# Patient Record
Sex: FEMALE | Race: WHITE | NOT HISPANIC OR LATINO | Employment: OTHER | URBAN - METROPOLITAN AREA
[De-identification: names, ages, dates, MRNs, and addresses within clinical notes are randomized per-mention and may not be internally consistent; named-entity substitution may affect disease eponyms.]

---

## 2017-03-14 DIAGNOSIS — I10 HYPERTENSION GOAL BP (BLOOD PRESSURE) < 140/90: ICD-10-CM

## 2017-03-14 NOTE — LETTER
[facility]                                           [address] [place]  [address]    March 16, 2017    Orquidea Stevens  [address] [address]    Dear Orquidea,       We recently received a refill request for enalapril (VASOTEC) 20 MG tablet.  We have refilled this for a one time 30 day supply only because you are due for a:    Blood pressure check with Dr. Amos and fasting lab appointment      Please schedule this lab appointment 4-5 days prior to the office visit.     Please call at your earliest convenience so that there will not be a delay with your future refills.          Thank you,   Your [facility]/as  [phone]

## 2017-03-15 RX ORDER — ENALAPRIL MALEATE 20 MG/1
20 TABLET ORAL 2 TIMES DAILY
Qty: 60 TABLET | Refills: 0 | Status: SHIPPED | OUTPATIENT
Start: 2017-03-15 | End: 2017-05-09

## 2017-05-03 ENCOUNTER — DOCUMENTATION ONLY (OUTPATIENT)
Dept: LAB | Facility: CLINIC | Age: 77
End: 2017-05-03

## 2017-05-03 DIAGNOSIS — E11.9 TYPE 2 DIABETES MELLITUS WITHOUT COMPLICATION, WITHOUT LONG-TERM CURRENT USE OF INSULIN (H): ICD-10-CM

## 2017-05-03 DIAGNOSIS — I10 HYPERTENSION GOAL BP (BLOOD PRESSURE) < 140/90: ICD-10-CM

## 2017-05-03 DIAGNOSIS — E78.5 HYPERLIPIDEMIA LDL GOAL <100: Primary | ICD-10-CM

## 2017-05-05 DIAGNOSIS — E11.9 TYPE 2 DIABETES MELLITUS WITHOUT COMPLICATION, WITHOUT LONG-TERM CURRENT USE OF INSULIN (H): ICD-10-CM

## 2017-05-05 DIAGNOSIS — E78.5 HYPERLIPIDEMIA LDL GOAL <100: ICD-10-CM

## 2017-05-05 DIAGNOSIS — I10 HYPERTENSION GOAL BP (BLOOD PRESSURE) < 140/90: ICD-10-CM

## 2017-05-05 LAB
ANION GAP SERPL CALCULATED.3IONS-SCNC: 7 MMOL/L (ref 3–14)
BUN SERPL-MCNC: 13 MG/DL (ref 7–30)
CALCIUM SERPL-MCNC: 9.4 MG/DL (ref 8.5–10.1)
CHLORIDE SERPL-SCNC: 104 MMOL/L (ref 94–109)
CHOLEST SERPL-MCNC: 141 MG/DL
CO2 SERPL-SCNC: 30 MMOL/L (ref 20–32)
CREAT SERPL-MCNC: 0.64 MG/DL (ref 0.52–1.04)
CREAT UR-MCNC: 183 MG/DL
GFR SERPL CREATININE-BSD FRML MDRD: ABNORMAL ML/MIN/1.7M2
GLUCOSE SERPL-MCNC: 134 MG/DL (ref 70–99)
HBA1C MFR BLD: 6.7 % (ref 4.3–6)
HDLC SERPL-MCNC: 67 MG/DL
LDLC SERPL CALC-MCNC: 51 MG/DL
MICROALBUMIN UR-MCNC: 16 MG/L
MICROALBUMIN/CREAT UR: 8.69 MG/G CR (ref 0–25)
NONHDLC SERPL-MCNC: 74 MG/DL
POTASSIUM SERPL-SCNC: 3.7 MMOL/L (ref 3.4–5.3)
SODIUM SERPL-SCNC: 141 MMOL/L (ref 133–144)
TRIGL SERPL-MCNC: 116 MG/DL

## 2017-05-05 PROCEDURE — 36415 COLL VENOUS BLD VENIPUNCTURE: CPT | Performed by: FAMILY MEDICINE

## 2017-05-05 PROCEDURE — 82043 UR ALBUMIN QUANTITATIVE: CPT | Performed by: FAMILY MEDICINE

## 2017-05-05 PROCEDURE — 80048 BASIC METABOLIC PNL TOTAL CA: CPT | Performed by: FAMILY MEDICINE

## 2017-05-05 PROCEDURE — 83036 HEMOGLOBIN GLYCOSYLATED A1C: CPT | Performed by: FAMILY MEDICINE

## 2017-05-05 PROCEDURE — 80061 LIPID PANEL: CPT | Performed by: FAMILY MEDICINE

## 2017-05-12 ENCOUNTER — OFFICE VISIT (OUTPATIENT)
Dept: FAMILY MEDICINE | Facility: CLINIC | Age: 77
End: 2017-05-12
Payer: COMMERCIAL

## 2017-05-12 VITALS
BODY MASS INDEX: 24.27 KG/M2 | WEIGHT: 137 LBS | TEMPERATURE: 98.2 F | SYSTOLIC BLOOD PRESSURE: 136 MMHG | DIASTOLIC BLOOD PRESSURE: 60 MMHG | OXYGEN SATURATION: 98 % | HEART RATE: 72 BPM | HEIGHT: 63 IN

## 2017-05-12 DIAGNOSIS — E78.5 HYPERLIPIDEMIA LDL GOAL <100: ICD-10-CM

## 2017-05-12 DIAGNOSIS — G25.81 RESTLESS LEG SYNDROME: ICD-10-CM

## 2017-05-12 DIAGNOSIS — E78.1 HYPERTRIGLYCERIDEMIA: ICD-10-CM

## 2017-05-12 DIAGNOSIS — I10 HYPERTENSION GOAL BP (BLOOD PRESSURE) < 140/90: ICD-10-CM

## 2017-05-12 DIAGNOSIS — E11.9 TYPE 2 DIABETES MELLITUS WITHOUT COMPLICATION, WITHOUT LONG-TERM CURRENT USE OF INSULIN (H): Primary | ICD-10-CM

## 2017-05-12 PROCEDURE — 99214 OFFICE O/P EST MOD 30 MIN: CPT | Performed by: FAMILY MEDICINE

## 2017-05-12 PROCEDURE — 99207 C FOOT EXAM  NO CHARGE: CPT | Mod: 25 | Performed by: FAMILY MEDICINE

## 2017-05-12 RX ORDER — ENALAPRIL MALEATE 20 MG/1
20 TABLET ORAL 2 TIMES DAILY
Qty: 180 TABLET | Refills: 1 | Status: SHIPPED | OUTPATIENT
Start: 2017-05-12 | End: 2017-11-01

## 2017-05-12 RX ORDER — AMLODIPINE BESYLATE 10 MG/1
10 TABLET ORAL DAILY
Qty: 90 TABLET | Refills: 3 | Status: SHIPPED | OUTPATIENT
Start: 2017-05-12 | End: 2018-05-07

## 2017-05-12 RX ORDER — HYDROCHLOROTHIAZIDE 25 MG/1
25 TABLET ORAL DAILY
Qty: 90 TABLET | Refills: 1 | Status: SHIPPED | OUTPATIENT
Start: 2017-05-12 | End: 2017-11-01

## 2017-05-12 RX ORDER — ATORVASTATIN CALCIUM 80 MG/1
80 TABLET, FILM COATED ORAL DAILY
Qty: 90 TABLET | Refills: 3 | Status: SHIPPED | OUTPATIENT
Start: 2017-05-12 | End: 2018-05-07

## 2017-05-12 RX ORDER — GABAPENTIN 300 MG/1
300 CAPSULE ORAL 2 TIMES DAILY
Qty: 180 CAPSULE | Refills: 3 | Status: SHIPPED | OUTPATIENT
Start: 2017-05-12 | End: 2018-04-16

## 2017-05-12 RX ORDER — HYDRALAZINE HYDROCHLORIDE 25 MG/1
25 TABLET, FILM COATED ORAL 2 TIMES DAILY
Qty: 180 TABLET | Refills: 3 | Status: SHIPPED | OUTPATIENT
Start: 2017-05-12 | End: 2018-05-07

## 2017-05-12 NOTE — NURSING NOTE
"Chief Complaint   Patient presents with     Diabetes     Hypertension       Initial /60  Pulse 72  Temp 98.2  F (36.8  C) (Oral)  Ht 5' 2.5\" (1.588 m)  Wt 137 lb (62.1 kg)  SpO2 98%  BMI 24.66 kg/m2 Estimated body mass index is 24.66 kg/(m^2) as calculated from the following:    Height as of this encounter: 5' 2.5\" (1.588 m).    Weight as of this encounter: 137 lb (62.1 kg).  Medication Reconciliation: complete   Lizeth Ibrahim CMA    "

## 2017-05-12 NOTE — MR AVS SNAPSHOT
"              After Visit Summary   5/12/2017    Orquidea Stevens    MRN: [id number]           Patient Information     Date Of Birth          1940        Visit Information        Provider Department      5/12/2017 11:20 AM Lisette Amos MD [facility]        Today's Diagnoses     Type 2 diabetes mellitus without complication, without long-term current use of insulin (H)    -  1    Hypertension goal BP (blood pressure) < 140/90        Hypertriglyceridemia        Restless leg syndrome        Hyperlipidemia LDL goal <100           Follow-ups after your visit        Your next 10 appointments already scheduled     Jun 30, 2017 10:00 AM CDT   New Visit with Lucie Gordillo MD   [facility] ([facility])    [address] [address] [phone]              Who to contact     If you have questions or need follow up information about today's clinic visit or your schedule please contact [facility] directly at [phone].  Normal or non-critical lab and imaging results will be communicated to you by [employer], letter or phone within 4 business days after the clinic has received the results. If you do not hear from us within 7 days, please contact the clinic through [employer] or phone. If you have a critical or abnormal lab result, we will notify you by phone as soon as possible.  Submit refill requests through [employer] or call your pharmacy and they will forward the refill request to us. Please allow 3 business days for your refill to be completed.          Additional Information About Your Visit        [employer] Information     [employer] lets you send messages to your doctor, view your test results, renew your prescriptions, schedule appointments and more. To sign up, go to [email or web address] . Click on \"Log in\" on the left side of the screen, which will take you to the Welcome page. Then click on \"Sign up Now\" on the right side of the page. " "    You will be asked to enter the access code listed below, as well as some personal information. Please follow the directions to create your username and password.     Your access code is: [email or web address]  Expires: 8/10/2017 12:02 PM     Your access code will  in 90 days. If you need help or a new code, please call your [place] clinic or [phone].        Care EveryWhere ID     This is your Care EveryWhere ID. This could be used by other organizations to access your [facility] medical records  [id number]        Your Vitals Were     Pulse Temperature Height Pulse Oximetry BMI (Body Mass Index)       72 98.2  F (36.8  C) (Oral) 5' 2.5\" (1.588 m) 98% 24.66 kg/m2        Blood Pressure from Last 3 Encounters:   17 136/60   10/31/16 118/58   16 138/56    Weight from Last 3 Encounters:   17 137 lb (62.1 kg)   10/31/16 135 lb (61.2 kg)   16 136 lb (61.7 kg)              We Performed the Following     FOOT EXAM          Today's Medication Changes          These changes are accurate as of: 17 12:02 PM.  If you have any questions, ask your nurse or doctor.               These medicines have changed or have updated prescriptions.        Dose/Directions    amLODIPine 10 MG tablet   Commonly known as:  NORVASC   This may have changed:  additional instructions   Used for:  Hypertension goal BP (blood pressure) < 140/90        Dose:  10 mg   Take 1 tablet (10 mg) by mouth daily Will call when needs refills   Quantity:  90 tablet   Refills:  3       atorvastatin 80 MG tablet   Commonly known as:  LIPITOR   This may have changed:  additional instructions   Used for:  Hyperlipidemia LDL goal <100, Hypertriglyceridemia        Dose:  80 mg   Take 1 tablet (80 mg) by mouth daily Will call when needs refill   Quantity:  90 tablet   Refills:  3       enalapril 20 MG tablet   Commonly known as:  VASOTEC   This may have changed:  additional instructions   Used for:  Hypertension goal BP (blood pressure) < " 140/90        Dose:  20 mg   Take 1 tablet (20 mg) by mouth 2 times daily Will call when needs refill   Quantity:  180 tablet   Refills:  1       gabapentin 300 MG capsule   Commonly known as:  NEURONTIN   This may have changed:  additional instructions   Used for:  Restless leg syndrome        Dose:  300 mg   Take 1 capsule (300 mg) by mouth 2 times daily Will call when needs refill   Quantity:  180 capsule   Refills:  3       hydrALAZINE 25 MG tablet   Commonly known as:  APRESOLINE   This may have changed:  additional instructions   Used for:  Hypertension goal BP (blood pressure) < 140/90        Dose:  25 mg   Take 1 tablet (25 mg) by mouth 2 times daily Will call when needs refill   Quantity:  180 tablet   Refills:  3       hydrochlorothiazide 25 MG tablet   Commonly known as:  HYDRODIURIL   This may have changed:  additional instructions   Used for:  Hypertension goal BP (blood pressure) < 140/90        Dose:  25 mg   Take 1 tablet (25 mg) by mouth daily Will call when needs refill   Quantity:  90 tablet   Refills:  1       metFORMIN 500 MG tablet   Commonly known as:  GLUCOPHAGE   This may have changed:  additional instructions   Used for:  Type 2 diabetes mellitus without complication, without long-term current use of insulin (H)        Dose:  500 mg   Take 1 tablet (500 mg) by mouth 2 times daily (with meals) Will call when needs refill   Quantity:  180 tablet   Refills:  1            Where to get your medicines      These medications were sent to [id number] - [place], [place] - [address]  [address] [place] [address]    Hours:  test fax successful 4/5/04 kr Phone:  [phone]     amLODIPine 10 MG tablet    atorvastatin 80 MG tablet    enalapril 20 MG tablet    gabapentin 300 MG capsule    hydrALAZINE 25 MG tablet    hydrochlorothiazide 25 MG tablet    metFORMIN 500 MG tablet                Primary Care Provider Office Phone # Fax #    Lisette Amos [phone]  [phone]       [facility] [address] [address] [facility] [address]        Thank you!     Thank you for choosing [facility]  for your care. Our goal is always to provide you with excellent care. Hearing back from our patients is one way we can continue to improve our services. Please take a few minutes to complete the written survey that you may receive in the mail after your visit with us. Thank you!             Your Updated Medication List - Protect others around you: Learn how to safely use, store and throw away your medicines at www.disposemymeds.org.          This list is accurate as of: 5/12/17 12:02 PM.  Always use your most recent med list.                   Brand Name Dispense Instructions for use    acidophilus Caps      Take 2 capsules by mouth daily.       amLODIPine 10 MG tablet    NORVASC    90 tablet    Take 1 tablet (10 mg) by mouth daily Will call when needs refills       aspirin 325 MG EC tablet     100 Tab    take 1 Tab by mouth daily.       atorvastatin 80 MG tablet    LIPITOR    90 tablet    Take 1 tablet (80 mg) by mouth daily Will call when needs refill       blood glucose monitoring meter device kit    no brand specified    1 kit    Use to test blood sugar 2 times daily or as directed.       CALCIUM 500 PO      2 daily       CRANBERRY      Two tablets daily       enalapril 20 MG tablet    VASOTEC    180 tablet    Take 1 tablet (20 mg) by mouth 2 times daily Will call when needs refill       gabapentin 300 MG capsule    NEURONTIN    180 capsule    Take 1 capsule (300 mg) by mouth 2 times daily Will call when needs refill       hydrALAZINE 25 MG tablet    APRESOLINE    180 tablet    Take 1 tablet (25 mg) by mouth 2 times daily Will call when needs refill       hydrochlorothiazide 25 MG tablet    HYDRODIURIL    90 tablet    Take 1 tablet (25 mg) by mouth daily Will call when needs refill       Lutein 20 MG Caps      Take  by mouth.       metFORMIN 500 MG tablet     GLUCOPHAGE    180 tablet    Take 1 tablet (500 mg) by mouth 2 times daily (with meals) Will call when needs refill       MULTIVITAMINS FC Tabs      1 TABLET DAILY       * ONE TOUCH ULTRA test strip   Generic drug:  blood glucose monitoring     100 Strip    testing 4 times daily       * blood glucose monitoring test strip    no brand specified    3 Box    Use to test blood sugar 2 times daily or as directed.       oxybutynin 5 MG tablet    DITROPAN    180 tablet    Take 2 tablets (10 mg) by mouth daily       * PC LANCETS SUPER THIN 30G Misc     180 each    1 Device 2 times daily       * blood glucose monitoring lancets     3 Box    Use to test blood sugar 2 times daily or as directed.       TURMERIC PO      1 cap daily       Vitamin B-12 500 MCG Subl      2 daily       vitamin D 1000 UNITS capsule      1 CAPSULE DAILY       * Notice:  This list has 4 medication(s) that are the same as other medications prescribed for you. Read the directions carefully, and ask your doctor or other care provider to review them with you.

## 2017-05-30 DIAGNOSIS — R32 INCONTINENCE: ICD-10-CM

## 2017-05-30 RX ORDER — OXYBUTYNIN CHLORIDE 5 MG/1
10 TABLET ORAL DAILY
Qty: 180 TABLET | Refills: 1 | Status: SHIPPED | OUTPATIENT
Start: 2017-05-30 | End: 2017-11-01

## 2017-05-31 DIAGNOSIS — R32 INCONTINENCE: ICD-10-CM

## 2017-05-31 RX ORDER — OXYBUTYNIN CHLORIDE 5 MG/1
10 TABLET ORAL DAILY
Qty: 180 TABLET | Refills: 1 | Status: CANCELLED | OUTPATIENT
Start: 2017-05-31

## 2017-06-30 ENCOUNTER — OFFICE VISIT (OUTPATIENT)
Dept: OPHTHALMOLOGY | Facility: CLINIC | Age: 77
End: 2017-06-30
Payer: COMMERCIAL

## 2017-06-30 DIAGNOSIS — H52.203 ASTIGMATISM OF BOTH EYES, UNSPECIFIED TYPE: ICD-10-CM

## 2017-06-30 DIAGNOSIS — H02.403 PTOSIS OF EYELID, BILATERAL: ICD-10-CM

## 2017-06-30 DIAGNOSIS — Z01.01 ENCOUNTER FOR EXAMINATION OF EYES AND VISION WITH ABNORMAL FINDINGS: Primary | ICD-10-CM

## 2017-06-30 DIAGNOSIS — E11.9 TYPE 2 DIABETES MELLITUS WITHOUT COMPLICATION, WITHOUT LONG-TERM CURRENT USE OF INSULIN (H): ICD-10-CM

## 2017-06-30 DIAGNOSIS — H02.836 DERMATOCHALASIS OF EYELIDS OF BOTH EYES: ICD-10-CM

## 2017-06-30 DIAGNOSIS — H52.4 PRESBYOPIA: ICD-10-CM

## 2017-06-30 DIAGNOSIS — H25.813 COMBINED FORM OF AGE-RELATED CATARACT, BOTH EYES: ICD-10-CM

## 2017-06-30 DIAGNOSIS — H02.833 DERMATOCHALASIS OF EYELIDS OF BOTH EYES: ICD-10-CM

## 2017-06-30 DIAGNOSIS — H52.03 HYPERMETROPIA, BILATERAL: ICD-10-CM

## 2017-06-30 PROCEDURE — 92015 DETERMINE REFRACTIVE STATE: CPT | Performed by: STUDENT IN AN ORGANIZED HEALTH CARE EDUCATION/TRAINING PROGRAM

## 2017-06-30 PROCEDURE — 92014 COMPRE OPH EXAM EST PT 1/>: CPT | Performed by: STUDENT IN AN ORGANIZED HEALTH CARE EDUCATION/TRAINING PROGRAM

## 2017-06-30 RX ORDER — DEXAMETHASONE ACETATE, MICRO 100 %
1 POWDER (GRAM) MISCELLANEOUS 4 TIMES DAILY
Qty: 1 BOTTLE | Refills: 1 | Status: SHIPPED | OUTPATIENT
Start: 2017-08-09 | End: 2019-01-07

## 2017-06-30 RX ORDER — MONOCHLOROACETIC ACID
1 CRYSTALS MISCELLANEOUS 4 TIMES DAILY
Qty: 1 BOTTLE | Refills: 1 | Status: SHIPPED | OUTPATIENT
Start: 2017-08-09 | End: 2017-11-06

## 2017-06-30 RX ORDER — BROMFENAC SODIUM 100 %
1 POWDER (GRAM) MISCELLANEOUS 4 TIMES DAILY
Qty: 1 BOTTLE | Refills: 1 | Status: SHIPPED | OUTPATIENT
Start: 2017-08-09 | End: 2017-11-06

## 2017-06-30 ASSESSMENT — REFRACTION_WEARINGRX
OS_SPHERE: +3.25
SPECS_TYPE: BIFOCAL
OS_ADD: +3.75
OD_CYLINDER: +1.00
OD_AXIS: 084
OD_ADD: +3.50
OD_SPHERE: +3.00
OS_AXIS: 166
OS_CYLINDER: +0.25

## 2017-06-30 ASSESSMENT — VISUAL ACUITY
METHOD: SNELLEN - LINEAR
CORRECTION_TYPE: GLASSES
OS_BAT_HIGH: 20/80
OS_BAT_MED: 20/60
OD_CC: 20/25
OD_CC+: -3
OD_BAT_HIGH: 20/70
OS_CC: 20/30
OD_BAT_MED: 20/40
OS_CC+: -1

## 2017-06-30 ASSESSMENT — REFRACTION_MANIFEST
OD_CYLINDER: SPHERE
OD_ADD: +3.00
OS_ADD: +3.00
OD_SPHERE: +3.25
OS_SPHERE: +3.00
OS_CYLINDER: SPHERE

## 2017-06-30 ASSESSMENT — CUP TO DISC RATIO
OD_RATIO: 0.1
OS_RATIO: 0.1

## 2017-06-30 ASSESSMENT — TONOMETRY
OS_IOP_MMHG: 19
OD_IOP_MMHG: 19
IOP_METHOD: APPLANATION

## 2017-06-30 ASSESSMENT — CONF VISUAL FIELD
OD_NORMAL: 1
OS_NORMAL: 1

## 2017-06-30 ASSESSMENT — SLIT LAMP EXAM - LIDS
COMMENTS: 2+ DERMATOCHALASIS - UPPER LID, 1+ PTOSIS
COMMENTS: 2+ DERMATOCHALASIS - UPPER LID, 1+ PTOSIS

## 2017-06-30 ASSESSMENT — EXTERNAL EXAM - RIGHT EYE: OD_EXAM: NORMAL

## 2017-06-30 ASSESSMENT — EXTERNAL EXAM - LEFT EYE: OS_EXAM: NORMAL

## 2017-06-30 NOTE — MR AVS SNAPSHOT
After Visit Summary   6/30/2017    Orquidea Stevens    MRN: [id number]           Patient Information     Date Of Birth          1940        Visit Information        Provider Department      6/30/2017 10:00 AM Lucie Gordillo MD [facility]        Today's Diagnoses     Encounter for examination of eyes and vision with abnormal findings    -  1    Presbyopia        Hypermetropia, bilateral        Astigmatism of both eyes, unspecified type        Type 2 diabetes mellitus without complication, without long-term current use of insulin (H)        Dermatochalasis of eyelids of both eyes        Ptosis of eyelid, bilateral        Combined form of age-related cataract, both eyes          Care Instructions    Visually significant cataract that is interfering with daily activities of living. Plan for cataract extraction and intraocular lens implant left eye first , then right eye .  Risks, benefits, complications, and alternatives discussed with patient including possibility of limitations from coexistent eye disease and loss of vision. Target refraction and lens options discussed.  Patient understands and wishes to proceed with surgery.    Lucie Gordillo MD  [phone]              Follow-ups after your visit        Who to contact     If you have questions or need follow up information about today's clinic visit or your schedule please contact [facility] directly at [phone].  Normal or non-critical lab and imaging results will be communicated to you by MyChart, letter or phone within 4 business days after the clinic has received the results. If you do not hear from us within 7 days, please contact the clinic through MyChart or phone. If you have a critical or abnormal lab result, we will notify you by phone as soon as possible.  Submit refill requests through [employer] or call your pharmacy and they will forward the refill request to us. Please allow 3 business days  "for your refill to be completed.          Additional Information About Your Visit        MyChart Information     [employer] lets you send messages to your doctor, view your test results, renew your prescriptions, schedule appointments and more. To sign up, go to [email or web address] . Click on \"Log in\" on the left side of the screen, which will take you to the Welcome page. Then click on \"Sign up Now\" on the right side of the page.     You will be asked to enter the access code listed below, as well as some personal information. Please follow the directions to create your username and password.     Your access code is: [email or web address]  Expires: 8/10/2017 12:02 PM     Your access code will  in 90 days. If you need help or a new code, please call your [place] clinic or [phone].        Care EveryWhere ID     This is your [facility] EveryWhere ID. This could be used by other organizations to access your [place] medical records  [id number]         Blood Pressure from Last 3 Encounters:   17 136/60   10/31/16 118/58   16 138/56    Weight from Last 3 Encounters:   17 62.1 kg (137 lb)   10/31/16 61.2 kg (135 lb)   16 61.7 kg (136 lb)              We Performed the Following     EYE EXAM (SIMPLE-NONBILLABLE)     REFRACTIVE STATUS        Primary Care Provider Office Phone # Fax #    Lisette Amos [phone]       [facility] [address] [address] [facility] [address]        Equal Access to Services     [facility]: Hadii aad ku hadasho Soomaali, waaxda luqadaha, qaybta kaalmada adekrishan, efrain jacinto . So [facility] [phone].    ATENCIÓN: Si habla español, tiene a trotter disposición servicios gratuitos de asistencia lingüística. Llame al [phone].    We comply with applicable federal civil rights laws and [place] laws. We do not discriminate on the basis of race, color, national origin, age, disability sex, sexual orientation or gender " identity.            Thank you!     Thank you for choosing [facility]  for your care. Our goal is always to provide you with excellent care. Hearing back from our patients is one way we can continue to improve our services. Please take a few minutes to complete the written survey that you may receive in the mail after your visit with us. Thank you!             Your Updated Medication List - Protect others around you: Learn how to safely use, store and throw away your medicines at www.disposemymeds.org.          This list is accurate as of: 6/30/17 11:28 AM.  Always use your most recent med list.                   Brand Name Dispense Instructions for use Diagnosis    acidophilus Caps      Take 2 capsules by mouth daily.        amLODIPine 10 MG tablet    NORVASC    90 tablet    Take 1 tablet (10 mg) by mouth daily Will call when needs refills    Hypertension goal BP (blood pressure) < 140/90       aspirin 325 MG EC tablet     100 Tab    take 1 Tab by mouth daily.    Type II or unspecified type diabetes mellitus without mention of complication, not stated as uncontrolled       atorvastatin 80 MG tablet    LIPITOR    90 tablet    Take 1 tablet (80 mg) by mouth daily Will call when needs refill    Hyperlipidemia LDL goal <100, Hypertriglyceridemia       blood glucose monitoring meter device kit    no brand specified    1 kit    Use to test blood sugar 2 times daily or as directed.    Type 2 diabetes, HbA1C goal < 8% (H)       CALCIUM 500 PO      2 daily        CRANBERRY      Two tablets daily        enalapril 20 MG tablet    VASOTEC    180 tablet    Take 1 tablet (20 mg) by mouth 2 times daily Will call when needs refill    Hypertension goal BP (blood pressure) < 140/90       gabapentin 300 MG capsule    NEURONTIN    180 capsule    Take 1 capsule (300 mg) by mouth 2 times daily Will call when needs refill    Restless leg syndrome       hydrALAZINE 25 MG tablet    APRESOLINE    180 tablet    Take 1 tablet  (25 mg) by mouth 2 times daily Will call when needs refill    Hypertension goal BP (blood pressure) < 140/90       hydrochlorothiazide 25 MG tablet    HYDRODIURIL    90 tablet    Take 1 tablet (25 mg) by mouth daily Will call when needs refill    Hypertension goal BP (blood pressure) < 140/90       Lutein 20 MG Caps      Take  by mouth.        metFORMIN 500 MG tablet    GLUCOPHAGE    180 tablet    Take 1 tablet (500 mg) by mouth 2 times daily (with meals) Will call when needs refill    Type 2 diabetes mellitus without complication, without long-term current use of insulin (H)       MULTIVITAMINS FC Tabs      1 TABLET DAILY        * ONE TOUCH ULTRA test strip   Generic drug:  blood glucose monitoring     100 Strip    testing 4 times daily    Diabetes mellitus, type 2 (H)       * blood glucose monitoring test strip    no brand specified    3 Box    Use to test blood sugar 2 times daily or as directed.    Type 2 diabetes, HbA1C goal < 8% (H)       oxybutynin 5 MG tablet    DITROPAN    180 tablet    Take 2 tablets (10 mg) by mouth daily    Incontinence       * PC LANCETS SUPER THIN 30G Misc     180 each    1 Device 2 times daily    Type 2 diabetes, HbA1C goal < 8% (H)       * blood glucose monitoring lancets     3 Box    Use to test blood sugar 2 times daily or as directed.    Type 2 diabetes, HbA1C goal < 8% (H)       TURMERIC PO      1 cap daily        Vitamin B-12 500 MCG Subl      2 daily        vitamin D 1000 UNITS capsule      1 CAPSULE DAILY        * Notice:  This list has 4 medication(s) that are the same as other medications prescribed for you. Read the directions carefully, and ask your doctor or other care provider to review them with you.

## 2017-06-30 NOTE — PROGRESS NOTES
Current Eye Medications:  none     Subjective:  Comprehensive Eye Exam.  Patient is here for a Diabetic Eye Exam.  Last A1C was:  6.7 in May.   Vision in both eyes continues to get blurry, distance and near.  Each morning, it takes a long time to focus her eyes.  She feels she is probably ready for cataract surgery.       Objective:  See Ophthalmology Exam.      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        Plan:  Visually significant cataract that is interfering with daily activities of living. Plan for cataract extraction and intraocular lens implant left eye first , then right eye .  Risks, benefits, complications, and alternatives discussed with patient including possibility of limitations from coexistent eye disease and loss of vision. Target refraction and lens options discussed.  Patient understands and wishes to proceed with surgery.    Eye: Left, then right  Pupil: 8  Pseudoexfoliation: No  Flomax: No  Diabetes mellitus: Yes, no DR  Glaucoma: Yes  Guttae: No  S/p refractive surgery or other eye surgery: No  Refractive target:  emmetropia - discuss  Anticoagulation: aspirin  Anesthesia: MAC/topical  Able to lay flat:  Yes  Additional concerns:   Difficulty: 3    Lucie Gordillo MD  [phone]

## 2017-06-30 NOTE — PATIENT INSTRUCTIONS
Visually significant cataract that is interfering with daily activities of living. Plan for cataract extraction and intraocular lens implant left eye first , then right eye .  Risks, benefits, complications, and alternatives discussed with patient including possibility of limitations from coexistent eye disease and loss of vision. Target refraction and lens options discussed.  Patient understands and wishes to proceed with surgery.    Lucie Gordillo MD  [phone]    Diabetes weakens the blood vessels all over the body, including the eyes. Damage to the blood vessels in the eyes can cause swelling or bleeding into part of the eye (called the retina). This is called diabetic retinopathy ([facility]-tin--puh-thee). If not treated, this disease can cause vision loss or blindness.   Symptoms may include blurred or distorted vision, but many people have no symptoms. It's important to see your eye doctor regularly to check for problems.   Early treatment and good control can help protect your vision. Here are the things you can do to help prevent vision loss:      1. Keep your blood sugar levels under tight control.      2. Bring high blood pressure under control.      3. No smoking.      4. Have yearly dilated eye exams.

## 2017-07-31 ENCOUNTER — OFFICE VISIT (OUTPATIENT)
Dept: OPHTHALMOLOGY | Facility: CLINIC | Age: 77
End: 2017-07-31
Payer: COMMERCIAL

## 2017-07-31 DIAGNOSIS — H25.813 COMBINED FORM OF AGE-RELATED CATARACT, BOTH EYES: Primary | ICD-10-CM

## 2017-07-31 PROBLEM — E78.00 PURE HYPERCHOLESTEROLEMIA: Status: ACTIVE | Noted: 2017-07-31

## 2017-07-31 PROCEDURE — 92136 OPHTHALMIC BIOMETRY: CPT | Mod: TC | Performed by: STUDENT IN AN ORGANIZED HEALTH CARE EDUCATION/TRAINING PROGRAM

## 2017-07-31 PROCEDURE — 92012 INTRM OPH EXAM EST PATIENT: CPT | Performed by: STUDENT IN AN ORGANIZED HEALTH CARE EDUCATION/TRAINING PROGRAM

## 2017-07-31 ASSESSMENT — REFRACTION_WEARINGRX
OD_ADD: +3.50
OS_CYLINDER: +0.25
OD_CYLINDER: +1.00
OS_AXIS: 166
OS_ADD: +3.75
OD_AXIS: 084
OD_SPHERE: +3.00
SPECS_TYPE: BIFOCAL
OS_SPHERE: +3.25

## 2017-07-31 ASSESSMENT — VISUAL ACUITY
OD_CC+: -2
CORRECTION_TYPE: GLASSES
OS_CC+: +2
OS_CC: 20/30
METHOD: SNELLEN - LINEAR
OD_CC: 20/30

## 2017-07-31 ASSESSMENT — EXTERNAL EXAM - LEFT EYE: OS_EXAM: NORMAL

## 2017-07-31 ASSESSMENT — EXTERNAL EXAM - RIGHT EYE: OD_EXAM: NORMAL

## 2017-07-31 NOTE — MR AVS SNAPSHOT
After Visit Summary   7/31/2017    Orquidea Stevens    MRN: [id number]           Patient Information     Date Of Birth          1940        Visit Information        Provider Department      7/31/2017 2:45 PM Lucie Gordillo MD [facility]        Today's Diagnoses     Combined form of age-related cataract, both eyes    -  1      Care Instructions    PRE-OP CATARACT INSTRUCTIONS    *Use the following drops in the left eye 4 times on the day before surgery and once the morning of surgery:                                 CatarActive3    *Please bring all your eyedrops to the surgery center.*    *If taking more than one drop, wait five minutes between drops.    *No solid food or drink after midnight.    *If you are taking glaucoma drops, continue as usual.    Lucie Gordillo MD  [phone]          Follow-ups after your visit        Your next 10 appointments already scheduled     Aug 01, 2017  9:20 AM CDT   Pre-Op physical with Lisette Amos MD   [facility] ([facility])    [address] [address] [facility] [id number] [phone]            Aug 10, 2017   Procedure with Lucie Gordillo MD   [facility] (--)    [address]  [facility] [address] [phone]            Aug 11, 2017  8:45 AM CDT   Return Visit with Lucie Gordillo MD   [facility] ([facility])    [address] [address]   [phone]            Aug 17, 2017 10:45 AM CDT   [facility]    [address] [address]   [phone]            Aug 22, 2017   [facility]    [address]  [facility] [address] [phone]            Aug 23, 2017  3:45 PM CDT   Return Visit with Lucie Gordillo MD   [facility]  "[place] ([facility])    [address]  [place] [address]   [phone]            Aug 30, 2017  1:45 PM CDT   Return Visit with Lucie Gordillo MD   [facility] ([address]  [place] [address]   [phone]            Sep 27, 2017  1:45 PM CDT   Return Visit with Lucie Gordillo MD   [facility] ([facility])    [address]  [place] [address]   [phone]              Who to contact     If you have questions or need follow up information about today's clinic visit or your schedule please contact [facility] directly at [phone].  Normal or non-critical lab and imaging results will be communicated to you by MyChart, letter or phone within 4 business days after the clinic has received the results. If you do not hear from us within 7 days, please contact the clinic through MyChart or phone. If you have a critical or abnormal lab result, we will notify you by phone as soon as possible.  Submit refill requests through [employer] or call your pharmacy and they will forward the refill request to us. Please allow 3 business days for your refill to be completed.          Additional Information About Your Visit        MyChart Information     [employer] lets you send messages to your doctor, view your test results, renew your prescriptions, schedule appointments and more. To sign up, go to [email or web address] . Click on \"Log in\" on the left side of the screen, which will take you to the Welcome page. Then click on \"Sign up Now\" on the right side of the page.     You will be asked to enter the access code listed below, as well as some personal information. Please follow the directions to create your username and password.     Your access code is: [email or web address]  Expires: 8/10/2017 12:02 PM     Your access code will  in 90 days. If you need help or a new code, please call your " [facility] or [phone].        Care EveryWhere ID     This is your Care EveryWhere ID. This could be used by other organizations to access your [facility] [place] medical records  [id number]         Blood Pressure from Last 3 Encounters:   05/12/17 136/60   10/31/16 118/58   07/28/16 138/56    Weight from Last 3 Encounters:   05/12/17 62.1 kg (137 lb)   10/31/16 61.2 kg (135 lb)   07/28/16 61.7 kg (136 lb)              We Performed the Following     IOL MASTER (both eyes)     OPHTHALMIC BIOMETY W IOL POWER CALC        Primary Care Provider Office Phone # Fax #    Lisette Amos [phone]       [facility] [address] [facility] [address]        Equal Access to Services     FIORDALIZA JACKMAN : Hadii aad ku hadasho Soomaali, waaxda luqadaha, qaybta kaalmada adeegyada, waxramesh jacinto . So [facility] [phone].    ATENCIÓN: Si habla español, tiene a trotter disposición servicios gratuitos de asistencia lingüística. Nicholas al [phone].    We comply with applicable federal civil rights laws and [place] laws. We do not discriminate on the basis of race, color, national origin, age, disability sex, sexual orientation or gender identity.            Thank you!     Thank you for choosing [facility]  for your care. Our goal is always to provide you with excellent care. Hearing back from our patients is one way we can continue to improve our services. Please take a few minutes to complete the written survey that you may receive in the mail after your visit with us. Thank you!             Your Updated Medication List - Protect others around you: Learn how to safely use, store and throw away your medicines at www.disposemymeds.org.          This list is accurate as of: 7/31/17  3:38 PM.  Always use your most recent med list.                   Brand Name Dispense Instructions for use Diagnosis    acidophilus Caps      Take 2 capsules by mouth daily.         amLODIPine 10 MG tablet    NORVASC    90 tablet    Take 1 tablet (10 mg) by mouth daily Will call when needs refills    Hypertension goal BP (blood pressure) < 140/90       aspirin 325 MG EC tablet     100 Tab    take 1 Tab by mouth daily.    Type II or unspecified type diabetes mellitus without mention of complication, not stated as uncontrolled       atorvastatin 80 MG tablet    LIPITOR    90 tablet    Take 1 tablet (80 mg) by mouth daily Will call when needs refill    Hyperlipidemia LDL goal <100, Hypertriglyceridemia       blood glucose monitoring meter device kit    no brand specified    1 kit    Use to test blood sugar 2 times daily or as directed.    Type 2 diabetes, HbA1C goal < 8% (H)       Bromfenac Sodium Powd   Start taking on:  8/9/2017     1 Bottle    1 Bottle 4 times daily 1 drop four times a day into the operative eye starting 1 day before surgery. Use until bottle runs out.    Combined form of age-related cataract, both eyes       CALCIUM 500 PO      2 daily        CRANBERRY      Two tablets daily        Dexamethasone Acetate Powd   Start taking on:  8/9/2017     1 Bottle    1 Bottle 4 times daily 1 drop four times a day into the operative eye starting 1 day before surgery. Use until bottle runs out.    Combined form of age-related cataract, both eyes       enalapril 20 MG tablet    VASOTEC    180 tablet    Take 1 tablet (20 mg) by mouth 2 times daily Will call when needs refill    Hypertension goal BP (blood pressure) < 140/90       gabapentin 300 MG capsule    NEURONTIN    180 capsule    Take 1 capsule (300 mg) by mouth 2 times daily Will call when needs refill    Restless leg syndrome       hydrALAZINE 25 MG tablet    APRESOLINE    180 tablet    Take 1 tablet (25 mg) by mouth 2 times daily Will call when needs refill    Hypertension goal BP (blood pressure) < 140/90       hydrochlorothiazide 25 MG tablet    HYDRODIURIL    90 tablet    Take 1 tablet (25 mg) by mouth daily Will call when needs  refill    Hypertension goal BP (blood pressure) < 140/90       Lutein 20 MG Caps      Take  by mouth.        metFORMIN 500 MG tablet    GLUCOPHAGE    180 tablet    Take 1 tablet (500 mg) by mouth 2 times daily (with meals) Will call when needs refill    Type 2 diabetes mellitus without complication, without long-term current use of insulin (H)       Moxifloxacin HCl Powd   Start taking on:  8/9/2017     1 Bottle    1 Bottle 4 times daily 1 drop four times a day into the operative eye starting 1 day before surgery. Use until bottle runs out.    Combined form of age-related cataract, both eyes       MULTIVITAMINS FC Tabs      1 TABLET DAILY        * ONE TOUCH ULTRA test strip   Generic drug:  blood glucose monitoring     100 Strip    testing 4 times daily    Diabetes mellitus, type 2 (H)       * blood glucose monitoring test strip    no brand specified    3 Box    Use to test blood sugar 2 times daily or as directed.    Type 2 diabetes, HbA1C goal < 8% (H)       oxybutynin 5 MG tablet    DITROPAN    180 tablet    Take 2 tablets (10 mg) by mouth daily    Incontinence       * PC LANCETS SUPER THIN 30G Misc     180 each    1 Device 2 times daily    Type 2 diabetes, HbA1C goal < 8% (H)       * blood glucose monitoring lancets     3 Box    Use to test blood sugar 2 times daily or as directed.    Type 2 diabetes, HbA1C goal < 8% (H)       TURMERIC PO      1 cap daily        Vitamin B-12 500 MCG Subl      2 daily        vitamin D 1000 UNITS capsule      1 CAPSULE DAILY        * Notice:  This list has 4 medication(s) that are the same as other medications prescribed for you. Read the directions carefully, and ask your doctor or other care provider to review them with you.

## 2017-07-31 NOTE — PROGRESS NOTES
Current Eye Medications: none     Subjective: Here for preop left eye, scheduled for Aug 10 at 9 am.   Given art. Tears to practice at home with, has a hard time with drops. (you did her 's cataract surgery 2 yrs ago). Has cataractive3 drops at home.       Objective:  See Ophthalmology Exam.       Assessment:  Orquidea Stevens is a 76 year old female who presents with:   Encounter Diagnosis   Name Primary?     Combined form of age-related cataract, both eyes Target mostly distance left eye. Discussed likely need for glasses in the distance given astigmatism.       Plan:  PRE-OP CATARACT INSTRUCTIONS    *Use the following drops in the left eye 4 times on the day before surgery and once the morning of surgery:      CatarActive3    *Please bring all your eyedrops to the surgery center.*  *If taking more than one drop, wait five minutes between drops.  *No solid food or drink after midnight.  *If you are taking glaucoma drops, continue as usual.    Lucie Gordillo MD  [phone]

## 2017-07-31 NOTE — PATIENT INSTRUCTIONS
PRE-OP CATARACT INSTRUCTIONS    *Use the following drops in the left eye 4 times on the day before surgery and once the morning of surgery:                                 CatarActive3    *Please bring all your eyedrops to the surgery center.*    *If taking more than one drop, wait five minutes between drops.    *No solid food or drink after midnight.    *If you are taking glaucoma drops, continue as usual.    Lucie Gordillo MD  [phone]

## 2017-08-01 ENCOUNTER — OFFICE VISIT (OUTPATIENT)
Dept: FAMILY MEDICINE | Facility: CLINIC | Age: 77
End: 2017-08-01
Payer: COMMERCIAL

## 2017-08-01 VITALS
SYSTOLIC BLOOD PRESSURE: 136 MMHG | HEART RATE: 58 BPM | BODY MASS INDEX: 24.45 KG/M2 | TEMPERATURE: 97.7 F | HEIGHT: 63 IN | WEIGHT: 138 LBS | DIASTOLIC BLOOD PRESSURE: 62 MMHG

## 2017-08-01 DIAGNOSIS — Z01.818 PREOP GENERAL PHYSICAL EXAM: Primary | ICD-10-CM

## 2017-08-01 DIAGNOSIS — E11.9 TYPE 2 DIABETES MELLITUS WITHOUT COMPLICATION, WITHOUT LONG-TERM CURRENT USE OF INSULIN (H): ICD-10-CM

## 2017-08-01 DIAGNOSIS — I10 ESSENTIAL HYPERTENSION: ICD-10-CM

## 2017-08-01 DIAGNOSIS — I25.10 CORONARY ARTERY DISEASE INVOLVING NATIVE CORONARY ARTERY OF NATIVE HEART WITHOUT ANGINA PECTORIS: ICD-10-CM

## 2017-08-01 DIAGNOSIS — H26.9 CATARACT OF BOTH EYES, UNSPECIFIED CATARACT TYPE: ICD-10-CM

## 2017-08-01 LAB
ANION GAP SERPL CALCULATED.3IONS-SCNC: 7 MMOL/L (ref 3–14)
BUN SERPL-MCNC: 13 MG/DL (ref 7–30)
CALCIUM SERPL-MCNC: 9.2 MG/DL (ref 8.5–10.1)
CHLORIDE SERPL-SCNC: 105 MMOL/L (ref 94–109)
CO2 SERPL-SCNC: 31 MMOL/L (ref 20–32)
CREAT SERPL-MCNC: 0.54 MG/DL (ref 0.52–1.04)
GFR SERPL CREATININE-BSD FRML MDRD: ABNORMAL ML/MIN/1.7M2
GLUCOSE SERPL-MCNC: 167 MG/DL (ref 70–99)
POTASSIUM SERPL-SCNC: 3.5 MMOL/L (ref 3.4–5.3)
SODIUM SERPL-SCNC: 143 MMOL/L (ref 133–144)

## 2017-08-01 PROCEDURE — 80048 BASIC METABOLIC PNL TOTAL CA: CPT | Performed by: FAMILY MEDICINE

## 2017-08-01 PROCEDURE — 99214 OFFICE O/P EST MOD 30 MIN: CPT | Performed by: FAMILY MEDICINE

## 2017-08-01 PROCEDURE — 36415 COLL VENOUS BLD VENIPUNCTURE: CPT | Performed by: FAMILY MEDICINE

## 2017-08-01 NOTE — PROGRESS NOTES
[facility]  [address] [facility] [address]  [phone]  Dept: [phone]    PRE-OP EVALUATION:  Today's date: 2017    Orquidea Stevens (: 1940) presents for pre-operative evaluation assessment as requested by Dr. Gordilol.  She requires evaluation and anesthesia risk assessment prior to undergoing surgery/procedure for treatment of cataract .  Proposed procedure: removal of cataracts     Date of Surgery/ Procedure: Aug. 10  Time of Surgery/ Procedure: 9 am   Hospital/Surgical Facility: [facility]   Fax number for surgical facility:   Primary Physician: Lisette Amos  Type of Anesthesia Anticipated: to be determined    Patient has a Health Care Directive or Living Will:  NO    1. NO - Do you have a history of heart attack, stroke, stent, bypass or surgery on an artery in the head, neck, heart or legs?  2. NO - Do you ever have any pain or discomfort in your chest?  3. NO - Do you have a history of  Heart Failure?  4. NO - Are you troubled by shortness of breath when: walking on the level, up a slight hill or at night?  5. NO - Do you currently have a cold, bronchitis or other respiratory infection?  6. NO - Do you have a cough, shortness of breath or wheezing?  7. NO - Do you sometimes get pains in the calves of your legs when you walk?  8. YES - DO YOU OR ANYONE IN YOUR FAMILY HAVE PREVIOUS HISTORY OF BLOOD CLOTS? Sister with DVT  9. NO - Do you or does anyone in your family have a serious bleeding problem such as prolonged bleeding following surgeries or cuts?  10. NO - Have you ever had problems with anemia or been told to take iron pills?  11. NO - Have you had any abnormal blood loss such as black, tarry or bloody stools, or abnormal vaginal bleeding?  12. NO - Have you ever had a blood transfusion?  13. NO - Have you or any of your relatives ever had problems with anesthesia?  14. YES - DO YOU HAVE SLEEP APNEA, EXCESSIVE SNORING OR DAYTIME DROWSINESS? Snoring per    15. NO - Do you have any prosthetic heart valves?  16. NO - Do you have prosthetic joints?  17. NO - Is there any chance that you may be pregnant?        HPI:                                                      Brief HPI related to upcoming procedure: bilateral cataract removal      DIABETES - Patient has a longstanding history of DiabetesType Type II . Patient is being treated with oral agents and denies significant side effects. Control has been good. Complicating factors include but are not limited to: cardiac ,, high cholesterol on statin and HTN well controlled.                                                                                                             .  HYPERTENSION - Patient has longstanding history of mod-severe HTN , currently denies any symptoms referable to elevated blood pressure. Specifically denies chest pain, palpitations, dyspnea, orthopnea, PND or peripheral edema. Blood pressure readings have been in normal range. Current medication regimen is as listed below. Patient denies any side effects of medication.                                                                                                                                                                                          .  HYPERLIPIDEMIA - Patient has a long history of significant Hyperlipidemia requiring medication for treatment with recent good control. Patient reports no problems or side effects with the medication.                                                                                                                                                       .  CAD - Patient has a longstanding history of mod-severe CAD. Patient denies recent chest pain or NTG use, denies exercise induced dyspnea or PND. Last Stress test                                                                                                                      .    MEDICAL HISTORY:                                                     Patient Active Problem List    Diagnosis Date Noted     Pure hypercholesterolemia 07/31/2017     Priority: Medium     Type 2 diabetes mellitus without complication, without long-term current use of insulin (H) 05/03/2017     Priority: Medium     Combined form of age-related cataract, both eyes 06/23/2016     Priority: Medium     Hypertriglyceridemia 12/11/2013     Priority: Medium     Restless leg syndrome 08/15/2013     Priority: Medium     Dermatochalasis 12/03/2012     Priority: Medium     Ptosis of eyelid. OU 12/03/2012     Priority: Medium     Atrophic vaginitis 03/13/2012     Priority: Medium     Diagnosis updated by automated process. Provider to review and confirm.       Essential hypertension      Priority: Medium     GERD (gastroesophageal reflux disease)      Priority: Medium     TYPE 2 DIABETES, HBA1C GOAL < 8% 10/31/2010     Priority: Medium     HYPERLIPIDEMIA LDL GOAL <100 10/31/2010     Priority: Medium     Allergic state      Priority: Medium     (Problem list name updated by automated process. Provider to review and confirm.)       Actinic keratosis      Priority: Medium     Spastic neurogenic bladder      Priority: Medium     Rosacea      Priority: Medium     History of squamous cell carcinoma of skin 12/03/2009     Priority: Medium     IBS (irritable bowel syndrome)      Priority: Medium     Urinary incontinence      Priority: Medium     Fatty liver      Priority: Medium     CAD (coronary artery disease)      Priority: Medium     1997 angiogram showed 60% LAD stenosis       Squamous cell carcinoma 10/27/2008     Priority: Medium     R.Nasal Sidewall       Tear of medial cartilage or meniscus of knee, current 10/02/2006     Priority: Medium     Advanced directives, counseling/discussion 05/23/2011     Priority: Low     Discussed Advance Directive planning with patient; however, patient declined at this time.         Past Medical History:   Diagnosis Date     Actinic keratosis      Actinic  keratosis      Allergies      Arthritis      CAD (coronary artery disease)      Cataract      Diabetes mellitus type II      Fatty liver      GERD (gastroesophageal reflux disease)      HH (hiatus hernia)      Hyperlipidaemia LDL goal <100      Hypertension goal BP (blood pressure) < 140/90      IBS (irritable bowel syndrome)      Rosacea      Spastic neurogenic bladder      Squamous cell carcinoma 10/27/2008    R. Nasal Sidewall     Type 2 diabetes, HbA1C goal < 8% (H) 10/31/2010     Urinary incontinence      Vitamin D deficiency      Past Surgical History:   Procedure Laterality Date     ANGIOGRAM  1999    50% Blockage     ARTHROSCOPY KNEE RT/LT  9/04     BIOPSY BREAST  2001    RT     BLADDER SURGERY  1988     HC REDUCTION OF LARGE BREAST  1988     HYSTERECTOMY, PAP NO LONGER INDICATED  2003     MOHS MICROGRAPHIC PROCEDURE      RT nose SCC     Current Outpatient Prescriptions   Medication Sig Dispense Refill     [START ON 8/9/2017] Bromfenac Sodium POWD 1 Bottle 4 times daily 1 drop four times a day into the operative eye starting 1 day before surgery. Use until bottle runs out. 1 Bottle 1     [START ON 8/9/2017] Dexamethasone Acetate POWD 1 Bottle 4 times daily 1 drop four times a day into the operative eye starting 1 day before surgery. Use until bottle runs out. 1 Bottle 1     [START ON 8/9/2017] Moxifloxacin HCl POWD 1 Bottle 4 times daily 1 drop four times a day into the operative eye starting 1 day before surgery. Use until bottle runs out. 1 Bottle 1     oxybutynin (DITROPAN) 5 MG tablet Take 2 tablets (10 mg) by mouth daily 180 tablet 1     metFORMIN (GLUCOPHAGE) 500 MG tablet Take 1 tablet (500 mg) by mouth 2 times daily (with meals) Will call when needs refill 180 tablet 1     hydrochlorothiazide (HYDRODIURIL) 25 MG tablet Take 1 tablet (25 mg) by mouth daily Will call when needs refill 90 tablet 1     amLODIPine (NORVASC) 10 MG tablet Take 1 tablet (10 mg) by mouth daily Will call when needs refills 90  tablet 3     gabapentin (NEURONTIN) 300 MG capsule Take 1 capsule (300 mg) by mouth 2 times daily Will call when needs refill 180 capsule 3     hydrALAZINE (APRESOLINE) 25 MG tablet Take 1 tablet (25 mg) by mouth 2 times daily Will call when needs refill 180 tablet 3     atorvastatin (LIPITOR) 80 MG tablet Take 1 tablet (80 mg) by mouth daily Will call when needs refill 90 tablet 3     enalapril (VASOTEC) 20 MG tablet Take 1 tablet (20 mg) by mouth 2 times daily Will call when needs refill 180 tablet 1     TURMERIC PO 1 cap daily       blood glucose monitoring (NO BRAND SPECIFIED) test strip Use to test blood sugar 2 times daily or as directed. 3 Box prn     PC LANCETS SUPER THIN 30G MISC 1 Device 2 times daily 180 each prn     blood glucose meter (NO BRAND SPECIFIED) meter device kit Use to test blood sugar 2 times daily or as directed. 1 kit 0     blood glucose monitoring (ACCU-CHEK MULTICLIX) lancets Use to test blood sugar 2 times daily or as directed. 3 Box prn     CRANBERRY Two tablets daily       Lutein 20 MG CAPS Take  by mouth.       Lactobacillus (ACIDOPHILUS) CAPS Take 2 capsules by mouth daily.       aspirin 325 MG EC tablet take 1 Tab by mouth daily. 100 Tab 3     ONETOUCH ULTRA TEST VI STRP testing 4 times daily 100 Strip 7     CALCIUM 500 OR 2 daily       MULTIVITAMINS FC OR TABS 1 TABLET DAILY       VITAMIN D 1000 UNIT OR CAPS 1 CAPSULE DAILY       VITAMIN B-12 500 MCG SL SUBL 2 daily       OTC products: None, except as noted above    Allergies   Allergen Reactions     Atorvastatin Muscle Pain (Myalgia)     Detrol [Tolterodine Tartrate]      Unsure reaction     Latex Rash     Lovastatin Muscle Pain (Myalgia)     in legs     Simvastatin Muscle Pain (Myalgia)     in legs     Zocor [Hmg-Coa-R Inhibitors] Cramps      Latex Allergy: YES: Precautions to take: rash in areas of contact    Social History   Substance Use Topics     Smoking status: Never Smoker     Smokeless tobacco: Never Used      Comment:  "Lives in smoke free household     Alcohol use No     History   Drug Use No       REVIEW OF SYSTEMS:                                                    Constitutional, neuro, ENT, endocrine, pulmonary, cardiac, gastrointestinal, genitourinary, musculoskeletal, integument and psychiatric systems are negative, except as otherwise noted.      EXAM:                                                    /62  Pulse 58  Temp 97.7  F (36.5  C) (Oral)  Ht 5' 3.25\" (1.607 m)  Wt 138 lb (62.6 kg)  BMI 24.25 kg/m2    GENERAL APPEARANCE: healthy, alert and no distress     EYES: EOMI, PERRL     HENT: ear canals and TM's normal and nose and mouth without ulcers or lesions     NECK: no adenopathy, no asymmetry, masses, or scars and thyroid normal to palpation     RESP: lungs clear to auscultation - no rales, rhonchi or wheezes     CV: regular rates and rhythm, normal S1 S2, no S3 or S4 and no murmur, click or rub     ABDOMEN:  soft, nontender, no HSM or masses and bowel sounds normal     MS: extremities normal- no gross deformities noted, no evidence of inflammation in joints, FROM in all extremities.     PSYCH: mentation appears normal. and affect normal/bright    DIAGNOSTICS:                                                    No EKG required for low risk surgery (cataract, skin procedure, breast biopsy, etc)    BMP pending        IMPRESSION:                                                    Reason for surgery/procedure: cataracts removal    The proposed surgical procedure is considered LOW risk.    REVISED CARDIAC RISK INDEX  The patient has the following serious cardiovascular risks for perioperative complications such as (MI, PE, VFib and 3  AV Block):  No serious cardiac risks  INTERPRETATION: 1 risks: Class II (low risk - 0.9% complication rate)    The patient has the following additional risks for perioperative complications:  No identified additional risks      ICD-10-CM    1. Preop general physical exam Z01.818 " Basic metabolic panel   2. Cataract of both eyes, unspecified cataract type H26.9    3. Type 2 diabetes mellitus without complication, without long-term current use of insulin (H) E11.9    4. Essential hypertension I10    5. Coronary artery disease involving native coronary artery of native heart without angina pectoris I25.10        RECOMMENDATIONS:                                                      (Z01.818) Preop general physical exam  (primary encounter diagnosis)  Comment:   Plan: Basic metabolic panel            (H26.9) Cataract of both eyes, unspecified cataract type  Comment:   Plan:     (E11.9) Type 2 diabetes mellitus without complication, without long-term current use of insulin (H)  Comment: well controlled on meds,   Plan:     (I10) Essential hypertension  Comment: at goal  Plan:     (I25.10) Coronary artery disease involving native coronary artery of native heart without angina pectoris  Comment: stable  Plan:         --Patient is to take all scheduled medications on the day of surgery EXCEPT for modifications listed below.  Can take all meds    APPROVAL GIVEN to proceed with proposed procedure, without further diagnostic evaluation       Signed Electronically by: Lisette Agustin MD    Copy of this evaluation report is provided to requesting physician.    [facility] Preop Guidelines

## 2017-08-01 NOTE — LETTER
[facility]  [address] [facility] [address] [phone]        August 2, 2017    Orquidea Stevens  [address] [facility] [address]            Dear Orquidea,    The labs completed for your pre-op are normal.    Lisette Agustin    If you have any questions or concerns, please call myself or my nurse at [phone].    Sincerely,    Lisette Agustin MD/allyn      Results for orders placed or performed in visit on 08/01/17   Basic metabolic panel   Result Value Ref Range    Sodium 143 133 - 144 mmol/L    Potassium 3.5 3.4 - 5.3 mmol/L    Chloride 105 94 - 109 mmol/L    Carbon Dioxide 31 20 - 32 mmol/L    Anion Gap 7 3 - 14 mmol/L    Glucose 167 (H) 70 - 99 mg/dL    Urea Nitrogen 13 7 - 30 mg/dL    Creatinine 0.54 0.52 - 1.04 mg/dL    GFR Estimate >90  Non  GFR Calc   >60 mL/min/1.7m2    GFR Estimate If Black >90   GFR Calc   >60 mL/min/1.7m2    Calcium 9.2 8.5 - 10.1 mg/dL

## 2017-08-01 NOTE — MR AVS SNAPSHOT
After Visit Summary   8/1/2017    Orquidea Stevens    MRN: [id number]           Patient Information     Date Of Birth          1940        Visit Information        Provider Department      8/1/2017 9:20 AM Lisette Amos MD [facility]        Today's Diagnoses     Preop general physical exam    -  1    Cataract of both eyes, unspecified cataract type        Type 2 diabetes mellitus without complication, without long-term current use of insulin (H)        Essential hypertension        Coronary artery disease involving native coronary artery of native heart without angina pectoris          Care Instructions      Before Your Surgery      Call your surgeon if there is any change in your health. This includes signs of a cold or flu (such as a sore throat, runny nose, cough, rash or fever).    Do not smoke, drink alcohol or take over the counter medicine (unless your surgeon or primary care doctor tells you to) for the 24 hours before and after surgery.    If you take prescribed drugs: Follow your doctor s orders about which medicines to take and which to stop until after surgery.    Eating and drinking prior to surgery: follow the instructions from your surgeon    Take a shower or bath the night before surgery. Use the soap your surgeon gave you to gently clean your skin. If you do not have soap from your surgeon, use your regular soap. Do not shave or scrub the surgery site.  Wear clean pajamas and have clean sheets on your bed.           Follow-ups after your visit        Your next 10 appointments already scheduled     Aug 10, 2017   Procedure with Lucie Gordillo MD   [facility] (--)    [address], [address]  [facility] [address]   [phone]            Aug 11, 2017  8:45 AM CDT   Return Visit with Lucie Gordillo MD   [facility] ([facility]    [address] [address]   [phone]            Aug 17,  "2017 10:45 AM CDT   Return Visit with Lucie Gordillo MD   [facility]    [address] [address]   [phone]            Aug 22, 2017   Procedure with Lucie Gordillo MD   [facility] (--)    [address]  [facility] [address]   [phone]            Aug 23, 2017  3:45 PM CDT   Return Visit with Lucie Gordillo MD   [facility]    [address] [place] [address]   [phone]            Aug 30, 2017  1:45 PM CDT   Return Visit with Lucie Gordillo MD   [facility] ([facility]    [address] [place] [address]   [phone]            Sep 27, 2017  1:45 PM CDT   Return Visit with Lucie Gordillo MD   [facility] ([facility]    [address] [place] [address]   [phone]              Who to contact     If you have questions or need follow up information about today's clinic visit or your schedule please contact [facility] directly at [phone].  Normal or non-critical lab and imaging results will be communicated to you by MyChart, letter or phone within 4 business days after the clinic has received the results. If you do not hear from us within 7 days, please contact the clinic through Snapsorthart or phone. If you have a critical or abnormal lab result, we will notify you by phone as soon as possible.  Submit refill requests through [employer] or call your pharmacy and they will forward the refill request to us. Please allow 3 business days for your refill to be completed.          Additional Information About Your Visit        [facility] Information     [employer]-Wave lets you send messages to your doctor, view your test results, renew your prescriptions, schedule appointments and more. To sign up, go to [email or web address] . Click on \"Log in\" on the left " "side of the screen, which will take you to the Welcome page. Then click on \"Sign up Now\" on the right side of the page.     You will be asked to enter the access code listed below, as well as some personal information. Please follow the directions to create your username and password.     Your access code is: [email or web address]  Expires: 8/10/2017 12:02 PM     Your access code will  in 90 days. If you need help or a new code, please call your [facility] clinic or [phone].        Care EveryWhere ID     This is your Care EveryWhere ID. This could be used by other organizations to access your [facility] medical records  [id number]        Your Vitals Were     Pulse Temperature Height BMI (Body Mass Index)          58 97.7  F (36.5  C) (Oral) 5' 3.25\" (1.607 m) 24.25 kg/m2         Blood Pressure from Last 3 Encounters:   17 136/62   17 136/60   10/31/16 118/58    Weight from Last 3 Encounters:   17 138 lb (62.6 kg)   17 137 lb (62.1 kg)   10/31/16 135 lb (61.2 kg)              We Performed the Following     Basic metabolic panel        Primary Care Provider Office Phone # Fax #    Lisette Amos [phone]       [facility] [address] [address]        Equal Access to Services     FIORDALIZA JACKMAN AH: Hadii shirley hando Sokunal, waaxda luqadaha, qaybta kaalmada ademarco antonioyada, efrain victoria. So [facility] [phone].    ATENCIÓN: Si habla español, tiene a trotter disposición servicios gratuitos de asistencia lingüística. Llame al [phone].    We comply with applicable federal civil rights laws and [place] laws. We do not discriminate on the basis of race, color, national origin, age, disability sex, sexual orientation or gender identity.            Thank you!     Thank you for choosing [facility]  for your care. Our goal is always to provide you with excellent care. Hearing back from our patients is one way we can " continue to improve our services. Please take a few minutes to complete the written survey that you may receive in the mail after your visit with us. Thank you!             Your Updated Medication List - Protect others around you: Learn how to safely use, store and throw away your medicines at www.disposemymeds.org.          This list is accurate as of: 8/1/17 10:09 AM.  Always use your most recent med list.                   Brand Name Dispense Instructions for use Diagnosis    acidophilus Caps      Take 2 capsules by mouth daily.        amLODIPine 10 MG tablet    NORVASC    90 tablet    Take 1 tablet (10 mg) by mouth daily Will call when needs refills    Hypertension goal BP (blood pressure) < 140/90       aspirin 325 MG EC tablet     100 Tab    take 1 Tab by mouth daily.    Type II or unspecified type diabetes mellitus without mention of complication, not stated as uncontrolled       atorvastatin 80 MG tablet    LIPITOR    90 tablet    Take 1 tablet (80 mg) by mouth daily Will call when needs refill    Hyperlipidemia LDL goal <100, Hypertriglyceridemia       blood glucose monitoring meter device kit    no brand specified    1 kit    Use to test blood sugar 2 times daily or as directed.    Type 2 diabetes, HbA1C goal < 8% (H)       Bromfenac Sodium Powd   Start taking on:  8/9/2017     1 Bottle    1 Bottle 4 times daily 1 drop four times a day into the operative eye starting 1 day before surgery. Use until bottle runs out.    Combined form of age-related cataract, both eyes       CALCIUM 500 PO      2 daily        CRANBERRY      Two tablets daily        Dexamethasone Acetate Powd   Start taking on:  8/9/2017     1 Bottle    1 Bottle 4 times daily 1 drop four times a day into the operative eye starting 1 day before surgery. Use until bottle runs out.    Combined form of age-related cataract, both eyes       enalapril 20 MG tablet    VASOTEC    180 tablet    Take 1 tablet (20 mg) by mouth 2 times daily Will call  when needs refill    Hypertension goal BP (blood pressure) < 140/90       gabapentin 300 MG capsule    NEURONTIN    180 capsule    Take 1 capsule (300 mg) by mouth 2 times daily Will call when needs refill    Restless leg syndrome       hydrALAZINE 25 MG tablet    APRESOLINE    180 tablet    Take 1 tablet (25 mg) by mouth 2 times daily Will call when needs refill    Hypertension goal BP (blood pressure) < 140/90       hydrochlorothiazide 25 MG tablet    HYDRODIURIL    90 tablet    Take 1 tablet (25 mg) by mouth daily Will call when needs refill    Hypertension goal BP (blood pressure) < 140/90       Lutein 20 MG Caps      Take  by mouth.        metFORMIN 500 MG tablet    GLUCOPHAGE    180 tablet    Take 1 tablet (500 mg) by mouth 2 times daily (with meals) Will call when needs refill    Type 2 diabetes mellitus without complication, without long-term current use of insulin (H)       Moxifloxacin HCl Powd   Start taking on:  8/9/2017     1 Bottle    1 Bottle 4 times daily 1 drop four times a day into the operative eye starting 1 day before surgery. Use until bottle runs out.    Combined form of age-related cataract, both eyes       MULTIVITAMINS FC Tabs      1 TABLET DAILY        * ONE TOUCH ULTRA test strip   Generic drug:  blood glucose monitoring     100 Strip    testing 4 times daily    Diabetes mellitus, type 2 (H)       * blood glucose monitoring test strip    no brand specified    3 Box    Use to test blood sugar 2 times daily or as directed.    Type 2 diabetes, HbA1C goal < 8% (H)       oxybutynin 5 MG tablet    DITROPAN    180 tablet    Take 2 tablets (10 mg) by mouth daily    Incontinence       * PC LANCETS SUPER THIN 30G Misc     180 each    1 Device 2 times daily    Type 2 diabetes, HbA1C goal < 8% (H)       * blood glucose monitoring lancets     3 Box    Use to test blood sugar 2 times daily or as directed.    Type 2 diabetes, HbA1C goal < 8% (H)       TURMERIC PO      1 cap daily        Vitamin B-12 500  MCG Subl      2 daily        vitamin D 1000 UNITS capsule      1 CAPSULE DAILY        * Notice:  This list has 4 medication(s) that are the same as other medications prescribed for you. Read the directions carefully, and ask your doctor or other care provider to review them with you.

## 2017-08-01 NOTE — NURSING NOTE
"Chief Complaint   Patient presents with     Pre-Op Exam       Initial /65  Pulse 58  Temp 97.7  F (36.5  C) (Oral)  Ht 5' 3.25\" (1.607 m)  Wt 138 lb (62.6 kg)  BMI 24.25 kg/m2 Estimated body mass index is 24.25 kg/(m^2) as calculated from the following:    Height as of this encounter: 5' 3.25\" (1.607 m).    Weight as of this encounter: 138 lb (62.6 kg).  Medication Reconciliation: complete     Lupe Loza MA               "

## 2017-08-09 NOTE — H&P (VIEW-ONLY)
[facility]  [address] [address]  [phone]  Dept: [phone]    PRE-OP EVALUATION:  Today's date: 2017    Orquidea Stevens (: 1940) presents for pre-operative evaluation assessment as requested by Dr. Gordillo.  She requires evaluation and anesthesia risk assessment prior to undergoing surgery/procedure for treatment of cataract .  Proposed procedure: removal of cataracts     Date of Surgery/ Procedure: Aug. 10  Time of Surgery/ Procedure: 9 am   Hospital/Surgical Facility: [facility]   Fax number for surgical facility:   Primary Physician: Lisette Amos  Type of Anesthesia Anticipated: to be determined    Patient has a Health Care Directive or Living Will:  NO    1. NO - Do you have a history of heart attack, stroke, stent, bypass or surgery on an artery in the head, neck, heart or legs?  2. NO - Do you ever have any pain or discomfort in your chest?  3. NO - Do you have a history of  Heart Failure?  4. NO - Are you troubled by shortness of breath when: walking on the level, up a slight hill or at night?  5. NO - Do you currently have a cold, bronchitis or other respiratory infection?  6. NO - Do you have a cough, shortness of breath or wheezing?  7. NO - Do you sometimes get pains in the calves of your legs when you walk?  8. YES - DO YOU OR ANYONE IN YOUR FAMILY HAVE PREVIOUS HISTORY OF BLOOD CLOTS? Sister with DVT  9. NO - Do you or does anyone in your family have a serious bleeding problem such as prolonged bleeding following surgeries or cuts?  10. NO - Have you ever had problems with anemia or been told to take iron pills?  11. NO - Have you had any abnormal blood loss such as black, tarry or bloody stools, or abnormal vaginal bleeding?  12. NO - Have you ever had a blood transfusion?  13. NO - Have you or any of your relatives ever had problems with anesthesia?  14. YES - DO YOU HAVE SLEEP APNEA, EXCESSIVE SNORING OR DAYTIME DROWSINESS? Snoring per    15. NO - Do you have any prosthetic heart valves?  16. NO - Do you have prosthetic joints?  17. NO - Is there any chance that you may be pregnant?        HPI:                                                      Brief HPI related to upcoming procedure: bilateral cataract removal      DIABETES - Patient has a longstanding history of DiabetesType Type II . Patient is being treated with oral agents and denies significant side effects. Control has been good. Complicating factors include but are not limited to: cardiac ,, high cholesterol on statin and HTN well controlled.                                                                                                             .  HYPERTENSION - Patient has longstanding history of mod-severe HTN , currently denies any symptoms referable to elevated blood pressure. Specifically denies chest pain, palpitations, dyspnea, orthopnea, PND or peripheral edema. Blood pressure readings have been in normal range. Current medication regimen is as listed below. Patient denies any side effects of medication.                                                                                                                                                                                          .  HYPERLIPIDEMIA - Patient has a long history of significant Hyperlipidemia requiring medication for treatment with recent good control. Patient reports no problems or side effects with the medication.                                                                                                                                                       .  CAD - Patient has a longstanding history of mod-severe CAD. Patient denies recent chest pain or NTG use, denies exercise induced dyspnea or PND. Last Stress test                                                                                                                      .    MEDICAL HISTORY:                                                     Patient Active Problem List    Diagnosis Date Noted     Pure hypercholesterolemia 07/31/2017     Priority: Medium     Type 2 diabetes mellitus without complication, without long-term current use of insulin (H) 05/03/2017     Priority: Medium     Combined form of age-related cataract, both eyes 06/23/2016     Priority: Medium     Hypertriglyceridemia 12/11/2013     Priority: Medium     Restless leg syndrome 08/15/2013     Priority: Medium     Dermatochalasis 12/03/2012     Priority: Medium     Ptosis of eyelid. OU 12/03/2012     Priority: Medium     Atrophic vaginitis 03/13/2012     Priority: Medium     Diagnosis updated by automated process. Provider to review and confirm.       Essential hypertension      Priority: Medium     GERD (gastroesophageal reflux disease)      Priority: Medium     TYPE 2 DIABETES, HBA1C GOAL < 8% 10/31/2010     Priority: Medium     HYPERLIPIDEMIA LDL GOAL <100 10/31/2010     Priority: Medium     Allergic state      Priority: Medium     (Problem list name updated by automated process. Provider to review and confirm.)       Actinic keratosis      Priority: Medium     Spastic neurogenic bladder      Priority: Medium     Rosacea      Priority: Medium     History of squamous cell carcinoma of skin 12/03/2009     Priority: Medium     IBS (irritable bowel syndrome)      Priority: Medium     Urinary incontinence      Priority: Medium     Fatty liver      Priority: Medium     CAD (coronary artery disease)      Priority: Medium     1997 angiogram showed 60% LAD stenosis       Squamous cell carcinoma 10/27/2008     Priority: Medium     R.Nasal Sidewall       Tear of medial cartilage or meniscus of knee, current 10/02/2006     Priority: Medium     Advanced directives, counseling/discussion 05/23/2011     Priority: Low     Discussed Advance Directive planning with patient; however, patient declined at this time.         Past Medical History:   Diagnosis Date     Actinic keratosis      Actinic  keratosis      Allergies      Arthritis      CAD (coronary artery disease)      Cataract      Diabetes mellitus type II      Fatty liver      GERD (gastroesophageal reflux disease)      HH (hiatus hernia)      Hyperlipidaemia LDL goal <100      Hypertension goal BP (blood pressure) < 140/90      IBS (irritable bowel syndrome)      Rosacea      Spastic neurogenic bladder      Squamous cell carcinoma 10/27/2008    R. Nasal Sidewall     Type 2 diabetes, HbA1C goal < 8% (H) 10/31/2010     Urinary incontinence      Vitamin D deficiency      Past Surgical History:   Procedure Laterality Date     ANGIOGRAM  1999    50% Blockage     ARTHROSCOPY KNEE RT/LT  9/04     BIOPSY BREAST  2001    RT     BLADDER SURGERY  1988     HC REDUCTION OF LARGE BREAST  1988     HYSTERECTOMY, PAP NO LONGER INDICATED  2003     MOHS MICROGRAPHIC PROCEDURE      RT nose SCC     Current Outpatient Prescriptions   Medication Sig Dispense Refill     [START ON 8/9/2017] Bromfenac Sodium POWD 1 Bottle 4 times daily 1 drop four times a day into the operative eye starting 1 day before surgery. Use until bottle runs out. 1 Bottle 1     [START ON 8/9/2017] Dexamethasone Acetate POWD 1 Bottle 4 times daily 1 drop four times a day into the operative eye starting 1 day before surgery. Use until bottle runs out. 1 Bottle 1     [START ON 8/9/2017] Moxifloxacin HCl POWD 1 Bottle 4 times daily 1 drop four times a day into the operative eye starting 1 day before surgery. Use until bottle runs out. 1 Bottle 1     oxybutynin (DITROPAN) 5 MG tablet Take 2 tablets (10 mg) by mouth daily 180 tablet 1     metFORMIN (GLUCOPHAGE) 500 MG tablet Take 1 tablet (500 mg) by mouth 2 times daily (with meals) Will call when needs refill 180 tablet 1     hydrochlorothiazide (HYDRODIURIL) 25 MG tablet Take 1 tablet (25 mg) by mouth daily Will call when needs refill 90 tablet 1     amLODIPine (NORVASC) 10 MG tablet Take 1 tablet (10 mg) by mouth daily Will call when needs refills 90  tablet 3     gabapentin (NEURONTIN) 300 MG capsule Take 1 capsule (300 mg) by mouth 2 times daily Will call when needs refill 180 capsule 3     hydrALAZINE (APRESOLINE) 25 MG tablet Take 1 tablet (25 mg) by mouth 2 times daily Will call when needs refill 180 tablet 3     atorvastatin (LIPITOR) 80 MG tablet Take 1 tablet (80 mg) by mouth daily Will call when needs refill 90 tablet 3     enalapril (VASOTEC) 20 MG tablet Take 1 tablet (20 mg) by mouth 2 times daily Will call when needs refill 180 tablet 1     TURMERIC PO 1 cap daily       blood glucose monitoring (NO BRAND SPECIFIED) test strip Use to test blood sugar 2 times daily or as directed. 3 Box prn     PC LANCETS SUPER THIN 30G MISC 1 Device 2 times daily 180 each prn     blood glucose meter (NO BRAND SPECIFIED) meter device kit Use to test blood sugar 2 times daily or as directed. 1 kit 0     blood glucose monitoring (ACCU-CHEK MULTICLIX) lancets Use to test blood sugar 2 times daily or as directed. 3 Box prn     CRANBERRY Two tablets daily       Lutein 20 MG CAPS Take  by mouth.       Lactobacillus (ACIDOPHILUS) CAPS Take 2 capsules by mouth daily.       aspirin 325 MG EC tablet take 1 Tab by mouth daily. 100 Tab 3     ONETOUCH ULTRA TEST VI STRP testing 4 times daily 100 Strip 7     CALCIUM 500 OR 2 daily       MULTIVITAMINS FC OR TABS 1 TABLET DAILY       VITAMIN D 1000 UNIT OR CAPS 1 CAPSULE DAILY       VITAMIN B-12 500 MCG SL SUBL 2 daily       OTC products: None, except as noted above    Allergies   Allergen Reactions     Atorvastatin Muscle Pain (Myalgia)     Detrol [Tolterodine Tartrate]      Unsure reaction     Latex Rash     Lovastatin Muscle Pain (Myalgia)     in legs     Simvastatin Muscle Pain (Myalgia)     in legs     Zocor [Hmg-Coa-R Inhibitors] Cramps      Latex Allergy: YES: Precautions to take: rash in areas of contact    Social History   Substance Use Topics     Smoking status: Never Smoker     Smokeless tobacco: Never Used      Comment:  "Lives in smoke free household     Alcohol use No     History   Drug Use No       REVIEW OF SYSTEMS:                                                    Constitutional, neuro, ENT, endocrine, pulmonary, cardiac, gastrointestinal, genitourinary, musculoskeletal, integument and psychiatric systems are negative, except as otherwise noted.      EXAM:                                                    /62  Pulse 58  Temp 97.7  F (36.5  C) (Oral)  Ht 5' 3.25\" (1.607 m)  Wt 138 lb (62.6 kg)  BMI 24.25 kg/m2    GENERAL APPEARANCE: healthy, alert and no distress     EYES: EOMI, PERRL     HENT: ear canals and TM's normal and nose and mouth without ulcers or lesions     NECK: no adenopathy, no asymmetry, masses, or scars and thyroid normal to palpation     RESP: lungs clear to auscultation - no rales, rhonchi or wheezes     CV: regular rates and rhythm, normal S1 S2, no S3 or S4 and no murmur, click or rub     ABDOMEN:  soft, nontender, no HSM or masses and bowel sounds normal     MS: extremities normal- no gross deformities noted, no evidence of inflammation in joints, FROM in all extremities.     PSYCH: mentation appears normal. and affect normal/bright    DIAGNOSTICS:                                                    No EKG required for low risk surgery (cataract, skin procedure, breast biopsy, etc)    BMP pending        IMPRESSION:                                                    Reason for surgery/procedure: cataracts removal    The proposed surgical procedure is considered LOW risk.    REVISED CARDIAC RISK INDEX  The patient has the following serious cardiovascular risks for perioperative complications such as (MI, PE, VFib and 3  AV Block):  No serious cardiac risks  INTERPRETATION: 1 risks: Class II (low risk - 0.9% complication rate)    The patient has the following additional risks for perioperative complications:  No identified additional risks      ICD-10-CM    1. Preop general physical exam Z01.818 " Basic metabolic panel   2. Cataract of both eyes, unspecified cataract type H26.9    3. Type 2 diabetes mellitus without complication, without long-term current use of insulin (H) E11.9    4. Essential hypertension I10    5. Coronary artery disease involving native coronary artery of native heart without angina pectoris I25.10        RECOMMENDATIONS:                                                      (Z01.818) Preop general physical exam  (primary encounter diagnosis)  Comment:   Plan: Basic metabolic panel            (H26.9) Cataract of both eyes, unspecified cataract type  Comment:   Plan:     (E11.9) Type 2 diabetes mellitus without complication, without long-term current use of insulin (H)  Comment: well controlled on meds,   Plan:     (I10) Essential hypertension  Comment: at goal  Plan:     (I25.10) Coronary artery disease involving native coronary artery of native heart without angina pectoris  Comment: stable  Plan:         --Patient is to take all scheduled medications on the day of surgery EXCEPT for modifications listed below.  Can take all meds    APPROVAL GIVEN to proceed with proposed procedure, without further diagnostic evaluation       Signed Electronically by: Lisette Agustin MD    Copy of this evaluation report is provided to requesting physician.    [facility] Preop Guidelines

## 2017-08-10 ENCOUNTER — ANESTHESIA (OUTPATIENT)
Dept: SURGERY | Facility: CLINIC | Age: 77
End: 2017-08-10
Payer: COMMERCIAL

## 2017-08-10 ENCOUNTER — HOSPITAL ENCOUNTER (OUTPATIENT)
Facility: CLINIC | Age: 77
Discharge: HOME | End: 2017-08-10
Attending: STUDENT IN AN ORGANIZED HEALTH CARE EDUCATION/TRAINING PROGRAM | Admitting: STUDENT IN AN ORGANIZED HEALTH CARE EDUCATION/TRAINING PROGRAM
Payer: COMMERCIAL

## 2017-08-10 ENCOUNTER — ANESTHESIA EVENT (OUTPATIENT)
Dept: SURGERY | Facility: CLINIC | Age: 77
End: 2017-08-10
Payer: COMMERCIAL

## 2017-08-10 VITALS
OXYGEN SATURATION: 18 % | BODY MASS INDEX: 24.45 KG/M2 | HEART RATE: 68 BPM | DIASTOLIC BLOOD PRESSURE: 66 MMHG | RESPIRATION RATE: 16 BRPM | WEIGHT: 138 LBS | SYSTOLIC BLOOD PRESSURE: 115 MMHG | TEMPERATURE: 96.2 F | HEIGHT: 63 IN

## 2017-08-10 LAB — GLUCOSE BLDC GLUCOMTR-MCNC: 131 MG/DL (ref 70–99)

## 2017-08-10 PROCEDURE — 25000128 H RX IP 250 OP 636: Performed by: STUDENT IN AN ORGANIZED HEALTH CARE EDUCATION/TRAINING PROGRAM

## 2017-08-10 PROCEDURE — 40000170 ZZH STATISTIC PRE-PROCEDURE ASSESSMENT II: Performed by: STUDENT IN AN ORGANIZED HEALTH CARE EDUCATION/TRAINING PROGRAM

## 2017-08-10 PROCEDURE — 36000102 ZZH EYE SURGERY LEVEL 3 EA 15 ADDTL MIN: Performed by: STUDENT IN AN ORGANIZED HEALTH CARE EDUCATION/TRAINING PROGRAM

## 2017-08-10 PROCEDURE — 37000009 ZZH ANESTHESIA TECHNICAL FEE, EACH ADDTL 15 MIN: Performed by: STUDENT IN AN ORGANIZED HEALTH CARE EDUCATION/TRAINING PROGRAM

## 2017-08-10 PROCEDURE — 25000125 ZZHC RX 250: Performed by: NURSE ANESTHETIST, CERTIFIED REGISTERED

## 2017-08-10 PROCEDURE — 25000128 H RX IP 250 OP 636: Performed by: NURSE ANESTHETIST, CERTIFIED REGISTERED

## 2017-08-10 PROCEDURE — 66984 XCAPSL CTRC RMVL W/O ECP: CPT | Mod: LT | Performed by: STUDENT IN AN ORGANIZED HEALTH CARE EDUCATION/TRAINING PROGRAM

## 2017-08-10 PROCEDURE — V2632 POST CHMBR INTRAOCULAR LENS: HCPCS | Performed by: STUDENT IN AN ORGANIZED HEALTH CARE EDUCATION/TRAINING PROGRAM

## 2017-08-10 PROCEDURE — 25000125 ZZHC RX 250: Performed by: ANESTHESIOLOGY

## 2017-08-10 PROCEDURE — 37000008 ZZH ANESTHESIA TECHNICAL FEE, 1ST 30 MIN: Performed by: STUDENT IN AN ORGANIZED HEALTH CARE EDUCATION/TRAINING PROGRAM

## 2017-08-10 PROCEDURE — 36000101 ZZH EYE SURGERY LEVEL 3 1ST 30 MIN: Performed by: STUDENT IN AN ORGANIZED HEALTH CARE EDUCATION/TRAINING PROGRAM

## 2017-08-10 PROCEDURE — 27210794 ZZH OR GENERAL SUPPLY STERILE: Performed by: STUDENT IN AN ORGANIZED HEALTH CARE EDUCATION/TRAINING PROGRAM

## 2017-08-10 PROCEDURE — 25000125 ZZHC RX 250: Performed by: STUDENT IN AN ORGANIZED HEALTH CARE EDUCATION/TRAINING PROGRAM

## 2017-08-10 PROCEDURE — 25000128 H RX IP 250 OP 636: Performed by: ANESTHESIOLOGY

## 2017-08-10 PROCEDURE — 82962 GLUCOSE BLOOD TEST: CPT

## 2017-08-10 PROCEDURE — 71000028 ZZH EYE RECOVERY PHASE 2 EACH 15 MINS: Performed by: STUDENT IN AN ORGANIZED HEALTH CARE EDUCATION/TRAINING PROGRAM

## 2017-08-10 DEVICE — EYE IMP IOL AMO PCL TECNIS ZCB00 26.5: Type: IMPLANTABLE DEVICE | Site: EYE | Status: FUNCTIONAL

## 2017-08-10 RX ORDER — SODIUM CHLORIDE, SODIUM LACTATE, POTASSIUM CHLORIDE, CALCIUM CHLORIDE 600; 310; 30; 20 MG/100ML; MG/100ML; MG/100ML; MG/100ML
500 INJECTION, SOLUTION INTRAVENOUS CONTINUOUS
Status: DISCONTINUED | OUTPATIENT
Start: 2017-08-10 | End: 2017-08-10 | Stop reason: HOSPADM

## 2017-08-10 RX ORDER — FLURBIPROFEN SODIUM 0.3 MG/ML
1 SOLUTION/ DROPS OPHTHALMIC
Status: DISCONTINUED | OUTPATIENT
Start: 2017-08-10 | End: 2017-08-10 | Stop reason: HOSPADM

## 2017-08-10 RX ORDER — LIDOCAINE HYDROCHLORIDE 10 MG/ML
INJECTION, SOLUTION EPIDURAL; INFILTRATION; INTRACAUDAL; PERINEURAL PRN
Status: DISCONTINUED | OUTPATIENT
Start: 2017-08-10 | End: 2017-08-10 | Stop reason: HOSPADM

## 2017-08-10 RX ORDER — PHENYLEPHRINE HYDROCHLORIDE 25 MG/ML
1 SOLUTION/ DROPS OPHTHALMIC
Status: COMPLETED | OUTPATIENT
Start: 2017-08-10 | End: 2017-08-10

## 2017-08-10 RX ORDER — TETRACAINE HYDROCHLORIDE 5 MG/ML
SOLUTION OPHTHALMIC PRN
Status: DISCONTINUED | OUTPATIENT
Start: 2017-08-10 | End: 2017-08-10 | Stop reason: HOSPADM

## 2017-08-10 RX ORDER — TROPICAMIDE 10 MG/ML
1 SOLUTION/ DROPS OPHTHALMIC
Status: COMPLETED | OUTPATIENT
Start: 2017-08-10 | End: 2017-08-10

## 2017-08-10 RX ORDER — CYCLOPENTOLATE HYDROCHLORIDE 10 MG/ML
1 SOLUTION/ DROPS OPHTHALMIC
Status: COMPLETED | OUTPATIENT
Start: 2017-08-10 | End: 2017-08-10

## 2017-08-10 RX ORDER — PROPARACAINE HYDROCHLORIDE 5 MG/ML
1 SOLUTION/ DROPS OPHTHALMIC ONCE
Status: COMPLETED | OUTPATIENT
Start: 2017-08-10 | End: 2017-08-10

## 2017-08-10 RX ORDER — BALANCED SALT SOLUTION 6.4; .75; .48; .3; 3.9; 1.7 MG/ML; MG/ML; MG/ML; MG/ML; MG/ML; MG/ML
SOLUTION OPHTHALMIC PRN
Status: DISCONTINUED | OUTPATIENT
Start: 2017-08-10 | End: 2017-08-10 | Stop reason: HOSPADM

## 2017-08-10 RX ADMIN — PROPARACAINE HYDROCHLORIDE 1 DROP: 5 SOLUTION/ DROPS OPHTHALMIC at 07:30

## 2017-08-10 RX ADMIN — LIDOCAINE HYDROCHLORIDE 1 ML: 10 INJECTION, SOLUTION EPIDURAL; INFILTRATION; INTRACAUDAL; PERINEURAL at 07:43

## 2017-08-10 RX ADMIN — SODIUM CHLORIDE, POTASSIUM CHLORIDE, SODIUM LACTATE AND CALCIUM CHLORIDE 500 ML: 600; 310; 30; 20 INJECTION, SOLUTION INTRAVENOUS at 07:43

## 2017-08-10 RX ADMIN — FLURBIPROFEN SODIUM 1 DROP: 0.3 SOLUTION/ DROPS OPHTHALMIC at 07:30

## 2017-08-10 RX ADMIN — PHENYLEPHRINE HYDROCHLORIDE 1 DROP: 2.5 SOLUTION/ DROPS OPHTHALMIC at 07:30

## 2017-08-10 RX ADMIN — MIDAZOLAM HYDROCHLORIDE 2 MG: 1 INJECTION, SOLUTION INTRAMUSCULAR; INTRAVENOUS at 08:45

## 2017-08-10 RX ADMIN — FLURBIPROFEN SODIUM 1 DROP: 0.3 SOLUTION/ DROPS OPHTHALMIC at 07:36

## 2017-08-10 RX ADMIN — TROPICAMIDE 1 DROP: 10 SOLUTION/ DROPS OPHTHALMIC at 07:42

## 2017-08-10 RX ADMIN — CYCLOPENTOLATE HYDROCHLORIDE 1 DROP: 10 SOLUTION/ DROPS OPHTHALMIC at 07:30

## 2017-08-10 RX ADMIN — FLURBIPROFEN SODIUM 1 DROP: 0.3 SOLUTION/ DROPS OPHTHALMIC at 07:42

## 2017-08-10 RX ADMIN — CYCLOPENTOLATE HYDROCHLORIDE 1 DROP: 10 SOLUTION/ DROPS OPHTHALMIC at 07:36

## 2017-08-10 RX ADMIN — DEXMEDETOMIDINE HYDROCHLORIDE 8 MCG: 100 INJECTION, SOLUTION INTRAVENOUS at 08:45

## 2017-08-10 RX ADMIN — TROPICAMIDE 1 DROP: 10 SOLUTION/ DROPS OPHTHALMIC at 07:36

## 2017-08-10 RX ADMIN — TROPICAMIDE 1 DROP: 10 SOLUTION/ DROPS OPHTHALMIC at 07:30

## 2017-08-10 RX ADMIN — PHENYLEPHRINE HYDROCHLORIDE 1 DROP: 2.5 SOLUTION/ DROPS OPHTHALMIC at 07:42

## 2017-08-10 RX ADMIN — CYCLOPENTOLATE HYDROCHLORIDE 1 DROP: 10 SOLUTION/ DROPS OPHTHALMIC at 07:42

## 2017-08-10 RX ADMIN — PHENYLEPHRINE HYDROCHLORIDE 1 DROP: 2.5 SOLUTION/ DROPS OPHTHALMIC at 07:36

## 2017-08-10 NOTE — ANESTHESIA PREPROCEDURE EVALUATION
Anesthesia Evaluation     . Pt has had prior anesthetic.     No history of anesthetic complications          ROS/MED HX    ENT/Pulmonary:     (+)DENIA risk factors snores loudly, hypertension, , . .   (-) sleep apnea   Neurologic:       Cardiovascular:     (+) hypertension--CAD, --. : . . . :. .      (-) angina, stent and angina   METS/Exercise Tolerance:  4 - Raking leaves, gardening   Hematologic:         Musculoskeletal:         GI/Hepatic:     (+) GERD Asymptomatic on medication,       Renal/Genitourinary:         Endo:     (+) type I DM, Not using insulin .      Psychiatric:         Infectious Disease:         Malignancy:         Other:                     Physical Exam      Airway   Mallampati: II  TM distance: >3 FB  Neck ROM: full    Dental   (+) upper dentures and lower dentures    Cardiovascular   Rhythm and rate: regular and normal      Pulmonary    breath sounds clear to auscultation                    Anesthesia Plan      History & Physical Review  History and physical reviewed and following examination; no interval change.    ASA Status:  3 .    NPO Status:  > 8 hours    Plan for MAC with Intravenous induction. Reason for MAC:  Procedure to face, neck, head or breast  PONV prophylaxis:  Ondansetron (or other 5HT-3)       Postoperative Care      Consents  Anesthetic plan, risks, benefits and alternatives discussed with:  Patient..                          .

## 2017-08-10 NOTE — IP AVS SNAPSHOT
MRN:[id number]                      After Visit Summary   8/10/2017    Orquidea Stevens    MRN: [id number]           Thank you!     Thank you for choosing [facility] for your care. Our goal is always to provide you with excellent care. Hearing back from our patients is one way we can continue to improve our services. Please take a few minutes to complete the written survey that you may receive in the mail after you visit with us. Thank you!        Patient Information     Date Of Birth          1940        About your hospital stay     You were admitted on:  August 10, 2017 You last received care in the:  [facility]    You were discharged on:  August 10, 2017       Who to Call     For medical emergencies, please call 911.  For non-urgent questions about your medical care, please call your primary care provider or clinic, [phone]  For questions related to your surgery, please call your surgery clinic        Attending Provider     Provider Specialty    Lucie Gordillo MD Ophthalmology       Primary Care Provider Office Phone # Fax #    Lisette Amos [phone]      Your next 10 appointments already scheduled     Aug 11, 2017  8:45 AM CDT   Return Visit with Lucie Gordillo MD   [facility] ([facility])    [address]  [place] [address] [phone]            Aug 17, 2017 10:45 AM CDT   Return Visit with Lucie Gordillo MD   [facility] [place] ([facility])    [address]  [place] [address] [phone]            Aug 22, 2017   Procedure with Lucie Gordillo MD   [facility] (--)    [address]  [place] [address]   [phone]            Aug 23, 2017  3:45 PM CDT   Return Visit with Lucie Gordillo MD   [facility] ([facility])    [address]  [place] [address] [phone]             Aug 30, 2017  1:45 PM CDT   Return Visit with Lucie Gordillo MD   [facility]    [address]  [place] [address] [phone]            Sep 27, 2017  1:45 PM CDT   Return Visit with Lucie Gordillo MD   [facility] ([facility])    [address]  [place] [address] [phone]              Further instructions from your care team               [facility] Discharge  Instructions  Anesthesia (Eye Care Center)   Adult Discharge Instructions    For 24 hours after surgery    1. Get plenty of rest.  Make arrangements to have a responsible adult stay with you for at least 6 hours after you leave the hospital.  2. Do not drive or use heavy equipment for 24 hours.    3. Do not drink alcohol for 24 hours.  4. Do not sign legal documents or make important decisions for 24 hours.  5. Avoid strenuous or risky activities. You may feel lightheaded.  If so, sit for a few minutes before standing.  Have someone help you get up.   6. Conscious sedation patients may resume a regular diet..  7. Any questions of medical nature, call your physician.              CATARACT SURGERY POST-OP INSTRUCTIONS  Dr. Lucie Gordillo  [phone]      *Continue using CatarActive3 four times a day in the operative eye.  *You should get 3 doses in today and 4 doses daily starting tomorrow.      Keep the eye shield taped in place unless putting drops in. We will remove it for you in the office tomorrow.    WEAR EYE SHIELD FOR NEXT 5 NIGHTS      Light sensitivity may be noticed. Sunglasses may be worn for comfort.      Do not rub the operated eye.      Keep the operated eye dry. You may wash your hair, bathe or shower, but keep the operated eye closed while doing so.       No swimming, hot tub, or sauna for 2 weeks.      No make up around eye for 5 days.      No bending at the waist or lifting more than 10 pounds for  "one week.      May take Tylenol (per directions on bottle) for mild pain.      Call Dr. Gordillo's cell at [phone]  if any of the following should occur:    o Any sudden vision changes  o Nausea or severe headache  o Increase in pain not controlled  o Or signs of infection (pus, increasing redness or tenderness)                           FOLLOW UP TOMORROW AS INSTRUCTED WITH SURGEON      Pending Results     No orders found from 2017 to 2017.            Admission Information     Date & Time Provider Department Dept. Phone    8/10/2017 Lucie Gordillo MD [facility] [phone]      Your Vitals Were     Blood Pressure Pulse Temperature Respirations Height Weight    169/70 68 96.2  F (35.7  C) (Temporal) 16 1.607 m (5' 3.27\") 62.6 kg (138 lb)    Pulse Oximetry BMI (Body Mass Index)                100% 24.24 kg/m2          MyChart Information     [employer] lets you send messages to your doctor, view your test results, renew your prescriptions, schedule appointments and more. To sign up, go to [email or web address] . Click on \"Log in\" on the left side of the screen, which will take you to the Welcome page. Then click on \"Sign up Now\" on the right side of the page.     You will be asked to enter the access code listed below, as well as some personal information. Please follow the directions to create your username and password.     Your access code is: [email or web address]  Expires: 8/10/2017 12:02 PM     Your access code will  in 90 days. If you need help or a new code, please call your [facility] clinic or [phone].        Care EveryWhere ID     This is your Care EveryWhere ID. This could be used by other organizations to access your [facility] medical records  [id number]        Equal Access to Services     [facility] AUGUSTINA : Sachin Bryant, wawilmanda luqadaha, qaybta kaalmada tee, efrain victoria. So [facility] [phone].    ATENCIÓN: Si habla " español, tiene a trotter disposición servicios gratuitos de asistencia lingüística. Nicholas costa [phone].    We comply with applicable federal civil rights laws and [place] laws. We do not discriminate on the basis of race, color, national origin, age, disability sex, sexual orientation or gender identity.               Review of your medicines      CONTINUE these medicines which have NOT CHANGED        Dose / Directions    acidophilus Caps        Dose:  2 capsule   Take 2 capsules by mouth daily.   Refills:  0       amLODIPine 10 MG tablet   Commonly known as:  NORVASC   Used for:  Hypertension goal BP (blood pressure) < 140/90        Dose:  10 mg   Take 1 tablet (10 mg) by mouth daily Will call when needs refills   Quantity:  90 tablet   Refills:  3       aspirin 325 MG EC tablet   Used for:  Type II or unspecified type diabetes mellitus without mention of complication, not stated as uncontrolled        Dose:  325 mg   take 1 Tab by mouth daily.   Quantity:  100 Tab   Refills:  3       atorvastatin 80 MG tablet   Commonly known as:  LIPITOR   Used for:  Hyperlipidemia LDL goal <100, Hypertriglyceridemia        Dose:  80 mg   Take 1 tablet (80 mg) by mouth daily Will call when needs refill   Quantity:  90 tablet   Refills:  3       blood glucose monitoring meter device kit   Commonly known as:  no brand specified   Used for:  Type 2 diabetes, HbA1C goal < 8% (H)        Use to test blood sugar 2 times daily or as directed.   Quantity:  1 kit   Refills:  0       Bromfenac Sodium Powd   Used for:  Combined form of age-related cataract, both eyes        Dose:  1 Bottle   1 Bottle 4 times daily 1 drop four times a day into the operative eye starting 1 day before surgery. Use until bottle runs out.   Quantity:  1 Bottle   Refills:  1       CALCIUM 500 PO        2 daily   Refills:  0       CRANBERRY        Two tablets daily   Refills:  0       Dexamethasone Acetate Powd   Used for:  Combined form of age-related cataract,  both eyes        Dose:  1 Bottle   1 Bottle 4 times daily 1 drop four times a day into the operative eye starting 1 day before surgery. Use until bottle runs out.   Quantity:  1 Bottle   Refills:  1       enalapril 20 MG tablet   Commonly known as:  VASOTEC   Used for:  Hypertension goal BP (blood pressure) < 140/90        Dose:  20 mg   Take 1 tablet (20 mg) by mouth 2 times daily Will call when needs refill   Quantity:  180 tablet   Refills:  1       gabapentin 300 MG capsule   Commonly known as:  NEURONTIN   Used for:  Restless leg syndrome        Dose:  300 mg   Take 1 capsule (300 mg) by mouth 2 times daily Will call when needs refill   Quantity:  180 capsule   Refills:  3       hydrALAZINE 25 MG tablet   Commonly known as:  APRESOLINE   Used for:  Hypertension goal BP (blood pressure) < 140/90        Dose:  25 mg   Take 1 tablet (25 mg) by mouth 2 times daily Will call when needs refill   Quantity:  180 tablet   Refills:  3       hydrochlorothiazide 25 MG tablet   Commonly known as:  HYDRODIURIL   Used for:  Hypertension goal BP (blood pressure) < 140/90        Dose:  25 mg   Take 1 tablet (25 mg) by mouth daily Will call when needs refill   Quantity:  90 tablet   Refills:  1       Lutein 20 MG Caps        Take  by mouth.   Refills:  0       metFORMIN 500 MG tablet   Commonly known as:  GLUCOPHAGE   Used for:  Type 2 diabetes mellitus without complication, without long-term current use of insulin (H)        Dose:  500 mg   Take 1 tablet (500 mg) by mouth 2 times daily (with meals) Will call when needs refill   Quantity:  180 tablet   Refills:  1       Moxifloxacin HCl Powd   Used for:  Combined form of age-related cataract, both eyes        Dose:  1 Bottle   1 Bottle 4 times daily 1 drop four times a day into the operative eye starting 1 day before surgery. Use until bottle runs out.   Quantity:  1 Bottle   Refills:  1       * ONE TOUCH ULTRA test strip   Used for:  Diabetes mellitus, type 2 (H)   Generic  drug:  blood glucose monitoring        testing 4 times daily   Quantity:  100 Strip   Refills:  7       * blood glucose monitoring test strip   Commonly known as:  no brand specified   Used for:  Type 2 diabetes, HbA1C goal < 8% (H)        Use to test blood sugar 2 times daily or as directed.   Quantity:  3 Box   Refills:  prn       oxybutynin 5 MG tablet   Commonly known as:  DITROPAN   Used for:  Incontinence        Dose:  10 mg   Take 2 tablets (10 mg) by mouth daily   Quantity:  180 tablet   Refills:  1       * PC LANCETS SUPER THIN 30G Misc   Used for:  Type 2 diabetes, HbA1C goal < 8% (H)        Dose:  1 Device   1 Device 2 times daily   Quantity:  180 each   Refills:  prn       * blood glucose monitoring lancets   Used for:  Type 2 diabetes, HbA1C goal < 8% (H)        Use to test blood sugar 2 times daily or as directed.   Quantity:  3 Box   Refills:  prn       TURMERIC PO        1 cap daily   Refills:  0       Vitamin B-12 500 MCG Subl        2 daily   Refills:  0       vitamin D 1000 UNITS capsule        1 CAPSULE DAILY   Refills:  0       * Notice:  This list has 4 medication(s) that are the same as other medications prescribed for you. Read the directions carefully, and ask your doctor or other care provider to review them with you.             Protect others around you: Learn how to safely use, store and throw away your medicines at www.disposemymeds.org.             Medication List: This is a list of all your medications and when to take them. Check marks below indicate your daily home schedule. Keep this list as a reference.      Medications           Morning Afternoon Evening Bedtime As Needed    acidophilus Caps   Take 2 capsules by mouth daily.                                amLODIPine 10 MG tablet   Commonly known as:  NORVASC   Take 1 tablet (10 mg) by mouth daily Will call when needs refills                                aspirin 325 MG EC tablet   take 1 Tab by mouth daily.                                 atorvastatin 80 MG tablet   Commonly known as:  LIPITOR   Take 1 tablet (80 mg) by mouth daily Will call when needs refill                                blood glucose monitoring meter device kit   Commonly known as:  no brand specified   Use to test blood sugar 2 times daily or as directed.                                Bromfenac Sodium Powd   1 Bottle 4 times daily 1 drop four times a day into the operative eye starting 1 day before surgery. Use until bottle runs out.                                CALCIUM 500 PO   2 daily                                CRANBERRY   Two tablets daily                                Dexamethasone Acetate Powd   1 Bottle 4 times daily 1 drop four times a day into the operative eye starting 1 day before surgery. Use until bottle runs out.                                enalapril 20 MG tablet   Commonly known as:  VASOTEC   Take 1 tablet (20 mg) by mouth 2 times daily Will call when needs refill                                gabapentin 300 MG capsule   Commonly known as:  NEURONTIN   Take 1 capsule (300 mg) by mouth 2 times daily Will call when needs refill                                hydrALAZINE 25 MG tablet   Commonly known as:  APRESOLINE   Take 1 tablet (25 mg) by mouth 2 times daily Will call when needs refill                                hydrochlorothiazide 25 MG tablet   Commonly known as:  HYDRODIURIL   Take 1 tablet (25 mg) by mouth daily Will call when needs refill                                Lutein 20 MG Caps   Take  by mouth.                                metFORMIN 500 MG tablet   Commonly known as:  GLUCOPHAGE   Take 1 tablet (500 mg) by mouth 2 times daily (with meals) Will call when needs refill                                Moxifloxacin HCl Powd   1 Bottle 4 times daily 1 drop four times a day into the operative eye starting 1 day before surgery. Use until bottle runs out.                                * ONE TOUCH ULTRA test strip   testing 4 times  daily   Generic drug:  blood glucose monitoring                                * blood glucose monitoring test strip   Commonly known as:  no brand specified   Use to test blood sugar 2 times daily or as directed.                                oxybutynin 5 MG tablet   Commonly known as:  DITROPAN   Take 2 tablets (10 mg) by mouth daily                                * PC LANCETS SUPER THIN 30G Misc   1 Device 2 times daily                                * blood glucose monitoring lancets   Use to test blood sugar 2 times daily or as directed.                                TURMERIC PO   1 cap daily                                Vitamin B-12 500 MCG Subl   2 daily                                vitamin D 1000 UNITS capsule   1 CAPSULE DAILY                                * Notice:  This list has 4 medication(s) that are the same as other medications prescribed for you. Read the directions carefully, and ask your doctor or other care provider to review them with you.

## 2017-08-10 NOTE — IP AVS SNAPSHOT
[facility]    [address]    [place] [address]    Phone:  [phone]    Fax:  [phone]                                       After Visit Summary   8/10/2017    Orquidea Stevens    MRN: [id number]           After Visit Summary Signature Page     I have received my discharge instructions, and my questions have been answered. I have discussed any challenges I see with this plan with the nurse or doctor.    ..........................................................................................................................................  Patient/Patient Representative Signature      ..........................................................................................................................................  Patient Representative Print Name and Relationship to Patient    ..................................................               ................................................  Date                                            Time    ..........................................................................................................................................  Reviewed by Signature/Title    ...................................................              ..............................................  Date                                                            Time

## 2017-08-10 NOTE — DISCHARGE INSTRUCTIONS
[facility] Discharge  Instructions  Anesthesia (Eye Care Center)   Adult Discharge Instructions    For 24 hours after surgery    1. Get plenty of rest.  Make arrangements to have a responsible adult stay with you for at least 6 hours after you leave the hospital.  2. Do not drive or use heavy equipment for 24 hours.    3. Do not drink alcohol for 24 hours.  4. Do not sign legal documents or make important decisions for 24 hours.  5. Avoid strenuous or risky activities. You may feel lightheaded.  If so, sit for a few minutes before standing.  Have someone help you get up.   6. Conscious sedation patients may resume a regular diet..  7. Any questions of medical nature, call your physician.              CATARACT SURGERY POST-OP INSTRUCTIONS  Dr. Lucie Gordillo  [phone]      *Continue using CatarActive3 four times a day in the operative eye.  *You should get 3 doses in today and 4 doses daily starting tomorrow.      Keep the eye shield taped in place unless putting drops in. We will remove it for you in the office tomorrow.    WEAR EYE SHIELD FOR NEXT 5 NIGHTS      Light sensitivity may be noticed. Sunglasses may be worn for comfort.      Do not rub the operated eye.      Keep the operated eye dry. You may wash your hair, bathe or shower, but keep the operated eye closed while doing so.       No swimming, hot tub, or sauna for 2 weeks.      No make up around eye for 5 days.      No bending at the waist or lifting more than 10 pounds for one week.      May take Tylenol (per directions on bottle) for mild pain.      Call Dr. Gordillo's cell at [phone]  if any of the following should occur:    o Any sudden vision changes  o Nausea or severe headache  o Increase in pain not controlled  o Or signs of infection (pus, increasing redness or tenderness)                           FOLLOW UP TOMORROW AS INSTRUCTED WITH SURGEON

## 2017-08-10 NOTE — OP NOTE
PreOp Diagnosis: Visually significant nuclear sclerotic cataract left eye  PostOp Diagnosis: Same  Surgeon: Lucie Gordillo MD  Implant: Technis [id number]    Procedures:   1. Review of intraocular lens calculations, both eyes   2. Phacoemulsification and extraction of lens  left eye   3. Intraocular lens implantation left eye  Anesthesia: MAC/topical  Complications: None  EBL: <1cc    Orquidea Stevens suffers from a visually significant cataract of the left eye. This has caused problems with distance and reading vision, including glare. After discussing the risks, benefits, and alternatives, the patient wishes to proceed with cataract surgery.    The patient was identified in the pre-op area where the left eye was marked. The patient was then brought to the operating room where a time out was called, identifying the patient, the procedure, and the correct site. Tetracaine drops were applied to both eyes. The operative eye was then prepped and draped in the usual sterile ophthalmic fashion. An eyelid speculum was placed into the operative eye. Additional tetracaine drops were applied. A paracentesis was made inferior with a supersharp blade. 1% preservative-free lidocaine was injected into the anterior chamber.  Viscoat was injected to deepen the anterior chamber. A 2.4mm clear corneal wound was created with a keratome blade temporally.  A continuous curvilinear capsulorrhexis was started with a bent cystitome and completed with Utrada forceps. Hydrodissection of the lens nucleus was performed with BSS on a cannula. The lens nucleus was rotated. Phacoemulsification of the lens nucleus was accomplished in a phacoemulsification stop and chop technique. Remaining cortex was removed with irrigation and aspiration. The lens capsule was noted to be intact. Provisc was used to inflate the capsular bag.  The lens was injected into the capsular bag. Remaining viscoelastic was removed with irrigation and aspiration. The  wounds were checked and found to be watertight after hydration. The eyelid speculum was removed and CatarActive3 drops were placed into the operative eye. The patient tolerated the procedure well and was in stable condition on the way to the recovery area.     Lucie Gordillo MD

## 2017-08-10 NOTE — ANESTHESIA POSTPROCEDURE EVALUATION
Patient: Orquidea Stevens    Procedure(s):  LEFT EYE PHACOEMULSIFICATION CLEAR CORNEA WITH STANDARD INTRAOCULAR LENS IMPLANT  - Wound Class: I-Clean    Diagnosis:CATARACT  Diagnosis Additional Information: No value filed.    Anesthesia Type:  MAC    Note:  Anesthesia Post Evaluation    Patient location during evaluation: PACU  Patient participation: Able to fully participate in evaluation  Level of consciousness: awake  Pain management: adequate  Airway patency: patent  Cardiovascular status: acceptable  Respiratory status: acceptable  Hydration status: acceptable  PONV: none     Anesthetic complications: None          Last vitals:  Vitals:    08/10/17 0731 08/10/17 0922   BP: 169/70 122/80   Pulse: 68    Resp: 16 18   Temp: 35.7  C (96.2  F)    SpO2: 100% 98%         Electronically Signed By: Dora Arteaga MD  August 10, 2017  9:36 AM

## 2017-08-10 NOTE — ANESTHESIA CARE TRANSFER NOTE
Patient: Orquidea Stevens    Procedure(s):  LEFT EYE PHACOEMULSIFICATION CLEAR CORNEA WITH STANDARD INTRAOCULAR LENS IMPLANT  - Wound Class: I-Clean    Diagnosis: CATARACT  Diagnosis Additional Information: No value filed.    Anesthesia Type:   MAC     Note:  Airway :Room Air  Patient transferred to:PACU  Comments: VSS      Vitals: (Last set prior to Anesthesia Care Transfer)    CRNA VITALS  8/10/2017 0847 - 8/10/2017 0921      8/10/2017             Pulse: 53    Ht Rate: 53    SpO2: 100 %    Resp Rate (set): 10                Electronically Signed By: JORDIN Iglesias CRNA  August 10, 2017  9:21 AM

## 2017-08-11 ENCOUNTER — OFFICE VISIT (OUTPATIENT)
Dept: OPHTHALMOLOGY | Facility: CLINIC | Age: 77
End: 2017-08-11
Payer: COMMERCIAL

## 2017-08-11 DIAGNOSIS — H25.811 COMBINED FORM OF AGE-RELATED CATARACT, RIGHT EYE: ICD-10-CM

## 2017-08-11 DIAGNOSIS — Z96.1 PSEUDOPHAKIA OF LEFT EYE: Primary | ICD-10-CM

## 2017-08-11 PROCEDURE — 99024 POSTOP FOLLOW-UP VISIT: CPT | Performed by: STUDENT IN AN ORGANIZED HEALTH CARE EDUCATION/TRAINING PROGRAM

## 2017-08-11 ASSESSMENT — TONOMETRY
IOP_METHOD: APPLANATION
OS_IOP_MMHG: 21

## 2017-08-11 ASSESSMENT — VISUAL ACUITY
OS_SC: 20/50
OS_PH_SC: 20/40
METHOD: SNELLEN - LINEAR

## 2017-08-11 ASSESSMENT — EXTERNAL EXAM - LEFT EYE: OS_EXAM: NORMAL

## 2017-08-11 ASSESSMENT — EXTERNAL EXAM - RIGHT EYE: OD_EXAM: NORMAL

## 2017-08-11 NOTE — PATIENT INSTRUCTIONS
POST-OP CATARACT INSTRUCTIONS    *   Use the following drop(s) in the LEFT EYE four times a day:        CatarActive 3    *   If you are taking glaucoma drops, continue as usual.    *   Wear eye shield when sleeping for one week.    *   No eye rubbing or lifting more than 10 pounds for one week.    *   Keep water out of eye for two weeks.    *   OK to resume aspirin and/or other blood thinners.    *   Return as scheduled in about one week.    *   If your vision worsens, eye becomes increasingly red, or becomes painful, call [phone].     Lucie Gordillo M.D.

## 2017-08-11 NOTE — MR AVS SNAPSHOT
After Visit Summary   8/11/2017    Orquidea Stevens    MRN: [id number]           Patient Information     Date Of Birth          1940        Visit Information        Provider Department      8/11/2017 8:45 AM Lucie Gordillo MD [facility]        Today's Diagnoses     Pseudophakia of left eye    -  1    Combined form of age-related cataract, right eye          Care Instructions    POST-OP CATARACT INSTRUCTIONS    *   Use the following drop(s) in the LEFT EYE four times a day:        CatarActive 3    *   If you are taking glaucoma drops, continue as usual.    *   Wear eye shield when sleeping for one week.    *   No eye rubbing or lifting more than 10 pounds for one week.    *   Keep water out of eye for two weeks.    *   OK to resume aspirin and/or other blood thinners.    *   Return as scheduled in about one week.    *   If your vision worsens, eye becomes increasingly red, or becomes painful, call [phone].     Lucie Gordillo M.D.          Follow-ups after your visit        Follow-up notes from your care team     Return in about 1 week (around 8/18/2017) for PO2, as scheduled.      Your next 10 appointments already scheduled     Aug 17, 2017 10:45 AM CDT   Return Visit with Lucie Gordillo MD   [facility] [place] ([facility])    [address] [facility] [facility] [address] [phone]            Aug 22, 2017   Procedure with Lucie Gordillo MD   [facility] (--)    [address]  [facility] [address]   [phone]            Aug 23, 2017  3:45 PM CDT   [facility] [place] [facility]    [address] [address] [phone]            Aug 30, 2017  1:45 PM CDT   Return Visit with Lucie Gordillo MD   [facility] [place] ([facility])    [address] [address] [phone]             "Sep 27, 2017  1:45 PM CDT   Return Visit with Lucie Gordillo MD   [facility] [place] ([facility])    [address]  [place] [address] [phone]              Who to contact     If you have questions or need follow up information about today's clinic visit or your schedule please contact [facility] directly at [phone].  Normal or non-critical lab and imaging results will be communicated to you by Llesianthart, letter or phone within 4 business days after the clinic has received the results. If you do not hear from us within 7 days, please contact the clinic through Llesianthart or phone. If you have a critical or abnormal lab result, we will notify you by phone as soon as possible.  Submit refill requests through [employer] or call your pharmacy and they will forward the refill request to us. Please allow 3 business days for your refill to be completed.          Additional Information About Your Visit        Shareablee Information     Share[employer] lets you send messages to your doctor, view your test results, renew your prescriptions, schedule appointments and more. To sign up, go to [email or web address] . Click on \"Log in\" on the left side of the screen, which will take you to the Welcome page. Then click on \"Sign up Now\" on the right side of the page.     You will be asked to enter the access code listed below, as well as some personal information. Please follow the directions to create your username and password.     Your access code is: [email or web address]  Expires: 2017  9:14 AM     Your access code will  in 90 days. If you need help or a new code, please call your [place] clinic or [phone].        Care EveryWhere ID     This is your Care EveryWhere ID. This could be used by other organizations to access your [place] medical records  [id number]         Blood Pressure from Last 3 Encounters:   08/10/17 115/66   17 136/62   17 136/60    Weight from " Last 3 Encounters:   08/10/17 62.6 kg (138 lb)   08/01/17 62.6 kg (138 lb)   05/12/17 62.1 kg (137 lb)              Today, you had the following     No orders found for display       Primary Care Provider Office Phone # Fax #    Lisette Amos [phone] [address] [address] [facility] [address]        Equal Access to Services     FIORDALIZA JACKMAN : Hadii aad ku hadasho Soomaali, waaxda luqadaha, qaybta kaalmada adeegyada, waxay idiin hayaan adeeg kharash ladanny . So [facility] [phone].    ATENCIÓN: Si habla espmadeleine, tiene a trotter disposición servicios gratuitos de asistencia lingüística. Llame al [phone].    We comply with applicable federal civil rights laws and [place] laws. We do not discriminate on the basis of race, color, national origin, age, disability sex, sexual orientation or gender identity.            Thank you!     Thank you for choosing [facility] [place]  for your care. Our goal is always to provide you with excellent care. Hearing back from our patients is one way we can continue to improve our services. Please take a few minutes to complete the written survey that you may receive in the mail after your visit with us. Thank you!             Your Updated Medication List - Protect others around you: Learn how to safely use, store and throw away your medicines at www.disposemymeds.org.          This list is accurate as of: 8/11/17  9:15 AM.  Always use your most recent med list.                   Brand Name Dispense Instructions for use Diagnosis    acidophilus Caps      Take 2 capsules by mouth daily.        amLODIPine 10 MG tablet    NORVASC    90 tablet    Take 1 tablet (10 mg) by mouth daily Will call when needs refills    Hypertension goal BP (blood pressure) < 140/90       aspirin 325 MG EC tablet     100 Tab    take 1 Tab by mouth daily.    Type II or unspecified type diabetes mellitus without mention of complication, not stated as uncontrolled       atorvastatin 80  MG tablet    LIPITOR    90 tablet    Take 1 tablet (80 mg) by mouth daily Will call when needs refill    Hyperlipidemia LDL goal <100, Hypertriglyceridemia       blood glucose monitoring meter device kit    no brand specified    1 kit    Use to test blood sugar 2 times daily or as directed.    Type 2 diabetes, HbA1C goal < 8% (H)       Bromfenac Sodium Powd     1 Bottle    1 Bottle 4 times daily 1 drop four times a day into the operative eye starting 1 day before surgery. Use until bottle runs out.    Combined form of age-related cataract, both eyes       CALCIUM 500 PO      2 daily        CRANBERRY      Two tablets daily        Dexamethasone Acetate Powd     1 Bottle    1 Bottle 4 times daily 1 drop four times a day into the operative eye starting 1 day before surgery. Use until bottle runs out.    Combined form of age-related cataract, both eyes       enalapril 20 MG tablet    VASOTEC    180 tablet    Take 1 tablet (20 mg) by mouth 2 times daily Will call when needs refill    Hypertension goal BP (blood pressure) < 140/90       gabapentin 300 MG capsule    NEURONTIN    180 capsule    Take 1 capsule (300 mg) by mouth 2 times daily Will call when needs refill    Restless leg syndrome       hydrALAZINE 25 MG tablet    APRESOLINE    180 tablet    Take 1 tablet (25 mg) by mouth 2 times daily Will call when needs refill    Hypertension goal BP (blood pressure) < 140/90       hydrochlorothiazide 25 MG tablet    HYDRODIURIL    90 tablet    Take 1 tablet (25 mg) by mouth daily Will call when needs refill    Hypertension goal BP (blood pressure) < 140/90       Lutein 20 MG Caps      Take  by mouth.        metFORMIN 500 MG tablet    GLUCOPHAGE    180 tablet    Take 1 tablet (500 mg) by mouth 2 times daily (with meals) Will call when needs refill    Type 2 diabetes mellitus without complication, without long-term current use of insulin (H)       Moxifloxacin HCl Powd     1 Bottle    1 Bottle 4 times daily 1 drop four times a  day into the operative eye starting 1 day before surgery. Use until bottle runs out.    Combined form of age-related cataract, both eyes       * ONE TOUCH ULTRA test strip   Generic drug:  blood glucose monitoring     100 Strip    testing 4 times daily    Diabetes mellitus, type 2 (H)       * blood glucose monitoring test strip    no brand specified    3 Box    Use to test blood sugar 2 times daily or as directed.    Type 2 diabetes, HbA1C goal < 8% (H)       oxybutynin 5 MG tablet    DITROPAN    180 tablet    Take 2 tablets (10 mg) by mouth daily    Incontinence       * PC LANCETS SUPER THIN 30G Misc     180 each    1 Device 2 times daily    Type 2 diabetes, HbA1C goal < 8% (H)       * blood glucose monitoring lancets     3 Box    Use to test blood sugar 2 times daily or as directed.    Type 2 diabetes, HbA1C goal < 8% (H)       TURMERIC PO      1 cap daily        Vitamin B-12 500 MCG Subl      2 daily        vitamin D 1000 UNITS capsule      1 CAPSULE DAILY        * Notice:  This list has 4 medication(s) that are the same as other medications prescribed for you. Read the directions carefully, and ask your doctor or other care provider to review them with you.

## 2017-08-11 NOTE — PROGRESS NOTES
Current Eye Medications:  CatarActive 3 left eye 4 times yesterday and once so far today.       Subjective:  Status/Post Kelman Phacoemulsification with Posterior Chamber Lens left eye:  8-10-17.  Slept well. Comfortable.       Objective:  See Ophthalmology Exam.       Assessment:  Orquidea Stevens is a 76 year old female who presents with:   Encounter Diagnoses   Name Primary?     Pseudophakia of left eye PO1, doing well. Mild k edema.     Combined form of age-related cataract, right eye        Plan:  POST-OP CATARACT INSTRUCTIONS    *   Use the following drop(s) in the LEFT EYE four times a day:        CatarActive 3  *   If you are taking glaucoma drops, continue as usual.  *   Wear eye shield when sleeping for one week.  *   No eye rubbing or lifting more than 10 pounds for one week.  *   Keep water out of eye for two weeks.  *   OK to resume aspirin and/or other blood thinners.  *   Return as scheduled in about one week.  *   If your vision worsens, eye becomes increasingly red, or becomes painful, call [phone].     Lucie Gordillo M.D.

## 2017-08-17 ENCOUNTER — OFFICE VISIT (OUTPATIENT)
Dept: OPHTHALMOLOGY | Facility: CLINIC | Age: 77
End: 2017-08-17
Payer: COMMERCIAL

## 2017-08-17 DIAGNOSIS — H02.833 DERMATOCHALASIS OF EYELIDS OF BOTH EYES: ICD-10-CM

## 2017-08-17 DIAGNOSIS — H25.811 COMBINED FORM OF AGE-RELATED CATARACT, RIGHT EYE: ICD-10-CM

## 2017-08-17 DIAGNOSIS — H02.836 DERMATOCHALASIS OF EYELIDS OF BOTH EYES: ICD-10-CM

## 2017-08-17 DIAGNOSIS — H02.403 PTOSIS OF EYELID, BILATERAL: ICD-10-CM

## 2017-08-17 DIAGNOSIS — Z96.1 PSEUDOPHAKIA OF LEFT EYE: Primary | ICD-10-CM

## 2017-08-17 PROCEDURE — 92136 OPHTHALMIC BIOMETRY: CPT | Mod: 26 | Performed by: STUDENT IN AN ORGANIZED HEALTH CARE EDUCATION/TRAINING PROGRAM

## 2017-08-17 PROCEDURE — 99024 POSTOP FOLLOW-UP VISIT: CPT | Performed by: STUDENT IN AN ORGANIZED HEALTH CARE EDUCATION/TRAINING PROGRAM

## 2017-08-17 ASSESSMENT — REFRACTION_MANIFEST
OS_SPHERE: PLANO
OS_CYLINDER: +1.00
OS_AXIS: 155

## 2017-08-17 ASSESSMENT — EXTERNAL EXAM - RIGHT EYE: OD_EXAM: NORMAL

## 2017-08-17 ASSESSMENT — TONOMETRY
OD_IOP_MMHG: 20
OS_IOP_MMHG: 17
IOP_METHOD: APPLANATION

## 2017-08-17 ASSESSMENT — VISUAL ACUITY
OD_BAT_HIGH: 20/40-2
OS_SC: 20/30
OD_CC: 20/25
METHOD: SNELLEN - LINEAR

## 2017-08-17 ASSESSMENT — EXTERNAL EXAM - LEFT EYE: OS_EXAM: NORMAL

## 2017-08-17 NOTE — PATIENT INSTRUCTIONS
*   Post op instructions for the left eye:     Continue CatarActive3 four times a day until the bottle runs out.    *   Stop wearing eye shield.    *   No eye rubbing. May resume heavy lifting.    *   If you are taking glaucoma drops, continue as usual.    *   If your vision worsens, eye becomes increasingly red, or becomes painful, call [phone].        PRE-OP CATARACT INSTRUCTIONS FOR THE RIGHT EYE    *Use the following drops in the right eye 4 times on the day before surgery and once the morning of surgery:                                   CatarActive3    *Please bring your eyedrops to the surgery center.*    *If taking more than one drop, wait five minutes between drops.    *No solid food or liquids after midnight.    * You may take your regular pills with small sips of water    *If you are taking glaucoma drops, continue as usual.    *No diabetic medications the morning of surgery.    Lucie Gordillo MD  [phone]

## 2017-08-17 NOTE — PROGRESS NOTES
Current Eye Medications: cataractive3 qid od       Subjective:  Po2 os and preop kpe od  Pt report her left seems to be a little bit blurry. No eye pain.      Objective:  See Ophthalmology Exam.      Assessment:  Orquidea Stevens is a 76 year old female who presents with:   Encounter Diagnoses   Name Primary?     Pseudophakia of left eye PO2, doing well.     Combined form of age-related cataract, right eye Pre-op right eye, target distance.      Dermatochalasis of eyelids of both eyes      Ptosis of eyelid, bilateral      *   Post op instructions for the left eye:   Continue CatarActive3 four times a day until the bottle runs out.  *   Stop wearing eye shield  *   No eye rubbing. May resume heavy lifting.  *   If you are taking glaucoma drops, continue as usual.  *   If your vision worsens, eye becomes increasingly red, or becomes painful, call [phone].      PRE-OP CATARACT INSTRUCTIONS FOR THE RIGHT EYE    *Use the following drops in the right eye 4 times on the day before surgery and once the morning of surgery:    CatarActive3  *Please bring your eyedrops to the surgery center.*  *If taking more than one drop, wait five minutes between drops.  *No solid food or liquids after midnight.  * You may take your regular pills with small sips of water  *If you are taking glaucoma drops, continue as usual.  *No diabetic medications the morning of surgery.    Lucie Gordillo MD  [phone]

## 2017-08-17 NOTE — MR AVS SNAPSHOT
After Visit Summary   8/17/2017    Orquidea Stevens    MRN: [id number]           Patient Information     Date Of Birth          1940        Visit Information        Provider Department      8/17/2017 10:45 AM Lucie Gordillo MD [facility]        Today's Diagnoses     Pseudophakia of left eye    -  1    Combined form of age-related cataract, right eye        Dermatochalasis of eyelids of both eyes        Ptosis of eyelid, bilateral          Care Instructions    *   Post op instructions for the left eye:     Continue CatarActive3 four times a day until the bottle runs out.    *   Stop wearing eye shield.    *   No eye rubbing. May resume heavy lifting.    *   If you are taking glaucoma drops, continue as usual.    *   If your vision worsens, eye becomes increasingly red, or becomes painful, call [phone].        PRE-OP CATARACT INSTRUCTIONS FOR THE RIGHT EYE    *Use the following drops in the right eye 4 times on the day before surgery and once the morning of surgery:                                   CatarActive3    *Please bring your eyedrops to the surgery center.*    *If taking more than one drop, wait five minutes between drops.    *No solid food or liquids after midnight.    * You may take your regular pills with small sips of water    *If you are taking glaucoma drops, continue as usual.    *No diabetic medications the morning of surgery.    Lucie Gordillo MD  [phone]            Follow-ups after your visit        Your next 10 appointments already scheduled     Aug 28, 2017   Procedure with Lucie Gordillo MD   [facility] (--)    [address]  [facility] [address]   [phone]            Aug 29, 2017 11:00 AM CDT   Return Visit with Lucie Gordillo MD   [facility]    [address] [address] [phone]            Sep 06, 2017  1:30 PM CDT   Return Visit  "with Lucie Gordillo MD   [facility]    [address]  [place] [address] [phone]            Oct 04, 2017  2:15 PM CDT   Return Visit with Lucie Gordillo MD   [facility] ([facility])    [address] [place] [address] [phone]              Who to contact     If you have questions or need follow up information about today's clinic visit or your schedule please contact [facility] directly at [phone].  Normal or non-critical lab and imaging results will be communicated to you by MyChart, letter or phone within 4 business days after the clinic has received the results. If you do not hear from us within 7 days, please contact the clinic through MyChart or phone. If you have a critical or abnormal lab result, we will notify you by phone as soon as possible.  Submit refill requests through [employer] or call your pharmacy and they will forward the refill request to us. Please allow 3 business days for your refill to be completed.          Additional Information About Your Visit        MyChart Information     [employer] lets you send messages to your doctor, view your test results, renew your prescriptions, schedule appointments and more. To sign up, go to [email or web address] . Click on \"Log in\" on the left side of the screen, which will take you to the Welcome page. Then click on \"Sign up Now\" on the right side of the page.     You will be asked to enter the access code listed below, as well as some personal information. Please follow the directions to create your username and password.     Your access code is: [email or web address]  Expires: 2017  9:14 AM     Your access code will  in 90 days. If you need help or a new code, please call your [place] clinic or [phone].        Care EveryWhere ID     This is your Care EveryWhere ID. This could be used by other organizations to access your " [place] medical records  [id number]         Blood Pressure from Last 3 Encounters:   08/10/17 115/66   08/01/17 136/62   05/12/17 136/60    Weight from Last 3 Encounters:   08/10/17 62.6 kg (138 lb)   08/01/17 62.6 kg (138 lb)   05/12/17 62.1 kg (137 lb)              Today, you had the following     No orders found for display       Primary Care Provider Office Phone # Fax #    Lisette Amos [phone] [address] [address] [facility] [address]        Equal Access to Services     [facility]: Hadii shirley campa hadasho Soreaganali, waaxda luqadaha, qaybta kaalmada ademarco antonioyada, efrain jacinto . So [facility] [phone].    ATENCIÓN: Si habla español, tiene a trotter disposición servicios gratuitos de asistencia lingüística. Llame al [phone].    We comply with applicable federal civil rights laws and [place] laws. We do not discriminate on the basis of race, color, national origin, age, disability sex, sexual orientation or gender identity.            Thank you!     Thank you for choosing [facility] [place]  for your care. Our goal is always to provide you with excellent care. Hearing back from our patients is one way we can continue to improve our services. Please take a few minutes to complete the written survey that you may receive in the mail after your visit with us. Thank you!             Your Updated Medication List - Protect others around you: Learn how to safely use, store and throw away your medicines at www.disposemymeds.org.          This list is accurate as of: 8/17/17 11:10 AM.  Always use your most recent med list.                   Brand Name Dispense Instructions for use Diagnosis    acidophilus Caps      Take 2 capsules by mouth daily.        amLODIPine 10 MG tablet    NORVASC    90 tablet    Take 1 tablet (10 mg) by mouth daily Will call when needs refills    Hypertension goal BP (blood pressure) < 140/90       aspirin 325 MG EC tablet     100 Tab    take  1 Tab by mouth daily.    Type II or unspecified type diabetes mellitus without mention of complication, not stated as uncontrolled       atorvastatin 80 MG tablet    LIPITOR    90 tablet    Take 1 tablet (80 mg) by mouth daily Will call when needs refill    Hyperlipidemia LDL goal <100, Hypertriglyceridemia       blood glucose monitoring meter device kit    no brand specified    1 kit    Use to test blood sugar 2 times daily or as directed.    Type 2 diabetes, HbA1C goal < 8% (H)       Bromfenac Sodium Powd     1 Bottle    1 Bottle 4 times daily 1 drop four times a day into the operative eye starting 1 day before surgery. Use until bottle runs out.    Combined form of age-related cataract, both eyes       CALCIUM 500 PO      2 daily        CRANBERRY      Two tablets daily        Dexamethasone Acetate Powd     1 Bottle    1 Bottle 4 times daily 1 drop four times a day into the operative eye starting 1 day before surgery. Use until bottle runs out.    Combined form of age-related cataract, both eyes       enalapril 20 MG tablet    VASOTEC    180 tablet    Take 1 tablet (20 mg) by mouth 2 times daily Will call when needs refill    Hypertension goal BP (blood pressure) < 140/90       gabapentin 300 MG capsule    NEURONTIN    180 capsule    Take 1 capsule (300 mg) by mouth 2 times daily Will call when needs refill    Restless leg syndrome       hydrALAZINE 25 MG tablet    APRESOLINE    180 tablet    Take 1 tablet (25 mg) by mouth 2 times daily Will call when needs refill    Hypertension goal BP (blood pressure) < 140/90       hydrochlorothiazide 25 MG tablet    HYDRODIURIL    90 tablet    Take 1 tablet (25 mg) by mouth daily Will call when needs refill    Hypertension goal BP (blood pressure) < 140/90       Lutein 20 MG Caps      Take  by mouth.        metFORMIN 500 MG tablet    GLUCOPHAGE    180 tablet    Take 1 tablet (500 mg) by mouth 2 times daily (with meals) Will call when needs refill    Type 2 diabetes mellitus  without complication, without long-term current use of insulin (H)       Moxifloxacin HCl Powd     1 Bottle    1 Bottle 4 times daily 1 drop four times a day into the operative eye starting 1 day before surgery. Use until bottle runs out.    Combined form of age-related cataract, both eyes       * ONE TOUCH ULTRA test strip   Generic drug:  blood glucose monitoring     100 Strip    testing 4 times daily    Diabetes mellitus, type 2 (H)       * blood glucose monitoring test strip    no brand specified    3 Box    Use to test blood sugar 2 times daily or as directed.    Type 2 diabetes, HbA1C goal < 8% (H)       oxybutynin 5 MG tablet    DITROPAN    180 tablet    Take 2 tablets (10 mg) by mouth daily    Incontinence       * PC LANCETS SUPER THIN 30G Misc     180 each    1 Device 2 times daily    Type 2 diabetes, HbA1C goal < 8% (H)       * blood glucose monitoring lancets     3 Box    Use to test blood sugar 2 times daily or as directed.    Type 2 diabetes, HbA1C goal < 8% (H)       TURMERIC PO      1 cap daily        Vitamin B-12 500 MCG Subl      2 daily        vitamin D 1000 UNITS capsule      1 CAPSULE DAILY        * Notice:  This list has 4 medication(s) that are the same as other medications prescribed for you. Read the directions carefully, and ask your doctor or other care provider to review them with you.

## 2017-08-25 NOTE — H&P (VIEW-ONLY)
[facility]  [address] [facility] [address]  [phone]  Dept: [phone]    PRE-OP EVALUATION:  Today's date: 2017    Orquidea Stevens (: 1940) presents for pre-operative evaluation assessment as requested by Dr. Gordillo.  She requires evaluation and anesthesia risk assessment prior to undergoing surgery/procedure for treatment of cataract .  Proposed procedure: removal of cataracts     Date of Surgery/ Procedure: Aug. 10  Time of Surgery/ Procedure: 9 am   Hospital/Surgical Facility: [facility]   Fax number for surgical facility:   Primary Physician: Lisette Amos  Type of Anesthesia Anticipated: to be determined    Patient has a Health Care Directive or Living Will:  NO    1. NO - Do you have a history of heart attack, stroke, stent, bypass or surgery on an artery in the head, neck, heart or legs?  2. NO - Do you ever have any pain or discomfort in your chest?  3. NO - Do you have a history of  Heart Failure?  4. NO - Are you troubled by shortness of breath when: walking on the level, up a slight hill or at night?  5. NO - Do you currently have a cold, bronchitis or other respiratory infection?  6. NO - Do you have a cough, shortness of breath or wheezing?  7. NO - Do you sometimes get pains in the calves of your legs when you walk?  8. YES - DO YOU OR ANYONE IN YOUR FAMILY HAVE PREVIOUS HISTORY OF BLOOD CLOTS? Sister with DVT  9. NO - Do you or does anyone in your family have a serious bleeding problem such as prolonged bleeding following surgeries or cuts?  10. NO - Have you ever had problems with anemia or been told to take iron pills?  11. NO - Have you had any abnormal blood loss such as black, tarry or bloody stools, or abnormal vaginal bleeding?  12. NO - Have you ever had a blood transfusion?  13. NO - Have you or any of your relatives ever had problems with anesthesia?  14. YES - DO YOU HAVE SLEEP APNEA, EXCESSIVE SNORING OR DAYTIME DROWSINESS? Snoring per    15. NO - Do you have any prosthetic heart valves?  16. NO - Do you have prosthetic joints?  17. NO - Is there any chance that you may be pregnant?        HPI:                                                      Brief HPI related to upcoming procedure: bilateral cataract removal      DIABETES - Patient has a longstanding history of DiabetesType Type II . Patient is being treated with oral agents and denies significant side effects. Control has been good. Complicating factors include but are not limited to: cardiac ,, high cholesterol on statin and HTN well controlled.                                                                                                             .  HYPERTENSION - Patient has longstanding history of mod-severe HTN , currently denies any symptoms referable to elevated blood pressure. Specifically denies chest pain, palpitations, dyspnea, orthopnea, PND or peripheral edema. Blood pressure readings have been in normal range. Current medication regimen is as listed below. Patient denies any side effects of medication.                                                                                                                                                                                          .  HYPERLIPIDEMIA - Patient has a long history of significant Hyperlipidemia requiring medication for treatment with recent good control. Patient reports no problems or side effects with the medication.                                                                                                                                                       .  CAD - Patient has a longstanding history of mod-severe CAD. Patient denies recent chest pain or NTG use, denies exercise induced dyspnea or PND. Last Stress test                                                                                                                      .    MEDICAL HISTORY:                                                     Patient Active Problem List    Diagnosis Date Noted     Pure hypercholesterolemia 07/31/2017     Priority: Medium     Type 2 diabetes mellitus without complication, without long-term current use of insulin (H) 05/03/2017     Priority: Medium     Combined form of age-related cataract, both eyes 06/23/2016     Priority: Medium     Hypertriglyceridemia 12/11/2013     Priority: Medium     Restless leg syndrome 08/15/2013     Priority: Medium     Dermatochalasis 12/03/2012     Priority: Medium     Ptosis of eyelid. OU 12/03/2012     Priority: Medium     Atrophic vaginitis 03/13/2012     Priority: Medium     Diagnosis updated by automated process. Provider to review and confirm.       Essential hypertension      Priority: Medium     GERD (gastroesophageal reflux disease)      Priority: Medium     TYPE 2 DIABETES, HBA1C GOAL < 8% 10/31/2010     Priority: Medium     HYPERLIPIDEMIA LDL GOAL <100 10/31/2010     Priority: Medium     Allergic state      Priority: Medium     (Problem list name updated by automated process. Provider to review and confirm.)       Actinic keratosis      Priority: Medium     Spastic neurogenic bladder      Priority: Medium     Rosacea      Priority: Medium     History of squamous cell carcinoma of skin 12/03/2009     Priority: Medium     IBS (irritable bowel syndrome)      Priority: Medium     Urinary incontinence      Priority: Medium     Fatty liver      Priority: Medium     CAD (coronary artery disease)      Priority: Medium     1997 angiogram showed 60% LAD stenosis       Squamous cell carcinoma 10/27/2008     Priority: Medium     R.Nasal Sidewall       Tear of medial cartilage or meniscus of knee, current 10/02/2006     Priority: Medium     Advanced directives, counseling/discussion 05/23/2011     Priority: Low     Discussed Advance Directive planning with patient; however, patient declined at this time.         Past Medical History:   Diagnosis Date     Actinic keratosis      Actinic  keratosis      Allergies      Arthritis      CAD (coronary artery disease)      Cataract      Diabetes mellitus type II      Fatty liver      GERD (gastroesophageal reflux disease)      HH (hiatus hernia)      Hyperlipidaemia LDL goal <100      Hypertension goal BP (blood pressure) < 140/90      IBS (irritable bowel syndrome)      Rosacea      Spastic neurogenic bladder      Squamous cell carcinoma 10/27/2008    R. Nasal Sidewall     Type 2 diabetes, HbA1C goal < 8% (H) 10/31/2010     Urinary incontinence      Vitamin D deficiency      Past Surgical History:   Procedure Laterality Date     ANGIOGRAM  1999    50% Blockage     ARTHROSCOPY KNEE RT/LT  9/04     BIOPSY BREAST  2001    RT     BLADDER SURGERY  1988     HC REDUCTION OF LARGE BREAST  1988     HYSTERECTOMY, PAP NO LONGER INDICATED  2003     MOHS MICROGRAPHIC PROCEDURE      RT nose SCC     Current Outpatient Prescriptions   Medication Sig Dispense Refill     [START ON 8/9/2017] Bromfenac Sodium POWD 1 Bottle 4 times daily 1 drop four times a day into the operative eye starting 1 day before surgery. Use until bottle runs out. 1 Bottle 1     [START ON 8/9/2017] Dexamethasone Acetate POWD 1 Bottle 4 times daily 1 drop four times a day into the operative eye starting 1 day before surgery. Use until bottle runs out. 1 Bottle 1     [START ON 8/9/2017] Moxifloxacin HCl POWD 1 Bottle 4 times daily 1 drop four times a day into the operative eye starting 1 day before surgery. Use until bottle runs out. 1 Bottle 1     oxybutynin (DITROPAN) 5 MG tablet Take 2 tablets (10 mg) by mouth daily 180 tablet 1     metFORMIN (GLUCOPHAGE) 500 MG tablet Take 1 tablet (500 mg) by mouth 2 times daily (with meals) Will call when needs refill 180 tablet 1     hydrochlorothiazide (HYDRODIURIL) 25 MG tablet Take 1 tablet (25 mg) by mouth daily Will call when needs refill 90 tablet 1     amLODIPine (NORVASC) 10 MG tablet Take 1 tablet (10 mg) by mouth daily Will call when needs refills 90  tablet 3     gabapentin (NEURONTIN) 300 MG capsule Take 1 capsule (300 mg) by mouth 2 times daily Will call when needs refill 180 capsule 3     hydrALAZINE (APRESOLINE) 25 MG tablet Take 1 tablet (25 mg) by mouth 2 times daily Will call when needs refill 180 tablet 3     atorvastatin (LIPITOR) 80 MG tablet Take 1 tablet (80 mg) by mouth daily Will call when needs refill 90 tablet 3     enalapril (VASOTEC) 20 MG tablet Take 1 tablet (20 mg) by mouth 2 times daily Will call when needs refill 180 tablet 1     TURMERIC PO 1 cap daily       blood glucose monitoring (NO BRAND SPECIFIED) test strip Use to test blood sugar 2 times daily or as directed. 3 Box prn     PC LANCETS SUPER THIN 30G MISC 1 Device 2 times daily 180 each prn     blood glucose meter (NO BRAND SPECIFIED) meter device kit Use to test blood sugar 2 times daily or as directed. 1 kit 0     blood glucose monitoring (ACCU-CHEK MULTICLIX) lancets Use to test blood sugar 2 times daily or as directed. 3 Box prn     CRANBERRY Two tablets daily       Lutein 20 MG CAPS Take  by mouth.       Lactobacillus (ACIDOPHILUS) CAPS Take 2 capsules by mouth daily.       aspirin 325 MG EC tablet take 1 Tab by mouth daily. 100 Tab 3     ONETOUCH ULTRA TEST VI STRP testing 4 times daily 100 Strip 7     CALCIUM 500 OR 2 daily       MULTIVITAMINS FC OR TABS 1 TABLET DAILY       VITAMIN D 1000 UNIT OR CAPS 1 CAPSULE DAILY       VITAMIN B-12 500 MCG SL SUBL 2 daily       OTC products: None, except as noted above    Allergies   Allergen Reactions     Atorvastatin Muscle Pain (Myalgia)     Detrol [Tolterodine Tartrate]      Unsure reaction     Latex Rash     Lovastatin Muscle Pain (Myalgia)     in legs     Simvastatin Muscle Pain (Myalgia)     in legs     Zocor [Hmg-Coa-R Inhibitors] Cramps      Latex Allergy: YES: Precautions to take: rash in areas of contact    Social History   Substance Use Topics     Smoking status: Never Smoker     Smokeless tobacco: Never Used      Comment:  "Lives in smoke free household     Alcohol use No     History   Drug Use No       REVIEW OF SYSTEMS:                                                    Constitutional, neuro, ENT, endocrine, pulmonary, cardiac, gastrointestinal, genitourinary, musculoskeletal, integument and psychiatric systems are negative, except as otherwise noted.      EXAM:                                                    /62  Pulse 58  Temp 97.7  F (36.5  C) (Oral)  Ht 5' 3.25\" (1.607 m)  Wt 138 lb (62.6 kg)  BMI 24.25 kg/m2    GENERAL APPEARANCE: healthy, alert and no distress     EYES: EOMI, PERRL     HENT: ear canals and TM's normal and nose and mouth without ulcers or lesions     NECK: no adenopathy, no asymmetry, masses, or scars and thyroid normal to palpation     RESP: lungs clear to auscultation - no rales, rhonchi or wheezes     CV: regular rates and rhythm, normal S1 S2, no S3 or S4 and no murmur, click or rub     ABDOMEN:  soft, nontender, no HSM or masses and bowel sounds normal     MS: extremities normal- no gross deformities noted, no evidence of inflammation in joints, FROM in all extremities.     PSYCH: mentation appears normal. and affect normal/bright    DIAGNOSTICS:                                                    No EKG required for low risk surgery (cataract, skin procedure, breast biopsy, etc)    BMP pending        IMPRESSION:                                                    Reason for surgery/procedure: cataracts removal    The proposed surgical procedure is considered LOW risk.    REVISED CARDIAC RISK INDEX  The patient has the following serious cardiovascular risks for perioperative complications such as (MI, PE, VFib and 3  AV Block):  No serious cardiac risks  INTERPRETATION: 1 risks: Class II (low risk - 0.9% complication rate)    The patient has the following additional risks for perioperative complications:  No identified additional risks      ICD-10-CM    1. Preop general physical exam Z01.818 " Basic metabolic panel   2. Cataract of both eyes, unspecified cataract type H26.9    3. Type 2 diabetes mellitus without complication, without long-term current use of insulin (H) E11.9    4. Essential hypertension I10    5. Coronary artery disease involving native coronary artery of native heart without angina pectoris I25.10        RECOMMENDATIONS:                                                      (Z01.818) Preop general physical exam  (primary encounter diagnosis)  Comment:   Plan: Basic metabolic panel            (H26.9) Cataract of both eyes, unspecified cataract type  Comment:   Plan:     (E11.9) Type 2 diabetes mellitus without complication, without long-term current use of insulin (H)  Comment: well controlled on meds,   Plan:     (I10) Essential hypertension  Comment: at goal  Plan:     (I25.10) Coronary artery disease involving native coronary artery of native heart without angina pectoris  Comment: stable  Plan:         --Patient is to take all scheduled medications on the day of surgery EXCEPT for modifications listed below.  Can take all meds    APPROVAL GIVEN to proceed with proposed procedure, without further diagnostic evaluation       Signed Electronically by: Lisette Agustin MD    Copy of this evaluation report is provided to requesting physician.    [facility] Preop Guidelines

## 2017-08-28 ENCOUNTER — ANESTHESIA EVENT (OUTPATIENT)
Dept: SURGERY | Facility: CLINIC | Age: 77
End: 2017-08-28
Payer: COMMERCIAL

## 2017-08-28 ENCOUNTER — ANESTHESIA (OUTPATIENT)
Dept: SURGERY | Facility: CLINIC | Age: 77
End: 2017-08-28
Payer: COMMERCIAL

## 2017-08-28 ENCOUNTER — HOSPITAL ENCOUNTER (OUTPATIENT)
Facility: CLINIC | Age: 77
Discharge: HOME | End: 2017-08-28
Attending: STUDENT IN AN ORGANIZED HEALTH CARE EDUCATION/TRAINING PROGRAM | Admitting: STUDENT IN AN ORGANIZED HEALTH CARE EDUCATION/TRAINING PROGRAM
Payer: COMMERCIAL

## 2017-08-28 VITALS
WEIGHT: 138 LBS | TEMPERATURE: 96 F | SYSTOLIC BLOOD PRESSURE: 118 MMHG | HEART RATE: 55 BPM | DIASTOLIC BLOOD PRESSURE: 64 MMHG | OXYGEN SATURATION: 96 % | HEIGHT: 63 IN | RESPIRATION RATE: 16 BRPM | BODY MASS INDEX: 24.45 KG/M2

## 2017-08-28 LAB — GLUCOSE BLDC GLUCOMTR-MCNC: 135 MG/DL (ref 70–99)

## 2017-08-28 PROCEDURE — 40000170 ZZH STATISTIC PRE-PROCEDURE ASSESSMENT II: Performed by: STUDENT IN AN ORGANIZED HEALTH CARE EDUCATION/TRAINING PROGRAM

## 2017-08-28 PROCEDURE — 25000125 ZZHC RX 250: Performed by: STUDENT IN AN ORGANIZED HEALTH CARE EDUCATION/TRAINING PROGRAM

## 2017-08-28 PROCEDURE — 82962 GLUCOSE BLOOD TEST: CPT

## 2017-08-28 PROCEDURE — 25000125 ZZHC RX 250

## 2017-08-28 PROCEDURE — 37000009 ZZH ANESTHESIA TECHNICAL FEE, EACH ADDTL 15 MIN: Performed by: STUDENT IN AN ORGANIZED HEALTH CARE EDUCATION/TRAINING PROGRAM

## 2017-08-28 PROCEDURE — 66984 XCAPSL CTRC RMVL W/O ECP: CPT | Mod: 79 | Performed by: STUDENT IN AN ORGANIZED HEALTH CARE EDUCATION/TRAINING PROGRAM

## 2017-08-28 PROCEDURE — 25000128 H RX IP 250 OP 636

## 2017-08-28 PROCEDURE — 25000128 H RX IP 250 OP 636: Performed by: ANESTHESIOLOGY

## 2017-08-28 PROCEDURE — 25000125 ZZHC RX 250: Performed by: ANESTHESIOLOGY

## 2017-08-28 PROCEDURE — 36000102 ZZH EYE SURGERY LEVEL 3 EA 15 ADDTL MIN: Performed by: STUDENT IN AN ORGANIZED HEALTH CARE EDUCATION/TRAINING PROGRAM

## 2017-08-28 PROCEDURE — V2632 POST CHMBR INTRAOCULAR LENS: HCPCS | Performed by: STUDENT IN AN ORGANIZED HEALTH CARE EDUCATION/TRAINING PROGRAM

## 2017-08-28 PROCEDURE — 37000008 ZZH ANESTHESIA TECHNICAL FEE, 1ST 30 MIN: Performed by: STUDENT IN AN ORGANIZED HEALTH CARE EDUCATION/TRAINING PROGRAM

## 2017-08-28 PROCEDURE — 27210794 ZZH OR GENERAL SUPPLY STERILE: Performed by: STUDENT IN AN ORGANIZED HEALTH CARE EDUCATION/TRAINING PROGRAM

## 2017-08-28 PROCEDURE — 36000101 ZZH EYE SURGERY LEVEL 3 1ST 30 MIN: Performed by: STUDENT IN AN ORGANIZED HEALTH CARE EDUCATION/TRAINING PROGRAM

## 2017-08-28 PROCEDURE — 71000028 ZZH EYE RECOVERY PHASE 2 EACH 15 MINS: Performed by: STUDENT IN AN ORGANIZED HEALTH CARE EDUCATION/TRAINING PROGRAM

## 2017-08-28 PROCEDURE — 25000128 H RX IP 250 OP 636: Performed by: STUDENT IN AN ORGANIZED HEALTH CARE EDUCATION/TRAINING PROGRAM

## 2017-08-28 DEVICE — EYE IMP IOL AMO PCL TECNIS ZCB00 25.5: Type: IMPLANTABLE DEVICE | Site: EYE | Status: FUNCTIONAL

## 2017-08-28 RX ORDER — TROPICAMIDE 10 MG/ML
1 SOLUTION/ DROPS OPHTHALMIC
Status: COMPLETED | OUTPATIENT
Start: 2017-08-28 | End: 2017-08-28

## 2017-08-28 RX ORDER — SODIUM CHLORIDE, SODIUM LACTATE, POTASSIUM CHLORIDE, CALCIUM CHLORIDE 600; 310; 30; 20 MG/100ML; MG/100ML; MG/100ML; MG/100ML
500 INJECTION, SOLUTION INTRAVENOUS CONTINUOUS
Status: DISCONTINUED | OUTPATIENT
Start: 2017-08-28 | End: 2017-08-28 | Stop reason: HOSPADM

## 2017-08-28 RX ORDER — TETRACAINE HYDROCHLORIDE 5 MG/ML
SOLUTION OPHTHALMIC PRN
Status: DISCONTINUED | OUTPATIENT
Start: 2017-08-28 | End: 2017-08-28 | Stop reason: HOSPADM

## 2017-08-28 RX ORDER — ONDANSETRON 2 MG/ML
INJECTION INTRAMUSCULAR; INTRAVENOUS PRN
Status: DISCONTINUED | OUTPATIENT
Start: 2017-08-28 | End: 2017-08-28

## 2017-08-28 RX ORDER — PHENYLEPHRINE HYDROCHLORIDE 25 MG/ML
1 SOLUTION/ DROPS OPHTHALMIC
Status: COMPLETED | OUTPATIENT
Start: 2017-08-28 | End: 2017-08-28

## 2017-08-28 RX ORDER — LIDOCAINE HYDROCHLORIDE 10 MG/ML
INJECTION, SOLUTION EPIDURAL; INFILTRATION; INTRACAUDAL; PERINEURAL PRN
Status: DISCONTINUED | OUTPATIENT
Start: 2017-08-28 | End: 2017-08-28 | Stop reason: HOSPADM

## 2017-08-28 RX ORDER — FLURBIPROFEN SODIUM 0.3 MG/ML
1 SOLUTION/ DROPS OPHTHALMIC
Status: DISCONTINUED | OUTPATIENT
Start: 2017-08-28 | End: 2017-08-28 | Stop reason: HOSPADM

## 2017-08-28 RX ORDER — BALANCED SALT SOLUTION 6.4; .75; .48; .3; 3.9; 1.7 MG/ML; MG/ML; MG/ML; MG/ML; MG/ML; MG/ML
SOLUTION OPHTHALMIC PRN
Status: DISCONTINUED | OUTPATIENT
Start: 2017-08-28 | End: 2017-08-28 | Stop reason: HOSPADM

## 2017-08-28 RX ORDER — CYCLOPENTOLATE HYDROCHLORIDE 10 MG/ML
1 SOLUTION/ DROPS OPHTHALMIC
Status: COMPLETED | OUTPATIENT
Start: 2017-08-28 | End: 2017-08-28

## 2017-08-28 RX ORDER — PROPARACAINE HYDROCHLORIDE 5 MG/ML
1 SOLUTION/ DROPS OPHTHALMIC ONCE
Status: COMPLETED | OUTPATIENT
Start: 2017-08-28 | End: 2017-08-28

## 2017-08-28 RX ADMIN — SODIUM CHLORIDE, POTASSIUM CHLORIDE, SODIUM LACTATE AND CALCIUM CHLORIDE 500 ML: 600; 310; 30; 20 INJECTION, SOLUTION INTRAVENOUS at 08:58

## 2017-08-28 RX ADMIN — FLURBIPROFEN SODIUM 1 DROP: 0.3 SOLUTION/ DROPS OPHTHALMIC at 08:42

## 2017-08-28 RX ADMIN — FLURBIPROFEN SODIUM 1 DROP: 0.3 SOLUTION/ DROPS OPHTHALMIC at 08:33

## 2017-08-28 RX ADMIN — TROPICAMIDE 1 DROP: 10 SOLUTION/ DROPS OPHTHALMIC at 08:51

## 2017-08-28 RX ADMIN — PHENYLEPHRINE HYDROCHLORIDE 1 DROP: 2.5 SOLUTION/ DROPS OPHTHALMIC at 08:33

## 2017-08-28 RX ADMIN — LIDOCAINE HYDROCHLORIDE 1 ML: 10 INJECTION, SOLUTION EPIDURAL; INFILTRATION; INTRACAUDAL; PERINEURAL at 08:58

## 2017-08-28 RX ADMIN — CYCLOPENTOLATE HYDROCHLORIDE 1 DROP: 10 SOLUTION/ DROPS OPHTHALMIC at 08:43

## 2017-08-28 RX ADMIN — ONDANSETRON 4 MG: 2 INJECTION INTRAMUSCULAR; INTRAVENOUS at 09:39

## 2017-08-28 RX ADMIN — CYCLOPENTOLATE HYDROCHLORIDE 1 DROP: 10 SOLUTION/ DROPS OPHTHALMIC at 08:51

## 2017-08-28 RX ADMIN — TROPICAMIDE 1 DROP: 10 SOLUTION/ DROPS OPHTHALMIC at 08:33

## 2017-08-28 RX ADMIN — PHENYLEPHRINE HYDROCHLORIDE 1 DROP: 2.5 SOLUTION/ DROPS OPHTHALMIC at 08:42

## 2017-08-28 RX ADMIN — MIDAZOLAM HYDROCHLORIDE 1 MG: 1 INJECTION, SOLUTION INTRAMUSCULAR; INTRAVENOUS at 09:35

## 2017-08-28 RX ADMIN — FLURBIPROFEN SODIUM 1 DROP: 0.3 SOLUTION/ DROPS OPHTHALMIC at 08:51

## 2017-08-28 RX ADMIN — TROPICAMIDE 1 DROP: 10 SOLUTION/ DROPS OPHTHALMIC at 08:42

## 2017-08-28 RX ADMIN — PROPARACAINE HYDROCHLORIDE 1 DROP: 5 SOLUTION/ DROPS OPHTHALMIC at 08:33

## 2017-08-28 RX ADMIN — DEXMEDETOMIDINE HYDROCHLORIDE 8 MCG: 100 INJECTION, SOLUTION INTRAVENOUS at 09:35

## 2017-08-28 RX ADMIN — CYCLOPENTOLATE HYDROCHLORIDE 1 DROP: 10 SOLUTION/ DROPS OPHTHALMIC at 08:48

## 2017-08-28 RX ADMIN — PHENYLEPHRINE HYDROCHLORIDE 1 DROP: 2.5 SOLUTION/ DROPS OPHTHALMIC at 08:51

## 2017-08-28 RX ADMIN — DEXMEDETOMIDINE HYDROCHLORIDE 8 MCG: 100 INJECTION, SOLUTION INTRAVENOUS at 09:44

## 2017-08-28 ASSESSMENT — COPD QUESTIONNAIRES: COPD: 0

## 2017-08-28 ASSESSMENT — ENCOUNTER SYMPTOMS: SEIZURES: 0

## 2017-08-28 NOTE — OP NOTE
PreOp Diagnosis: Visually significant nuclear sclerotic cataract right eye  PostOp Diagnosis: Same  Surgeon: Lucie Gordillo MD  Implant: Technis [id number]    Procedures:   1. Review of intraocular lens calculations, both eyes   2. Phacoemulsification and extraction of lens  right eye   3. Intraocular lens implantation right eye  Anesthesia: MAC/topical  Complications: None  EBL: <1cc    Orquidea Stevens suffers from a visually significant cataract of the right eye. This has caused problems with distance and reading vision, including glare. After discussing the risks, benefits, and alternatives, the patient wishes to proceed with cataract surgery.    The patient was identified in the pre-op area where the right eye was marked. The patient was then brought to the operating room where a time out was called, identifying the patient, the procedure, and the correct site. Tetracaine drops were applied to both eyes. The operative eye was then prepped and draped in the usual sterile ophthalmic fashion. An eyelid speculum was placed into the operative eye. Additional tetracaine drops were applied. A paracentesis was made superior with a supersharp blade. 1% preservative-free lidocaine and 1.5% preservative-free phenyephrine was injected into the anterior chamber.  Viscoat was injected to deepen the anterior chamber. A 2.4mm clear corneal wound was created with a keratome blade temporally. 1 A continuous curvilinear capsulorrhexis was started with a bent cystitome and completed with Utrada forceps. Hydrodissection of the lens nucleus was performed with BSS on a cannula. The lens nucleus was rotated. Phacoemulsification of the lens nucleus was accomplished in a phacoemulsification stop and chop technique. Remaining cortex was removed with irrigation and aspiration. The lens capsule was noted to be intact. Provisc was used to inflate the capsular bag.  The lens was injected into the capsular bag.Remaining viscoelastic was  removed with irrigation and aspiration. The wounds were checked and found to be watertight after hydration. The eyelid speculum was removed and CatarActive3 drops were placed into the operative eye. The patient tolerated the procedure well and was in stable condition on the way to the recovery area.     Lucie Gordillo MD

## 2017-08-28 NOTE — ANESTHESIA PREPROCEDURE EVALUATION
Procedure: Procedure(s):  PHACOEMULSIFICATION CLEAR CORNEA WITH STANDARD INTRAOCULAR LENS IMPLANT  Preop diagnosis: CATARACT    Allergies   Allergen Reactions     Atorvastatin Muscle Pain (Myalgia)     Detrol [Tolterodine Tartrate]      Unsure reaction     Latex Rash     Lovastatin Muscle Pain (Myalgia)     in legs     Simvastatin Muscle Pain (Myalgia)     in legs     Zocor [Hmg-Coa-R Inhibitors] Cramps     Past Medical History:   Diagnosis Date     Actinic keratosis      Actinic keratosis      Allergies      Arthritis      CAD (coronary artery disease)      Cataract      Diabetes mellitus type II      Fatty liver      GERD (gastroesophageal reflux disease)      HH (hiatus hernia)      Hyperlipidaemia LDL goal <100      Hypertension goal BP (blood pressure) < 140/90      IBS (irritable bowel syndrome)      Rosacea      Spastic neurogenic bladder      Squamous cell carcinoma 10/27/2008    R. Nasal Sidewall     Type 2 diabetes, HbA1C goal < 8% (H) 10/31/2010     Urinary incontinence      Vitamin D deficiency      Past Surgical History:   Procedure Laterality Date     ANGIOGRAM  1999    50% Blockage     ARTHROSCOPY KNEE RT/LT  9/04     BIOPSY BREAST  2001    RT     BLADDER SURGERY  1988     HC REDUCTION OF LARGE BREAST  1988     HYSTERECTOMY, PAP NO LONGER INDICATED  2003     MOHS MICROGRAPHIC PROCEDURE      RT nose SCC     PHACOEMULSIFICATION CLEAR CORNEA WITH STANDARD INTRAOCULAR LENS IMPLANT Left 8/10/2017    Procedure: PHACOEMULSIFICATION CLEAR CORNEA WITH STANDARD INTRAOCULAR LENS IMPLANT;  LEFT EYE PHACOEMULSIFICATION CLEAR CORNEA WITH STANDARD INTRAOCULAR LENS IMPLANT ;  Surgeon: Lucie Gordillo MD;  Location: [facility]     Prior to Admission medications    Medication Sig Start Date End Date Taking? Authorizing Provider   Bromfenac Sodium POWD 1 Bottle 4 times daily 1 drop four times a day into the operative eye starting 1 day before surgery. Use until bottle runs out. 8/9/17  Yes Lucie Gordillo MD    Dexamethasone Acetate POWD 1 Bottle 4 times daily 1 drop four times a day into the operative eye starting 1 day before surgery. Use until bottle runs out. 8/9/17  Yes Lucie Gordillo MD   Moxifloxacin HCl POWD 1 Bottle 4 times daily 1 drop four times a day into the operative eye starting 1 day before surgery. Use until bottle runs out. 8/9/17  Yes Lucie Gordillo MD   oxybutynin (DITROPAN) 5 MG tablet Take 2 tablets (10 mg) by mouth daily 5/30/17  Yes Lisette Amos MD   metFORMIN (GLUCOPHAGE) 500 MG tablet Take 1 tablet (500 mg) by mouth 2 times daily (with meals) Will call when needs refill 5/12/17  Yes Lisette Amos MD   hydrochlorothiazide (HYDRODIURIL) 25 MG tablet Take 1 tablet (25 mg) by mouth daily Will call when needs refill 5/12/17  Yes Lisette Amos MD   amLODIPine (NORVASC) 10 MG tablet Take 1 tablet (10 mg) by mouth daily Will call when needs refills 5/12/17  Yes Lisette Amos MD   gabapentin (NEURONTIN) 300 MG capsule Take 1 capsule (300 mg) by mouth 2 times daily Will call when needs refill 5/12/17  Yes Lisette Amos MD   hydrALAZINE (APRESOLINE) 25 MG tablet Take 1 tablet (25 mg) by mouth 2 times daily Will call when needs refill 5/12/17  Yes Lisette Amos MD   atorvastatin (LIPITOR) 80 MG tablet Take 1 tablet (80 mg) by mouth daily Will call when needs refill 5/12/17  Yes Lisette Amos MD   enalapril (VASOTEC) 20 MG tablet Take 1 tablet (20 mg) by mouth 2 times daily Will call when needs refill 5/12/17  Yes Lisette Amos MD   TURMERIC PO 1 cap daily   Yes Reported, Patient   CRANBERRY Two tablets daily   Yes Reported, Patient   Lutein 20 MG CAPS Take  by mouth.   Yes Reported, Patient   Lactobacillus (ACIDOPHILUS) CAPS Take 2 capsules by mouth daily.   Yes Reported, Patient   aspirin 325 MG EC tablet take 1 Tab by mouth daily. 9/14/10  Yes Tori Donovan MD   CALCIUM 500 OR 2 daily   Yes Reported, Patient   VITAMIN D 1000 UNIT OR CAPS 1 CAPSULE DAILY   Yes Reported,  Patient   VITAMIN B-12 500 MCG SL SUBL 2 daily   Yes Reported, Patient   blood glucose monitoring (NO BRAND SPECIFIED) test strip Use to test blood sugar 2 times daily or as directed. 9/2/15   Lisette Amos MD   PC LANCETS SUPER THIN 30G MISC 1 Device 2 times daily 8/8/14   Lisette Amos MD   blood glucose meter (NO BRAND SPECIFIED) meter device kit Use to test blood sugar 2 times daily or as directed. 8/8/14   Lisette Amos MD   blood glucose monitoring (ACCU-CHEK MULTICLIX) lancets Use to test blood sugar 2 times daily or as directed. 8/8/14   Lisette Amos MD   ONETOUCH ULTRA TEST VI STRP testing 4 times daily 6/14/10   Tori Donovan MD     Current Facility-Administered Medications Ordered in Epic   Medication Dose Route Frequency Last Rate Last Dose     cyclopentolate (CYCLOGYL) 1 % ophthalmic solution 1 drop  1 drop Ophthalmic q5 Min Prior to Surgery   1 drop at 08/28/17 0843     phenylephrine (MYDFRIN /REINALDO-SYNEPHRINE) 2.5 % ophthalmic solution 1 drop  1 drop Ophthalmic q5 Min Prior to Surgery   1 drop at 08/28/17 0842     tropicamide (MYDRIACYL) 1 % ophthalmic solution 1 drop  1 drop Ophthalmic q5 Min Prior to Surgery   1 drop at 08/28/17 0842     flurbiprofen (OCUFEN) 0.03 % ophthalmic solution 1 drop  1 drop Ophthalmic q5 Min Prior to Surgery   1 drop at 08/28/17 0842     lidocaine 1 % 1 mL  1 mL Other Q1H PRN         lactated ringers infusion  500 mL Intravenous Continuous         No current [facility]-ordered outpatient prescriptions on file.     Wt Readings from Last 1 Encounters:   08/28/17 62.6 kg (138 lb)     Temp Readings from Last 1 Encounters:   08/28/17 35.6  C (96  F)     BP Readings from Last 6 Encounters:   08/28/17 173/68   08/10/17 115/66   08/01/17 136/62   05/12/17 136/60   10/31/16 118/58   07/28/16 138/56     Pulse Readings from Last 4 Encounters:   08/28/17 55   08/10/17 68   08/01/17 58   05/12/17 72     Resp Readings from Last 1 Encounters:   08/28/17 16     SpO2 Readings from Last  1 Encounters:   08/28/17 98%     Recent Labs   Lab Test  08/01/17   0951  05/05/17   0914   NA  143  141   POTASSIUM  3.5  3.7   CHLORIDE  105  104   CO2  31  30   ANIONGAP  7  7   GLC  167*  134*   BUN  13  13   CR  0.54  0.64   ILEANA  9.2  9.4     Recent Labs   Lab Test  03/16/16   0929  10/22/15   1422   AST  16  14   ALT  26  27     Recent Labs   Lab Test  03/16/16   0929  01/13/15   1400   WBC  6.5  7.8   HGB  13.7  14.2   PLT  163  213       Anesthesia Evaluation     . Pt has had prior anesthetic.     No history of anesthetic complications          ROS/MED HX    ENT/Pulmonary:     (+)DENIA risk factors snores loudly, hypertension, , . .   (-) asthma, COPD and sleep apnea   Neurologic:      (-) seizures and CVA   Cardiovascular:     (+) hypertension--CAD, --. : . . . :. .      (-) angina, stent and angina   METS/Exercise Tolerance:  4 - Raking leaves, gardening   Hematologic:         Musculoskeletal:         GI/Hepatic:     (+) GERD Asymptomatic on medication, liver disease (fatty liver),       Renal/Genitourinary:         Endo:     (+) type I DM, Not using insulin .      Psychiatric:         Infectious Disease:         Malignancy:         Other:                     Physical Exam      Airway   Mallampati: II  TM distance: >3 FB  Neck ROM: full    Dental   (+) upper dentures and lower dentures    Cardiovascular   Rhythm and rate: regular and normal      Pulmonary    breath sounds clear to auscultation                        Anesthesia Plan      History & Physical Review  History and physical reviewed and following examination; no interval change.    ASA Status:  3 .    NPO Status:  > 8 hours    Plan for MAC with Intravenous induction. Reason for MAC:  Procedure to face, neck, head or breast  PONV prophylaxis:  Ondansetron (or other 5HT-3)       Postoperative Care      Consents  Anesthetic plan, risks, benefits and alternatives discussed with:  Patient..                          .

## 2017-08-28 NOTE — ANESTHESIA CARE TRANSFER NOTE
Patient: Orquidea Stevens    Procedure(s):  RIGHT EYE PHACOEMULSIFICATION CLEAR CORNEA WITH STANDARD INTRAOCULAR LENS IMPLANT    - Wound Class: I-Clean    Diagnosis: CATARACT  Diagnosis Additional Information: No value filed.    Anesthesia Type:   MAC     Note:  Airway :Room Air  [facility]  Comments: VSS, spontaneously breathing with sats %.  To PACU, monitors on, report to RN.      Vitals: (Last set prior to Anesthesia Care Transfer)    CRNA VITALS  8/28/2017 0937 - 8/28/2017 1009      8/28/2017             Pulse: 52    SpO2: 97 %    Resp Rate (set): 10                Electronically Signed By: JORDIN Prieto CRNA  August 28, 2017  10:09 AM

## 2017-08-28 NOTE — ANESTHESIA POSTPROCEDURE EVALUATION
Patient: Orquidea Stevens    Procedure(s):  RIGHT EYE PHACOEMULSIFICATION CLEAR CORNEA WITH STANDARD INTRAOCULAR LENS IMPLANT    - Wound Class: I-Clean    Diagnosis:CATARACT  Diagnosis Additional Information: No value filed.    Anesthesia Type:  MAC    Note:  Anesthesia Post Evaluation    Patient location during evaluation: PACU  Patient participation: Able to fully participate in evaluation  Level of consciousness: awake and awake and alert  Pain management: adequate  Airway patency: patent  Cardiovascular status: acceptable  Respiratory status: acceptable  Hydration status: acceptable  PONV: none     Anesthetic complications: None          Last vitals:  Vitals:    08/28/17 0844 08/28/17 1010 08/28/17 1016   BP: 173/68 113/90 118/64   Pulse: 55     Resp: 16 14 16   Temp: 35.6  C (96  F)     SpO2: 98% 97% 96%         Electronically Signed By: Ayana De Los Santos  August 28, 2017  12:18 PM

## 2017-08-28 NOTE — DISCHARGE INSTRUCTIONS
CATARACT SURGERY POST-OP INSTRUCTIONS  Dr. Lucie Gordillo  [phone]      *Continue using CatarActive3 four times a day in the operative eye.  *You should get 3 doses in today and 4 doses daily starting tomorrow.\      Keep the eye shield taped in place unless putting drops in. We will remove it for you in the office tomorrow.      Light sensitivity may be noticed. Sunglasses may be worn for comfort.      Do not rub the operated eye.      Keep the operated eye dry. You may wash your hair, bathe or shower, but keep the operated eye closed while doing so.       No swimming, hot tub, or sauna for 2 weeks.      No make up around eye for 5 days.      No bending at the waist or lifting more than 10 pounds for one week.      May take Tylenol (per directions on bottle) for mild pain.      Call Dr. Gordillo's cell at [phone]  if any of the following should occur:    o Any sudden vision changes  o Nausea or severe headache  o Increase in pain not controlled  o Or signs of infection (pus, increasing redness or tenderness)      [facility] Anesthesia Eye Care Center Discharge  Instructions  Anesthesia (Eye Care Center)   Adult Discharge Instructions    For 24 hours after surgery    1. Get plenty of rest.  Make arrangements to have a responsible adult stay with you for at least 6 hours after you leave the hospital.  2. Do not drive or use heavy equipment for 24 hours.    3. Do not drink alcohol for 24 hours.  4. Do not sign legal documents or make important decisions for 24 hours.  5. Avoid strenuous or risky activities. You may feel lightheaded.  If so, sit for a few minutes before standing.  Have someone help you get up.   6. Conscious sedation patients may resume a regular diet..  7. Any questions of medical nature, call your physician.

## 2017-08-28 NOTE — IP AVS SNAPSHOT
MRN:[id number]                      After Visit Summary   8/28/2017    Orquidea Stevens    MRN: [id number]           Thank you!     Thank you for choosing [facility] for your care. Our goal is always to provide you with excellent care. Hearing back from our patients is one way we can continue to improve our services. Please take a few minutes to complete the written survey that you may receive in the mail after you visit with us. Thank you!        Patient Information     Date Of Birth          1940        About your hospital stay     You were admitted on:  August 28, 2017 You last received care in the:  [facility]    You were discharged on:  August 28, 2017       Who to Call     For medical emergencies, please call 911.  For non-urgent questions about your medical care, please call your primary care provider or clinic, [phone]  For questions related to your surgery, please call your surgery clinic        Attending Provider     Provider Specialty    Lucie Gordillo MD Ophthalmology       Primary Care Provider Office Phone # Fax #    Lisette Amos [phone]      Your next 10 appointments already scheduled     Aug 29, 2017 11:00 AM CDT   [facility] Luc[facility]illo [facility]   [facility]    [address]  [place] [address] [phone]            Sep 06, 2017  1:30 PM CDT   Return Visit with Lucie Gordillo MD   [facility] [place] ([facility])    [address]  [place] [address] [phone]            Oct 04, 2017  2:15 PM CDT   [facility] Visit with Luc[facility]illo MD   [facility]    [address] [address]   [phone]              Further instructions from your care team       CATARACT SURGERY POST-OP INSTRUCTIONS  Dr. Lucie Gordillo  [phone]      *Continue using CatarActive3 four times a  day in the operative eye.  *You should get 3 doses in today and 4 doses daily starting tomorrow.\      Keep the eye shield taped in place unless putting drops in. We will remove it for you in the office tomorrow.      Light sensitivity may be noticed. Sunglasses may be worn for comfort.      Do not rub the operated eye.      Keep the operated eye dry. You may wash your hair, bathe or shower, but keep the operated eye closed while doing so.       No swimming, hot tub, or sauna for 2 weeks.      No make up around eye for 5 days.      No bending at the waist or lifting more than 10 pounds for one week.      May take Tylenol (per directions on bottle) for mild pain.      Call Dr. Gordillo's cell at [phone]  if any of the following should occur:    o Any sudden vision changes  o Nausea or severe headache  o Increase in pain not controlled  o Or signs of infection (pus, increasing redness or tenderness)      [facility] Anesthesia Eye Care Center Discharge  Instructions  Anesthesia (Eye Care Center)   Adult Discharge Instructions    For 24 hours after surgery    1. Get plenty of rest.  Make arrangements to have a responsible adult stay with you for at least 6 hours after you leave the hospital.  2. Do not drive or use heavy equipment for 24 hours.    3. Do not drink alcohol for 24 hours.  4. Do not sign legal documents or make important decisions for 24 hours.  5. Avoid strenuous or risky activities. You may feel lightheaded.  If so, sit for a few minutes before standing.  Have someone help you get up.   6. Conscious sedation patients may resume a regular diet..  7. Any questions of medical nature, call your physician.      Pending Results     No orders found from 8/26/2017 to 8/29/2017.            Admission Information     Date & Time Provider Department Dept. Phone    8/28/2017 Lucie Gordillo MD [facility] [phone]      Your Vitals Were     Blood Pressure Pulse Temperature  "Respirations Height Weight    113/90 55 96  F (35.6  C) 14 1.607 m (5' 3.27\") 62.6 kg (138 lb)    Pulse Oximetry BMI (Body Mass Index)                97% 24.24 kg/m2          [employer] Information     [employer] lets you send messages to your doctor, view your test results, renew your prescriptions, schedule appointments and more. To sign up, go to [email or web address] . Click on \"Log in\" on the left side of the screen, which will take you to the Welcome page. Then click on \"Sign up Now\" on the right side of the page.     You will be asked to enter the access code listed below, as well as some personal information. Please follow the directions to create your username and password.     Your access code is: [email or web address]  Expires: 2017  9:14 AM     Your access code will  in 90 days. If you need help or a new code, please call your [facility] clinic or [phone].        Care EveryWhere ID     This is your Care EveryWhere ID. This could be used by other organizations to access your [facility] medical records  [id number]        Equal Access to Services     FIORDALIZA JACKMAN AH: Sachin Bryant, wacristal vera, qalisa kaalmada tee, efrain victoria. So [facility] [phone].    ATENCIÓN: Si habla español, tiene a trotter disposición servicios gratuitos de asistencia lingüística. Nicholas al [phone].    We comply with applicable federal civil rights laws and [place] laws. We do not discriminate on the basis of race, color, national origin, age, disability sex, sexual orientation or gender identity.               Review of your medicines      CONTINUE these medicines which have NOT CHANGED        Dose / Directions    acidophilus Caps        Dose:  2 capsule   Take 2 capsules by mouth daily.   Refills:  0       amLODIPine 10 MG tablet   Commonly known as:  NORVASC   Used for:  Hypertension goal BP (blood pressure) < 140/90        Dose:  10 mg   Take 1 tablet (10 mg) by mouth daily " Will call when needs refills   Quantity:  90 tablet   Refills:  3       aspirin 325 MG EC tablet   Used for:  Type II or unspecified type diabetes mellitus without mention of complication, not stated as uncontrolled        Dose:  325 mg   take 1 Tab by mouth daily.   Quantity:  100 Tab   Refills:  3       atorvastatin 80 MG tablet   Commonly known as:  LIPITOR   Used for:  Hyperlipidemia LDL goal <100, Hypertriglyceridemia        Dose:  80 mg   Take 1 tablet (80 mg) by mouth daily Will call when needs refill   Quantity:  90 tablet   Refills:  3       blood glucose monitoring meter device kit   Commonly known as:  no brand specified   Used for:  Type 2 diabetes, HbA1C goal < 8% (H)        Use to test blood sugar 2 times daily or as directed.   Quantity:  1 kit   Refills:  0       Bromfenac Sodium Powd   Used for:  Combined form of age-related cataract, both eyes        Dose:  1 Bottle   1 Bottle 4 times daily 1 drop four times a day into the operative eye starting 1 day before surgery. Use until bottle runs out.   Quantity:  1 Bottle   Refills:  1       CALCIUM 500 PO        2 daily   Refills:  0       CRANBERRY        Two tablets daily   Refills:  0       Dexamethasone Acetate Powd   Used for:  Combined form of age-related cataract, both eyes        Dose:  1 Bottle   1 Bottle 4 times daily 1 drop four times a day into the operative eye starting 1 day before surgery. Use until bottle runs out.   Quantity:  1 Bottle   Refills:  1       enalapril 20 MG tablet   Commonly known as:  VASOTEC   Used for:  Hypertension goal BP (blood pressure) < 140/90        Dose:  20 mg   Take 1 tablet (20 mg) by mouth 2 times daily Will call when needs refill   Quantity:  180 tablet   Refills:  1       gabapentin 300 MG capsule   Commonly known as:  NEURONTIN   Used for:  Restless leg syndrome        Dose:  300 mg   Take 1 capsule (300 mg) by mouth 2 times daily Will call when needs refill   Quantity:  180 capsule   Refills:  3        hydrALAZINE 25 MG tablet   Commonly known as:  APRESOLINE   Used for:  Hypertension goal BP (blood pressure) < 140/90        Dose:  25 mg   Take 1 tablet (25 mg) by mouth 2 times daily Will call when needs refill   Quantity:  180 tablet   Refills:  3       hydrochlorothiazide 25 MG tablet   Commonly known as:  HYDRODIURIL   Used for:  Hypertension goal BP (blood pressure) < 140/90        Dose:  25 mg   Take 1 tablet (25 mg) by mouth daily Will call when needs refill   Quantity:  90 tablet   Refills:  1       Lutein 20 MG Caps        Take  by mouth.   Refills:  0       metFORMIN 500 MG tablet   Commonly known as:  GLUCOPHAGE   Used for:  Type 2 diabetes mellitus without complication, without long-term current use of insulin (H)        Dose:  500 mg   Take 1 tablet (500 mg) by mouth 2 times daily (with meals) Will call when needs refill   Quantity:  180 tablet   Refills:  1       Moxifloxacin HCl Powd   Used for:  Combined form of age-related cataract, both eyes        Dose:  1 Bottle   1 Bottle 4 times daily 1 drop four times a day into the operative eye starting 1 day before surgery. Use until bottle runs out.   Quantity:  1 Bottle   Refills:  1       * ONE TOUCH ULTRA test strip   Used for:  Diabetes mellitus, type 2 (H)   Generic drug:  blood glucose monitoring        testing 4 times daily   Quantity:  100 Strip   Refills:  7       * blood glucose monitoring test strip   Commonly known as:  no brand specified   Used for:  Type 2 diabetes, HbA1C goal < 8% (H)        Use to test blood sugar 2 times daily or as directed.   Quantity:  3 Box   Refills:  prn       oxybutynin 5 MG tablet   Commonly known as:  DITROPAN   Used for:  Incontinence        Dose:  10 mg   Take 2 tablets (10 mg) by mouth daily   Quantity:  180 tablet   Refills:  1       * PC LANCETS SUPER THIN 30G Misc   Used for:  Type 2 diabetes, HbA1C goal < 8% (H)        Dose:  1 Device   1 Device 2 times daily   Quantity:  180 each   Refills:  prn       *  blood glucose monitoring lancets   Used for:  Type 2 diabetes, HbA1C goal < 8% (H)        Use to test blood sugar 2 times daily or as directed.   Quantity:  3 Box   Refills:  prn       TURMERIC PO        1 cap daily   Refills:  0       Vitamin B-12 500 MCG Subl        2 daily   Refills:  0       vitamin D 1000 UNITS capsule        1 CAPSULE DAILY   Refills:  0       * Notice:  This list has 4 medication(s) that are the same as other medications prescribed for you. Read the directions carefully, and ask your doctor or other care provider to review them with you.             Protect others around you: Learn how to safely use, store and throw away your medicines at [email or web address].             Medication List: This is a list of all your medications and when to take them. Check marks below indicate your daily home schedule. Keep this list as a reference.      Medications           Morning Afternoon Evening Bedtime As Needed    acidophilus Caps   Take 2 capsules by mouth daily.                                amLODIPine 10 MG tablet   Commonly known as:  NORVASC   Take 1 tablet (10 mg) by mouth daily Will call when needs refills                                aspirin 325 MG EC tablet   take 1 Tab by mouth daily.                                atorvastatin 80 MG tablet   Commonly known as:  LIPITOR   Take 1 tablet (80 mg) by mouth daily Will call when needs refill                                blood glucose monitoring meter device kit   Commonly known as:  no brand specified   Use to test blood sugar 2 times daily or as directed.                                Bromfenac Sodium Powd   1 Bottle 4 times daily 1 drop four times a day into the operative eye starting 1 day before surgery. Use until bottle runs out.                                CALCIUM 500 PO   2 daily                                CRANBERRY   Two tablets daily                                Dexamethasone Acetate Powd   1 Bottle 4 times daily 1 drop  four times a day into the operative eye starting 1 day before surgery. Use until bottle runs out.                                enalapril 20 MG tablet   Commonly known as:  VASOTEC   Take 1 tablet (20 mg) by mouth 2 times daily Will call when needs refill                                gabapentin 300 MG capsule   Commonly known as:  NEURONTIN   Take 1 capsule (300 mg) by mouth 2 times daily Will call when needs refill                                hydrALAZINE 25 MG tablet   Commonly known as:  APRESOLINE   Take 1 tablet (25 mg) by mouth 2 times daily Will call when needs refill                                hydrochlorothiazide 25 MG tablet   Commonly known as:  HYDRODIURIL   Take 1 tablet (25 mg) by mouth daily Will call when needs refill                                Lutein 20 MG Caps   Take  by mouth.                                metFORMIN 500 MG tablet   Commonly known as:  GLUCOPHAGE   Take 1 tablet (500 mg) by mouth 2 times daily (with meals) Will call when needs refill                                Moxifloxacin HCl Powd   1 Bottle 4 times daily 1 drop four times a day into the operative eye starting 1 day before surgery. Use until bottle runs out.                                * ONE TOUCH ULTRA test strip   testing 4 times daily   Generic drug:  blood glucose monitoring                                * blood glucose monitoring test strip   Commonly known as:  no brand specified   Use to test blood sugar 2 times daily or as directed.                                oxybutynin 5 MG tablet   Commonly known as:  DITROPAN   Take 2 tablets (10 mg) by mouth daily                                * PC LANCETS SUPER THIN 30G Misc   1 Device 2 times daily                                * blood glucose monitoring lancets   Use to test blood sugar 2 times daily or as directed.                                TURMERIC PO   1 cap daily                                Vitamin B-12 500 MCG Subl   2 daily                                 vitamin D 1000 UNITS capsule   1 CAPSULE DAILY                                * Notice:  This list has 4 medication(s) that are the same as other medications prescribed for you. Read the directions carefully, and ask your doctor or other care provider to review them with you.

## 2017-08-28 NOTE — IP AVS SNAPSHOT
[facility]    [address]    [place] [address]    Phone:  [phone]    Fax:  [phone]                                       After Visit Summary   8/28/2017    Orquidea Stevens    MRN: [id number]           After Visit Summary Signature Page     I have received my discharge instructions, and my questions have been answered. I have discussed any challenges I see with this plan with the nurse or doctor.    ..........................................................................................................................................  Patient/Patient Representative Signature      ..........................................................................................................................................  Patient Representative Print Name and Relationship to Patient    ..................................................               ................................................  Date                                            Time    ..........................................................................................................................................  Reviewed by Signature/Title    ...................................................              ..............................................  Date                                                            Time

## 2017-08-29 ENCOUNTER — OFFICE VISIT (OUTPATIENT)
Dept: OPHTHALMOLOGY | Facility: CLINIC | Age: 77
End: 2017-08-29
Payer: COMMERCIAL

## 2017-08-29 DIAGNOSIS — Z96.1 PSEUDOPHAKIA OF BOTH EYES: Primary | ICD-10-CM

## 2017-08-29 DIAGNOSIS — H02.833 DERMATOCHALASIS OF EYELIDS OF BOTH EYES: ICD-10-CM

## 2017-08-29 DIAGNOSIS — H02.836 DERMATOCHALASIS OF EYELIDS OF BOTH EYES: ICD-10-CM

## 2017-08-29 DIAGNOSIS — H02.403 PTOSIS OF EYELID, BILATERAL: ICD-10-CM

## 2017-08-29 PROCEDURE — 99024 POSTOP FOLLOW-UP VISIT: CPT | Performed by: STUDENT IN AN ORGANIZED HEALTH CARE EDUCATION/TRAINING PROGRAM

## 2017-08-29 ASSESSMENT — VISUAL ACUITY
OD_PH_SC: 20/40+2
METHOD: SNELLEN - LINEAR
OD_SC: 20/50

## 2017-08-29 ASSESSMENT — TONOMETRY: OD_IOP_MMHG: 17

## 2017-08-29 ASSESSMENT — EXTERNAL EXAM - RIGHT EYE: OD_EXAM: NORMAL

## 2017-08-29 ASSESSMENT — SLIT LAMP EXAM - LIDS: COMMENTS: NORMAL

## 2017-08-29 NOTE — PROGRESS NOTES
Current Eye Medications: cataractive3 qid od.     Subjective:  Po1 od   Pt reports her vision is a little blurry, states her eye feels fine.     Objective:  See Ophthalmology Exam.      Assessment:  Orquidea Stevens is a 77 year old female who presents with:     Pseudophakia of both eyes po1 right eye, doing well.     Dermatochalasis of eyelids of both eyes      Ptosis of eyelid, bilateral      POST-OP CATARACT INSTRUCTIONS  *   Use the following drop(s) in the both eyes four times a day:        Continue CatarActive3 four times a day until the bottle runs out.  *   If you are taking glaucoma drops, continue as usual.  *   Wear eye shield when sleeping for one week.  *   No eye rubbing or lifting more than 10 pounds for one week.  *   Keep water out of eye for two weeks.  *   OK to resume aspirin and/or other blood thinners.  *   Return as scheduled in about one week.  *   If your vision worsens, eye becomes increasingly red, or becomes painful, call [phone].     Lucie Gordillo M.D.

## 2017-08-29 NOTE — MR AVS SNAPSHOT
After Visit Summary   8/29/2017    Orquidea Stevens    MRN: [id number]           Patient Information     Date Of Birth          1940        Visit Information        Provider Department      8/29/2017 11:00 AM Lucie Gordillo MD [facility]        Today's Diagnoses     Pseudophakia of both eyes    -  1    Dermatochalasis of eyelids of both eyes        Ptosis of eyelid, bilateral          Care Instructions    POST-OP CATARACT INSTRUCTIONS    *   Use the following drop(s) in the both eyes four times a day:          Continue CatarActive3 four times a day until the bottle runs out.    *   If you are taking glaucoma drops, continue as usual.    *   Wear eye shield when sleeping for one week.    *   No eye rubbing or lifting more than 10 pounds for one week.    *   Keep water out of eye for two weeks.    *   OK to resume aspirin and/or other blood thinners.    *   Return as scheduled in about one week.    *   If your vision worsens, eye becomes increasingly red, or becomes painful, call [phone].     Lucie Gordillo M.D.          Follow-ups after your visit        Your next 10 appointments already scheduled     Sep 06, 2017  1:30 PM CDT   Return Visit with Lucie Gordillo MD   [facility] ([address] [id number] [phone]            Oct 04, 2017  2:15 PM CDT   Return Visit with Lucie Gordillo MD   [facility] ([address] [phone] [phone]              Who to contact     If you have questions or need follow up information about today's clinic visit or your schedule please contact [facility] directly at [phone].  Normal or non-critical lab and imaging results will be communicated to you by MyChart, letter or phone within 4 business days after the clinic has received the results. If you do not hear from us within 7 days,  "please contact the clinic through [employer] or phone. If you have a critical or abnormal lab result, we will notify you by phone as soon as possible.  Submit refill requests through [employer] or call your pharmacy and they will forward the refill request to us. Please allow 3 business days for your refill to be completed.          Additional Information About Your Visit        [facility] Information     [employer] lets you send messages to your doctor, view your test results, renew your prescriptions, schedule appointments and more. To sign up, go to [email or web address] . Click on \"Log in\" on the left side of the screen, which will take you to the Welcome page. Then click on \"Sign up Now\" on the right side of the page.     You will be asked to enter the access code listed below, as well as some personal information. Please follow the directions to create your username and password.     Your access code is: [email or web address]  Expires: 2017  9:14 AM     Your access code will  in 90 days. If you need help or a new code, please call your [facility] clinic or [phone].        Care EveryWhere ID     This is your Care EveryWhere ID. This could be used by other organizations to access your [facility] medical records  [id number]         Blood Pressure from Last 3 Encounters:   17 118/64   08/10/17 115/66   17 136/62    Weight from Last 3 Encounters:   17 62.6 kg (138 lb)   08/10/17 62.6 kg (138 lb)   17 62.6 kg (138 lb)              Today, you had the following     No orders found for display       Primary Care Provider Office Phone # Fax #    Lisette Amos [phone] [address] [facility] [address]        Equal Access to Services     [facility] AUGUSTINA : Sachin Bryant, wawilmanda agqadaha, qaybta kaalisabella oliveira, efrain victoria. So [facility] [phone].    ATENCIÓN: Si habla español, tiene a trotter disposición servicios gratuitos de " asistencia lingüística. Nicholas al [phone].    We comply with applicable federal civil rights laws and [place] laws. We do not discriminate on the basis of race, color, national origin, age, disability sex, sexual orientation or gender identity.            Thank you!     Thank you for choosing [facility]  for your care. Our goal is always to provide you with excellent care. Hearing back from our patients is one way we can continue to improve our services. Please take a few minutes to complete the written survey that you may receive in the mail after your visit with us. Thank you!             Your Updated Medication List - Protect others around you: Learn how to safely use, store and throw away your medicines at www.disposemymeds.org.          This list is accurate as of: 8/29/17 11:55 AM.  Always use your most recent med list.                   Brand Name Dispense Instructions for use Diagnosis    acidophilus Caps      Take 2 capsules by mouth daily.        amLODIPine 10 MG tablet    NORVASC    90 tablet    Take 1 tablet (10 mg) by mouth daily Will call when needs refills    Hypertension goal BP (blood pressure) < 140/90       aspirin 325 MG EC tablet     100 Tab    take 1 Tab by mouth daily.    Type II or unspecified type diabetes mellitus without mention of complication, not stated as uncontrolled       atorvastatin 80 MG tablet    LIPITOR    90 tablet    Take 1 tablet (80 mg) by mouth daily Will call when needs refill    Hyperlipidemia LDL goal <100, Hypertriglyceridemia       blood glucose monitoring meter device kit    no brand specified    1 kit    Use to test blood sugar 2 times daily or as directed.    Type 2 diabetes, HbA1C goal < 8% (H)       Bromfenac Sodium Powd     1 Bottle    1 Bottle 4 times daily 1 drop four times a day into the operative eye starting 1 day before surgery. Use until bottle runs out.    Combined form of age-related cataract, both eyes       CALCIUM 500 PO      2  daily        CRANBERRY      Two tablets daily        Dexamethasone Acetate Powd     1 Bottle    1 Bottle 4 times daily 1 drop four times a day into the operative eye starting 1 day before surgery. Use until bottle runs out.    Combined form of age-related cataract, both eyes       enalapril 20 MG tablet    VASOTEC    180 tablet    Take 1 tablet (20 mg) by mouth 2 times daily Will call when needs refill    Hypertension goal BP (blood pressure) < 140/90       gabapentin 300 MG capsule    NEURONTIN    180 capsule    Take 1 capsule (300 mg) by mouth 2 times daily Will call when needs refill    Restless leg syndrome       hydrALAZINE 25 MG tablet    APRESOLINE    180 tablet    Take 1 tablet (25 mg) by mouth 2 times daily Will call when needs refill    Hypertension goal BP (blood pressure) < 140/90       hydrochlorothiazide 25 MG tablet    HYDRODIURIL    90 tablet    Take 1 tablet (25 mg) by mouth daily Will call when needs refill    Hypertension goal BP (blood pressure) < 140/90       Lutein 20 MG Caps      Take  by mouth.        metFORMIN 500 MG tablet    GLUCOPHAGE    180 tablet    Take 1 tablet (500 mg) by mouth 2 times daily (with meals) Will call when needs refill    Type 2 diabetes mellitus without complication, without long-term current use of insulin (H)       Moxifloxacin HCl Powd     1 Bottle    1 Bottle 4 times daily 1 drop four times a day into the operative eye starting 1 day before surgery. Use until bottle runs out.    Combined form of age-related cataract, both eyes       * ONE TOUCH ULTRA test strip   Generic drug:  blood glucose monitoring     100 Strip    testing 4 times daily    Diabetes mellitus, type 2 (H)       * blood glucose monitoring test strip    no brand specified    3 Box    Use to test blood sugar 2 times daily or as directed.    Type 2 diabetes, HbA1C goal < 8% (H)       oxybutynin 5 MG tablet    DITROPAN    180 tablet    Take 2 tablets (10 mg) by mouth daily    Incontinence       * PC  LANCETS SUPER THIN 30G Misc     180 each    1 Device 2 times daily    Type 2 diabetes, HbA1C goal < 8% (H)       * blood glucose monitoring lancets     3 Box    Use to test blood sugar 2 times daily or as directed.    Type 2 diabetes, HbA1C goal < 8% (H)       TURMERIC PO      1 cap daily        Vitamin B-12 500 MCG Subl      2 daily        vitamin D 1000 UNITS capsule      1 CAPSULE DAILY        * Notice:  This list has 4 medication(s) that are the same as other medications prescribed for you. Read the directions carefully, and ask your doctor or other care provider to review them with you.

## 2017-08-29 NOTE — PATIENT INSTRUCTIONS
POST-OP CATARACT INSTRUCTIONS    *   Use the following drop(s) in the both eyes four times a day:          Continue CatarActive3 four times a day until the bottle runs out.    *   If you are taking glaucoma drops, continue as usual.    *   Wear eye shield when sleeping for one week.    *   No eye rubbing or lifting more than 10 pounds for one week.    *   Keep water out of eye for two weeks.    *   OK to resume aspirin and/or other blood thinners.    *   Return as scheduled in about one week.    *   If your vision worsens, eye becomes increasingly red, or becomes painful, call [phone].     Lucie Gordillo M.D.

## 2017-09-06 ENCOUNTER — OFFICE VISIT (OUTPATIENT)
Dept: OPHTHALMOLOGY | Facility: CLINIC | Age: 77
End: 2017-09-06
Payer: COMMERCIAL

## 2017-09-06 DIAGNOSIS — Z96.1 PSEUDOPHAKIA OF BOTH EYES: Primary | ICD-10-CM

## 2017-09-06 DIAGNOSIS — H02.403 PTOSIS OF EYELID, BILATERAL: ICD-10-CM

## 2017-09-06 DIAGNOSIS — H02.833 DERMATOCHALASIS OF EYELIDS OF BOTH EYES: ICD-10-CM

## 2017-09-06 DIAGNOSIS — H02.836 DERMATOCHALASIS OF EYELIDS OF BOTH EYES: ICD-10-CM

## 2017-09-06 PROCEDURE — 99024 POSTOP FOLLOW-UP VISIT: CPT | Performed by: STUDENT IN AN ORGANIZED HEALTH CARE EDUCATION/TRAINING PROGRAM

## 2017-09-06 RX ORDER — PREDNISOLONE ACETATE 10 MG/ML
1 SUSPENSION/ DROPS OPHTHALMIC 4 TIMES DAILY
Qty: 10 ML | Refills: 0 | Status: SHIPPED | OUTPATIENT
Start: 2017-09-06 | End: 2017-11-06

## 2017-09-06 ASSESSMENT — SLIT LAMP EXAM - LIDS
COMMENTS: 2+ DERMATOCHALASIS - UPPER LID, 1+ PTOSIS
COMMENTS: NORMAL

## 2017-09-06 ASSESSMENT — TONOMETRY
IOP_METHOD: APPLANATION
OD_IOP_MMHG: 19

## 2017-09-06 ASSESSMENT — VISUAL ACUITY
OD_SC: 20/60
OD_SC+: -1
METHOD: SNELLEN - LINEAR
OS_SC: 20/40
OS_SC+: +1

## 2017-09-06 ASSESSMENT — REFRACTION_MANIFEST
OD_SPHERE: -0.75
OD_ADD: +3.00
OD_CYLINDER: +1.25
OD_AXIS: 045

## 2017-09-06 ASSESSMENT — EXTERNAL EXAM - RIGHT EYE: OD_EXAM: NORMAL

## 2017-09-06 ASSESSMENT — EXTERNAL EXAM - LEFT EYE: OS_EXAM: NORMAL

## 2017-09-06 NOTE — MR AVS SNAPSHOT
After Visit Summary   9/6/2017    Orquidea Stevens    MRN: [id number]           Patient Information     Date Of Birth          1940        Visit Information        Provider Department      9/6/2017 1:30 PM Lucie Gordillo MD [facility]        Today's Diagnoses     Pseudophakia of both eyes    -  1    Dermatochalasis of eyelids of both eyes        Ptosis of eyelid, bilateral          Care Instructions    POST-OP CATARACT INSTRUCTIONS for the RIGHT EYE:    *   Use the following eye drop(s):         CatarActive 3    *Start Prednisolone four times a day in the left eye     *   Stop wearing eye shield.    *   No eye rubbing. May resume heavy lifting.    *   If you are taking glaucoma drops, continue as usual.    *   Return one month for final exam with glasses check.    *   If your vision worsens, eye becomes increasingly red, or becomes painful, call [phone].      Lucie Gordillo MD  [phone]          Follow-ups after your visit        Follow-up notes from your care team     Return for as scheduled, Final MR.      Your next 10 appointments already scheduled     Oct 04, 2017  2:15 PM CDT   [facility]   [facility] ([facility])    [address] [place] [id number] [phone]              Who to contact     If you have questions or need follow up information about today's clinic visit or your schedule please contact [facility] directly at [phone].  Normal or non-critical lab and imaging results will be communicated to you by MyChart, letter or phone within 4 business days after the clinic has received the results. If you do not hear from us within 7 days, please contact the clinic through MyChart or phone. If you have a critical or abnormal lab result, we will notify you by phone as soon as possible.  Submit refill requests through [employer] or call your pharmacy and they will forward the  "refill request to us. Please allow 3 business days for your refill to be completed.          Additional Information About Your Visit        MyChart Information     [facility] lets you send messages to your doctor, view your test results, renew your prescriptions, schedule appointments and more. To sign up, go to [email or web address] . Click on \"Log in\" on the left side of the screen, which will take you to the Welcome page. Then click on \"Sign up Now\" on the right side of the page.     You will be asked to enter the access code listed below, as well as some personal information. Please follow the directions to create your username and password.     Your access code is: [email or web address]  Expires: 2017  9:14 AM     Your access code will  in 90 days. If you need help or a new code, please call your [place] clinic or [phone].        Care EveryWhere ID     This is your Care EveryWhere ID. This could be used by other organizations to access your [facility] medical records  [id number]         Blood Pressure from Last 3 Encounters:   17 118/64   08/10/17 115/66   17 136/62    Weight from Last 3 Encounters:   17 62.6 kg (138 lb)   08/10/17 62.6 kg (138 lb)   17 62.6 kg (138 lb)              Today, you had the following     No orders found for display         Today's Medication Changes          These changes are accurate as of: 17  2:13 PM.  If you have any questions, ask your nurse or doctor.               Start taking these medicines.        Dose/Directions    prednisoLONE acetate 1 % ophthalmic susp   Commonly known as:  PRED FORTE   Used for:  Pseudophakia of both eyes   Started by:  Lucie Gordillo MD        Dose:  1 drop   Place 1 drop Into the left eye 4 times daily   Quantity:  10 mL   Refills:  0            Where to get your medicines      These [id number] [place] - [address] [facility]  [address] [address] [place] [address] [address]    Hours:  test fax successful 4/5/04 kr Phone:  [phone]     prednisoLONE acetate 1 % ophthalmic susp                Primary Care Provider Office Phone # Fax #    Lisette Amos [phone] [address] [address] [facility] [address]        Equal Access to Services     PRATEEK JACKMAN : Hadii aad ku hadasho Soomaali, waaxda luqadaha, qaybta kaalmada adeegyada, waxay idiin hayaan adeeg khmaylinsh la'aan . So [facility] [phone].    ATENCIÓN: Si habla español, tiene a trotter disposición servicios gratuitos de asistencia lingüística. Llame al [phone].    We comply with applicable federal civil rights laws and [place] laws. We do not discriminate on the basis of race, color, national origin, age, disability sex, sexual orientation or gender identity.            Thank you!     Thank you for choosing [facility]  for your care. Our goal is always to provide you with excellent care. Hearing back from our patients is one way we can continue to improve our services. Please take a few minutes to complete the written survey that you may receive in the mail after your visit with us. Thank you!             Your Updated Medication List - Protect others around you: Learn how to safely use, store and throw away your medicines at www.disposemymeds.org.          This list is accurate as of: 9/6/17  2:13 PM.  Always use your most recent med list.                   Brand Name Dispense Instructions for use Diagnosis    acidophilus Caps      Take 2 capsules by mouth daily.        amLODIPine 10 MG tablet    NORVASC    90 tablet    Take 1 tablet (10 mg) by mouth daily Will call when needs refills    Hypertension goal BP (blood pressure) < 140/90       aspirin 325 MG EC tablet     100 Tab    take 1 Tab by mouth daily.    Type II or unspecified type diabetes mellitus without mention of complication, not stated as uncontrolled       atorvastatin 80 MG tablet    LIPITOR    90 tablet    Take 1 tablet (80  mg) by mouth daily Will call when needs refill    Hyperlipidemia LDL goal <100, Hypertriglyceridemia       blood glucose monitoring meter device kit    no brand specified    1 kit    Use to test blood sugar 2 times daily or as directed.    Type 2 diabetes, HbA1C goal < 8% (H)       Bromfenac Sodium Powd     1 Bottle    1 Bottle 4 times daily 1 drop four times a day into the operative eye starting 1 day before surgery. Use until bottle runs out.    Combined form of age-related cataract, both eyes       CALCIUM 500 PO      2 daily        CRANBERRY      Two tablets daily        Dexamethasone Acetate Powd     1 Bottle    1 Bottle 4 times daily 1 drop four times a day into the operative eye starting 1 day before surgery. Use until bottle runs out.    Combined form of age-related cataract, both eyes       enalapril 20 MG tablet    VASOTEC    180 tablet    Take 1 tablet (20 mg) by mouth 2 times daily Will call when needs refill    Hypertension goal BP (blood pressure) < 140/90       gabapentin 300 MG capsule    NEURONTIN    180 capsule    Take 1 capsule (300 mg) by mouth 2 times daily Will call when needs refill    Restless leg syndrome       hydrALAZINE 25 MG tablet    APRESOLINE    180 tablet    Take 1 tablet (25 mg) by mouth 2 times daily Will call when needs refill    Hypertension goal BP (blood pressure) < 140/90       hydrochlorothiazide 25 MG tablet    HYDRODIURIL    90 tablet    Take 1 tablet (25 mg) by mouth daily Will call when needs refill    Hypertension goal BP (blood pressure) < 140/90       Lutein 20 MG Caps      Take  by mouth.        metFORMIN 500 MG tablet    GLUCOPHAGE    180 tablet    Take 1 tablet (500 mg) by mouth 2 times daily (with meals) Will call when needs refill    Type 2 diabetes mellitus without complication, without long-term current use of insulin (H)       Moxifloxacin HCl Powd     1 Bottle    1 Bottle 4 times daily 1 drop four times a day into the operative eye starting 1 day before  surgery. Use until bottle runs out.    Combined form of age-related cataract, both eyes       * ONE TOUCH ULTRA test strip   Generic drug:  blood glucose monitoring     100 Strip    testing 4 times daily    Diabetes mellitus, type 2 (H)       * blood glucose monitoring test strip    no brand specified    3 Box    Use to test blood sugar 2 times daily or as directed.    Type 2 diabetes, HbA1C goal < 8% (H)       oxybutynin 5 MG tablet    DITROPAN    180 tablet    Take 2 tablets (10 mg) by mouth daily    Incontinence       * PC LANCETS SUPER THIN 30G Misc     180 each    1 Device 2 times daily    Type 2 diabetes, HbA1C goal < 8% (H)       * blood glucose monitoring lancets     3 Box    Use to test blood sugar 2 times daily or as directed.    Type 2 diabetes, HbA1C goal < 8% (H)       prednisoLONE acetate 1 % ophthalmic susp    PRED FORTE    10 mL    Place 1 drop Into the left eye 4 times daily    Pseudophakia of both eyes       TURMERIC PO      1 cap daily        Vitamin B-12 500 MCG Subl      2 daily        vitamin D 1000 UNITS capsule      1 CAPSULE DAILY        * Notice:  This list has 4 medication(s) that are the same as other medications prescribed for you. Read the directions carefully, and ask your doctor or other care provider to review them with you.

## 2017-09-06 NOTE — PATIENT INSTRUCTIONS
POST-OP CATARACT INSTRUCTIONS for the RIGHT EYE:    *   Use the following eye drop(s):         CatarActive 3    *Start Prednisolone four times a day in the left eye     *   Stop wearing eye shield.    *   No eye rubbing. May resume heavy lifting.    *   If you are taking glaucoma drops, continue as usual.    *   Return one month for final exam with glasses check.    *   If your vision worsens, eye becomes increasingly red, or becomes painful, call [phone].      Lucie Gordillo MD  [phone]

## 2017-09-06 NOTE — PROGRESS NOTES
Current Eye Medications:  Cataractive3 QID right eye     Subjective:  Here for PO2 right eye.  Done with the drops in the left eye.  Feels she can see TV but not read.      Objective:  See Ophthalmology Exam.      Assessment:  Orquidea Stevens is a 77 year old female who presents with:     Pseudophakia of both eyes PO2 right eye, doing well. Still some residual cell in the left eye, so will start Pred Forte four times a day  In the left eye (diabetic).     Dermatochalasis of eyelids of both eyes      Ptosis of eyelid, bilateral          POST-OP CATARACT INSTRUCTIONS for the RIGHT EYE:  *   Use the following eye drop(s):       CatarActive 3  *Start Prednisolone four times a day in the left eye   *   Stop wearing eye shield.  *   No eye rubbing. May resume heavy lifting.  *   If you are taking glaucoma drops, continue as usual.  *   Return one month for final exam with glasses check.  *   If your vision worsens, eye becomes increasingly red, or becomes painful, call [phone].    Lucie Gordillo MD  [phone]

## 2017-09-22 ENCOUNTER — OFFICE VISIT (OUTPATIENT)
Dept: OPHTHALMOLOGY | Facility: CLINIC | Age: 77
End: 2017-09-22
Payer: COMMERCIAL

## 2017-09-22 DIAGNOSIS — H02.836 DERMATOCHALASIS OF EYELIDS OF BOTH EYES: ICD-10-CM

## 2017-09-22 DIAGNOSIS — Z96.1 PSEUDOPHAKIA OF BOTH EYES: Primary | ICD-10-CM

## 2017-09-22 DIAGNOSIS — H02.403 PTOSIS OF EYELID, BILATERAL: ICD-10-CM

## 2017-09-22 DIAGNOSIS — H02.833 DERMATOCHALASIS OF EYELIDS OF BOTH EYES: ICD-10-CM

## 2017-09-22 DIAGNOSIS — H26.491 POSTERIOR CAPSULAR OPACIFICATION, RIGHT: ICD-10-CM

## 2017-09-22 PROCEDURE — 99024 POSTOP FOLLOW-UP VISIT: CPT | Performed by: STUDENT IN AN ORGANIZED HEALTH CARE EDUCATION/TRAINING PROGRAM

## 2017-09-22 ASSESSMENT — EXTERNAL EXAM - LEFT EYE: OS_EXAM: NORMAL

## 2017-09-22 ASSESSMENT — REFRACTION_MANIFEST
OD_SPHERE: -0.75
OD_AXIS: 050
OD_CYLINDER: +1.25

## 2017-09-22 ASSESSMENT — TONOMETRY
OD_IOP_MMHG: 14
IOP_METHOD: APPLANATION
OS_IOP_MMHG: 14

## 2017-09-22 ASSESSMENT — VISUAL ACUITY
OS_SC: 20/40+1
OD_SC: 20/60+1
METHOD: SNELLEN - LINEAR
OS_PH_SC: 20/40+2

## 2017-09-22 ASSESSMENT — SLIT LAMP EXAM - LIDS
COMMENTS: 2+ DERMATOCHALASIS - UPPER LID, 1+ PTOSIS
COMMENTS: NORMAL

## 2017-09-22 ASSESSMENT — EXTERNAL EXAM - RIGHT EYE: OD_EXAM: NORMAL

## 2017-09-22 NOTE — PATIENT INSTRUCTIONS
Continue same medications   Keep scheduled appt. in October  Discussed a laser treatment in about 2-3 months     Lucie Gordillo MD  [phone]

## 2017-09-22 NOTE — MR AVS SNAPSHOT
"              After Visit Summary   9/22/2017    Orquidea Stevens    MRN: [id number]           Patient Information     Date Of Birth          1940        Visit Information        Provider Department      9/22/2017 2:15 PM Lucie Gordillo MD [facility]        Today's Diagnoses     Pseudophakia of both eyes    -  1    Posterior capsular opacification, right        Dermatochalasis of eyelids of both eyes        Ptosis of eyelid, bilateral          Care Instructions    Continue same medications   Keep scheduled appt. in October  Discussed a laser treatment in about 2-3 months     Lucie Gordillo MD  [phone]            Follow-ups after your visit        Your next 10 appointments already scheduled     Oct 04, 2017  2:15 PM CDT   Return Visit with Lucie Gordillo MD   [facility] ([facility] [address] [address] [phone]              Who to contact     If you have questions or need follow up information about today's clinic visit or your schedule please contact [facility] directly at [phone].  Normal or non-critical lab and imaging results will be communicated to you by NovoPedicshart, letter or phone within 4 business days after the clinic has received the results. If you do not hear from us within 7 days, please contact the clinic through NovoPedicshart or phone. If you have a critical or abnormal lab result, we will notify you by phone as soon as possible.  Submit refill requests through Viralytics or call your pharmacy and they will forward the refill request to us. Please allow 3 business days for your refill to be completed.          Additional Information About Your Visit        NovoPedicshart Information     Viralytics lets you send messages to your doctor, view your test results, renew your prescriptions, schedule appointments and more. To sign up, go to [email or web address] . Click on \"Log in\" on the left side of the " "screen, which will take you to the Welcome page. Then click on \"Sign up Now\" on the right side of the page.     You will be asked to enter the access code listed below, as well as some personal information. Please follow the directions to create your username and password.     Your access code is: [email or web address]  Expires: 2017  9:14 AM     Your access code will  in 90 days. If you need help or a new code, please call your [place] clinic or [phone].        Care EveryWhere ID     This is your Care EveryWhere ID. This could be used by other organizations to access your [place] medical records  [id number]         Blood Pressure from Last 3 Encounters:   17 118/64   08/10/17 115/66   17 136/62    Weight from Last 3 Encounters:   17 62.6 kg (138 lb)   08/10/17 62.6 kg (138 lb)   17 62.6 kg (138 lb)              Today, you had the following     No orders found for display       Primary Care Provider Office Phone # Fax #    Lisette Amos [phone] [address] [facility] [address]        Equal Access to Services     FIORDALIZA JACKMAN : Hadii shirley ku hadasho Soomaali, waaxda luqadaha, qaybta kaalmada adeegyada, waxay efrain jacinto . So [facility] [phone].    ATENCIÓN: Si habla español, tiene a trotter disposición servicios gratuitos de asistencia lingüística. Llame al [phone].    We comply with applicable federal civil rights laws and [place] laws. We do not discriminate on the basis of race, color, national origin, age, disability sex, sexual orientation or gender identity.            Thank you!     Thank you for choosing [facility] [place]  for your care. Our goal is always to provide you with excellent care. Hearing back from our patients is one way we can continue to improve our services. Please take a few minutes to complete the written survey that you may receive in the mail after your visit with us. Thank you!           "   Your Updated Medication List - Protect others around you: Learn how to safely use, store and throw away your medicines at www.disposemymeds.org.          This list is accurate as of: 9/22/17  3:09 PM.  Always use your most recent med list.                   Brand Name Dispense Instructions for use Diagnosis    acidophilus Caps      Take 2 capsules by mouth daily.        amLODIPine 10 MG tablet    NORVASC    90 tablet    Take 1 tablet (10 mg) by mouth daily Will call when needs refills    Hypertension goal BP (blood pressure) < 140/90       aspirin 325 MG EC tablet     100 Tab    take 1 Tab by mouth daily.    Type II or unspecified type diabetes mellitus without mention of complication, not stated as uncontrolled       atorvastatin 80 MG tablet    LIPITOR    90 tablet    Take 1 tablet (80 mg) by mouth daily Will call when needs refill    Hyperlipidemia LDL goal <100, Hypertriglyceridemia       blood glucose monitoring meter device kit    no brand specified    1 kit    Use to test blood sugar 2 times daily or as directed.    Type 2 diabetes, HbA1C goal < 8% (H)       Bromfenac Sodium Powd     1 Bottle    1 Bottle 4 times daily 1 drop four times a day into the operative eye starting 1 day before surgery. Use until bottle runs out.    Combined form of age-related cataract, both eyes       CALCIUM 500 PO      2 daily        CRANBERRY      Two tablets daily        Dexamethasone Acetate Powd     1 Bottle    1 Bottle 4 times daily 1 drop four times a day into the operative eye starting 1 day before surgery. Use until bottle runs out.    Combined form of age-related cataract, both eyes       enalapril 20 MG tablet    VASOTEC    180 tablet    Take 1 tablet (20 mg) by mouth 2 times daily Will call when needs refill    Hypertension goal BP (blood pressure) < 140/90       gabapentin 300 MG capsule    NEURONTIN    180 capsule    Take 1 capsule (300 mg) by mouth 2 times daily Will call when needs refill    Restless leg syndrome        hydrALAZINE 25 MG tablet    APRESOLINE    180 tablet    Take 1 tablet (25 mg) by mouth 2 times daily Will call when needs refill    Hypertension goal BP (blood pressure) < 140/90       hydrochlorothiazide 25 MG tablet    HYDRODIURIL    90 tablet    Take 1 tablet (25 mg) by mouth daily Will call when needs refill    Hypertension goal BP (blood pressure) < 140/90       Lutein 20 MG Caps      Take  by mouth.        metFORMIN 500 MG tablet    GLUCOPHAGE    180 tablet    Take 1 tablet (500 mg) by mouth 2 times daily (with meals) Will call when needs refill    Type 2 diabetes mellitus without complication, without long-term current use of insulin (H)       Moxifloxacin HCl Powd     1 Bottle    1 Bottle 4 times daily 1 drop four times a day into the operative eye starting 1 day before surgery. Use until bottle runs out.    Combined form of age-related cataract, both eyes       * ONE TOUCH ULTRA test strip   Generic drug:  blood glucose monitoring     100 Strip    testing 4 times daily    Diabetes mellitus, type 2 (H)       * blood glucose monitoring test strip    no brand specified    3 Box    Use to test blood sugar 2 times daily or as directed.    Type 2 diabetes, HbA1C goal < 8% (H)       oxybutynin 5 MG tablet    DITROPAN    180 tablet    Take 2 tablets (10 mg) by mouth daily    Incontinence       * PC LANCETS SUPER THIN 30G Misc     180 each    1 Device 2 times daily    Type 2 diabetes, HbA1C goal < 8% (H)       * blood glucose monitoring lancets     3 Box    Use to test blood sugar 2 times daily or as directed.    Type 2 diabetes, HbA1C goal < 8% (H)       prednisoLONE acetate 1 % ophthalmic susp    PRED FORTE    10 mL    Place 1 drop Into the left eye 4 times daily    Pseudophakia of both eyes       TURMERIC PO      1 cap daily        Vitamin B-12 500 MCG Subl      2 daily        vitamin D 1000 UNITS capsule      1 CAPSULE DAILY        * Notice:  This list has 4 medication(s) that are the same as other  medications prescribed for you. Read the directions carefully, and ask your doctor or other care provider to review them with you.

## 2017-09-22 NOTE — PROGRESS NOTES
Current Eye Medications:  Prednisolone qid both eyes      Subjective:  Blurry vision right eye  Pt reports vision right has been filmy since her cataract surgery 3 wks ago, vision left eye seems good.     Objective:  See Ophthalmology Exam.       Assessment:  Orquidea Stevens is a 77 year old female who presents with:   Encounter Diagnoses   Name Primary?     Pseudophakia of both eyes      Posterior capsular opacification, right      Dermatochalasis of eyelids of both eyes      Ptosis of eyelid, bilateral        Plan:  Continue same medications   Keep scheduled appt. in October  Discussed a laser treatment in about 2-3 months     Lucie Gordillo MD  [phone]

## 2017-10-04 ENCOUNTER — OFFICE VISIT (OUTPATIENT)
Dept: OPHTHALMOLOGY | Facility: CLINIC | Age: 77
End: 2017-10-04
Payer: COMMERCIAL

## 2017-10-04 DIAGNOSIS — H02.403 PTOSIS OF EYELID, BILATERAL: ICD-10-CM

## 2017-10-04 DIAGNOSIS — H26.493 PCO (POSTERIOR CAPSULAR OPACIFICATION), BILATERAL: ICD-10-CM

## 2017-10-04 DIAGNOSIS — H02.836 DERMATOCHALASIS OF EYELIDS OF BOTH EYES: ICD-10-CM

## 2017-10-04 DIAGNOSIS — H02.833 DERMATOCHALASIS OF EYELIDS OF BOTH EYES: ICD-10-CM

## 2017-10-04 DIAGNOSIS — Z96.1 PSEUDOPHAKIA OF BOTH EYES: Primary | ICD-10-CM

## 2017-10-04 PROCEDURE — 99024 POSTOP FOLLOW-UP VISIT: CPT | Performed by: STUDENT IN AN ORGANIZED HEALTH CARE EDUCATION/TRAINING PROGRAM

## 2017-10-04 ASSESSMENT — VISUAL ACUITY
OD_PH_SC: 20/40
OD_SC+: -1
OD_SC: 20/70
OS_PH_SC: 30-1
OS_SC: 20/40
OS_SC+: -2
METHOD: SNELLEN - LINEAR

## 2017-10-04 ASSESSMENT — EXTERNAL EXAM - RIGHT EYE: OD_EXAM: NORMAL

## 2017-10-04 ASSESSMENT — REFRACTION_MANIFEST
OD_CYLINDER: +0.50
OS_AXIS: 177
OD_AXIS: 038
OS_SPHERE: +1.25
OD_ADD: +2.75
OD_SPHERE: PLANO
OS_ADD: +2.75
OS_CYLINDER: +0.75

## 2017-10-04 ASSESSMENT — TONOMETRY
IOP_METHOD: APPLANATION
OS_IOP_MMHG: 18
OD_IOP_MMHG: 17

## 2017-10-04 ASSESSMENT — CUP TO DISC RATIO
OD_RATIO: 0.1
OS_RATIO: 0.1

## 2017-10-04 ASSESSMENT — EXTERNAL EXAM - LEFT EYE: OS_EXAM: NORMAL

## 2017-10-04 NOTE — MR AVS SNAPSHOT
After Visit Summary   10/4/2017    Orquidea Stevens    MRN: [id number]           Patient Information     Date Of Birth          1940        Visit Information        Provider Department      10/4/2017 2:15 PM Lucie Gordillo MD [facility]        Today's Diagnoses     Pseudophakia of both eyes    -  1    Posterior capsular opacification, right        Dermatochalasis of eyelids of both eyes        Ptosis of eyelid, bilateral          Care Instructions    *  Continue Prednisolone four times a day both eyes until bottle runs out.    *  Fill prescription for new glasses or drugstore readers.    *  Return visit 5 weeks for a yag caps left eye and a week later a sp yag left eye and  yag caps right eye and then follow up one week later for sp yag right eye     Lucie Gordillo M.D.  [phone]          Follow-ups after your visit        Follow-up notes from your care team     Return in about 1 year (around 10/4/2018) for Complete Exam.      Who to contact     If you have questions or need follow up information about today's clinic visit or your schedule please contact [facility] directly at [phone].  Normal or non-critical lab and imaging results will be communicated to you by Headspacehart, letter or phone within 4 business days after the clinic has received the results. If you do not hear from us within 7 days, please contact the clinic through Headspacehart or phone. If you have a critical or abnormal lab result, we will notify you by phone as soon as possible.  Submit refill requests through Uni-Pixel or call your pharmacy and they will forward the refill request to us. Please allow 3 business days for your refill to be completed.          Additional Information About Your Visit        MyChart Information     Uni-Pixel lets you send messages to your doctor, view your test results, renew your prescriptions, schedule appointments and more. To sign up, go to [email or web address]  ". Click on \"Log in\" on the left side of the screen, which will take you to the Welcome page. Then click on \"Sign up Now\" on the right side of the page.     You will be asked to enter the access code listed below, as well as some personal information. Please follow the directions to create your username and password.     Your access code is: [email or web address]  Expires: 2017  9:14 AM     Your access code will  in 90 days. If you need help or a new code, please call your [place] clinic or [phone].        Care EveryWhere ID     This is your Care EveryWhere ID. This could be used by other organizations to access your [place] medical records  [id number]         Blood Pressure from Last 3 Encounters:   17 118/64   08/10/17 115/66   17 136/62    Weight from Last 3 Encounters:   17 62.6 kg (138 lb)   08/10/17 62.6 kg (138 lb)   17 62.6 kg (138 lb)              Today, you had the following     No orders found for display       Primary Care Provider Office Phone # Fax #    Lisette Amos [phone] [address] [facility] [address]        Equal Access to Services     [facility] AUGUSTINA : Hadii shirley ku hadasho Soomaali, waaxda luqadaha, qaybta kaalmada adeegyada, efrain zuniga haybraydon jacinto . So [facility] [phone].    ATENCIÓN: Si habla español, tiene a trotter disposición servicios gratuitos de asistencia lingüística. Llame al [phone].    We comply with applicable federal civil rights laws and [place] laws. We do not discriminate on the basis of race, color, national origin, age, disability, sex, sexual orientation, or gender identity.            Thank you!     Thank you for choosing [facility] [place]  for your care. Our goal is always to provide you with excellent care. Hearing back from our patients is one way we can continue to improve our services. Please take a few minutes to complete the written survey that you may receive in the mail after " your visit with us. Thank you!             Your Updated Medication List - Protect others around you: Learn how to safely use, store and throw away your medicines at www.disposemymeds.org.          This list is accurate as of: 10/4/17  3:24 PM.  Always use your most recent med list.                   Brand Name Dispense Instructions for use Diagnosis    acidophilus Caps      Take 2 capsules by mouth daily.        amLODIPine 10 MG tablet    NORVASC    90 tablet    Take 1 tablet (10 mg) by mouth daily Will call when needs refills    Hypertension goal BP (blood pressure) < 140/90       aspirin 325 MG EC tablet     100 Tab    take 1 Tab by mouth daily.    Type II or unspecified type diabetes mellitus without mention of complication, not stated as uncontrolled       atorvastatin 80 MG tablet    LIPITOR    90 tablet    Take 1 tablet (80 mg) by mouth daily Will call when needs refill    Hyperlipidemia LDL goal <100, Hypertriglyceridemia       blood glucose monitoring meter device kit    no brand specified    1 kit    Use to test blood sugar 2 times daily or as directed.    Type 2 diabetes, HbA1C goal < 8% (H)       Bromfenac Sodium Powd     1 Bottle    1 Bottle 4 times daily 1 drop four times a day into the operative eye starting 1 day before surgery. Use until bottle runs out.    Combined form of age-related cataract, both eyes       CALCIUM 500 PO      2 daily        CRANBERRY      Two tablets daily        Dexamethasone Acetate Powd     1 Bottle    1 Bottle 4 times daily 1 drop four times a day into the operative eye starting 1 day before surgery. Use until bottle runs out.    Combined form of age-related cataract, both eyes       enalapril 20 MG tablet    VASOTEC    180 tablet    Take 1 tablet (20 mg) by mouth 2 times daily Will call when needs refill    Hypertension goal BP (blood pressure) < 140/90       gabapentin 300 MG capsule    NEURONTIN    180 capsule    Take 1 capsule (300 mg) by mouth 2 times daily Will call  when needs refill    Restless leg syndrome       hydrALAZINE 25 MG tablet    APRESOLINE    180 tablet    Take 1 tablet (25 mg) by mouth 2 times daily Will call when needs refill    Hypertension goal BP (blood pressure) < 140/90       hydrochlorothiazide 25 MG tablet    HYDRODIURIL    90 tablet    Take 1 tablet (25 mg) by mouth daily Will call when needs refill    Hypertension goal BP (blood pressure) < 140/90       Lutein 20 MG Caps      Take  by mouth.        metFORMIN 500 MG tablet    GLUCOPHAGE    180 tablet    Take 1 tablet (500 mg) by mouth 2 times daily (with meals) Will call when needs refill    Type 2 diabetes mellitus without complication, without long-term current use of insulin (H)       Moxifloxacin HCl Powd     1 Bottle    1 Bottle 4 times daily 1 drop four times a day into the operative eye starting 1 day before surgery. Use until bottle runs out.    Combined form of age-related cataract, both eyes       * ONE TOUCH ULTRA test strip   Generic drug:  blood glucose monitoring     100 Strip    testing 4 times daily    Diabetes mellitus, type 2 (H)       * blood glucose monitoring test strip    no brand specified    3 Box    Use to test blood sugar 2 times daily or as directed.    Type 2 diabetes, HbA1C goal < 8% (H)       oxybutynin 5 MG tablet    DITROPAN    180 tablet    Take 2 tablets (10 mg) by mouth daily    Incontinence       * PC LANCETS SUPER THIN 30G Misc     180 each    1 Device 2 times daily    Type 2 diabetes, HbA1C goal < 8% (H)       * blood glucose monitoring lancets     3 Box    Use to test blood sugar 2 times daily or as directed.    Type 2 diabetes, HbA1C goal < 8% (H)       prednisoLONE acetate 1 % ophthalmic susp    PRED FORTE    10 mL    Place 1 drop Into the left eye 4 times daily    Pseudophakia of both eyes       TURMERIC PO      1 cap daily        Vitamin B-12 500 MCG Subl      2 daily        vitamin D 1000 UNITS capsule      1 CAPSULE DAILY        * Notice:  This list has 4  medication(s) that are the same as other medications prescribed for you. Read the directions carefully, and ask your doctor or other care provider to review them with you.

## 2017-10-04 NOTE — PROGRESS NOTES
Current Eye Medications:  Prednisolone 1% both eyes four times a day.  Finished CatarActive 3.       Subjective:  Final both eyes    Status/Post Kelman Phacoemulsification with Posterior Chamber Lens left eye:  8-10-17, right eye:  8-28-17.  Both eyes are still blurry and uncomfortable.  She is unable to see well enough to drive.       Objective:  See Ophthalmology Exam.      Assessment:  Orquidea Stevens is a 77 year old female who presents with:     Pseudophakia of both eyes Final mr both eyes, doing well, but some posterior capsular opacity (PCO) both eyes.      Posterior capsular opacification, both eyes      Dermatochalasis of eyelids of both eyes Discussed oculoplastics referral if she desires.     Ptosis of eyelid, bilateral        Plan:  *  Continue Prednisolone four times a day both eyes until bottle runs out.  *  Fill prescription for new glasses or drugstore readers.  *  Return visit 5 weeks for a yag caps left eye and a week later a sp yag left eye and  yag caps right eye and then follow up one week later for sp yag right eye     Lucie Gordillo M.D.  [phone]

## 2017-10-04 NOTE — PATIENT INSTRUCTIONS
*  Continue Prednisolone four times a day both eyes until bottle runs out.    *  Fill prescription for new glasses or drugstore readers.    *  Return visit 5 weeks for a yag caps left eye and a week later a sp yag left eye and  yag caps right eye and then follow up one week later for sp yag right eye     Lucie Gordillo M.D.  [phone]

## 2017-10-30 ENCOUNTER — TRANSFERRED RECORDS (OUTPATIENT)
Dept: HEALTH INFORMATION MANAGEMENT | Facility: CLINIC | Age: 77
End: 2017-10-30

## 2017-11-01 ENCOUNTER — OFFICE VISIT (OUTPATIENT)
Dept: FAMILY MEDICINE | Facility: CLINIC | Age: 77
End: 2017-11-01
Payer: COMMERCIAL

## 2017-11-01 VITALS
BODY MASS INDEX: 23.71 KG/M2 | TEMPERATURE: 98.5 F | SYSTOLIC BLOOD PRESSURE: 136 MMHG | HEART RATE: 62 BPM | DIASTOLIC BLOOD PRESSURE: 70 MMHG | WEIGHT: 135 LBS

## 2017-11-01 DIAGNOSIS — R19.7 DIARRHEA, UNSPECIFIED TYPE: Primary | ICD-10-CM

## 2017-11-01 DIAGNOSIS — E11.9 TYPE 2 DIABETES MELLITUS WITHOUT COMPLICATION, WITHOUT LONG-TERM CURRENT USE OF INSULIN (H): ICD-10-CM

## 2017-11-01 DIAGNOSIS — I10 HYPERTENSION GOAL BP (BLOOD PRESSURE) < 140/90: ICD-10-CM

## 2017-11-01 DIAGNOSIS — R32 URINARY INCONTINENCE, UNSPECIFIED TYPE: ICD-10-CM

## 2017-11-01 LAB
ALBUMIN SERPL-MCNC: 4.3 G/DL (ref 3.4–5)
ALP SERPL-CCNC: 74 U/L (ref 40–150)
ALT SERPL W P-5'-P-CCNC: 29 U/L (ref 0–50)
ANION GAP SERPL CALCULATED.3IONS-SCNC: 7 MMOL/L (ref 3–14)
AST SERPL W P-5'-P-CCNC: 12 U/L (ref 0–45)
BILIRUB SERPL-MCNC: 0.7 MG/DL (ref 0.2–1.3)
BUN SERPL-MCNC: 16 MG/DL (ref 7–30)
CALCIUM SERPL-MCNC: 9.4 MG/DL (ref 8.5–10.1)
CHLORIDE SERPL-SCNC: 102 MMOL/L (ref 94–109)
CO2 SERPL-SCNC: 29 MMOL/L (ref 20–32)
CREAT SERPL-MCNC: 0.53 MG/DL (ref 0.52–1.04)
CRP SERPL-MCNC: <2.9 MG/L (ref 0–8)
ERYTHROCYTE [SEDIMENTATION RATE] IN BLOOD BY WESTERGREN METHOD: 6 MM/H (ref 0–30)
GFR SERPL CREATININE-BSD FRML MDRD: >90 ML/MIN/1.7M2
GLUCOSE SERPL-MCNC: 137 MG/DL (ref 70–99)
HBA1C MFR BLD: 6.6 % (ref 4.3–6)
POTASSIUM SERPL-SCNC: 3.8 MMOL/L (ref 3.4–5.3)
PROT SERPL-MCNC: 8.1 G/DL (ref 6.8–8.8)
SODIUM SERPL-SCNC: 138 MMOL/L (ref 133–144)

## 2017-11-01 PROCEDURE — 36415 COLL VENOUS BLD VENIPUNCTURE: CPT | Performed by: FAMILY MEDICINE

## 2017-11-01 PROCEDURE — 80053 COMPREHEN METABOLIC PANEL: CPT | Performed by: FAMILY MEDICINE

## 2017-11-01 PROCEDURE — 99214 OFFICE O/P EST MOD 30 MIN: CPT | Performed by: FAMILY MEDICINE

## 2017-11-01 PROCEDURE — 86140 C-REACTIVE PROTEIN: CPT | Performed by: FAMILY MEDICINE

## 2017-11-01 PROCEDURE — 85652 RBC SED RATE AUTOMATED: CPT | Performed by: FAMILY MEDICINE

## 2017-11-01 PROCEDURE — 83036 HEMOGLOBIN GLYCOSYLATED A1C: CPT | Performed by: FAMILY MEDICINE

## 2017-11-01 RX ORDER — ENALAPRIL MALEATE 20 MG/1
20 TABLET ORAL 2 TIMES DAILY
Qty: 180 TABLET | Refills: 1 | Status: SHIPPED | OUTPATIENT
Start: 2017-11-01 | End: 2018-05-07

## 2017-11-01 RX ORDER — OXYBUTYNIN CHLORIDE 5 MG/1
10 TABLET ORAL DAILY
Qty: 180 TABLET | Refills: 1 | Status: SHIPPED | OUTPATIENT
Start: 2017-11-01 | End: 2020-02-18

## 2017-11-01 RX ORDER — HYDROCHLOROTHIAZIDE 25 MG/1
25 TABLET ORAL DAILY
Qty: 90 TABLET | Refills: 1 | Status: SHIPPED | OUTPATIENT
Start: 2017-11-01 | End: 2018-05-07

## 2017-11-01 NOTE — PROGRESS NOTES
SUBJECTIVE:   Orquidea Stevens is a 77 year old female who presents to clinic today for the following health issues:      ibs flare up       Duration: 2 months     Description (location/character/radiation): every time she eats has to go to the bathroom    Intensity:  Severe     Accompanying signs and symptoms: bouts of constipation vs diarrhea     History (similar episodes/previous evaluation): ibs     Precipitating or alleviating factors:     Therapies tried and outcome: anti-diarrheal medication        Pt with stomach cramps, then has diarrhea, and feels better til she eats then goes through same cycle  Been going on for a couple of months,   No black or bloody stools. No fevers  Take immodium, is constipated for a day and then restarts.     This has happened 2x before but usually not this long or severe  Has had colonoscopy due to these episodes and has been normal  Is taking imodium 2 every other day.     Pt also with diabetes and due for labwork  Is on ace/statin and asa  Also with htn    Also needs refill of med for urinary incontinence    Problem list and histories reviewed & adjusted, as indicated.  Additional history: as documented    Labs reviewed in EPIC    Reviewed and updated as needed this visit by clinical staffTobacco  Allergies  Meds  Med Hx  Surg Hx  Fam Hx  Soc Hx      Reviewed and updated as needed this visit by Provider         ROS:  Constitutional, HEENT, cardiovascular, pulmonary, gi and gu systems are negative, except as otherwise noted.      OBJECTIVE:   /70  Pulse 62  Temp 98.5  F (36.9  C) (Oral)  Wt 135 lb (61.2 kg)  BMI 23.71 kg/m2  Body mass index is 23.71 kg/(m^2).  GENERAL: healthy, alert and no distress  NECK: no adenopathy, no asymmetry, masses, or scars and thyroid normal to palpation  RESP: lungs clear to auscultation - no rales, rhonchi or wheezes  CV: regular rate and rhythm, normal S1 S2, no S3 or S4, no murmur, click or rub, no peripheral edema and peripheral  pulses strong  ABDOMEN: soft, nontender, no hepatosplenomegaly, no masses and bowel sounds normal  MS: no gross musculoskeletal defects noted, no edema    Diagnostic Test Results:  none     ASSESSMENT/PLAN:     1. Diarrhea, unspecified type  As above, await labwork, if all normal would refer for GI consult  - Comprehensive metabolic panel  - TSH with free T4 reflex; Future  - Erythrocyte sedimentation rate auto  - CRP inflammation  - Enteric Bacteria and Virus Panel by WAYNE Stool; Future  - Giardia antigen; Future  - Clostridium difficile Toxin B PCR; Future    2. Type 2 diabetes mellitus without complication, without long-term current use of insulin (H)  Overdue for visit, recheck  - Hemoglobin A1c  - metFORMIN (GLUCOPHAGE) 500 MG tablet; Take 1 tablet (500 mg) by mouth 2 times daily (with meals) Will call when needs refill  Dispense: 180 tablet; Refill: 1    3. Urinary incontinence, unspecified type  Working well   - oxybutynin (DITROPAN) 5 MG tablet; Take 2 tablets (10 mg) by mouth daily  Dispense: 180 tablet; Refill: 1    4. Hypertension goal BP (blood pressure) < 140/90  At goal on meds  - hydrochlorothiazide (HYDRODIURIL) 25 MG tablet; Take 1 tablet (25 mg) by mouth daily Will call when needs refill  Dispense: 90 tablet; Refill: 1  - enalapril (VASOTEC) 20 MG tablet; Take 1 tablet (20 mg) by mouth 2 times daily Will call when needs refill  Dispense: 180 tablet; Refill: 1        Lisette Agustin MD  [facility]

## 2017-11-01 NOTE — NURSING NOTE
"Chief Complaint   Patient presents with     Diarrhea       Initial /71  Pulse 62  Temp 98.5  F (36.9  C) (Oral)  Wt 135 lb (61.2 kg)  BMI 23.71 kg/m2 Estimated body mass index is 23.71 kg/(m^2) as calculated from the following:    Height as of 8/28/17: 5' 3.27\" (1.607 m).    Weight as of this encounter: 135 lb (61.2 kg).  Medication Reconciliation: ella Loza MA      "

## 2017-11-01 NOTE — LETTER
November 2, 2017    Orquidea Stevens  [address] [address] [facility] [address]            Dear Orquidea,    Your electrolytes, liver and kidney function are normal.  Both markers of inflammation are negative.  Your diabetes is still well controlled.   Will await your stool cultures.    If you have any questions or concerns, please call myself or my nurse at [phone].    Sincerely,      Lisette Agustin MD/allyn    Results for orders placed or performed in visit on 11/01/17   Hemoglobin A1c   Result Value Ref Range    Hemoglobin A1C 6.6 (H) 4.3 - 6.0 %   Comprehensive metabolic panel   Result Value Ref Range    Sodium 138 133 - 144 mmol/L    Potassium 3.8 3.4 - 5.3 mmol/L    Chloride 102 94 - 109 mmol/L    Carbon Dioxide 29 20 - 32 mmol/L    Anion Gap 7 3 - 14 mmol/L    Glucose 137 (H) 70 - 99 mg/dL    Urea Nitrogen 16 7 - 30 mg/dL    Creatinine 0.53 0.52 - 1.04 mg/dL    GFR Estimate >90 >60 mL/min/1.7m2    GFR Estimate If Black >90 >60 mL/min/1.7m2    Calcium 9.4 8.5 - 10.1 mg/dL    Bilirubin Total 0.7 0.2 - 1.3 mg/dL    Albumin 4.3 3.4 - 5.0 g/dL    Protein Total 8.1 6.8 - 8.8 g/dL    Alkaline Phosphatase 74 40 - 150 U/L    ALT 29 0 - 50 U/L    AST 12 0 - 45 U/L   Erythrocyte sedimentation rate auto   Result Value Ref Range    Sed Rate 6 0 - 30 mm/h   CRP inflammation   Result Value Ref Range    CRP Inflammation <2.9 0.0 - 8.0 mg/L

## 2017-11-01 NOTE — MR AVS SNAPSHOT
After Visit Summary   11/1/2017    Orquidea Stevens    MRN: [id number]           Patient Information     Date Of Birth          1940        Visit Information        Provider Department      11/1/2017 12:00 PM Lisette Amos MD [facility]        Today's Diagnoses     Diarrhea, unspecified type    -  1    Type 2 diabetes mellitus without complication, without long-term current use of insulin (H)        Urinary incontinence, unspecified type        Hypertension goal BP (blood pressure) < 140/90           Follow-ups after your visit        Your next 10 appointments already scheduled     Nov 06, 2017 12:45 PM CST   Return Visit with Lucie Gordillo MD   [facility] ([facility])    [address] [address]   [phone]            Nov 13, 2017 12:45 PM CST   Return Visit with Lucie Gordillo MD   [facility] ([facility])    [address] [address]   [phone]            Nov 20, 2017 12:45 PM CST   Return Visit with Lucie Gordillo MD   [facility] ([facility])    [address] [address]   [phone]              Future tests that were ordered for you today     Open Future Orders        Priority Expected Expires Ordered    PAF COMPLETED Routine  10/31/2018 11/1/2017    TSH with free T4 reflex Routine  2/1/2018 11/1/2017    Enteric Bacteria and Virus Panel by WAYNE Stool Routine  11/1/2018 11/1/2017    Giardia antigen Routine  11/1/2018 11/1/2017    Clostridium difficile Toxin B PCR Routine  12/1/2017 11/1/2017            Who to contact     If you have questions or need follow up information about today's clinic visit or your schedule please contact [facility] directly at [phone].  Normal or non-critical lab and imaging results will be communicated to you by MyChart, letter or phone within 4 business days after the  "clinic has received the results. If you do not hear from us within 7 days, please contact the clinic through [employer] or phone. If you have a critical or abnormal lab result, we will notify you by phone as soon as possible.  Submit refill requests through [employer] or call your pharmacy and they will forward the refill request to us. Please allow 3 business days for your refill to be completed.          Additional Information About Your Visit        [email or web address] Information     [employer] lets you send messages to your doctor, view your test results, renew your prescriptions, schedule appointments and more. To sign up, go to [email or web address] . Click on \"Log in\" on the left side of the screen, which will take you to the Welcome page. Then click on \"Sign up Now\" on the right side of the page.     You will be asked to enter the access code listed below, as well as some personal information. Please follow the directions to create your username and password.     Your access code is: [email or web address]  Expires: 2017  9:14 AM     Your access code will  in 90 days. If you need help or a new code, please call your [place] clinic or [phone].        Care EveryWhere ID     This is your Care EveryWhere ID. This could be used by other organizations to access your [place] medical records  [id number]        Your Vitals Were     Pulse Temperature BMI (Body Mass Index)             62 98.5  F (36.9  C) (Oral) 23.71 kg/m2          Blood Pressure from Last 3 Encounters:   17 136/70   17 118/64   08/10/17 115/66    Weight from Last 3 Encounters:   17 135 lb (61.2 kg)   17 138 lb (62.6 kg)   08/10/17 138 lb (62.6 kg)              We Performed the Following     Comprehensive metabolic panel     CRP inflammation     Erythrocyte sedimentation rate auto     Hemoglobin A1c          Where to get your medicines      These medications were sent to [id number] - [facility] [address]  " [address], [place] [address]    Hours:  test fax successful 4/5/04 kr Phone:  [phone]     enalapril 20 MG tablet    hydrochlorothiazide 25 MG tablet    metFORMIN 500 MG tablet    oxybutynin 5 MG tablet          Primary Care Provider Office Phone # Fax #    Lisette Amos [phone] [address] [facility] [address]        Equal Access to Services     FIORDALIZA JACKMAN : Hadii aad ku hadasho Soomaali, waaxda luqadaha, qaybta kaalmada adeegyada, waxay idiin hayaan adeeg kharash la'aan . So [facility] [phone].    ATENCIÓN: Si habla español, tiene a trotter disposición servicios gratuitos de asistencia lingüística. [facility] [phone].    We comply with applicable federal civil rights laws and [place] laws. We do not discriminate on the basis of race, color, national origin, age, disability, sex, sexual orientation, or gender identity.            Thank you!     Thank you for choosing [facility]  for your care. Our goal is always to provide you with excellent care. Hearing back from our patients is one way we can continue to improve our services. Please take a few minutes to complete the written survey that you may receive in the mail after your visit with us. Thank you!             Your Updated Medication List - Protect others around you: Learn how to safely use, store and throw away your medicines at www.disposemymeds.org.          This list is accurate as of: 11/1/17  5:19 PM.  Always use your most recent med list.                   Brand Name Dispense Instructions for use Diagnosis    acidophilus Caps      Take 2 capsules by mouth daily.        amLODIPine 10 MG tablet    NORVASC    90 tablet    Take 1 tablet (10 mg) by mouth daily Will call when needs refills    Hypertension goal BP (blood pressure) < 140/90       aspirin 325 MG EC tablet     100 Tab    take 1 Tab by mouth daily.    Type II or unspecified type diabetes mellitus without mention of complication,  not stated as uncontrolled       atorvastatin 80 MG tablet    LIPITOR    90 tablet    Take 1 tablet (80 mg) by mouth daily Will call when needs refill    Hyperlipidemia LDL goal <100, Hypertriglyceridemia       blood glucose monitoring meter device kit    no brand specified    1 kit    Use to test blood sugar 2 times daily or as directed.    Type 2 diabetes, HbA1C goal < 8% (H)       Bromfenac Sodium Powd     1 Bottle    1 Bottle 4 times daily 1 drop four times a day into the operative eye starting 1 day before surgery. Use until bottle runs out.    Combined form of age-related cataract, both eyes       CALCIUM 500 PO      2 daily        CRANBERRY      Two tablets daily        Dexamethasone Acetate Powd     1 Bottle    1 Bottle 4 times daily 1 drop four times a day into the operative eye starting 1 day before surgery. Use until bottle runs out.    Combined form of age-related cataract, both eyes       enalapril 20 MG tablet    VASOTEC    180 tablet    Take 1 tablet (20 mg) by mouth 2 times daily Will call when needs refill    Hypertension goal BP (blood pressure) < 140/90       gabapentin 300 MG capsule    NEURONTIN    180 capsule    Take 1 capsule (300 mg) by mouth 2 times daily Will call when needs refill    Restless leg syndrome       hydrALAZINE 25 MG tablet    APRESOLINE    180 tablet    Take 1 tablet (25 mg) by mouth 2 times daily Will call when needs refill    Hypertension goal BP (blood pressure) < 140/90       hydrochlorothiazide 25 MG tablet    HYDRODIURIL    90 tablet    Take 1 tablet (25 mg) by mouth daily Will call when needs refill    Hypertension goal BP (blood pressure) < 140/90       Lutein 20 MG Caps      Take  by mouth.        metFORMIN 500 MG tablet    GLUCOPHAGE    180 tablet    Take 1 tablet (500 mg) by mouth 2 times daily (with meals) Will call when needs refill    Type 2 diabetes mellitus without complication, without long-term current use of insulin (H)       Moxifloxacin HCl Powd     1  Bottle    1 Bottle 4 times daily 1 drop four times a day into the operative eye starting 1 day before surgery. Use until bottle runs out.    Combined form of age-related cataract, both eyes       * ONE TOUCH ULTRA test strip   Generic drug:  blood glucose monitoring     100 Strip    testing 4 times daily    Diabetes mellitus, type 2 (H)       * blood glucose monitoring test strip    no brand specified    3 Box    Use to test blood sugar 2 times daily or as directed.    Type 2 diabetes, HbA1C goal < 8% (H)       oxybutynin 5 MG tablet    DITROPAN    180 tablet    Take 2 tablets (10 mg) by mouth daily    Urinary incontinence, unspecified type       * PC LANCETS SUPER THIN 30G Misc     180 each    1 Device 2 times daily    Type 2 diabetes, HbA1C goal < 8% (H)       * blood glucose monitoring lancets     3 Box    Use to test blood sugar 2 times daily or as directed.    Type 2 diabetes, HbA1C goal < 8% (H)       prednisoLONE acetate 1 % ophthalmic susp    PRED FORTE    10 mL    Place 1 drop Into the left eye 4 times daily    Pseudophakia of both eyes       TURMERIC PO      1 cap daily        Vitamin B-12 500 MCG Subl      2 daily        vitamin D 1000 UNITS capsule      1 CAPSULE DAILY        * Notice:  This list has 4 medication(s) that are the same as other medications prescribed for you. Read the directions carefully, and ask your doctor or other care provider to review them with you.

## 2017-11-06 ENCOUNTER — OFFICE VISIT (OUTPATIENT)
Dept: OPHTHALMOLOGY | Facility: CLINIC | Age: 77
End: 2017-11-06
Payer: COMMERCIAL

## 2017-11-06 DIAGNOSIS — R19.7 DIARRHEA, UNSPECIFIED TYPE: ICD-10-CM

## 2017-11-06 DIAGNOSIS — Z96.1 PSEUDOPHAKIA OF BOTH EYES: ICD-10-CM

## 2017-11-06 DIAGNOSIS — H26.492 LEFT POSTERIOR CAPSULAR OPACIFICATION: Primary | ICD-10-CM

## 2017-11-06 PROCEDURE — 87329 GIARDIA AG IA: CPT | Mod: 59 | Performed by: FAMILY MEDICINE

## 2017-11-06 PROCEDURE — 87506 IADNA-DNA/RNA PROBE TQ 6-11: CPT | Performed by: FAMILY MEDICINE

## 2017-11-06 PROCEDURE — 66821 AFTER CATARACT LASER SURGERY: CPT | Mod: 79 | Performed by: STUDENT IN AN ORGANIZED HEALTH CARE EDUCATION/TRAINING PROGRAM

## 2017-11-06 ASSESSMENT — TONOMETRY
IOP_METHOD: APPLANATION
OS_IOP_MMHG: 17
IOP_METHOD: APPLANATION
OS_IOP_MMHG: 17

## 2017-11-06 ASSESSMENT — VISUAL ACUITY
METHOD: SNELLEN - LINEAR
CORRECTION_TYPE: GLASSES
OS_CC: 20/30+3

## 2017-11-06 NOTE — PATIENT INSTRUCTIONS
You may notice more floaters for the next few days.  Also, your eye may be slightly red, sore, and blurry for a few days.  Return in 1 week for Intraocular pressure check and refraction.    Lucie Gordillo MD  [phone]

## 2017-11-06 NOTE — MR AVS SNAPSHOT
After Visit Summary   11/6/2017    Orquidea Stevens    MRN: [id number]           Patient Information     Date Of Birth          1940        Visit Information        Provider Department      11/6/2017 12:45 PM Lucie Gordillo MD [facility]        Today's Diagnoses     Left posterior capsular opacification    -  1      Care Instructions    You may notice more floaters for the next few days.  Also, your eye may be slightly red, sore, and blurry for a few days.  Return in 1 week for Intraocular pressure check and refraction.    Lucie Gordillo MD  [phone]            Follow-ups after your visit        Your next 10 appointments already scheduled     Nov 13, 2017 12:45 PM CST   Return Visit with Lucie Gordillo MD   [facility]    [address] [place] [address] [phone]            Nov 20, 2017 12:45 PM CST   Return Visit with Lucie Gordillo MD   [facility] ([address] [address] [phone]              Who to contact     If you have questions or need follow up information about today's clinic visit or your schedule please contact [facility] directly at [phone].  Normal or non-critical lab and imaging results will be communicated to you by MyChart, letter or phone within 4 business days after the clinic has received the results. If you do not hear from us within 7 days, please contact the clinic through MyChart or phone. If you have a critical or abnormal lab result, we will notify you by phone as soon as possible.  Submit refill requests through [email or web address] or call your pharmacy and they will forward the refill request to us. Please allow 3 business days for your refill to be completed.          Additional Information About Your Visit        [email or web address] Information     [email or web address] lets you send messages to your doctor, view your test  "results, renew your prescriptions, schedule appointments and more. To sign up, go to [email or web address] . Click on \"Log in\" on the left side of the screen, which will take you to the Welcome page. Then click on \"Sign up Now\" on the right side of the page.     You will be asked to enter the access code listed below, as well as some personal information. Please follow the directions to create your username and password.     Your access code is: [email or web address]  Expires: 2017  8:14 AM     Your access code will  in 90 days. If you need help or a new code, please call your [place] clinic or [phone].        Care EveryWhere ID     This is your Care EveryWhere ID. This could be used by other organizations to access your [place] medical records  [id number]         Blood Pressure from Last 3 Encounters:   17 136/70   17 118/64   08/10/17 115/66    Weight from Last 3 Encounters:   17 61.2 kg (135 lb)   17 62.6 kg (138 lb)   08/10/17 62.6 kg (138 lb)              Today, you had the following     No orders found for display         Today's Medication Changes          These changes are accurate as of: 17  1:15 PM.  If you have any questions, ask your nurse or doctor.               Stop taking these medicines if you haven't already. Please contact your care team if you have questions.     Bromfenac Sodium Powd           Moxifloxacin HCl Powd           prednisoLONE acetate 1 % ophthalmic susp   Commonly known as:  PRED FORTE                    Primary Care Provider Office Phone # Fax #    Lisette Amos [phone] [address] [facility] [address]        Equal Access to Services     FIORDALIZA JACKMAN : Sachin Bryant, hari vera, efrain curry. So [facility] [phone].    ATENCIÓN: Si habla español, tiene a trotter disposición servicios gratuitos de asistencia lingüística. Llame al " [phone].    We comply with applicable federal civil rights laws and [place] laws. We do not discriminate on the basis of race, color, national origin, age, disability, sex, sexual orientation, or gender identity.            Thank you!     Thank you for choosing [facility]  for your care. Our goal is always to provide you with excellent care. Hearing back from our patients is one way we can continue to improve our services. Please take a few minutes to complete the written survey that you may receive in the mail after your visit with us. Thank you!             Your Updated Medication List - Protect others around you: Learn how to safely use, store and throw away your medicines at www.disposemymeds.org.          This list is accurate as of: 11/6/17  1:15 PM.  Always use your most recent med list.                   Brand Name Dispense Instructions for use Diagnosis    acidophilus Caps      Take 2 capsules by mouth daily.        amLODIPine 10 MG tablet    NORVASC    90 tablet    Take 1 tablet (10 mg) by mouth daily Will call when needs refills    Hypertension goal BP (blood pressure) < 140/90       aspirin 325 MG EC tablet     100 Tab    take 1 Tab by mouth daily.    Type II or unspecified type diabetes mellitus without mention of complication, not stated as uncontrolled       atorvastatin 80 MG tablet    LIPITOR    90 tablet    Take 1 tablet (80 mg) by mouth daily Will call when needs refill    Hyperlipidemia LDL goal <100, Hypertriglyceridemia       blood glucose monitoring meter device kit    no brand specified    1 kit    Use to test blood sugar 2 times daily or as directed.    Type 2 diabetes, HbA1C goal < 8% (H)       CALCIUM 500 PO      2 daily        CRANBERRY      Two tablets daily        Dexamethasone Acetate Powd     1 Bottle    1 Bottle 4 times daily 1 drop four times a day into the operative eye starting 1 day before surgery. Use until bottle runs out.    Combined form of age-related  cataract, both eyes       enalapril 20 MG tablet    VASOTEC    180 tablet    Take 1 tablet (20 mg) by mouth 2 times daily Will call when needs refill    Hypertension goal BP (blood pressure) < 140/90       gabapentin 300 MG capsule    NEURONTIN    180 capsule    Take 1 capsule (300 mg) by mouth 2 times daily Will call when needs refill    Restless leg syndrome       hydrALAZINE 25 MG tablet    APRESOLINE    180 tablet    Take 1 tablet (25 mg) by mouth 2 times daily Will call when needs refill    Hypertension goal BP (blood pressure) < 140/90       hydrochlorothiazide 25 MG tablet    HYDRODIURIL    90 tablet    Take 1 tablet (25 mg) by mouth daily Will call when needs refill    Hypertension goal BP (blood pressure) < 140/90       Lutein 20 MG Caps      Take  by mouth.        metFORMIN 500 MG tablet    GLUCOPHAGE    180 tablet    Take 1 tablet (500 mg) by mouth 2 times daily (with meals) Will call when needs refill    Type 2 diabetes mellitus without complication, without long-term current use of insulin (H)       * ONETOUCH ULTRA test strip   Generic drug:  blood glucose monitoring     100 Strip    testing 4 times daily    Diabetes mellitus, type 2 (H)       * blood glucose monitoring test strip    no brand specified    3 Box    Use to test blood sugar 2 times daily or as directed.    Type 2 diabetes, HbA1C goal < 8% (H)       oxybutynin 5 MG tablet    DITROPAN    180 tablet    Take 2 tablets (10 mg) by mouth daily    Urinary incontinence, unspecified type       * PC LANCETS SUPER THIN 30G Misc     180 each    1 Device 2 times daily    Type 2 diabetes, HbA1C goal < 8% (H)       * blood glucose monitoring lancets     3 Box    Use to test blood sugar 2 times daily or as directed.    Type 2 diabetes, HbA1C goal < 8% (H)       TURMERIC PO      1 cap daily        Vitamin B-12 500 MCG Subl      2 daily        vitamin D 1000 UNITS capsule      1 CAPSULE DAILY        * Notice:  This list has 4 medication(s) that are the same  as other medications prescribed for you. Read the directions carefully, and ask your doctor or other care provider to review them with you.

## 2017-11-06 NOTE — PROGRESS NOTES
Current Eye Medications:       Subjective:  Yag capsulotomy left eye.  Consent signed. RBA discussed. No vision changes since the last visit.      Objective:  See Ophthalmology Exam.       Assessment:  Orquidea Stevens is a 77 year old female who presents with:   Encounter Diagnoses   Name Primary?     Left posterior capsular opacification YAG capsulotomy performed today without complication.      Pseudophakia of both eyes        Plan:  You may notice more floaters for the next few days.  Also, your eye may be slightly red, sore, and blurry for a few days.  Return in 1 week for Intraocular pressure check and refraction.    Lucie Gordillo MD  [phone]

## 2017-11-07 LAB
C COLI+JEJUNI+LARI FUSA STL QL NAA+PROBE: NOT DETECTED
EC STX1 GENE STL QL NAA+PROBE: NOT DETECTED
EC STX2 GENE STL QL NAA+PROBE: NOT DETECTED
ENTERIC PATHOGEN COMMENT: NORMAL
G LAMBLIA AG STL QL IA: NORMAL
NOROV GI+II ORF1-ORF2 JNC STL QL NAA+PR: NOT DETECTED
RVA NSP5 STL QL NAA+PROBE: NOT DETECTED
SALMONELLA SP RPOD STL QL NAA+PROBE: NOT DETECTED
SHIGELLA SP+EIEC IPAH STL QL NAA+PROBE: NOT DETECTED
SPECIMEN SOURCE: NORMAL
V CHOL+PARA RFBL+TRKH+TNAA STL QL NAA+PR: NOT DETECTED
Y ENTERO RECN STL QL NAA+PROBE: NOT DETECTED

## 2017-11-09 ASSESSMENT — EXTERNAL EXAM - LEFT EYE: OS_EXAM: NORMAL

## 2017-11-09 ASSESSMENT — EXTERNAL EXAM - RIGHT EYE: OD_EXAM: NORMAL

## 2017-11-13 ENCOUNTER — OFFICE VISIT (OUTPATIENT)
Dept: OPHTHALMOLOGY | Facility: CLINIC | Age: 77
End: 2017-11-13
Payer: COMMERCIAL

## 2017-11-13 DIAGNOSIS — H26.491 PCO (POSTERIOR CAPSULAR OPACIFICATION), RIGHT: Primary | ICD-10-CM

## 2017-11-13 DIAGNOSIS — Z96.1 PSEUDOPHAKIA OF BOTH EYES: ICD-10-CM

## 2017-11-13 PROCEDURE — 66821 AFTER CATARACT LASER SURGERY: CPT | Mod: 79 | Performed by: STUDENT IN AN ORGANIZED HEALTH CARE EDUCATION/TRAINING PROGRAM

## 2017-11-13 ASSESSMENT — EXTERNAL EXAM - RIGHT EYE: OD_EXAM: NORMAL

## 2017-11-13 ASSESSMENT — VISUAL ACUITY
OS_CC: 20/25
OD_CC+: -1
OD_CC: 20/60
CORRECTION_TYPE: GLASSES
METHOD: SNELLEN - LINEAR
OS_CC+: -2

## 2017-11-13 ASSESSMENT — TONOMETRY
OD_IOP_MMHG: 16
OS_IOP_MMHG: 17
IOP_METHOD: APPLANATION

## 2017-11-13 ASSESSMENT — EXTERNAL EXAM - LEFT EYE: OS_EXAM: NORMAL

## 2017-11-13 ASSESSMENT — CUP TO DISC RATIO: OS_RATIO: 0.1

## 2017-11-13 NOTE — MR AVS SNAPSHOT
"              After Visit Summary   11/13/2017    Orquidea Stevens    MRN: [id number]           Patient Information     Date Of Birth          1940        Visit Information        Provider Department      11/13/2017 12:45 PM Lucie Gordillo MD [facility]        Care Instructions    You may notice more floaters for the next few days.  Also, your eye may be slightly red, sore, and blurry for a few days.  Return in 1 week for Intraocular pressure check and refraction both eyes     Lucie Gordillo MD  [phone]            Follow-ups after your visit        Your next 10 appointments already scheduled     Nov 20, 2017 12:45 PM CST   Return Visit with Lucie Gordillo MD   [facility]) [address] [address] [phone]              Who to contact     If you have questions or need follow up information about today's clinic visit or your schedule please contact [facility] directly at [phone].  Normal or non-critical lab and imaging results will be communicated to you by MyChart, letter or phone within 4 business days after the clinic has received the results. If you do not hear from us within 7 days, please contact the clinic through [employer] or phone. If you have a critical or abnormal lab result, we will notify you by phone as soon as possible.  Submit refill requests through [employer] or call your pharmacy and they will forward the refill request to us. Please allow 3 business days for your refill to be completed.          Additional Information About Your Visit        [employer] Information     [employer] lets you send messages to your doctor, view your test results, renew your prescriptions, schedule appointments and more. To sign up, go to [email or web address] . Click on \"Log in\" on the left side of the screen, which will take you to the Welcome page. Then click on \"Sign up Now\" on the right side of the " page.     You will be asked to enter the access code listed below, as well as some personal information. Please follow the directions to create your username and password.     Your access code is: [email or web address]  Expires: 2018  1:09 PM     Your access code will  in 90 days. If you need help or a new code, please call your [place] clinic or [phone].        Care EveryWhere ID     This is your Care EveryWhere ID. This could be used by other organizations to access your [place] medical records  [id number]         Blood Pressure from Last 3 Encounters:   17 136/70   17 118/64   08/10/17 115/66    Weight from Last 3 Encounters:   17 61.2 kg (135 lb)   17 62.6 kg (138 lb)   08/10/17 62.6 kg (138 lb)              Today, you had the following     No orders found for display       Primary Care Provider Office Phone # Fax #    Lisette Amos [phone] [address] [facility] [address]        Equal Access to Services     [facility]: Hadii aad ku hadasho Soomaali, waaxda luqadaha, qaybta kaalmada adeegyada, efrain jacinto . So [facility] [phone].    ATENCIÓN: Si habla español, tiene a trotter disposición servicios gratuitos de asistencia lingüística. Llame al [phone].    We comply with applicable federal civil rights laws and [place] laws. We do not discriminate on the basis of race, color, national origin, age, disability, sex, sexual orientation, or gender identity.            Thank you!     Thank you for choosing [facility]  for your care. Our goal is always to provide you with excellent care. Hearing back from our patients is one way we can continue to improve our services. Please take a few minutes to complete the written survey that you may receive in the mail after your visit with us. Thank you!             Your Updated Medication List - Protect others around you: Learn how to safely use, store and throw away  your medicines at www.disposemymeds.org.          This list is accurate as of: 11/13/17  1:10 PM.  Always use your most recent med list.                   Brand Name Dispense Instructions for use Diagnosis    acidophilus Caps      Take 2 capsules by mouth daily.        amLODIPine 10 MG tablet    NORVASC    90 tablet    Take 1 tablet (10 mg) by mouth daily Will call when needs refills    Hypertension goal BP (blood pressure) < 140/90       aspirin 325 MG EC tablet     100 Tab    take 1 Tab by mouth daily.    Type II or unspecified type diabetes mellitus without mention of complication, not stated as uncontrolled       atorvastatin 80 MG tablet    LIPITOR    90 tablet    Take 1 tablet (80 mg) by mouth daily Will call when needs refill    Hyperlipidemia LDL goal <100, Hypertriglyceridemia       blood glucose monitoring meter device kit    no brand specified    1 kit    Use to test blood sugar 2 times daily or as directed.    Type 2 diabetes, HbA1C goal < 8% (H)       CALCIUM 500 PO      2 daily        CRANBERRY      Two tablets daily        Dexamethasone Acetate Powd     1 Bottle    1 Bottle 4 times daily 1 drop four times a day into the operative eye starting 1 day before surgery. Use until bottle runs out.    Combined form of age-related cataract, both eyes       enalapril 20 MG tablet    VASOTEC    180 tablet    Take 1 tablet (20 mg) by mouth 2 times daily Will call when needs refill    Hypertension goal BP (blood pressure) < 140/90       gabapentin 300 MG capsule    NEURONTIN    180 capsule    Take 1 capsule (300 mg) by mouth 2 times daily Will call when needs refill    Restless leg syndrome       hydrALAZINE 25 MG tablet    APRESOLINE    180 tablet    Take 1 tablet (25 mg) by mouth 2 times daily Will call when needs refill    Hypertension goal BP (blood pressure) < 140/90       hydrochlorothiazide 25 MG tablet    HYDRODIURIL    90 tablet    Take 1 tablet (25 mg) by mouth daily Will call when needs refill     Hypertension goal BP (blood pressure) < 140/90       Lutein 20 MG Caps      Take  by mouth.        metFORMIN 500 MG tablet    GLUCOPHAGE    180 tablet    Take 1 tablet (500 mg) by mouth 2 times daily (with meals) Will call when needs refill    Type 2 diabetes mellitus without complication, without long-term current use of insulin (H)       * ONETOUCH ULTRA test strip   Generic drug:  blood glucose monitoring     100 Strip    testing 4 times daily    Diabetes mellitus, type 2 (H)       * blood glucose monitoring test strip    no brand specified    3 Box    Use to test blood sugar 2 times daily or as directed.    Type 2 diabetes, HbA1C goal < 8% (H)       oxybutynin 5 MG tablet    DITROPAN    180 tablet    Take 2 tablets (10 mg) by mouth daily    Urinary incontinence, unspecified type       * PC LANCETS SUPER THIN 30G Misc     180 each    1 Device 2 times daily    Type 2 diabetes, HbA1C goal < 8% (H)       * blood glucose monitoring lancets     3 Box    Use to test blood sugar 2 times daily or as directed.    Type 2 diabetes, HbA1C goal < 8% (H)       TURMERIC PO      1 cap daily        Vitamin B-12 500 MCG Subl      2 daily        vitamin D 1000 UNITS capsule      1 CAPSULE DAILY        * Notice:  This list has 4 medication(s) that are the same as other medications prescribed for you. Read the directions carefully, and ask your doctor or other care provider to review them with you.

## 2017-11-13 NOTE — PATIENT INSTRUCTIONS
You may notice more floaters for the next few days.  Also, your eye may be slightly red, sore, and blurry for a few days.  Return in 1 week for Intraocular pressure check and refraction both eyes     Lucie Gordillo MD  [phone]

## 2017-11-13 NOTE — PROGRESS NOTES
Current Eye Medications:  Thera tears BID both eyes     Subjective:  Here for YAG right eye and s/p YAG left eye. Left eye is clearer, but not perfect.  Ready for the right eye. Consent signed.       Objective:  See Ophthalmology Exam.       Assessment:  Orquidea Stevens is a 77 year old female who presents with:   Encounter Diagnoses   Name Primary?     PCO (posterior capsular opacification), right YAG capsulotomy right eye today without complication.  1 week s/p YAG cap left eye, doing well. Will refract both eyes next week.     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  Also, your eye may be slightly red, sore, and blurry for a few days.  Return in 1 week for Intraocular pressure check and refraction both eyes     Lucie Gordillo MD  [phone]

## 2017-11-20 ENCOUNTER — OFFICE VISIT (OUTPATIENT)
Dept: OPHTHALMOLOGY | Facility: CLINIC | Age: 77
End: 2017-11-20
Payer: COMMERCIAL

## 2017-11-20 DIAGNOSIS — Z96.1 PSEUDOPHAKIA OF BOTH EYES: Primary | ICD-10-CM

## 2017-11-20 DIAGNOSIS — H02.833 DERMATOCHALASIS OF EYELIDS OF BOTH EYES: ICD-10-CM

## 2017-11-20 DIAGNOSIS — H02.836 DERMATOCHALASIS OF EYELIDS OF BOTH EYES: ICD-10-CM

## 2017-11-20 DIAGNOSIS — H02.403 PTOSIS OF BOTH EYELIDS: ICD-10-CM

## 2017-11-20 PROCEDURE — 99024 POSTOP FOLLOW-UP VISIT: CPT | Performed by: STUDENT IN AN ORGANIZED HEALTH CARE EDUCATION/TRAINING PROGRAM

## 2017-11-20 ASSESSMENT — REFRACTION_MANIFEST
OD_CYLINDER: +1.25
OS_SPHERE: +1.50
OS_CYLINDER: +0.50
OS_AXIS: 151
OD_ADD: +3.00
OD_AXIS: 045
OS_ADD: +3.00
OD_SPHERE: +1.25

## 2017-11-20 ASSESSMENT — REFRACTION_WEARINGRX
OS_SPHERE: +1.25
OD_ADD: +3.00
OS_AXIS: 004
OD_CYLINDER: +0.75
OD_SPHERE: PLANO
OS_CYLINDER: +0.75
OS_ADD: +3.00
SPECS_TYPE: BIFOCAL
OD_AXIS: 030

## 2017-11-20 ASSESSMENT — TONOMETRY
IOP_METHOD: APPLANATION
OD_IOP_MMHG: 19
OS_IOP_MMHG: 19

## 2017-11-20 ASSESSMENT — CUP TO DISC RATIO
OD_RATIO: 0.1 UD
OS_RATIO: 0.1 UD

## 2017-11-20 ASSESSMENT — EXTERNAL EXAM - RIGHT EYE: OD_EXAM: NORMAL

## 2017-11-20 ASSESSMENT — VISUAL ACUITY
CORRECTION_TYPE: GLASSES
METHOD: SNELLEN - LINEAR
OS_CC+: -2
OD_CC+: +2
OD_CC: 20/40
OS_CC: 20/25

## 2017-11-20 ASSESSMENT — EXTERNAL EXAM - LEFT EYE: OS_EXAM: NORMAL

## 2017-11-20 NOTE — MR AVS SNAPSHOT
"              After Visit Summary   11/20/2017    Orquidea Stevens    MRN: [id number]           Patient Information     Date Of Birth          1940        Visit Information        Provider Department      11/20/2017 12:45 PM Lucie Gordillo MD [facility]        Today's Diagnoses     Pseudophakia with Yag caps ou     -  1    Dermatochalasis of eyelids of both eyes        Ptosis of both eyelids          Care Instructions    Glasses prescription given - optional     Lucie Gordillo MD  [phone]            Follow-ups after your visit        Follow-up notes from your care team     Return in about 1 year (around 11/20/2018) for Complete Exam.      Who to contact     If you have questions or need follow up information about today's clinic visit or your schedule please contact [facility] directly at [phone].  Normal or non-critical lab and imaging results will be communicated to you by MyChart, letter or phone within 4 business days after the clinic has received the results. If you do not hear from us within 7 days, please contact the clinic through MyChart or phone. If you have a critical or abnormal lab result, we will notify you by phone as soon as possible.  Submit refill requests through [employer] or call your pharmacy and they will forward the refill request to us. Please allow 3 business days for your refill to be completed.          Additional Information About Your Visit        MyChart Information     [employer] lets you send messages to your doctor, view your test results, renew your prescriptions, schedule appointments and more. To sign up, go to [email or web address] . Click on \"Log in\" on the left side of the screen, which will take you to the Welcome page. Then click on \"Sign up Now\" on the right side of the page.     You will be asked to enter the access code listed below, as well as some personal information. Please follow the directions to create your username " and password.     Your access code is: [email or web address]  Expires: 2018  1:09 PM     Your access code will  in 90 days. If you need help or a new code, please call your [place] clinic or [phone].        Care EveryWhere ID     This is your Care EveryWhere ID. This could be used by other organizations to access your [place] medical records  [id number]         Blood Pressure from Last 3 Encounters:   17 136/70   17 118/64   08/10/17 115/66    Weight from Last 3 Encounters:   17 61.2 kg (135 lb)   17 62.6 kg (138 lb)   08/10/17 62.6 kg (138 lb)              Today, you had the following     No orders found for display       Primary Care Provider Office Phone # Fax #    Lisette Amos [phone] [address] [facility] [address]        Equal Access to Services     [facility]: Hadii aad ku hadasho Soomaali, waaxda luqadaha, qaybta kaalmada adeegyada, waxay idiin hayfernandon estefania jacinto . So [facility] [phone].    ATENCIÓN: Si habla español, tiene a trotter disposición servicios gratuitos de asistencia lingüística. LlReg[facility] [phone].    We comply with applicable federal civil rights laws and [place] laws. We do not discriminate on the basis of race, color, national origin, age, disability, sex, sexual orientation, or gender identity.            Thank you!     Thank you for choosing [facility] [place]  for your care. Our goal is always to provide you with excellent care. Hearing back from our patients is one way we can continue to improve our services. Please take a few minutes to complete the written survey that you may receive in the mail after your visit with us. Thank you!             Your Updated Medication List - Protect others around you: Learn how to safely use, store and throw away your medicines at www.disposemymeds.org.          This list is accurate as of: 17  1:44 PM.  Always use your most recent med list.                    Brand Name Dispense Instructions for use Diagnosis    acidophilus Caps      Take 2 capsules by mouth daily.        amLODIPine 10 MG tablet    NORVASC    90 tablet    Take 1 tablet (10 mg) by mouth daily Will call when needs refills    Hypertension goal BP (blood pressure) < 140/90       aspirin 325 MG EC tablet     100 Tab    take 1 Tab by mouth daily.    Type II or unspecified type diabetes mellitus without mention of complication, not stated as uncontrolled       atorvastatin 80 MG tablet    LIPITOR    90 tablet    Take 1 tablet (80 mg) by mouth daily Will call when needs refill    Hyperlipidemia LDL goal <100, Hypertriglyceridemia       blood glucose monitoring meter device kit    no brand specified    1 kit    Use to test blood sugar 2 times daily or as directed.    Type 2 diabetes, HbA1C goal < 8% (H)       CALCIUM 500 PO      2 daily        CRANBERRY      Two tablets daily        Dexamethasone Acetate Powd     1 Bottle    1 Bottle 4 times daily 1 drop four times a day into the operative eye starting 1 day before surgery. Use until bottle runs out.    Combined form of age-related cataract, both eyes       enalapril 20 MG tablet    VASOTEC    180 tablet    Take 1 tablet (20 mg) by mouth 2 times daily Will call when needs refill    Hypertension goal BP (blood pressure) < 140/90       gabapentin 300 MG capsule    NEURONTIN    180 capsule    Take 1 capsule (300 mg) by mouth 2 times daily Will call when needs refill    Restless leg syndrome       hydrALAZINE 25 MG tablet    APRESOLINE    180 tablet    Take 1 tablet (25 mg) by mouth 2 times daily Will call when needs refill    Hypertension goal BP (blood pressure) < 140/90       hydrochlorothiazide 25 MG tablet    HYDRODIURIL    90 tablet    Take 1 tablet (25 mg) by mouth daily Will call when needs refill    Hypertension goal BP (blood pressure) < 140/90       Lutein 20 MG Caps      Take  by mouth.        metFORMIN 500 MG tablet    GLUCOPHAGE    180 tablet    Take 1  tablet (500 mg) by mouth 2 times daily (with meals) Will call when needs refill    Type 2 diabetes mellitus without complication, without long-term current use of insulin (H)       * ONETOUCH ULTRA test strip   Generic drug:  blood glucose monitoring     100 Strip    testing 4 times daily    Diabetes mellitus, type 2 (H)       * blood glucose monitoring test strip    no brand specified    3 Box    Use to test blood sugar 2 times daily or as directed.    Type 2 diabetes, HbA1C goal < 8% (H)       oxybutynin 5 MG tablet    DITROPAN    180 tablet    Take 2 tablets (10 mg) by mouth daily    Urinary incontinence, unspecified type       * PC LANCETS SUPER THIN 30G Misc     180 each    1 Device 2 times daily    Type 2 diabetes, HbA1C goal < 8% (H)       * blood glucose monitoring lancets     3 Box    Use to test blood sugar 2 times daily or as directed.    Type 2 diabetes, HbA1C goal < 8% (H)       TURMERIC PO      1 cap daily        Vitamin B-12 500 MCG Subl      2 daily        vitamin D 1000 UNITS capsule      1 CAPSULE DAILY        * Notice:  This list has 4 medication(s) that are the same as other medications prescribed for you. Read the directions carefully, and ask your doctor or other care provider to review them with you.

## 2017-11-20 NOTE — PROGRESS NOTES
Current Eye Medications: Thera tears BID both eyes     Subjective:  Here for s/p yag Caps right eye and left eye.  No pain.      Objective:  See Ophthalmology Exam.      Assessment:  Orquidea Stevens is a 77 year old female who presents with:     Pseudophakia with Yag caps ou  1 wk sp Yag Caps right eye, doing well.      Dermatochalasis of eyelids of both eyes      Ptosis of both eyelids        Plan:  Glasses prescription given - optional     Lucie Gordillo MD  [phone]

## 2018-02-12 ENCOUNTER — OFFICE VISIT (OUTPATIENT)
Dept: FAMILY MEDICINE | Facility: CLINIC | Age: 78
End: 2018-02-12
Payer: COMMERCIAL

## 2018-02-12 VITALS
DIASTOLIC BLOOD PRESSURE: 73 MMHG | HEIGHT: 63 IN | WEIGHT: 139 LBS | BODY MASS INDEX: 24.63 KG/M2 | HEART RATE: 68 BPM | TEMPERATURE: 98.3 F | SYSTOLIC BLOOD PRESSURE: 138 MMHG

## 2018-02-12 DIAGNOSIS — S46.811A TRAPEZIUS STRAIN, RIGHT, INITIAL ENCOUNTER: ICD-10-CM

## 2018-02-12 DIAGNOSIS — K58.0 IRRITABLE BOWEL SYNDROME WITH DIARRHEA: Primary | ICD-10-CM

## 2018-02-12 PROCEDURE — 99214 OFFICE O/P EST MOD 30 MIN: CPT | Performed by: FAMILY MEDICINE

## 2018-02-12 RX ORDER — HYOSCYAMINE SULFATE 0.125 MG
0.125-0.25 TABLET ORAL EVERY 4 HOURS PRN
Qty: 40 TABLET | Refills: 1 | Status: SHIPPED | OUTPATIENT
Start: 2018-02-12 | End: 2019-02-07

## 2018-02-12 NOTE — PROGRESS NOTES
"  SUBJECTIVE:   Orquidea Stevens is a 77 year old female who presents to clinic today for the following health issues:        Cramping of abdomen after eating and than has diarrhea, known IBS taking antidiarrheal pills with not much releif  She has had this before and was worked up and was negative. Please see note from 11/1/17  Discussed diet, suspect lactose intolerance. She will try to eliminate that from her diet and keep diary.   Will do trial of levsin as is probably an IBS component to it as well.    Headache from top of had around face and into neck x 1 month   Notes when she turns her head, she has pulling sensation in her right neck. She has tried some ice and has helped some  Notes it is more sore in the morning upon waking. Denies any trauma. No rashes          Problem list and histories reviewed & adjusted, as indicated.  Additional history: as documented    Labs reviewed in EPIC    Reviewed and updated as needed this visit by clinical staff  Tobacco  Allergies  Med Hx  Surg Hx  Fam Hx  Soc Hx      Reviewed and updated as needed this visit by Provider         ROS:  Constitutional, HEENT, cardiovascular, pulmonary, gi and gu systems are negative, except as otherwise noted.    OBJECTIVE:     /73  Pulse 68  Temp 98.3  F (36.8  C) (Oral)  Ht 5' 3.25\" (1.607 m)  Wt 139 lb (63 kg)  BMI 24.43 kg/m2  Body mass index is 24.43 kg/(m^2).  GENERAL: healthy, alert and no distress  NECK: limited rom due to pulling in right neck and tenderness to palpation over right trzpezius  RESP: lungs clear to auscultation - no rales, rhonchi or wheezes  CV: regular rate and rhythm, normal S1 S2, no S3 or S4, no murmur, click or rub, no peripheral edema and peripheral pulses strong  ABDOMEN: soft, nontender, no hepatosplenomegaly, no masses and bowel sounds normal    Diagnostic Test Results:  none     ASSESSMENT/PLAN:     1. Irritable bowel syndrome with diarrhea  As above  - hyoscyamine (ANASPAZ/LEVSIN) 0.125 MG " tablet; Take 1-2 tablets (125-250 mcg) by mouth every 4 hours as needed for cramping  Dispense: 40 tablet; Refill: 1    2. Trapezius strain, right, initial encounter  Stretching, heat, ice , rest, consider PT if not resolving on its own.           Lisette Agustin MD  [facility]

## 2018-02-12 NOTE — MR AVS SNAPSHOT
"              After Visit Summary   2018    Orquidea Stevens    MRN: [id number]           Patient Information     Date Of Birth          1940        Visit Information        Provider Department      2018 2:20 PM Lisette Amos MD [facility]        Today's Diagnoses     Irritable bowel syndrome with diarrhea    -  1    Trapezius strain, right, initial encounter           Follow-ups after your visit        Who to contact     If you have questions or need follow up information about today's clinic visit or your schedule please contact [facility] directly at [phone].  Normal or non-critical lab and imaging results will be communicated to you by MedSockethart, letter or phone within 4 business days after the clinic has received the results. If you do not hear from us within 7 days, please contact the clinic through MedSockethart or phone. If you have a critical or abnormal lab result, we will notify you by phone as soon as possible.  Submit refill requests through [employer] or call your pharmacy and they will forward the refill request to us. Please allow 3 business days for your refill to be completed.          Additional Information About Your Visit        MyChart Information     V[employer] lets you send messages to your doctor, view your test results, renew your prescriptions, schedule appointments and more. To sign up, go to [email or web address] . Click on \"Log in\" on the left side of the screen, which will take you to the Welcome page. Then click on \"Sign up Now\" on the right side of the page.     You will be asked to enter the access code listed below, as well as some personal information. Please follow the directions to create your username and password.     Your access code is: [email or web address]  Expires: 2018  3:11 PM     Your access code will  in 90 days. If you need help or a new code, please call your [facility] or [phone].        Care EveryWhere ID     This " "is your Care EveryWhere ID. This could be used by other organizations to access your [place] medical records  [id number]        Your Vitals Were     Pulse Temperature Height BMI (Body Mass Index)          68 98.3  F (36.8  C) (Oral) 5' 3.25\" (1.607 m) 24.43 kg/m2         Blood Pressure from Last 3 Encounters:   02/12/18 138/73   11/01/17 136/70   08/28/17 118/64    Weight from Last 3 Encounters:   02/12/18 139 lb (63 kg)   11/01/17 135 lb (61.2 kg)   08/28/17 138 lb (62.6 kg)              Today, you had the following     No orders found for display         Today's Medication Changes          These changes are accurate as of 2/12/18  3:11 PM.  If you have any questions, ask your nurse or doctor.               Start taking these medicines.        Dose/Directions    hyoscyamine 0.125 MG tablet   Commonly known as:  ANASPAZ/LEVSIN   Used for:  Irritable bowel syndrome with diarrhea   Started by:  Lisette Amos MD        Dose:  0.125-0.25 mg   Take 1-2 tablets (125-250 mcg) by mouth every 4 hours as needed for cramping   Quantity:  40 tablet   Refills:  1            Where to get your medicines      These medications were sent to [id number] - [place] - [address] [address]  [address] [place] [address]    Hours:  test fax successful 4/5/04  Phone:  [phone]     hyoscyamine 0.125 MG tablet                Primary Care Provider Office Phone # Fax #    Lisette Amos [phone] [address] [facility] [address]        Equal Access to Services     PRATEEK JACKMAN AH: Hadron Bryant, wawilmanda lujakobadaha, qaybta kaalmada tee, efrain victoria. So [facility] [phone].    ATENCIÓN: Si habla español, tiene a trotter disposición servicios gratuitos de asistencia lingüística. Llame al [phone].    We comply with applicable federal civil rights laws and [place] laws. We do not discriminate on the basis of race, color, national " origin, age, disability, sex, sexual orientation, or gender identity.            Thank you!     Thank you for choosing [facility]  for your care. Our goal is always to provide you with excellent care. Hearing back from our patients is one way we can continue to improve our services. Please take a few minutes to complete the written survey that you may receive in the mail after your visit with us. Thank you!             Your Updated Medication List - Protect others around you: Learn how to safely use, store and throw away your medicines at www.disposemymeds.org.          This list is accurate as of 2/12/18  3:11 PM.  Always use your most recent med list.                   Brand Name Dispense Instructions for use Diagnosis    acidophilus Caps      Take 2 capsules by mouth daily.        amLODIPine 10 MG tablet    NORVASC    90 tablet    Take 1 tablet (10 mg) by mouth daily Will call when needs refills    Hypertension goal BP (blood pressure) < 140/90       aspirin 325 MG EC tablet     100 Tab    take 1 Tab by mouth daily.    Type II or unspecified type diabetes mellitus without mention of complication, not stated as uncontrolled       atorvastatin 80 MG tablet    LIPITOR    90 tablet    Take 1 tablet (80 mg) by mouth daily Will call when needs refill    Hyperlipidemia LDL goal <100, Hypertriglyceridemia       blood glucose monitoring meter device kit    no brand specified    1 kit    Use to test blood sugar 2 times daily or as directed.    Type 2 diabetes, HbA1C goal < 8% (H)       CALCIUM 500 PO      2 daily        CRANBERRY      Two tablets daily        Dexamethasone Acetate Powd     1 Bottle    1 Bottle 4 times daily 1 drop four times a day into the operative eye starting 1 day before surgery. Use until bottle runs out.    Combined form of age-related cataract, both eyes       enalapril 20 MG tablet    VASOTEC    180 tablet    Take 1 tablet (20 mg) by mouth 2 times daily Will call when needs refill     Hypertension goal BP (blood pressure) < 140/90       gabapentin 300 MG capsule    NEURONTIN    180 capsule    Take 1 capsule (300 mg) by mouth 2 times daily Will call when needs refill    Restless leg syndrome       hydrALAZINE 25 MG tablet    APRESOLINE    180 tablet    Take 1 tablet (25 mg) by mouth 2 times daily Will call when needs refill    Hypertension goal BP (blood pressure) < 140/90       hydrochlorothiazide 25 MG tablet    HYDRODIURIL    90 tablet    Take 1 tablet (25 mg) by mouth daily Will call when needs refill    Hypertension goal BP (blood pressure) < 140/90       hyoscyamine 0.125 MG tablet    ANASPAZ/LEVSIN    40 tablet    Take 1-2 tablets (125-250 mcg) by mouth every 4 hours as needed for cramping    Irritable bowel syndrome with diarrhea       Lutein 20 MG Caps      Take  by mouth.        metFORMIN 500 MG tablet    GLUCOPHAGE    180 tablet    Take 1 tablet (500 mg) by mouth 2 times daily (with meals) Will call when needs refill    Type 2 diabetes mellitus without complication, without long-term current use of insulin (H)       * ONETOUCH ULTRA test strip   Generic drug:  blood glucose monitoring     100 Strip    testing 4 times daily    Diabetes mellitus, type 2 (H)       * blood glucose monitoring test strip    no brand specified    3 Box    Use to test blood sugar 2 times daily or as directed.    Type 2 diabetes, HbA1C goal < 8% (H)       oxybutynin 5 MG tablet    DITROPAN    180 tablet    Take 2 tablets (10 mg) by mouth daily    Urinary incontinence, unspecified type       * PC LANCETS SUPER THIN 30G Misc     180 each    1 Device 2 times daily    Type 2 diabetes, HbA1C goal < 8% (H)       * blood glucose monitoring lancets     3 Box    Use to test blood sugar 2 times daily or as directed.    Type 2 diabetes, HbA1C goal < 8% (H)       TURMERIC PO      1 cap daily        Vitamin B-12 500 MCG Subl      2 daily        vitamin D 1000 UNITS capsule      1 CAPSULE DAILY        * Notice:  This list has  4 medication(s) that are the same as other medications prescribed for you. Read the directions carefully, and ask your doctor or other care provider to review them with you.

## 2018-02-12 NOTE — NURSING NOTE
"Chief Complaint   Patient presents with     Abdominal Pain     Headache       Initial /73  Pulse 68  Temp 98.3  F (36.8  C) (Oral)  Ht 5' 3.25\" (1.607 m)  Wt 139 lb (63 kg)  BMI 24.43 kg/m2 Estimated body mass index is 24.43 kg/(m^2) as calculated from the following:    Height as of this encounter: 5' 3.25\" (1.607 m).    Weight as of this encounter: 139 lb (63 kg).  Medication Reconciliation: complete    "

## 2018-04-16 ENCOUNTER — TRANSFERRED RECORDS (OUTPATIENT)
Dept: HEALTH INFORMATION MANAGEMENT | Facility: CLINIC | Age: 78
End: 2018-04-16

## 2018-04-16 DIAGNOSIS — G25.81 RESTLESS LEG SYNDROME: ICD-10-CM

## 2018-04-16 RX ORDER — GABAPENTIN 300 MG/1
300 CAPSULE ORAL 2 TIMES DAILY
Qty: 60 CAPSULE | Refills: 1 | Status: SHIPPED | OUTPATIENT
Start: 2018-04-16 | End: 2018-04-19

## 2018-04-19 ENCOUNTER — TELEPHONE (OUTPATIENT)
Dept: FAMILY MEDICINE | Facility: CLINIC | Age: 78
End: 2018-04-19

## 2018-04-19 DIAGNOSIS — G25.81 RESTLESS LEG SYNDROME: ICD-10-CM

## 2018-04-19 NOTE — TELEPHONE ENCOUNTER
Patient returned call at 2:37 and left a voicemail.      Patient saw Dr. Darius Hernandez at the [facility] clinic in  last week. Phone: [phone]  She has 2 pending injections in to her neck. She was also advised to increase her current dose of gabapentin from 600 mg at night to 900 mg at night. They offered to write prescription, but patient thought that Dr. Lisette Amos would refill her prescription for 3 months.     We made her the 6 month MD appointment with fasting labs for early May.     Does PCP want patient to call [facility] for them to write the new prescription. Or will PCP continue prescribing?    Patient just picked up #60 tabs of gabapentin from Dr. Lisette Amos. She is now taking 3 capsules daily. This is a #20 day supply.    To provider to advise. Sunni Almaguer, SEVERINON RN

## 2018-04-19 NOTE — TELEPHONE ENCOUNTER
Patient due for DM cholesterol and blood pressure appointment in May for 6 month OV and fasting labs  Left message for patient to call back on their VM. Writers direct line given, Sheila [phone]. Sunni Almaguer RN

## 2018-04-19 NOTE — TELEPHONE ENCOUNTER
Patient is calling to discuss RX: gabapentin (NEURONTIN) 300 MG capsule, stating would like to know why provider only filled it for 1 month. Please call to discuss. Thank  You.

## 2018-04-20 ENCOUNTER — DOCUMENTATION ONLY (OUTPATIENT)
Dept: LAB | Facility: CLINIC | Age: 78
End: 2018-04-20

## 2018-04-20 DIAGNOSIS — E11.9 TYPE 2 DIABETES MELLITUS WITHOUT COMPLICATION, WITHOUT LONG-TERM CURRENT USE OF INSULIN (H): ICD-10-CM

## 2018-04-20 DIAGNOSIS — E78.5 HYPERLIPIDEMIA LDL GOAL <100: ICD-10-CM

## 2018-04-20 DIAGNOSIS — I10 ESSENTIAL HYPERTENSION: Primary | ICD-10-CM

## 2018-04-20 RX ORDER — GABAPENTIN 300 MG/1
900 CAPSULE ORAL 2 TIMES DAILY
Qty: 270 CAPSULE | Refills: 0 | Status: SHIPPED | OUTPATIENT
Start: 2018-04-20 | End: 2018-05-07

## 2018-04-20 NOTE — TELEPHONE ENCOUNTER
Called patient, informed pt of providers note as written below, pt verbalized good understanding.    SEVERINO OvalleN RN

## 2018-04-20 NOTE — PROGRESS NOTES
Patient has an upcoming previsit appointment on 05/03/2018. Please review pended orders and add additional orders if needed.     Thank you,   Estefania Avina

## 2018-04-20 NOTE — TELEPHONE ENCOUNTER
It was refilled for 30 days as she is due to be seen  It has now been filled for 90 days at 900 mg per day  She needs to keep her appt in May.    Lisette Agustin

## 2018-04-23 NOTE — PROGRESS NOTES
Please review lab orders sign and close encounter. Jessica Liriano MA/NGUYEN    BP, chol, diabetes appt 5/8/18

## 2018-05-01 ENCOUNTER — TRANSFERRED RECORDS (OUTPATIENT)
Dept: HEALTH INFORMATION MANAGEMENT | Facility: CLINIC | Age: 78
End: 2018-05-01

## 2018-05-03 DIAGNOSIS — E78.5 HYPERLIPIDEMIA LDL GOAL <100: ICD-10-CM

## 2018-05-03 DIAGNOSIS — E11.9 TYPE 2 DIABETES MELLITUS WITHOUT COMPLICATION, WITHOUT LONG-TERM CURRENT USE OF INSULIN (H): ICD-10-CM

## 2018-05-03 DIAGNOSIS — I10 ESSENTIAL HYPERTENSION: ICD-10-CM

## 2018-05-03 LAB
ANION GAP SERPL CALCULATED.3IONS-SCNC: 6 MMOL/L (ref 3–14)
BUN SERPL-MCNC: 13 MG/DL (ref 7–30)
CALCIUM SERPL-MCNC: 9 MG/DL (ref 8.5–10.1)
CHLORIDE SERPL-SCNC: 105 MMOL/L (ref 94–109)
CHOLEST SERPL-MCNC: 133 MG/DL
CO2 SERPL-SCNC: 31 MMOL/L (ref 20–32)
CREAT SERPL-MCNC: 0.56 MG/DL (ref 0.52–1.04)
CREAT UR-MCNC: 88 MG/DL
GFR SERPL CREATININE-BSD FRML MDRD: >90 ML/MIN/1.7M2
GLUCOSE SERPL-MCNC: 137 MG/DL (ref 70–99)
HBA1C MFR BLD: 6.8 % (ref 0–5.6)
HDLC SERPL-MCNC: 58 MG/DL
LDLC SERPL CALC-MCNC: 44 MG/DL
MICROALBUMIN UR-MCNC: 5 MG/L
MICROALBUMIN/CREAT UR: 5.98 MG/G CR (ref 0–25)
NONHDLC SERPL-MCNC: 75 MG/DL
POTASSIUM SERPL-SCNC: 3.8 MMOL/L (ref 3.4–5.3)
SODIUM SERPL-SCNC: 142 MMOL/L (ref 133–144)
TRIGL SERPL-MCNC: 153 MG/DL
TSH SERPL DL<=0.005 MIU/L-ACNC: 1.88 MU/L (ref 0.4–4)

## 2018-05-03 PROCEDURE — 80061 LIPID PANEL: CPT | Performed by: FAMILY MEDICINE

## 2018-05-03 PROCEDURE — 83036 HEMOGLOBIN GLYCOSYLATED A1C: CPT | Performed by: FAMILY MEDICINE

## 2018-05-03 PROCEDURE — 80048 BASIC METABOLIC PNL TOTAL CA: CPT | Performed by: FAMILY MEDICINE

## 2018-05-03 PROCEDURE — 84443 ASSAY THYROID STIM HORMONE: CPT | Performed by: FAMILY MEDICINE

## 2018-05-03 PROCEDURE — 36415 COLL VENOUS BLD VENIPUNCTURE: CPT | Performed by: FAMILY MEDICINE

## 2018-05-03 PROCEDURE — 82043 UR ALBUMIN QUANTITATIVE: CPT | Performed by: FAMILY MEDICINE

## 2018-05-04 NOTE — PROGRESS NOTES
I have reviewed the schedule of future appointments and this patient has an appointment scheduled for 5-8-2018.    Visit date not found

## 2018-05-08 ENCOUNTER — OFFICE VISIT (OUTPATIENT)
Dept: FAMILY MEDICINE | Facility: CLINIC | Age: 78
End: 2018-05-08
Payer: COMMERCIAL

## 2018-05-08 VITALS
TEMPERATURE: 98.5 F | BODY MASS INDEX: 24.63 KG/M2 | HEIGHT: 63 IN | SYSTOLIC BLOOD PRESSURE: 168 MMHG | WEIGHT: 139 LBS | DIASTOLIC BLOOD PRESSURE: 70 MMHG | HEART RATE: 66 BPM

## 2018-05-08 DIAGNOSIS — E78.1 HYPERTRIGLYCERIDEMIA: ICD-10-CM

## 2018-05-08 DIAGNOSIS — E78.5 HYPERLIPIDEMIA LDL GOAL <100: ICD-10-CM

## 2018-05-08 DIAGNOSIS — I10 HYPERTENSION GOAL BP (BLOOD PRESSURE) < 140/90: ICD-10-CM

## 2018-05-08 DIAGNOSIS — E11.9 TYPE 2 DIABETES MELLITUS WITHOUT COMPLICATION, WITHOUT LONG-TERM CURRENT USE OF INSULIN (H): Primary | ICD-10-CM

## 2018-05-08 DIAGNOSIS — G25.81 RESTLESS LEG SYNDROME: ICD-10-CM

## 2018-05-08 PROCEDURE — 99207 C FOOT EXAM  NO CHARGE: CPT | Performed by: FAMILY MEDICINE

## 2018-05-08 PROCEDURE — 99214 OFFICE O/P EST MOD 30 MIN: CPT | Performed by: FAMILY MEDICINE

## 2018-05-08 RX ORDER — HYDROCHLOROTHIAZIDE 25 MG/1
25 TABLET ORAL DAILY
Qty: 90 TABLET | Refills: 1 | Status: SHIPPED | OUTPATIENT
Start: 2018-05-08 | End: 2019-01-07

## 2018-05-08 RX ORDER — ATORVASTATIN CALCIUM 80 MG/1
80 TABLET, FILM COATED ORAL DAILY
Qty: 90 TABLET | Refills: 3 | Status: SHIPPED | OUTPATIENT
Start: 2018-05-08 | End: 2019-07-15

## 2018-05-08 RX ORDER — AMLODIPINE BESYLATE 10 MG/1
10 TABLET ORAL DAILY
Qty: 90 TABLET | Refills: 3 | Status: SHIPPED | OUTPATIENT
Start: 2018-05-08 | End: 2019-08-16

## 2018-05-08 RX ORDER — ENALAPRIL MALEATE 20 MG/1
20 TABLET ORAL 2 TIMES DAILY
Qty: 180 TABLET | Refills: 1 | Status: SHIPPED | OUTPATIENT
Start: 2018-05-08 | End: 2019-01-07

## 2018-05-08 RX ORDER — GABAPENTIN 300 MG/1
900 CAPSULE ORAL 2 TIMES DAILY
Qty: 270 CAPSULE | Refills: 0 | Status: SHIPPED | OUTPATIENT
Start: 2018-05-08 | End: 2018-11-10

## 2018-05-08 RX ORDER — HYDRALAZINE HYDROCHLORIDE 25 MG/1
25 TABLET, FILM COATED ORAL 2 TIMES DAILY
Qty: 180 TABLET | Refills: 1 | Status: SHIPPED | OUTPATIENT
Start: 2018-05-08 | End: 2019-01-07

## 2018-05-08 NOTE — MR AVS SNAPSHOT
"              After Visit Summary   5/8/2018    Orquidea Stevens    MRN: [id number]           Patient Information     Date Of Birth          1940        Visit Information        Provider Department      5/8/2018 11:20 AM Lisette Amos MD [facility]        Today's Diagnoses     Type 2 diabetes mellitus without complication, without long-term current use of insulin (H)    -  1    Hypertension goal BP (blood pressure) < 140/90        Hyperlipidemia LDL goal <100        Hypertriglyceridemia        Restless leg syndrome           Follow-ups after your visit        Who to contact     If you have questions or need follow up information about today's clinic visit or your schedule please contact [facility] directly at [phone].  Normal or non-critical lab and imaging results will be communicated to you by MyChart, letter or phone within 4 business days after the clinic has received the results. If you do not hear from us within 7 days, please contact the clinic through [employer] or phone. If you have a critical or abnormal lab result, we will notify you by phone as soon as possible.  Submit refill requests through [employer] or call your pharmacy and they will forward the refill request to us. Please allow 3 business days for your refill to be completed.          Additional Information About Your Visit        MyChart Information     [employer] lets you send messages to your doctor, view your test results, renew your prescriptions, schedule appointments and more. To sign up, go to [email or web address] . Click on \"Log in\" on the left side of the screen, which will take you to the Welcome page. Then click on \"Sign up Now\" on the right side of the page.     You will be asked to enter the access code listed below, as well as some personal information. Please follow the directions to create your username and password.     Your access code is: [email or web address]  Expires: 5/13/2018  4:11 PM     Your " "access code will  in 90 days. If you need help or a new code, please call your [place] clinic or [phone].        Care EveryWhere ID     This is your Care EveryWhere ID. This could be used by other organizations to access your [place] medical records  [id number]        Your Vitals Were     Pulse Temperature Height BMI (Body Mass Index)          66 98.5  F (36.9  C) (Oral) 5' 3.25\" (1.607 m) 24.43 kg/m2         Blood Pressure from Last 3 Encounters:   18 168/70   18 138/73   17 136/70    Weight from Last 3 Encounters:   18 139 lb (63 kg)   18 139 lb (63 kg)   17 135 lb (61.2 kg)              We Performed the Following     FOOT EXAM          Where to get your medicines      These medications were sent to [facility] - [place], [place] - [address] [facility]  [address] [address] [address]    Hours:  test fax successful 04 kr Phone:  [phone]     amLODIPine 10 MG tablet    atorvastatin 80 MG tablet    enalapril 20 MG tablet    gabapentin 300 MG capsule    hydrALAZINE 25 MG tablet    hydrochlorothiazide 25 MG tablet    metFORMIN 500 MG tablet          Primary Care Provider Office Phone # Fax #    Lisette Amos [phone] [address] [facility] [address]        Equal Access to Services     PRATEEK JACKMAN AH: Hadii shirley ku hadasho Soomaali, waaxda luqadaha, qaybta kaalmada adeegyada, efrain victoria. So [place] [phone].    ATENCIÓN: Si habla español, tiene a trotter disposición servicios gratuitos de asistencia lingüística. Nicholas al [phone].    We comply with applicable federal civil rights laws and [place] laws. We do not discriminate on the basis of race, color, national origin, age, disability, sex, sexual orientation, or gender identity.            Thank you!     Thank you for choosing [facility]  for your care. Our goal is always to provide you with excellent care. " Hearing back from our patients is one way we can continue to improve our services. Please take a few minutes to complete the written survey that you may receive in the mail after your visit with us. Thank you!             Your Updated Medication List - Protect others around you: Learn how to safely use, store and throw away your medicines at www.disposemymeds.org.          This list is accurate as of 5/8/18  1:07 PM.  Always use your most recent med list.                   Brand Name Dispense Instructions for use Diagnosis    acidophilus Caps      Take 2 capsules by mouth daily.        amLODIPine 10 MG tablet    NORVASC    90 tablet    Take 1 tablet (10 mg) by mouth daily Will call when needs refills    Hypertension goal BP (blood pressure) < 140/90       aspirin 325 MG EC tablet     100 Tab    take 1 Tab by mouth daily.    Type II or unspecified type diabetes mellitus without mention of complication, not stated as uncontrolled       atorvastatin 80 MG tablet    LIPITOR    90 tablet    Take 1 tablet (80 mg) by mouth daily Will call when needs refill    Hyperlipidemia LDL goal <100, Hypertriglyceridemia       blood glucose monitoring meter device kit    no brand specified    1 kit    Use to test blood sugar 2 times daily or as directed.    Type 2 diabetes, HbA1C goal < 8% (H)       CALCIUM 500 PO      2 daily        CRANBERRY      Two tablets daily        Dexamethasone Acetate Powd     1 Bottle    1 Bottle 4 times daily 1 drop four times a day into the operative eye starting 1 day before surgery. Use until bottle runs out.    Combined form of age-related cataract, both eyes       enalapril 20 MG tablet    VASOTEC    180 tablet    Take 1 tablet (20 mg) by mouth 2 times daily Will call when needs refill    Hypertension goal BP (blood pressure) < 140/90       gabapentin 300 MG capsule    NEURONTIN    270 capsule    Take 3 capsules (900 mg) by mouth 2 times daily Needs to be seen for further refills.    Restless leg  syndrome       hydrALAZINE 25 MG tablet    APRESOLINE    180 tablet    Take 1 tablet (25 mg) by mouth 2 times daily Will call when needs refill    Hypertension goal BP (blood pressure) < 140/90       hydrochlorothiazide 25 MG tablet    HYDRODIURIL    90 tablet    Take 1 tablet (25 mg) by mouth daily Will call when needs refill    Hypertension goal BP (blood pressure) < 140/90       hyoscyamine 0.125 MG tablet    ANASPAZ/LEVSIN    40 tablet    Take 1-2 tablets (125-250 mcg) by mouth every 4 hours as needed for cramping    Irritable bowel syndrome with diarrhea       Lutein 20 MG Caps      Take  by mouth.        metFORMIN 500 MG tablet    GLUCOPHAGE    180 tablet    Take 1 tablet (500 mg) by mouth 2 times daily (with meals) Will call when needs refill    Type 2 diabetes mellitus without complication, without long-term current use of insulin (H)       * ONETOUCH ULTRA test strip   Generic drug:  blood glucose monitoring     100 Strip    testing 4 times daily    Diabetes mellitus, type 2 (H)       * blood glucose monitoring test strip    no brand specified    3 Box    Use to test blood sugar 2 times daily or as directed.    Type 2 diabetes, HbA1C goal < 8% (H)       oxybutynin 5 MG tablet    DITROPAN    180 tablet    Take 2 tablets (10 mg) by mouth daily    Urinary incontinence, unspecified type       * PC LANCETS SUPER THIN 30G Misc     180 each    1 Device 2 times daily    Type 2 diabetes, HbA1C goal < 8% (H)       * blood glucose monitoring lancets     3 Box    Use to test blood sugar 2 times daily or as directed.    Type 2 diabetes, HbA1C goal < 8% (H)       TURMERIC PO      1 cap daily        Vitamin B-12 500 MCG Subl      2 daily        vitamin D 1000 units capsule      1 CAPSULE DAILY        * Notice:  This list has 4 medication(s) that are the same as other medications prescribed for you. Read the directions carefully, and ask your doctor or other care provider to review them with you.

## 2018-05-08 NOTE — PROGRESS NOTES
"  SUBJECTIVE:   Orquidea Stevens is a 77 year old female who presents to clinic today for the following health issues:        Diabetes Follow-up      Patient is checking blood sugars: not at all    Diabetic concerns: None     Symptoms of hypoglycemia (low blood sugar): none     Paresthesias (numbness or burning in feet) or sores: No     Date of last diabetic eye exam: June 2017    Hyperlipidemia Follow-Up      Rate your low fat/cholesterol diet?: fair    Taking statin?  Yes, no muscle aches from statin    Other lipid medications/supplements?:  none    Hypertension Follow-up      Outpatient blood pressures are being checked at home.  Results are 140/70.    Low Salt Diet: not monitoring salt        BP Readings from Last 2 Encounters:   02/12/18 138/73   11/01/17 136/70     Hemoglobin A1C (%)   Date Value   05/03/2018 6.8 (H)   11/01/2017 6.6 (H)     LDL Cholesterol Calculated (mg/dL)   Date Value   05/03/2018 44   05/05/2017 51       Amount of exercise or physical activity: every day     Problems taking medications regularly: No    Medication side effects: none    Diet: regular (no restrictions)    Pt notes neck and head pain.   Put heat on it  Saw chiropractor   Had MRI of neck, was told MRI showed mild arthritis.   Had medial block and did not help with pain  She is going to fu with them    In regards to diabetes, has been well controlled  Is on asa, statin and ace  No concerns regarding diabetes per pt        Problem list and histories reviewed & adjusted, as indicated.  Additional history: as documented    Labs reviewed in EPIC    Reviewed and updated as needed this visit by clinical staff       Reviewed and updated as needed this visit by Provider         ROS:  Constitutional, HEENT, cardiovascular, pulmonary, gi and gu systems are negative, except as otherwise noted.    OBJECTIVE:     /70  Pulse 66  Temp 98.5  F (36.9  C) (Oral)  Ht 5' 3.25\" (1.607 m)  Wt 139 lb (63 kg)  BMI 24.43 kg/m2  Body mass " index is 24.43 kg/(m^2).  GENERAL: healthy, alert and no distress  NECK: no adenopathy, no asymmetry, masses, or scars and thyroid normal to palpation  RESP: lungs clear to auscultation - no rales, rhonchi or wheezes  CV: regular rate and rhythm, normal S1 S2, no S3 or S4, no murmur, click or rub, no peripheral edema and peripheral pulses strong  ABDOMEN: soft, nontender, no hepatosplenomegaly, no masses and bowel sounds normal  MS: no gross musculoskeletal defects noted, no edema    Diagnostic Test Results:  none     ASSESSMENT/PLAN:     1. Type 2 diabetes mellitus without complication, without long-term current use of insulin (H)  At goal on meds, fu in 6 months  - metFORMIN (GLUCOPHAGE) 500 MG tablet; Take 1 tablet (500 mg) by mouth 2 times daily (with meals) Will call when needs refill  Dispense: 180 tablet; Refill: 1  - FOOT EXAM    2. Hypertension goal BP (blood pressure) < 140/90  Elevated today and she left before it could be rechecked. Have her stop by pharmacy at her convenience  - amLODIPine (NORVASC) 10 MG tablet; Take 1 tablet (10 mg) by mouth daily Will call when needs refills  Dispense: 90 tablet; Refill: 3  - enalapril (VASOTEC) 20 MG tablet; Take 1 tablet (20 mg) by mouth 2 times daily Will call when needs refill  Dispense: 180 tablet; Refill: 1  - hydrALAZINE (APRESOLINE) 25 MG tablet; Take 1 tablet (25 mg) by mouth 2 times daily Will call when needs refill  Dispense: 180 tablet; Refill: 1  - hydrochlorothiazide (HYDRODIURIL) 25 MG tablet; Take 1 tablet (25 mg) by mouth daily Will call when needs refill  Dispense: 90 tablet; Refill: 1    3. Hyperlipidemia LDL goal <100  On statin  - atorvastatin (LIPITOR) 80 MG tablet; Take 1 tablet (80 mg) by mouth daily Will call when needs refill  Dispense: 90 tablet; Refill: 3    4. Hypertriglyceridemia  On statin  - atorvastatin (LIPITOR) 80 MG tablet; Take 1 tablet (80 mg) by mouth daily Will call when needs refill  Dispense: 90 tablet; Refill: 3    5.  Restless leg syndrome  Refill as needed  - gabapentin (NEURONTIN) 300 MG capsule; Take 3 capsules (900 mg) by mouth 2 times daily Needs to be seen for further refills.  Dispense: 270 capsule; Refill: 0        Lisette Agustin MD  [facility]

## 2018-06-04 ENCOUNTER — NURSE TRIAGE (OUTPATIENT)
Dept: NURSING | Facility: CLINIC | Age: 78
End: 2018-06-04

## 2018-06-05 NOTE — TELEPHONE ENCOUNTER
"Caller reports she received a steroid epidural injection in her cervical spine this morning @  \"[facility]\" ; was told to check her bG and to  \"go in\" if it was  > 300;  Current  BG is 288;  Is  DM II  And is only on Metformin. Has no symptoms of elevated bG.   On call  PCP for  [facility] paged via  @ [phone]    Received instruction from  Dr. Latham; patient  contacted \"drink lots  of water to dilute bG and to call back if bG is > 350 ; page him then   advised to call PCP triage nurse in morning for further  advisement regarding bG's  While she has received steroid.      Understands and will comply   Garima Trevizo RN  FNA            Additional Information    Negative: Unconscious or difficult to awaken    Negative: Acting confused (e.g., disoriented, slurred speech)    Negative: Very weak (e.g., can't stand)    Negative: Sounds like a life-threatening emergency to the triager    Negative: [1] Vomiting AND [2] signs of dehydration (e.g., very dry mouth, lightheaded, etc.)    Negative: [1] Blood glucose > 240 mg/dl (13 mmol/l) AND [2] rapid breathing    Negative: Blood glucose > 500 mg/dl (27.5 mmol/l)    Negative: [1] Blood glucose > 240 mg/dl (13 mmol/l) AND [2] urine ketones moderate-large (or more than 1+)    Negative: [1] Blood glucose > 240 mg/dl (13 mmol/l) AND [2] blood ketones > 1.5 mmol/l    Negative: [1] Blood glucose > 240 mg/dl (13 mmol/l) AND [2] vomiting AND [3] unable to check for ketones (in blood or urine)    Negative: [1] New onset Diabetes suspected (e.g., frequent urination, weak, weight loss) AND [2] vomiting or rapid breathing    Negative: Vomiting lasts > 4 hours    Negative: Patient sounds very sick or weak to the triager    Negative: Fever > 100.5 F (38.1 C)    Negative: Blood glucose > 400 mg/dl (22 mmol/l)    Negative: [1] Blood glucose > 300 mg/dl (16.5 mmol/l) AND [2] two or more times in a row    Negative: Urine ketones moderate - large    Negative: Caller has URGENT " medication or insulin pump question and triager unable to answer question    [1] Blood glucose > 240  mg/dl (13 mmol/l) AND [2] does not  use insulin (e.g., not insulin-dependent; most type 2 diabetics)   (all triage questions negative)    Protocols used: DIABETES - HIGH BLOOD SUGAR-ADULT-

## 2018-07-03 ENCOUNTER — OFFICE VISIT (OUTPATIENT)
Dept: OPHTHALMOLOGY | Facility: CLINIC | Age: 78
End: 2018-07-03
Payer: COMMERCIAL

## 2018-07-03 DIAGNOSIS — H02.403 PTOSIS OF BOTH EYELIDS: ICD-10-CM

## 2018-07-03 DIAGNOSIS — H02.836 DERMATOCHALASIS OF EYELIDS OF BOTH EYES: ICD-10-CM

## 2018-07-03 DIAGNOSIS — Z01.01 ENCOUNTER FOR EXAMINATION OF EYES AND VISION WITH ABNORMAL FINDINGS: Primary | ICD-10-CM

## 2018-07-03 DIAGNOSIS — H52.4 PRESBYOPIA: ICD-10-CM

## 2018-07-03 DIAGNOSIS — D31.32 CHOROIDAL NEVUS OF BOTH EYES: ICD-10-CM

## 2018-07-03 DIAGNOSIS — H02.833 DERMATOCHALASIS OF EYELIDS OF BOTH EYES: ICD-10-CM

## 2018-07-03 DIAGNOSIS — D31.31 CHOROIDAL NEVUS OF BOTH EYES: ICD-10-CM

## 2018-07-03 DIAGNOSIS — Z96.1 PSEUDOPHAKIA OF BOTH EYES: ICD-10-CM

## 2018-07-03 DIAGNOSIS — E11.9 TYPE 2 DIABETES MELLITUS WITHOUT COMPLICATION, WITHOUT LONG-TERM CURRENT USE OF INSULIN (H): ICD-10-CM

## 2018-07-03 PROCEDURE — 92134 CPTRZ OPH DX IMG PST SGM RTA: CPT | Performed by: STUDENT IN AN ORGANIZED HEALTH CARE EDUCATION/TRAINING PROGRAM

## 2018-07-03 PROCEDURE — 92015 DETERMINE REFRACTIVE STATE: CPT | Performed by: STUDENT IN AN ORGANIZED HEALTH CARE EDUCATION/TRAINING PROGRAM

## 2018-07-03 PROCEDURE — 92014 COMPRE OPH EXAM EST PT 1/>: CPT | Performed by: STUDENT IN AN ORGANIZED HEALTH CARE EDUCATION/TRAINING PROGRAM

## 2018-07-03 ASSESSMENT — CUP TO DISC RATIO
OS_RATIO: 0.1
OD_RATIO: 0.1

## 2018-07-03 ASSESSMENT — REFRACTION_WEARINGRX
OS_CYLINDER: +0.75
OS_AXIS: 004
SPECS_TYPE: BIFOCAL
OD_AXIS: 045
OD_ADD: +3.00
OS_ADD: +3.00
OS_SPHERE: +1.25
OD_SPHERE: +1.25
OD_CYLINDER: +1.50

## 2018-07-03 ASSESSMENT — TONOMETRY
OD_IOP_MMHG: 18
IOP_METHOD: APPLANATION
OS_IOP_MMHG: 17

## 2018-07-03 ASSESSMENT — VISUAL ACUITY
OS_CC+: -1
OD_CC: 20/30
METHOD: SNELLEN - LINEAR
OD_CC+: +2
OS_CC: 20/30
CORRECTION_TYPE: GLASSES

## 2018-07-03 ASSESSMENT — REFRACTION_MANIFEST
OD_SPHERE: +1.25
OS_AXIS: 175
OD_AXIS: 058
OD_CYLINDER: +1.25
OS_CYLINDER: +1.25
OD_ADD: +3.00
OS_SPHERE: +1.25
OS_ADD: +3.00

## 2018-07-03 ASSESSMENT — EXTERNAL EXAM - LEFT EYE: OS_EXAM: NORMAL

## 2018-07-03 ASSESSMENT — EXTERNAL EXAM - RIGHT EYE: OD_EXAM: NORMAL

## 2018-07-03 ASSESSMENT — CONF VISUAL FIELD
OD_NORMAL: 1
OS_NORMAL: 1

## 2018-07-03 NOTE — LETTER
7/3/2018         RE: Orquidea Stevens  [address] [place] [facility] [address]        Dear Colleague,    Thank you for referring your patient, Orquidea Stevens, to the [facility].     No signs of diabetic retinopathy in either eye today.  Please see a copy of my visit note below.     Current Eye Medications:  thera tears both eyes once a day     Subjective:  Here for complete today.  (Said she recently saw you at [facility]) Was not due until November for complete, but having issues with the right eye. Can't see the signs until she is up on them. DM, last a1c was 6.8 on 5-3-18.  Doctor told her she is doing well with her sugars.    Lab Results   Component Value Date    A1C 6.8 05/03/2018    A1C 6.6 11/01/2017    A1C 6.7 05/05/2017    A1C 6.3 10/31/2016    A1C 6.2 03/16/2016      Objective:  See Ophthalmology Exam.      Assessment:  Orquidea Stevens is a 77 year old female who presents with:       Type 2 diabetes mellitus without complication, without long-term current use of insulin (H) Negative diabetic retinopathy   Macular OCT: no fluid, normal retinal architecture both eyes      Pseudophakia with Yag caps ou       Ptosis of both eyelids      Dermatochalasis of eyelids of both eyes          Plan:  Glasses prescription given - optional.    Lucie Gordillo MD  [phone]        Again, thank you for allowing me to participate in the care of your patient.        Sincerely,        Lucie Gordillo MD

## 2018-07-03 NOTE — PROGRESS NOTES
Current Eye Medications:  thera tears both eyes once a day     Subjective:  Here for complete today.  (Said she recently saw you at [facility]) Was not due until November for complete, but having issues with the right eye. Can't see the signs until she is up on them. DM, last a1c was 6.8 on 5-3-18.  Doctor told her she is doing well with her sugars.    Lab Results   Component Value Date    A1C 6.8 05/03/2018    A1C 6.6 11/01/2017    A1C 6.7 05/05/2017    A1C 6.3 10/31/2016    A1C 6.2 03/16/2016      Objective:  See Ophthalmology Exam.      Assessment:  Orquidea Stevens is a 77 year old female who presents with:       Type 2 diabetes mellitus without complication, without long-term current use of insulin (H) Negative diabetic retinopathy   Macular OCT: no fluid, normal retinal architecture both eyes      Pseudophakia with Yag caps ou       Ptosis of both eyelids      Dermatochalasis of eyelids of both eyes          Plan:  Glasses prescription given - optional.    Lucie Gordillo MD  [phone]

## 2018-07-03 NOTE — MR AVS SNAPSHOT
After Visit Summary   7/3/2018    Orquidea Stevens    MRN: [id number]           Patient Information     Date Of Birth          1940        Visit Information        Provider Department      7/3/2018 9:00 AM Lucie Gordillo MD [facility] [place]        Today's Diagnoses     Encounter for examination of eyes and vision with abnormal findings    -  1    Presbyopia        Ptosis of both eyelids          Care Instructions    Glasses prescription given - optional.  Try gel tears (Refresh Celluvisc or GenTeal, or generic gel tears ok).    Lucie Gordillo MD  [phone]    Diabetes weakens the blood vessels all over the body, including the eyes. Damage to the blood vessels in the eyes can cause swelling or bleeding into part of the eye (called the retina). This is called diabetic retinopathy ([facility]-tin[facility]-thee). If not treated, this disease can cause vision loss or blindness.   Symptoms may include blurred or distorted vision, but many people have no symptoms. It's important to see your eye doctor regularly to check for problems.   Early treatment and good control can help protect your vision. Here are the things you can do to help prevent vision loss:      1. Keep your blood sugar levels under tight control.      2. Bring high blood pressure under control.      3. No smoking.      4. Have yearly dilated eye exams.            Follow-ups after your visit        Follow-up notes from your care team     Return in about 1 year (around 7/3/2019) for Complete Exam.      Who to contact     If you have questions or need follow up information about today's clinic visit or your schedule please contact [facility] directly at [phone].  Normal or non-critical lab and imaging results will be communicated to you by MyChart, letter or phone within 4 business days after the clinic has received the results. If you do not hear from us within 7 days, please contact the clinic through  Cobasehart or phone. If you have a critical or abnormal lab result, we will notify you by phone as soon as possible.  Submit refill requests through IntelliMatt or call your pharmacy and they will forward the refill request to us. Please allow 3 business days for your refill to be completed.          Additional Information About Your Visit        Care EveryWhere ID     This is your Care EveryWhere ID. This could be used by other organizations to access your [place] medical records  [id number]         Blood Pressure from Last 3 Encounters:   05/08/18 168/70   02/12/18 138/73   11/01/17 136/70    Weight from Last 3 Encounters:   05/08/18 63 kg (139 lb)   02/12/18 63 kg (139 lb)   11/01/17 61.2 kg (135 lb)              Today, you had the following     No orders found for display       Primary Care Provider Office Phone # Fax #    Lisette Amos [phone] [address] [address] [address]        Equal Access to Services     [facility]K [facility] : Hadii aad ku hadasho Soomaali, waaxda luqadaha, qaybta kaalmada adeegyada, waxay idiin hayaan estefania jacinto . So [facility] [phone].    ATENCIÓN: Si habla español, tiene a trotter disposición servicios gratuitos de asistencia lingüística. [facility] [phone].    We comply with applicable federal civil rights laws and [place] laws. We do not discriminate on the basis of race, color, national origin, age, disability, sex, sexual orientation, or gender identity.            Thank you!     Thank you for choosing [facility] [place]  for your care. Our goal is always to provide you with excellent care. Hearing back from our patients is one way we can continue to improve our services. Please take a few minutes to complete the written survey that you may receive in the mail after your visit with us. Thank you!             Your Updated Medication List - Protect others around you: Learn how to safely use, store and throw away your medicines at  www.disposemymeds.org.          This list is accurate as of 7/3/18 10:53 AM.  Always use your most recent med list.                   Brand Name Dispense Instructions for use Diagnosis    acidophilus Caps      Take 2 capsules by mouth daily.        amLODIPine 10 MG tablet    NORVASC    90 tablet    Take 1 tablet (10 mg) by mouth daily Will call when needs refills    Hypertension goal BP (blood pressure) < 140/90       aspirin 325 MG EC tablet     100 Tab    take 1 Tab by mouth daily.    Type II or unspecified type diabetes mellitus without mention of complication, not stated as uncontrolled       atorvastatin 80 MG tablet    LIPITOR    90 tablet    Take 1 tablet (80 mg) by mouth daily Will call when needs refill    Hyperlipidemia LDL goal <100, Hypertriglyceridemia       blood glucose monitoring meter device kit    no brand specified    1 kit    Use to test blood sugar 2 times daily or as directed.    Type 2 diabetes, HbA1C goal < 8% (H)       CALCIUM 500 PO      2 daily        CRANBERRY      Two tablets daily        Dexamethasone Acetate Powd     1 Bottle    1 Bottle 4 times daily 1 drop four times a day into the operative eye starting 1 day before surgery. Use until bottle runs out.    Combined form of age-related cataract, both eyes       enalapril 20 MG tablet    VASOTEC    180 tablet    Take 1 tablet (20 mg) by mouth 2 times daily Will call when needs refill    Hypertension goal BP (blood pressure) < 140/90       gabapentin 300 MG capsule    NEURONTIN    270 capsule    Take 3 capsules (900 mg) by mouth 2 times daily Needs to be seen for further refills.    Restless leg syndrome       hydrALAZINE 25 MG tablet    APRESOLINE    180 tablet    Take 1 tablet (25 mg) by mouth 2 times daily Will call when needs refill    Hypertension goal BP (blood pressure) < 140/90       hydrochlorothiazide 25 MG tablet    HYDRODIURIL    90 tablet    Take 1 tablet (25 mg) by mouth daily Will call when needs refill    Hypertension  goal BP (blood pressure) < 140/90       hyoscyamine 0.125 MG tablet    ANASPAZ/LEVSIN    40 tablet    Take 1-2 tablets (125-250 mcg) by mouth every 4 hours as needed for cramping    Irritable bowel syndrome with diarrhea       Lutein 20 MG Caps      Take  by mouth.        metFORMIN 500 MG tablet    GLUCOPHAGE    180 tablet    Take 1 tablet (500 mg) by mouth 2 times daily (with meals) Will call when needs refill    Type 2 diabetes mellitus without complication, without long-term current use of insulin (H)       * ONETOUCH ULTRA test strip   Generic drug:  blood glucose monitoring     100 Strip    testing 4 times daily    Diabetes mellitus, type 2 (H)       * blood glucose monitoring test strip    no brand specified    3 Box    Use to test blood sugar 2 times daily or as directed.    Type 2 diabetes, HbA1C goal < 8% (H)       oxybutynin 5 MG tablet    DITROPAN    180 tablet    Take 2 tablets (10 mg) by mouth daily    Urinary incontinence, unspecified type       * PC LANCETS SUPER THIN 30G Misc     180 each    1 Device 2 times daily    Type 2 diabetes, HbA1C goal < 8% (H)       * blood glucose monitoring lancets     3 Box    Use to test blood sugar 2 times daily or as directed.    Type 2 diabetes, HbA1C goal < 8% (H)       TURMERIC PO      1 cap daily        Vitamin B-12 500 MCG Subl      2 daily        vitamin D 1000 units capsule      1 CAPSULE DAILY        * Notice:  This list has 4 medication(s) that are the same as other medications prescribed for you. Read the directions carefully, and ask your doctor or other care provider to review them with you.

## 2018-07-03 NOTE — PATIENT INSTRUCTIONS
Glasses prescription given - optional.  Try gel tears (Refresh Celluvisc or GenTeal, or generic gel tears ok).    Lucie Gordillo MD  [phone]    Diabetes weakens the blood vessels all over the body, including the eyes. Damage to the blood vessels in the eyes can cause swelling or bleeding into part of the eye (called the retina). This is called diabetic retinopathy ([facility]ULIAN-tin--pu-thee). If not treated, this disease can cause vision loss or blindness.   Symptoms may include blurred or distorted vision, but many people have no symptoms. It's important to see your eye doctor regularly to check for problems.   Early treatment and good control can help protect your vision. Here are the things you can do to help prevent vision loss:      1. Keep your blood sugar levels under tight control.      2. Bring high blood pressure under control.      3. No smoking.      4. Have yearly dilated eye exams.

## 2018-10-01 ENCOUNTER — OFFICE VISIT (OUTPATIENT)
Dept: FAMILY MEDICINE | Facility: CLINIC | Age: 78
End: 2018-10-01
Payer: COMMERCIAL

## 2018-10-01 VITALS
HEART RATE: 72 BPM | BODY MASS INDEX: 24.38 KG/M2 | DIASTOLIC BLOOD PRESSURE: 68 MMHG | WEIGHT: 142.8 LBS | OXYGEN SATURATION: 97 % | HEIGHT: 64 IN | SYSTOLIC BLOOD PRESSURE: 165 MMHG | TEMPERATURE: 97.8 F | RESPIRATION RATE: 12 BRPM

## 2018-10-01 DIAGNOSIS — R82.90 NONSPECIFIC FINDING ON EXAMINATION OF URINE: ICD-10-CM

## 2018-10-01 DIAGNOSIS — N30.00 ACUTE CYSTITIS WITHOUT HEMATURIA: Primary | ICD-10-CM

## 2018-10-01 DIAGNOSIS — R30.0 DYSURIA: ICD-10-CM

## 2018-10-01 LAB
ALBUMIN UR-MCNC: NEGATIVE MG/DL
APPEARANCE UR: ABNORMAL
BACTERIA #/AREA URNS HPF: ABNORMAL /HPF
BILIRUB UR QL STRIP: NEGATIVE
COLOR UR AUTO: YELLOW
GLUCOSE UR STRIP-MCNC: NEGATIVE MG/DL
HGB UR QL STRIP: ABNORMAL
KETONES UR STRIP-MCNC: NEGATIVE MG/DL
LEUKOCYTE ESTERASE UR QL STRIP: ABNORMAL
NITRATE UR QL: NEGATIVE
PH UR STRIP: 5.5 PH (ref 5–7)
RBC #/AREA URNS AUTO: ABNORMAL /HPF
SOURCE: ABNORMAL
SP GR UR STRIP: 1.01 (ref 1–1.03)
UROBILINOGEN UR STRIP-ACNC: 0.2 EU/DL (ref 0.2–1)
WBC #/AREA URNS AUTO: ABNORMAL /HPF

## 2018-10-01 PROCEDURE — 81001 URINALYSIS AUTO W/SCOPE: CPT | Performed by: PHYSICIAN ASSISTANT

## 2018-10-01 PROCEDURE — 87186 SC STD MICRODIL/AGAR DIL: CPT | Performed by: PHYSICIAN ASSISTANT

## 2018-10-01 PROCEDURE — 87086 URINE CULTURE/COLONY COUNT: CPT | Performed by: PHYSICIAN ASSISTANT

## 2018-10-01 PROCEDURE — 87088 URINE BACTERIA CULTURE: CPT | Performed by: PHYSICIAN ASSISTANT

## 2018-10-01 PROCEDURE — 99213 OFFICE O/P EST LOW 20 MIN: CPT | Performed by: PHYSICIAN ASSISTANT

## 2018-10-01 RX ORDER — SULFAMETHOXAZOLE/TRIMETHOPRIM 800-160 MG
1 TABLET ORAL 2 TIMES DAILY
Qty: 6 TABLET | Refills: 0 | Status: SHIPPED | OUTPATIENT
Start: 2018-10-01 | End: 2019-02-07

## 2018-10-01 NOTE — NURSING NOTE
"Chief Complaint   Patient presents with     Urinary Problem       Initial /68  Pulse 72  Temp 97.8  F (36.6  C) (Oral)  Resp 12  Ht 5' 3.75\" (1.619 m)  Wt 142 lb 12.8 oz (64.8 kg)  SpO2 97%  BMI 24.7 kg/m2 Estimated body mass index is 24.7 kg/(m^2) as calculated from the following:    Height as of this encounter: 5' 3.75\" (1.619 m).    Weight as of this encounter: 142 lb 12.8 oz (64.8 kg)..  BP completed using cuff size: regular    "

## 2018-10-01 NOTE — MR AVS SNAPSHOT
"              After Visit Summary   10/1/2018    Orquidea Stevens    MRN: [id number]           Patient Information     Date Of Birth          1940        Visit Information        Provider Department      10/1/2018 3:00 PM Miri Shelton PA-C [facility]        Today's Diagnoses     Acute cystitis without hematuria    -  1    Dysuria        Nonspecific finding on examination of urine           Follow-ups after your visit        Who to contact     If you have questions or need follow up information about today's clinic visit or your schedule please contact [facility] directly at [phone].  Normal or non-critical lab and imaging results will be communicated to you by VetDChart, letter or phone within 4 business days after the clinic has received the results. If you do not hear from us within 7 days, please contact the clinic through VetDChart or phone. If you have a critical or abnormal lab result, we will notify you by phone as soon as possible.  Submit refill requests through Cycell or call your pharmacy and they will forward the refill request to us. Please allow 3 business days for your refill to be completed.          Additional Information About Your Visit        MyChart Information     Cycell lets you send messages to your doctor, view your test results, renew your prescriptions, schedule appointments and more. To sign up, go to [email or web address] . Click on \"Log in\" on the left side of the screen, which will take you to the Welcome page. Then click on \"Sign up Now\" on the right side of the page.     You will be asked to enter the access code listed below, as well as some personal information. Please follow the directions to create your username and password.     Your access code is: [email or web address]  Expires: 2018  3:29 PM     Your access code will  in 90 days. If you need help or a new code, please call your [facility] or [phone].        Care " "EveryWhere ID     This is your Care EveryWhere ID. This could be used by other organizations to access your [place] medical records  [id number]        Your Vitals Were     Pulse Temperature Respirations Height Pulse Oximetry BMI (Body Mass Index)    72 97.8  F (36.6  C) (Oral) 12 5' 3.75\" (1.619 m) 97% 24.7 kg/m2       Blood Pressure from Last 3 Encounters:   10/01/18 165/68   05/08/18 168/70   02/12/18 138/73    Weight from Last 3 Encounters:   10/01/18 142 lb 12.8 oz (64.8 kg)   05/08/18 139 lb (63 kg)   02/12/18 139 lb (63 kg)              We Performed the Following     *UA reflex to Microscopic and Culture ([place] and [facility] (except [place] and [place])     Urine Culture Aerobic Bacterial     Urine Microscopic          Today's Medication Changes          These changes are accurate as of 10/1/18  3:29 PM.  If you have any questions, ask your nurse or doctor.               Start taking these medicines.        Dose/Directions    sulfamethoxazole-trimethoprim 800-160 MG per tablet   Commonly known as:  BACTRIM DS/SEPTRA DS   Used for:  Acute cystitis without hematuria   Started by:  Miri Shelton PA-C        Dose:  1 tablet   Take 1 tablet by mouth 2 times daily for 3 days   Quantity:  6 tablet   Refills:  0            Where to get your medicines      These medications were sent to [facility] [address]  [address] [address], [facility] [address]     Phone:  [phone]     sulfamethoxazole-trimethoprim 800-160 MG per tablet                Primary Care Provider Office Phone # Fax #    Lisette Amos [phone] [address] [facility] [address]        Equal Access to Services     FIORDALIZA JACKMAN AH: Sachin Bryant, hari vera, qajeysonta kaalmada tee, efrain victoria. So [facility] [phone].    ATENCIÓN: Si habla español, tiene a trotter disposición servicios gratuitos de " asistencia lingüística. Nicholas al [phone].    We comply with applicable federal civil rights laws and [place] laws. We do not discriminate on the basis of race, color, national origin, age, disability, sex, sexual orientation, or gender identity.            Thank you!     Thank you for choosing [facility] [place]  [facility]. Our goal is always to provide you with excellent care. Hearing back from our patients is one way we can continue to improve our services. Please take a few minutes to complete the written survey that you may receive in the mail after your visit with us. Thank you!             Your Updated Medication List - Protect others around you: Learn how to safely use, store and throw away your medicines at www.disposemymeds.org.          This list is accurate as of 10/1/18  3:29 PM.  Always use your most recent med list.                   Brand Name Dispense Instructions for use Diagnosis    acidophilus Caps      Take 2 capsules by mouth daily.        amLODIPine 10 MG tablet    NORVASC    90 tablet    Take 1 tablet (10 mg) by mouth daily Will call when needs refills    Hypertension goal BP (blood pressure) < 140/90       aspirin 325 MG EC tablet     100 Tab    take 1 Tab by mouth daily.    Type II or unspecified type diabetes mellitus without mention of complication, not stated as uncontrolled       atorvastatin 80 MG tablet    LIPITOR    90 tablet    Take 1 tablet (80 mg) by mouth daily Will call when needs refill    Hyperlipidemia LDL goal <100, Hypertriglyceridemia       blood glucose monitoring meter device kit    no brand specified    1 kit    Use to test blood sugar 2 times daily or as directed.    Type 2 diabetes, HbA1C goal < 8% (H)       CALCIUM 500 PO      2 daily        CRANBERRY      Two tablets daily        Dexamethasone Acetate Powd     1 Bottle    1 Bottle 4 times daily 1 drop four times a day into the operative eye starting 1 day before surgery. Use until bottle runs out.     Combined form of age-related cataract, both eyes       enalapril 20 MG tablet    VASOTEC    180 tablet    Take 1 tablet (20 mg) by mouth 2 times daily Will call when needs refill    Hypertension goal BP (blood pressure) < 140/90       gabapentin 300 MG capsule    NEURONTIN    270 capsule    Take 3 capsules (900 mg) by mouth 2 times daily Needs to be seen for further refills.    Restless leg syndrome       hydrALAZINE 25 MG tablet    APRESOLINE    180 tablet    Take 1 tablet (25 mg) by mouth 2 times daily Will call when needs refill    Hypertension goal BP (blood pressure) < 140/90       hydrochlorothiazide 25 MG tablet    HYDRODIURIL    90 tablet    Take 1 tablet (25 mg) by mouth daily Will call when needs refill    Hypertension goal BP (blood pressure) < 140/90       hyoscyamine 0.125 MG tablet    ANASPAZ/LEVSIN    40 tablet    Take 1-2 tablets (125-250 mcg) by mouth every 4 hours as needed for cramping    Irritable bowel syndrome with diarrhea       Lutein 20 MG Caps      Take  by mouth.        metFORMIN 500 MG tablet    GLUCOPHAGE    180 tablet    Take 1 tablet (500 mg) by mouth 2 times daily (with meals) Will call when needs refill    Type 2 diabetes mellitus without complication, without long-term current use of insulin (H)       * ONETOUCH ULTRA test strip   Generic drug:  blood glucose monitoring     100 Strip    testing 4 times daily    Diabetes mellitus, type 2 (H)       * blood glucose monitoring test strip    no brand specified    3 Box    Use to test blood sugar 2 times daily or as directed.    Type 2 diabetes, HbA1C goal < 8% (H)       oxybutynin 5 MG tablet    DITROPAN    180 tablet    Take 2 tablets (10 mg) by mouth daily    Urinary incontinence, unspecified type       * PC LANCETS SUPER THIN 30G Misc     180 each    1 Device 2 times daily    Type 2 diabetes, HbA1C goal < 8% (H)       * blood glucose monitoring lancets     3 Box    Use to test blood sugar 2 times daily or as directed.    Type 2  diabetes, HbA1C goal < 8% (H)       sulfamethoxazole-trimethoprim 800-160 MG per tablet    BACTRIM DS/SEPTRA DS    6 tablet    Take 1 tablet by mouth 2 times daily for 3 days    Acute cystitis without hematuria       TURMERIC PO      1 cap daily        Vitamin B-12 500 MCG Subl      2 daily        vitamin D 1000 units capsule      1 CAPSULE DAILY        * Notice:  This list has 4 medication(s) that are the same as other medications prescribed for you. Read the directions carefully, and ask your doctor or other care provider to review them with you.

## 2018-10-01 NOTE — PROGRESS NOTES
"  SUBJECTIVE:   Orquidea Stevens is a 78 year old female who presents to clinic today for the following health issues:      URINARY TRACT SYMPTOMS  Onset: x 2 days    Description:   Painful urination (Dysuria): YES  Blood in urine (Hematuria): no   Delay in urine (Hesitency): no     Intensity: moderate    Progression of Symptoms:  worsening    Accompanying Signs & Symptoms:  Fever/chills: no   Flank pain no   Nausea and vomiting: no   Any vaginal symptoms: none  Abdominal/Pelvic Pain: no     History:   History of frequent UTI's: no  History of kidney stones: no   Sexually Active: YES  Possibility of pregnancy: No    Precipitating factors:   none    Therapies Tried and outcome:   Increase fluid intake    Problem list and histories reviewed & adjusted, as indicated.  Additional history: as documented      ROS:  Constitutional, HEENT, cardiovascular, pulmonary, gi and gu systems are negative, except as otherwise noted.    OBJECTIVE:     /68  Pulse 72  Temp 97.8  F (36.6  C) (Oral)  Resp 12  Ht 5' 3.75\" (1.619 m)  Wt 142 lb 12.8 oz (64.8 kg)  SpO2 97%  BMI 24.7 kg/m2  Body mass index is 24.7 kg/(m^2).  GENERAL: healthy, alert and no distress  ABDOMEN: soft, nontender, no hepatosplenomegaly, no masses and bowel sounds normal  BACK: no CVA tenderness, no paralumbar tenderness    Diagnostic Test Results:  Results for orders placed or performed in visit on 10/01/18 (from the past 24 hour(s))   *UA reflex to Microscopic and Culture ([place] and [facility] (except [place] and [place])   Result Value Ref Range    Color Urine Yellow     Appearance Urine Slightly Cloudy     Glucose Urine Negative NEG^Negative mg/dL    Bilirubin Urine Negative NEG^Negative    Ketones Urine Negative NEG^Negative mg/dL    Specific Gravity Urine 1.015 1.003 - 1.035    Blood Urine Trace (A) NEG^Negative    pH Urine 5.5 5.0 - 7.0 pH    Protein Albumin Urine Negative NEG^Negative mg/dL    Urobilinogen Urine 0.2 0.2 - 1.0 EU/dL    " Nitrite Urine Negative NEG^Negative    Leukocyte Esterase Urine Moderate (A) NEG^Negative    Source Midstream Urine    Urine Microscopic   Result Value Ref Range    WBC Urine 25-50 (A) OTO5^0 - 5 /HPF    RBC Urine O - 2 OTO2^O - 2 /HPF    Bacteria Urine Few (A) NEG^Negative /HPF       ASSESSMENT/PLAN:   1. Acute cystitis without hematuria  - sulfamethoxazole-trimethoprim (BACTRIM DS/SEPTRA DS) 800-160 MG per tablet; Take 1 tablet by mouth 2 times daily for 3 days  Dispense: 6 tablet; Refill: 0    2. Dysuria  - *UA reflex to Microscopic and Culture ([place] and [facility] (except [place] and [place])  - Urine Microscopic    3. Nonspecific finding on examination of urine  - Urine Culture Aerobic Bacterial    Use medication as directed.  Increase hydration  Follow up if symptoms should persist, change or worsen.      Miri Shelton PA-C  [facility]

## 2018-10-03 LAB
BACTERIA SPEC CULT: ABNORMAL
SPECIMEN SOURCE: ABNORMAL

## 2018-11-01 ENCOUNTER — TRANSFERRED RECORDS (OUTPATIENT)
Dept: HEALTH INFORMATION MANAGEMENT | Facility: CLINIC | Age: 78
End: 2018-11-01

## 2018-11-10 DIAGNOSIS — G25.81 RESTLESS LEG SYNDROME: ICD-10-CM

## 2018-11-12 RX ORDER — GABAPENTIN 300 MG/1
CAPSULE ORAL
Qty: 180 CAPSULE | Refills: 0 | Status: SHIPPED | OUTPATIENT
Start: 2018-11-12 | End: 2019-01-06

## 2018-11-16 ENCOUNTER — TELEPHONE (OUTPATIENT)
Dept: FAMILY MEDICINE | Facility: CLINIC | Age: 78
End: 2018-11-16

## 2018-11-16 NOTE — TELEPHONE ENCOUNTER
Panel Management Review      Patient has the following on her problem list:     Diabetes    ASA: Passed    Last A1C  Lab Results   Component Value Date    A1C 6.8 05/03/2018    A1C 6.6 11/01/2017    A1C 6.7 05/05/2017    A1C 6.3 10/31/2016    A1C 6.2 03/16/2016     A1C tested: Passed    Last LDL:    Lab Results   Component Value Date    CHOL 133 05/03/2018     Lab Results   Component Value Date    HDL 58 05/03/2018     Lab Results   Component Value Date    LDL 44 05/03/2018     Lab Results   Component Value Date    TRIG 153 05/03/2018     Lab Results   Component Value Date    CHOLHDLRATIO 2.6 08/08/2014     Lab Results   Component Value Date    NHDL 75 05/03/2018       Is the patient on a Statin? YES             Is the patient on Aspirin? YES    Medications     HMG CoA Reductase Inhibitors    atorvastatin (LIPITOR) 80 MG tablet    Salicylates    aspirin 325 MG EC tablet          Last three blood pressure readings:  BP Readings from Last 3 Encounters:   10/01/18 165/68   05/08/18 168/70   02/12/18 138/73       Date of last diabetes office visit: 5/8/18     Tobacco History:     History   Smoking Status     Never Smoker   Smokeless Tobacco     Never Used     Comment: Lives in smoke free household         Hypertension   Last three blood pressure readings:  BP Readings from Last 3 Encounters:   10/01/18 165/68   05/08/18 168/70   02/12/18 138/73     Blood pressure: Passed    HTN Guidelines:  Age 18-59 BP range:  Less than 140/90  Age 60-85 with Diabetes:  Less than 140/90  Age 60-85 without Diabetes:  less than 150/90      Composite cancer screening  Chart review shows that this patient is due/due soon for the following None  Summary:    Patient is due/failing the following:   A1C    Action needed:   Patient needs office visit for dm check.    Type of outreach:    Sent letter.    Questions for provider review:    None                                                                                                                                     Lupe Loza MA       Chart routed to Care Team .

## 2018-11-16 NOTE — LETTER
[facility]  [address] [facility] [address]  Phone: [phone]    11/16/18    Orquidea Stevens  [address] [facility] [address]      Dear Orquidea,    You are due for a diabetic check. Please contact the clinic to schedule an appointment.    Sincerely,      Lisette Agustin MD/ct

## 2018-11-23 ENCOUNTER — TELEPHONE (OUTPATIENT)
Dept: FAMILY MEDICINE | Facility: CLINIC | Age: 78
End: 2018-11-23

## 2018-11-23 NOTE — TELEPHONE ENCOUNTER
Please abstract the following data from this visit with this patient into the appropriate field in Epic:    Mammogram done on this date: 11/1/2018, by this group: [facility], results were negative.  Mary Dietz,

## 2018-12-05 ENCOUNTER — DOCUMENTATION ONLY (OUTPATIENT)
Dept: LAB | Facility: CLINIC | Age: 78
End: 2018-12-05

## 2018-12-05 DIAGNOSIS — E11.9 TYPE 2 DIABETES MELLITUS WITHOUT COMPLICATION, WITHOUT LONG-TERM CURRENT USE OF INSULIN (H): Primary | ICD-10-CM

## 2018-12-05 DIAGNOSIS — I10 ESSENTIAL HYPERTENSION: ICD-10-CM

## 2018-12-05 NOTE — PROGRESS NOTES
Patient has an upcoming previsit appointment on 12/10/2018. Please review pended orders and add additional orders if needed.     Thank you,   Estefania Avina

## 2018-12-10 DIAGNOSIS — E11.9 TYPE 2 DIABETES MELLITUS WITHOUT COMPLICATION, WITHOUT LONG-TERM CURRENT USE OF INSULIN (H): ICD-10-CM

## 2018-12-10 DIAGNOSIS — I10 ESSENTIAL HYPERTENSION: ICD-10-CM

## 2018-12-10 LAB
ANION GAP SERPL CALCULATED.3IONS-SCNC: 10 MMOL/L (ref 3–14)
BUN SERPL-MCNC: 11 MG/DL (ref 7–30)
CALCIUM SERPL-MCNC: 9.3 MG/DL (ref 8.5–10.1)
CHLORIDE SERPL-SCNC: 106 MMOL/L (ref 94–109)
CO2 SERPL-SCNC: 28 MMOL/L (ref 20–32)
CREAT SERPL-MCNC: 0.59 MG/DL (ref 0.52–1.04)
GFR SERPL CREATININE-BSD FRML MDRD: >90 ML/MIN/1.7M2
GLUCOSE SERPL-MCNC: 146 MG/DL (ref 70–99)
HBA1C MFR BLD: 7.4 % (ref 0–5.6)
POTASSIUM SERPL-SCNC: 3.9 MMOL/L (ref 3.4–5.3)
SODIUM SERPL-SCNC: 144 MMOL/L (ref 133–144)

## 2018-12-10 PROCEDURE — 36415 COLL VENOUS BLD VENIPUNCTURE: CPT | Performed by: FAMILY MEDICINE

## 2018-12-10 PROCEDURE — 80048 BASIC METABOLIC PNL TOTAL CA: CPT | Performed by: FAMILY MEDICINE

## 2018-12-10 PROCEDURE — 83036 HEMOGLOBIN GLYCOSYLATED A1C: CPT | Performed by: FAMILY MEDICINE

## 2019-01-07 ENCOUNTER — OFFICE VISIT (OUTPATIENT)
Dept: FAMILY MEDICINE | Facility: CLINIC | Age: 79
End: 2019-01-07
Payer: COMMERCIAL

## 2019-01-07 VITALS
SYSTOLIC BLOOD PRESSURE: 145 MMHG | DIASTOLIC BLOOD PRESSURE: 69 MMHG | TEMPERATURE: 98.4 F | BODY MASS INDEX: 23.73 KG/M2 | HEIGHT: 64 IN | HEART RATE: 88 BPM | OXYGEN SATURATION: 99 % | WEIGHT: 139 LBS | RESPIRATION RATE: 17 BRPM

## 2019-01-07 DIAGNOSIS — G25.81 RESTLESS LEG SYNDROME: ICD-10-CM

## 2019-01-07 DIAGNOSIS — K59.1 FUNCTIONAL DIARRHEA: Primary | ICD-10-CM

## 2019-01-07 DIAGNOSIS — I10 HYPERTENSION GOAL BP (BLOOD PRESSURE) < 140/90: ICD-10-CM

## 2019-01-07 DIAGNOSIS — E11.9 TYPE 2 DIABETES MELLITUS WITHOUT COMPLICATION, WITHOUT LONG-TERM CURRENT USE OF INSULIN (H): ICD-10-CM

## 2019-01-07 PROCEDURE — 99214 OFFICE O/P EST MOD 30 MIN: CPT | Performed by: FAMILY MEDICINE

## 2019-01-07 RX ORDER — METFORMIN HCL 500 MG
500 TABLET, EXTENDED RELEASE 24 HR ORAL 2 TIMES DAILY WITH MEALS
Qty: 180 TABLET | Refills: 1 | Status: SHIPPED | OUTPATIENT
Start: 2019-01-07 | End: 2019-08-01

## 2019-01-07 RX ORDER — HYDRALAZINE HYDROCHLORIDE 25 MG/1
25 TABLET, FILM COATED ORAL 2 TIMES DAILY
Qty: 180 TABLET | Refills: 1 | Status: SHIPPED | OUTPATIENT
Start: 2019-01-07 | End: 2019-07-05

## 2019-01-07 RX ORDER — ENALAPRIL MALEATE 20 MG/1
20 TABLET ORAL 2 TIMES DAILY
Qty: 180 TABLET | Refills: 1 | Status: SHIPPED | OUTPATIENT
Start: 2019-01-07 | End: 2019-08-16

## 2019-01-07 RX ORDER — GABAPENTIN 300 MG/1
CAPSULE ORAL
Qty: 180 CAPSULE | Refills: 1 | Status: SHIPPED | OUTPATIENT
Start: 2019-01-07 | End: 2019-01-09

## 2019-01-07 RX ORDER — HYDROCHLOROTHIAZIDE 25 MG/1
25 TABLET ORAL DAILY
Qty: 90 TABLET | Refills: 1 | Status: SHIPPED | OUTPATIENT
Start: 2019-01-07 | End: 2019-08-16

## 2019-01-07 RX ORDER — HYDRALAZINE HYDROCHLORIDE 25 MG/1
25 TABLET, FILM COATED ORAL 2 TIMES DAILY
Qty: 180 TABLET | Refills: 1 | Status: SHIPPED | OUTPATIENT
Start: 2019-01-07 | End: 2019-01-07

## 2019-01-07 ASSESSMENT — MIFFLIN-ST. JEOR: SCORE: 1086.53

## 2019-01-07 NOTE — PROGRESS NOTES
SUBJECTIVE:   Orquidea Stevens is a 78 year old female who presents to clinic today for the following health issues:        Diabetes Follow-up    Patient is checking blood sugars: 2 times daily.    Blood sugar testing frequency justification: Uncontrolled diabetes  Results are as follows:         am - 130-135     Diabetic concerns: None     Symptoms of hypoglycemia (low blood sugar): none     Paresthesias (numbness or burning in feet) or sores: Yes      Date of last diabetic eye exam: July 2018    Diabetes Management Resources    Hyperlipidemia Follow-Up      Rate your low fat/cholesterol diet?: fair    Taking statin?  Yes, no muscle aches from statin    Other lipid medications/supplements?:  none    Hypertension Follow-up      Outpatient blood pressures are being checked at home.  Results are 138/80.    Low Salt Diet: not monitoring salt    BP Readings from Last 2 Encounters:   01/07/19 145/69   10/01/18 165/68     Hemoglobin A1C (%)   Date Value   12/10/2018 7.4 (H)   05/03/2018 6.8 (H)     LDL Cholesterol Calculated (mg/dL)   Date Value   05/03/2018 44   05/05/2017 51       Amount of exercise or physical activity: 4 days a week     Problems taking medications regularly: No    Medication side effects: none    Diet: regular (no restrictions)    Pt with diabetes controlled on meds  Pt on statin/ace/and asa  Has diarrhea and is bothersome. Taking immodium. Does have IBS  Will switch to extended releasee metformin to see if that helps.   Has seen eye doctor    Problem list and histories reviewed & adjusted, as indicated.  Additional history: as documented    Labs reviewed in EPIC    Reviewed and updated as needed this visit by clinical staff  Tobacco  Allergies  Med Hx  Surg Hx  Fam Hx  Soc Hx      Reviewed and updated as needed this visit by Provider         ROS:  Constitutional, HEENT, cardiovascular, pulmonary, gi and gu systems are negative, except as otherwise noted.    OBJECTIVE:     /69   Pulse 88   " Temp 98.4  F (36.9  C) (Oral)   Resp 17   Ht 1.619 m (5' 3.75\")   Wt 63 kg (139 lb)   SpO2 99%   BMI 24.05 kg/m    Body mass index is 24.05 kg/m .  GENERAL: healthy, alert and no distress  NECK: no adenopathy, no asymmetry, masses, or scars and thyroid normal to palpation  RESP: lungs clear to auscultation - no rales, rhonchi or wheezes  CV: regular rate and rhythm, normal S1 S2, no S3 or S4, no murmur, click or rub, no peripheral edema and peripheral pulses strong  ABDOMEN: soft, nontender, no hepatosplenomegaly, no masses and bowel sounds normal  MS: no gross musculoskeletal defects noted, no edema    Diagnostic Test Results:  none     ASSESSMENT/PLAN:       1. Type 2 diabetes mellitus without complication, without long-term current use of insulin (H)  At goal on meds, follow-up in 6 months  - metFORMIN (GLUCOPHAGE-XR) 500 MG 24 hr tablet; Take 1 tablet (500 mg) by mouth 2 times daily (with meals)  Dispense: 180 tablet; Refill: 1    2. Hypertension goal BP (blood pressure) < 140/90  At goal on meds, follow-up in 6 months  - hydrochlorothiazide (HYDRODIURIL) 25 MG tablet; Take 1 tablet (25 mg) by mouth daily Will call when needs refill  Dispense: 90 tablet; Refill: 1  - enalapril (VASOTEC) 20 MG tablet; Take 1 tablet (20 mg) by mouth 2 times daily Will call when needs refill  Dispense: 180 tablet; Refill: 1  - hydrALAZINE (APRESOLINE) 25 MG tablet; Take 1 tablet (25 mg) by mouth 2 times daily Will call when needs refill  Dispense: 180 tablet; Refill: 1    3. Restless leg syndrome  Controlled on meds  - gabapentin (NEURONTIN) 300 MG capsule; TAKE 3 CAPSULES BY MOUTH TWICE DAILY.  Dispense: 180 capsule; Refill: 1      4. Diarrhea  ? how much gabapentin contributes to this. Will switch to extended release    Lisette Agustin MD  [facility]    "

## 2019-01-09 ENCOUNTER — TELEPHONE (OUTPATIENT)
Dept: FAMILY MEDICINE | Facility: CLINIC | Age: 79
End: 2019-01-09

## 2019-01-09 DIAGNOSIS — G25.81 RESTLESS LEG SYNDROME: ICD-10-CM

## 2019-01-09 RX ORDER — GABAPENTIN 300 MG/1
CAPSULE ORAL
Qty: 540 CAPSULE | Refills: 1 | Status: SHIPPED | OUTPATIENT
Start: 2019-01-09 | End: 2019-08-16

## 2019-01-09 NOTE — TELEPHONE ENCOUNTER
Monday she was in and dr Amos renewed everything for 90 days except    gabapentin (NEURONTIN) 300 MG capsule     Which was only for 30 and she is requesting the 90 day request be put in- please call when done or if un able to do this

## 2019-01-11 DIAGNOSIS — E11.9 TYPE 2 DIABETES, HBA1C GOAL < 8% (H): ICD-10-CM

## 2019-01-11 RX ORDER — LANCETS
EACH MISCELLANEOUS
Qty: 200 EACH | Refills: 1 | Status: SHIPPED | OUTPATIENT
Start: 2019-01-11 | End: 2019-08-01

## 2019-01-11 RX ORDER — GLUCOSAMINE HCL/CHONDROITIN SU 500-400 MG
CAPSULE ORAL
Qty: 100 EACH | Refills: 1 | Status: SHIPPED | OUTPATIENT
Start: 2019-01-11

## 2019-01-11 RX ORDER — LANCING DEVICE/LANCETS
KIT MISCELLANEOUS
Qty: 1 EACH | Refills: 0 | Status: SHIPPED | OUTPATIENT
Start: 2019-01-11

## 2019-01-11 RX ORDER — BLOOD-GLUCOSE METER
EACH MISCELLANEOUS
Qty: 1 KIT | Refills: 0 | Status: SHIPPED | OUTPATIENT
Start: 2019-01-11 | End: 2020-01-11

## 2019-01-11 NOTE — TELEPHONE ENCOUNTER
Please checking pending orders. New rx req for Accu-chek Virginia Plus Meter, Accu-chek Anisha Plus Test Strip, Accu-chek Softclix Lancets, and BD Single use swab.

## 2019-01-11 NOTE — TELEPHONE ENCOUNTER
Prescriptions for diabetic supplies approved per [facility].  Kenzie Maldonado RN

## 2019-01-25 ENCOUNTER — TELEPHONE (OUTPATIENT)
Dept: FAMILY MEDICINE | Facility: CLINIC | Age: 79
End: 2019-01-25

## 2019-01-25 NOTE — TELEPHONE ENCOUNTER
RN contacted pt regarding incoming request from [facility] mail order pharmacy for gabapentin. Gabapentin Rx for 90-day-supply with refill was recently sent to [facility] on 1/9/19.     Calling to confirm which pharmacy pt would like the gabapentin order filled through.  Pt states to disregard the incoming request from [facility]. Pt states she prefers to use [facility] as she is not satisfied with the services through [facility].   Pt states her  is at [facility] now to  the gabapentin order for her.    RN returned call to [facility] at [phone] to notify to cancel this request for gabapentin as pt will be receiving this from a local pharmacy. [facility] technician states the request will be cancelled and documented in pt file.    Kenzie Maldonado RN

## 2019-02-07 ENCOUNTER — ANCILLARY PROCEDURE (OUTPATIENT)
Dept: ULTRASOUND IMAGING | Facility: CLINIC | Age: 79
End: 2019-02-07
Attending: FAMILY MEDICINE
Payer: COMMERCIAL

## 2019-02-07 ENCOUNTER — OFFICE VISIT (OUTPATIENT)
Dept: FAMILY MEDICINE | Facility: CLINIC | Age: 79
End: 2019-02-07
Payer: COMMERCIAL

## 2019-02-07 VITALS
SYSTOLIC BLOOD PRESSURE: 156 MMHG | TEMPERATURE: 98.8 F | WEIGHT: 140.8 LBS | RESPIRATION RATE: 16 BRPM | HEART RATE: 75 BPM | DIASTOLIC BLOOD PRESSURE: 74 MMHG | BODY MASS INDEX: 24.36 KG/M2

## 2019-02-07 DIAGNOSIS — R09.89 OTHER SPECIFIED SYMPTOMS AND SIGNS INVOLVING THE CIRCULATORY AND RESPIRATORY SYSTEMS: ICD-10-CM

## 2019-02-07 DIAGNOSIS — M79.661 PAIN OF RIGHT LOWER LEG: Primary | ICD-10-CM

## 2019-02-07 DIAGNOSIS — M79.661 PAIN OF RIGHT LOWER LEG: ICD-10-CM

## 2019-02-07 DIAGNOSIS — G25.81 RESTLESS LEG SYNDROME: ICD-10-CM

## 2019-02-07 PROCEDURE — 93922 UPR/L XTREMITY ART 2 LEVELS: CPT

## 2019-02-07 PROCEDURE — 99214 OFFICE O/P EST MOD 30 MIN: CPT | Performed by: FAMILY MEDICINE

## 2019-02-07 ASSESSMENT — PAIN SCALES - GENERAL: PAINLEVEL: SEVERE PAIN (7)

## 2019-02-07 NOTE — PATIENT INSTRUCTIONS
Please  call [phone] to schedule your ultrasound test.         Ok to take 2 extra strength tylenol for leg pain every 6 hours.

## 2019-02-07 NOTE — PROGRESS NOTES
SUBJECTIVE:   Orquidea Stevens is a 78 year old female who presents to clinic today for the following health issues:      Musculoskeletal problem/pain      Duration: x 5 days    Description  Location: right leg from top of foot to thigh, also pain in right calf    Intensity:  7/10 at night, right now 4/10    Accompanying signs and symptoms: radiation of pain from top side of foot to just above right knee     History  Previous similar problem: no   Previous evaluation:  none    Precipitating or alleviating factors:  Trauma or overuse: no   Aggravating factors include: Worse as day progresses, worse at night when trying to go to sleep     Therapies tried and outcome: nothing    -Started out as soreness, getting worse  -Worse at night, cant get comfortable  -Getting up at night and moving around helps  -Has not tried any OTC medications for pain  -History of restless leg syndrome and neuropathy- taking gabapentin 3 twice per day - recently increased 1/25/2019  -Is taking Lipitor, has been for years    -Denies numbness, tingling, burning    Problem list and histories reviewed & adjusted, as indicated.  Additional history: as documented    Patient Active Problem List   Diagnosis     IBS (irritable bowel syndrome)     Urinary incontinence     Fatty liver     CAD (coronary artery disease)     History of squamous cell carcinoma of skin     Squamous cell carcinoma     Allergic state     Actinic keratosis     Spastic neurogenic bladder     Rosacea     TYPE 2 DIABETES, HBA1C GOAL < 8%     HYPERLIPIDEMIA LDL GOAL <100     Advanced directives, counseling/discussion     GERD (gastroesophageal reflux disease)     Essential hypertension     Atrophic vaginitis     Dermatochalasis     Ptosis of eyelid. OU     Restless leg syndrome     Hypertriglyceridemia     Type 2 diabetes mellitus without complication, without long-term current use of insulin (H)     Tear of medial cartilage or meniscus of knee, current     Pure  hypercholesterolemia     Pseudophakia of both eyes     Past Surgical History:   Procedure Laterality Date     ANGIOGRAM  1999    50% Blockage     ARTHROSCOPY KNEE RT/LT  9/04     BIOPSY BREAST  2001    RT     BLADDER SURGERY  1988     CATARACT IOL, RT/LT       HC REDUCTION OF LARGE BREAST  1988     HYSTERECTOMY, PAP NO LONGER INDICATED  2003     MOHS MICROGRAPHIC PROCEDURE      RT nose SCC     PHACOEMULSIFICATION CLEAR CORNEA WITH STANDARD INTRAOCULAR LENS IMPLANT Left 8/10/2017    Procedure: PHACOEMULSIFICATION CLEAR CORNEA WITH STANDARD INTRAOCULAR LENS IMPLANT;  LEFT EYE PHACOEMULSIFICATION CLEAR CORNEA WITH STANDARD INTRAOCULAR LENS IMPLANT ;  Surgeon: Lucie Gordillo MD;  Location: [facility]     PHACOEMULSIFICATION CLEAR CORNEA WITH STANDARD INTRAOCULAR LENS IMPLANT Right 8/28/2017    Procedure: PHACOEMULSIFICATION CLEAR CORNEA WITH STANDARD INTRAOCULAR LENS IMPLANT;  RIGHT EYE PHACOEMULSIFICATION CLEAR CORNEA WITH STANDARD INTRAOCULAR LENS IMPLANT   ;  Surgeon: Lucie Gordillo MD;  Location: [facility]       Social History     Tobacco Use     Smoking status: Never Smoker     Smokeless tobacco: Never Used     Tobacco comment: Lives in smoke free household   Substance Use Topics     Alcohol use: No     Family History   Problem Relation Age of Onset     Respiratory Mother         COPD     Hypertension Mother      Respiratory Father         COPD     Diabetes Father      Heart Disease Father      Heart Disease Brother      Diabetes Maternal Grandfather      Thyroid Disease Daughter      Other Cancer Daughter 57        stage 4 rectal cancer     Prostate Cancer Brother      Cancer No family hx of      Cerebrovascular Disease No family hx of      Glaucoma No family hx of      Macular Degeneration No family hx of          Current Outpatient Medications   Medication Sig Dispense Refill     alcohol swab prep pads Use to swab area of injection/robe as directed. 100 each 1     amLODIPine (NORVASC) 10 MG tablet Take 1  tablet (10 mg) by mouth daily Will call when needs refills 90 tablet 3     aspirin 325 MG EC tablet take 1 Tab by mouth daily. 100 Tab 3     atorvastatin (LIPITOR) 80 MG tablet Take 1 tablet (80 mg) by mouth daily Will call when needs refill 90 tablet 3     blood glucose (ACCU-CHEK SOFTCLIX) lancing device Lancing device to be used with lancets. 1 each 0     blood glucose (NO BRAND SPECIFIED) test strip Use to test blood sugar 2 times daily or as directed. 300 strip 1     blood glucose monitoring (ACCU-CHEK SUSY PLUS) meter device kit Use to test blood sugar 2 times daily or as directed. 1 kit 0     blood glucose monitoring (SOFTCLIX) lancets Use to test blood sugar 2 times daily. 200 each 1     CALCIUM 500 OR 2 daily       CRANBERRY Two tablets daily       enalapril (VASOTEC) 20 MG tablet Take 1 tablet (20 mg) by mouth 2 times daily Will call when needs refill 180 tablet 1     gabapentin (NEURONTIN) 300 MG capsule TAKE 3 CAPSULES BY MOUTH TWICE DAILY. 540 capsule 1     hydrALAZINE (APRESOLINE) 25 MG tablet Take 1 tablet (25 mg) by mouth 2 times daily Will call when needs refill 180 tablet 1     hydrochlorothiazide (HYDRODIURIL) 25 MG tablet Take 1 tablet (25 mg) by mouth daily Will call when needs refill 90 tablet 1     Lactobacillus (ACIDOPHILUS) CAPS Take 2 capsules by mouth daily.       Lutein 20 MG CAPS Take  by mouth.       metFORMIN (GLUCOPHAGE) 500 MG tablet Take 1 tablet (500 mg) by mouth 2 times daily (with meals) Will call when needs refill 180 tablet 1     metFORMIN (GLUCOPHAGE-XR) 500 MG 24 hr tablet Take 1 tablet (500 mg) by mouth 2 times daily (with meals) 180 tablet 1     oxybutynin (DITROPAN) 5 MG tablet Take 2 tablets (10 mg) by mouth daily 180 tablet 1     TURMERIC PO 1 cap daily       VITAMIN B-12 500 MCG SL SUBL 2 daily       VITAMIN D 1000 UNIT OR CAPS 1 CAPSULE DAILY       Recent Labs   Lab Test 12/10/18  0949 05/03/18  0946 11/01/17  1239  05/05/17  0914  04/04/16  1418 03/16/16  0929  10/22/15  1422   A1C 7.4* 6.8* 6.6*  --  6.7*   < >  --  6.2*  --    LDL  --  44  --   --  51  --   --  48  --    HDL  --  58  --   --  67  --   --  63  --    TRIG  --  153*  --   --  116  --   --  109  --    ALT  --   --  29  --   --   --   --  26 27   CR 0.59 0.56 0.53   < > 0.64  --   --  0.63  --    GFRESTIMATED >90 >90 >90   < > >90  Non  GFR Calc    --   --  >90  Non  GFR Calc    --    GFRESTBLACK >90 >90 >90   < > >90  African American GFR Calc    --   --  >90  African American GFR Calc    --    POTASSIUM 3.9 3.8 3.8   < > 3.7  --   --  3.8  --    TSH  --  1.88  --   --   --   --  2.04  --   --     < > = values in this interval not displayed.      BP Readings from Last 3 Encounters:   02/07/19 156/74   01/07/19 145/69   10/01/18 165/68    Wt Readings from Last 3 Encounters:   02/07/19 63.9 kg (140 lb 12.8 oz)   01/07/19 63 kg (139 lb)   10/01/18 64.8 kg (142 lb 12.8 oz)                  Labs reviewed in EPIC    Reviewed and updated as needed this visit by clinical staff  Tobacco  Allergies  Meds  Problems  Med Hx  Surg Hx  Fam Hx  Soc Hx        Reviewed and updated as needed this visit by Provider  Tobacco  Allergies  Meds  Problems  Med Hx  Surg Hx  Fam Hx         ROS:  Constitutional, HEENT, cardiovascular, pulmonary, gi and gu systems are negative, except as otherwise noted.    OBJECTIVE:     /74   Pulse 75   Temp 98.8  F (37.1  C) (Oral)   Resp 16   Wt 63.9 kg (140 lb 12.8 oz)   BMI 24.36 kg/m    Body mass index is 24.36 kg/m .  GENERAL: healthy, alert and no distress  EYES: Eyes grossly normal to inspection, PERRL and conjunctivae and sclerae normal  MS: no gross musculoskeletal defects noted, no edema   Right lower extremity reveals no skin changes, swelling, or erythema, distal quadriceps reveal mind tenderness, no knee tenderness, no calf tenderness, right knee has full range of motion  SKIN: no suspicious lesions or rashes  PSYCH: mentation  appears normal, affect normal/bright    Diagnostic Test Results:  DRU US pending    ASSESSMENT/PLAN:   (M79.661) Pain of right lower leg  (primary encounter diagnosis), (G25.81) Restless leg syndrome, (R09.89) Other specified symptoms and signs involving the circulatory and respiratory systems   Comment: Clinical presentation concerning for flare of restless leg syndrome vs arterial disease vs other. DVT and venous insufficiency considered but unlikely based on physical examination  Plan: US DRU Doppler No Exercise        -Recommend we proceed with an US DRU to rule out arterial disease        -Patient will schedule at her convenience and we will contact her with the results once available and proceed accordingly         -Tylenol or ibuprofen recommended for pain control in the meantime, dosing instructions provided on the AVS      Follow-up:1 week to discuss ultrasound    Poonam Astudillo MD  [facility]

## 2019-02-07 NOTE — NURSING NOTE
"Chief Complaint   Patient presents with     Musculoskeletal Problem       Initial /77   Pulse 75   Temp 98.8  F (37.1  C) (Oral)   Resp 16   Wt 63.9 kg (140 lb 12.8 oz)   BMI 24.36 kg/m   Estimated body mass index is 24.36 kg/m  as calculated from the following:    Height as of 1/7/19: 1.619 m (5' 3.75\").    Weight as of this encounter: 63.9 kg (140 lb 12.8 oz).  Medication Reconciliation: complete  Gil Olivares CMA    "

## 2019-02-14 ENCOUNTER — TELEPHONE (OUTPATIENT)
Dept: FAMILY MEDICINE | Facility: CLINIC | Age: 79
End: 2019-02-14

## 2019-02-14 NOTE — TELEPHONE ENCOUNTER
Reason for Call:  Request for results:    Name of test or procedure: Ultrasound     Date of test of procedure: 2/7/19    Location of the test or procedure: [place]    OK to leave the result message on voice mail or with a family member? YES    Phone number Patient can be reached at:  Home number on file [phone] (home)    Additional comments: patient would like a call with test results or to leave a detail message.    Call taken on 2/14/2019 at 12:07 PM by Marce Ramirez

## 2019-03-20 ENCOUNTER — ALLIED HEALTH/NURSE VISIT (OUTPATIENT)
Dept: NURSING | Facility: CLINIC | Age: 79
End: 2019-03-20
Payer: COMMERCIAL

## 2019-03-20 DIAGNOSIS — Z23 NEED FOR VACCINATION: Primary | ICD-10-CM

## 2019-03-20 PROCEDURE — 90471 IMMUNIZATION ADMIN: CPT

## 2019-03-20 PROCEDURE — 99207 ZZC NO CHARGE LOS: CPT

## 2019-03-20 PROCEDURE — 90750 HZV VACC RECOMBINANT IM: CPT

## 2019-03-20 NOTE — PROGRESS NOTES
Screening Questionnaire for Adult Immunization    Are you sick today?   No   Do you have allergies to medications, food, a vaccine component or latex?   No   Have you ever had a serious reaction after receiving a vaccination?   No   Do you have a long-term health problem with heart disease, lung disease, asthma, kidney disease, metabolic disease (e.g. diabetes), anemia, or other blood disorder?   No   Do you have cancer, leukemia, HIV/AIDS, or any other immune system problem?   No   In the past 3 months, have you taken medications that affect  your immune system, such as prednisone, other steroids, or anticancer drugs; drugs for the treatment of rheumatoid arthritis, Crohn s disease, or psoriasis; or have you had radiation treatments?   No   Have you had a seizure, or a brain or other nervous system problem?   No   During the past year, have you received a transfusion of blood or blood     products, or been given immune (gamma) globulin or antiviral drug?   No   For women: Are you pregnant or is there a chance you could become        pregnant during the next month?   No   Have you received any vaccinations in the past 4 weeks?   No     Immunization questionnaire answers were all negative.        Per orders of Dr. Agustin, injection of Shingrix given by Isamar Preciado. Patient instructed to remain in clinic for 15 minutes afterwards, and to report any adverse reaction to me immediately.       Screening performed by Isamar Preciado on 3/20/2019 at 11:52 AM.

## 2019-05-28 ENCOUNTER — OFFICE VISIT (OUTPATIENT)
Dept: OPHTHALMOLOGY | Facility: CLINIC | Age: 79
End: 2019-05-28
Payer: COMMERCIAL

## 2019-05-28 DIAGNOSIS — H52.4 PRESBYOPIA: ICD-10-CM

## 2019-05-28 DIAGNOSIS — E11.9 TYPE 2 DIABETES MELLITUS WITHOUT COMPLICATION, WITHOUT LONG-TERM CURRENT USE OF INSULIN (H): Primary | ICD-10-CM

## 2019-05-28 DIAGNOSIS — H02.834 DERMATOCHALASIS OF BOTH UPPER EYELIDS: ICD-10-CM

## 2019-05-28 DIAGNOSIS — D31.31 CHOROIDAL NEVUS OF BOTH EYES: ICD-10-CM

## 2019-05-28 DIAGNOSIS — H02.403 PTOSIS OF BOTH EYELIDS: ICD-10-CM

## 2019-05-28 DIAGNOSIS — Z96.1 PSEUDOPHAKIA OF BOTH EYES: ICD-10-CM

## 2019-05-28 DIAGNOSIS — D31.32 CHOROIDAL NEVUS OF BOTH EYES: ICD-10-CM

## 2019-05-28 DIAGNOSIS — H02.831 DERMATOCHALASIS OF BOTH UPPER EYELIDS: ICD-10-CM

## 2019-05-28 PROCEDURE — 92015 DETERMINE REFRACTIVE STATE: CPT | Performed by: STUDENT IN AN ORGANIZED HEALTH CARE EDUCATION/TRAINING PROGRAM

## 2019-05-28 PROCEDURE — 92014 COMPRE OPH EXAM EST PT 1/>: CPT | Performed by: STUDENT IN AN ORGANIZED HEALTH CARE EDUCATION/TRAINING PROGRAM

## 2019-05-28 ASSESSMENT — REFRACTION_WEARINGRX
OD_SPHERE: +1.25
OD_AXIS: 044
OS_ADD: +3.25
OS_AXIS: 170
OS_SPHERE: +1.25
OD_ADD: +3.25
OS_CYLINDER: +0.50
OS_CYLINDER: +1.25
OD_ADD: +3.25
OD_AXIS: 057
OD_CYLINDER: +1.25
SPECS_TYPE: BIFOCAL
OS_SPHERE: +1.50
SPECS_TYPE: BIFOCAL
OD_SPHERE: +1.25
OD_CYLINDER: +1.25
OS_ADD: +3.25
OS_AXIS: 147

## 2019-05-28 ASSESSMENT — REFRACTION_MANIFEST
OD_SPHERE: +1.75
OD_CYLINDER: +1.25
OS_SPHERE: +2.00
OS_AXIS: 165
OS_ADD: +3.00
OS_CYLINDER: +0.75
OD_ADD: +3.00
OD_AXIS: 055

## 2019-05-28 ASSESSMENT — TONOMETRY
OD_IOP_MMHG: 14
IOP_METHOD: APPLANATION
OS_IOP_MMHG: 16

## 2019-05-28 ASSESSMENT — VISUAL ACUITY
OS_CC: J1+-
OD_CC+: -2
METHOD: SNELLEN - LINEAR
OS_CC: 20/20
OD_CC: J1-
OD_CC: 20/20
CORRECTION_TYPE: GLASSES
OS_CC+: -2

## 2019-05-28 ASSESSMENT — CUP TO DISC RATIO
OD_RATIO: 0.1
OS_RATIO: 0.1

## 2019-05-28 ASSESSMENT — CONF VISUAL FIELD
OD_NORMAL: 1
OS_NORMAL: 1

## 2019-05-28 ASSESSMENT — EXTERNAL EXAM - RIGHT EYE: OD_EXAM: NORMAL

## 2019-05-28 ASSESSMENT — EXTERNAL EXAM - LEFT EYE: OS_EXAM: NORMAL

## 2019-05-28 NOTE — PROGRESS NOTES
" Current Eye Medications:  None     Subjective:  Complete eye exam    Diabetic exam.   Lab Results   Component Value Date    A1C 7.4 12/10/2018    A1C 6.8 05/03/2018    A1C 6.6 11/01/2017    A1C 6.7 05/05/2017    A1C 6.3 10/31/2016     Patient complains that when first awakens, that her vision is very blurry and eyes are watery and it takes awhile to clear up.  Also complains that when driving with sunglasses on, that she has to be right on top of things before she is able to see clearly (right eye only).     Objective:  See Ophthalmology Exam.       Assessment:  Orquidea Stevens is a 78 year old female who presents with:   Encounter Diagnoses   Name Primary?     Type 2 diabetes mellitus without complication, without long-term current use of insulin (H) Negative diabetic retinopathy      Pseudophakia with Yag caps ou       Choroidal nevus of both eyes      Ptosis of both eyelids      Dermatochalasis of both upper eyelids        Plan:  Could use a gel tear at bedtime (like Refresh Liquigel or GenTeal Gel Tears) or artificial tears in the morning (like Refresh Optive, Systane Balance, TheraTears, or generic artificial tears are ok. Avoid \"get the red out\" drops).     Keep blood sugars and blood pressure under good control.    Glasses prescription given    Lucie Gordillo MD  [phone]      "

## 2019-05-28 NOTE — LETTER
"    5/28/2019         RE: Orquidea Stevens  [address] [facility] [address]      Dear Dr. Amos,    Thank you for referring your patient, Orquidea Stevens, to the [facility].     No signs of diabetic retinopathy in either eye today.  Please see a copy of my visit note below.     Current Eye Medications:  None     Subjective:  Complete eye exam    Diabetic exam.   Lab Results   Component Value Date    A1C 7.4 12/10/2018    A1C 6.8 05/03/2018    A1C 6.6 11/01/2017    A1C 6.7 05/05/2017    A1C 6.3 10/31/2016     Patient complains that when first awakens, that her vision is very blurry and eyes are watery and it takes awhile to clear up.  Also complains that when driving with sunglasses on, that she has to be right on top of things before she is able to see clearly (right eye only).     Objective:  See Ophthalmology Exam.       Assessment:  Orquidea Stevens is a 78 year old female who presents with:   Encounter Diagnoses   Name Primary?     Type 2 diabetes mellitus without complication, without long-term current use of insulin (H) Negative diabetic retinopathy      Pseudophakia with Yag caps ou       Choroidal nevus of both eyes      Ptosis of both eyelids      Dermatochalasis of both upper eyelids        Plan:  Could use a gel tear at bedtime (like Refresh Liquigel or GenTeal Gel Tears) or artificial tears in the morning (like Refresh Optive, Systane Balance, TheraTears, or generic artificial tears are ok. Avoid \"get the red out\" drops).     Keep blood sugars and blood pressure under good control.    Glasses prescription given    Lucie Gordillo MD  [phone]        Again, thank you for allowing me to participate in the care of your patient.        Sincerely,        Lucie Gordillo MD    "

## 2019-05-28 NOTE — PATIENT INSTRUCTIONS
"Could use a gel tear at bedtime (like Refresh Liquigel or GenTeal Gel Tears) or artificial tears in the morning (like Refresh Optive, Systane Balance, TheraTears, or generic artificial tears are ok. Avoid \"get the red out\" drops).     Keep blood sugars and blood pressure under good control.    Glasses prescription given    Lucie Gordillo MD  [phone]    Patient Education   Diabetes weakens the blood vessels all over the body, including the eyes. Damage to the blood vessels in the eyes can cause swelling or bleeding into part of the eye (called the retina). This is called diabetic retinopathy (JULIAN-tin-AH-puh-thee). If not treated, this disease can cause vision loss or blindness.   Symptoms may include blurred or distorted vision, but many people have no symptoms. It's important to see your eye doctor regularly to check for problems.   Early treatment and good control can help protect your vision. Here are the things you can do to help prevent vision loss:      1. Keep your blood sugar levels under tight control.      2. Bring high blood pressure under control.      3. No smoking.      4. Have yearly dilated eye exams.       "

## 2019-07-05 DIAGNOSIS — I10 HYPERTENSION GOAL BP (BLOOD PRESSURE) < 140/90: ICD-10-CM

## 2019-07-05 NOTE — LETTER
July 11, 2019    Orquidea Stevens  [address] [facility] [address]        Dear Orquidea,       We recently received a refill request for hydrALAZINE (APRESOLINE) 25 MG tablet.  We have refilled this for a one time 30 day supply only because you are due for a:    6 month BP recheck office visit      Please call at your earliest convenience so that there will not be a delay with your future refills.          Thank you,   Your [facility] Team/allyn  [phone]

## 2019-07-09 RX ORDER — HYDRALAZINE HYDROCHLORIDE 25 MG/1
TABLET, FILM COATED ORAL
Qty: 60 TABLET | Refills: 0 | Status: SHIPPED | OUTPATIENT
Start: 2019-07-09 | End: 2019-08-16

## 2019-07-09 NOTE — TELEPHONE ENCOUNTER
Medication is being filled for 1 time refill only due to:  Patient needs to be seen because due for 6 month office visit for BP. Mae Stevens RN

## 2019-07-10 DIAGNOSIS — E78.5 HYPERLIPIDEMIA LDL GOAL <100: ICD-10-CM

## 2019-07-10 DIAGNOSIS — E78.1 HYPERTRIGLYCERIDEMIA: ICD-10-CM

## 2019-07-11 NOTE — TELEPHONE ENCOUNTER
"Requested Prescriptions   Pending Prescriptions Disp Refills     atorvastatin (LIPITOR) 80 MG tablet [Pharmacy Med Name: Atorvastatin Calcium Oral Tablet 80 MG] 90 tablet 2     Sig: TAKE 1 TABLET BY MOUTH ONCE DAILY       Statins Protocol Failed - 7/10/2019 10:32 AM        Failed - LDL on file in past 12 months     Recent Labs   Lab Test 05/03/18  0946   LDL 44             Passed - No abnormal creatine kinase in past 12 months     No lab results found.             Passed - Recent (12 mo) or future (30 days) visit within the authorizing provider's specialty     Patient had office visit in the last 12 months or has a visit in the next 30 days with authorizing provider or within the authorizing provider's specialty.  See \"Patient Info\" tab in inbasket, or \"Choose Columns\" in Meds & Orders section of the refill encounter.              Passed - Medication is active on med list        Passed - Patient is age 18 or older        Passed - No active pregnancy on record        Passed - No positive pregnancy test in past 12 months        "

## 2019-07-12 RX ORDER — ATORVASTATIN CALCIUM 80 MG/1
TABLET, FILM COATED ORAL
Qty: 90 TABLET | Refills: 2 | OUTPATIENT
Start: 2019-07-12

## 2019-07-12 NOTE — TELEPHONE ENCOUNTER
Overdue for diabetes visit.   Have her schedule appt then give her enough meds to get her to appt.     Lisette Agustin

## 2019-07-15 DIAGNOSIS — E78.1 HYPERTRIGLYCERIDEMIA: ICD-10-CM

## 2019-07-15 DIAGNOSIS — E78.5 HYPERLIPIDEMIA LDL GOAL <100: ICD-10-CM

## 2019-07-15 RX ORDER — ATORVASTATIN CALCIUM 80 MG/1
80 TABLET, FILM COATED ORAL DAILY
Qty: 30 TABLET | Refills: 0 | Status: SHIPPED | OUTPATIENT
Start: 2019-07-15 | End: 2019-08-16

## 2019-07-16 RX ORDER — ATORVASTATIN CALCIUM 80 MG/1
TABLET, FILM COATED ORAL
Qty: 90 TABLET | Refills: 2 | OUTPATIENT
Start: 2019-07-16

## 2019-08-01 ENCOUNTER — OFFICE VISIT (OUTPATIENT)
Dept: FAMILY MEDICINE | Facility: CLINIC | Age: 79
End: 2019-08-01
Payer: COMMERCIAL

## 2019-08-01 ENCOUNTER — NURSE TRIAGE (OUTPATIENT)
Dept: FAMILY MEDICINE | Facility: CLINIC | Age: 79
End: 2019-08-01

## 2019-08-01 VITALS
HEART RATE: 72 BPM | DIASTOLIC BLOOD PRESSURE: 62 MMHG | OXYGEN SATURATION: 98 % | BODY MASS INDEX: 24.11 KG/M2 | SYSTOLIC BLOOD PRESSURE: 142 MMHG | TEMPERATURE: 98.3 F | RESPIRATION RATE: 16 BRPM | HEIGHT: 64 IN | WEIGHT: 141.2 LBS

## 2019-08-01 DIAGNOSIS — R51.9 ACUTE INTRACTABLE HEADACHE, UNSPECIFIED HEADACHE TYPE: ICD-10-CM

## 2019-08-01 DIAGNOSIS — I10 ESSENTIAL HYPERTENSION: Primary | ICD-10-CM

## 2019-08-01 DIAGNOSIS — E11.9 TYPE 2 DIABETES MELLITUS WITHOUT COMPLICATION, WITHOUT LONG-TERM CURRENT USE OF INSULIN (H): ICD-10-CM

## 2019-08-01 DIAGNOSIS — R47.9 SPEECH DYSFUNCTION: ICD-10-CM

## 2019-08-01 LAB
ALBUMIN SERPL-MCNC: 4.4 G/DL (ref 3.4–5)
ALP SERPL-CCNC: 80 U/L (ref 40–150)
ALT SERPL W P-5'-P-CCNC: 60 U/L (ref 0–50)
ANION GAP SERPL CALCULATED.3IONS-SCNC: 8 MMOL/L (ref 3–14)
AST SERPL W P-5'-P-CCNC: 30 U/L (ref 0–45)
BASOPHILS # BLD AUTO: 0 10E9/L (ref 0–0.2)
BASOPHILS NFR BLD AUTO: 0.2 %
BILIRUB SERPL-MCNC: 0.6 MG/DL (ref 0.2–1.3)
BUN SERPL-MCNC: 10 MG/DL (ref 7–30)
CALCIUM SERPL-MCNC: 10.3 MG/DL (ref 8.5–10.1)
CHLORIDE SERPL-SCNC: 103 MMOL/L (ref 94–109)
CO2 SERPL-SCNC: 28 MMOL/L (ref 20–32)
CREAT SERPL-MCNC: 0.56 MG/DL (ref 0.52–1.04)
DIFFERENTIAL METHOD BLD: NORMAL
EOSINOPHIL # BLD AUTO: 0.2 10E9/L (ref 0–0.7)
EOSINOPHIL NFR BLD AUTO: 1.5 %
ERYTHROCYTE [DISTWIDTH] IN BLOOD BY AUTOMATED COUNT: 13.1 % (ref 10–15)
GFR SERPL CREATININE-BSD FRML MDRD: 88 ML/MIN/{1.73_M2}
GLUCOSE SERPL-MCNC: 157 MG/DL (ref 70–99)
HCT VFR BLD AUTO: 46.3 % (ref 35–47)
HGB BLD-MCNC: 15.6 G/DL (ref 11.7–15.7)
LYMPHOCYTES # BLD AUTO: 2.8 10E9/L (ref 0.8–5.3)
LYMPHOCYTES NFR BLD AUTO: 28.9 %
MCH RBC QN AUTO: 31.2 PG (ref 26.5–33)
MCHC RBC AUTO-ENTMCNC: 33.7 G/DL (ref 31.5–36.5)
MCV RBC AUTO: 93 FL (ref 78–100)
MONOCYTES # BLD AUTO: 0.8 10E9/L (ref 0–1.3)
MONOCYTES NFR BLD AUTO: 8 %
NEUTROPHILS # BLD AUTO: 6 10E9/L (ref 1.6–8.3)
NEUTROPHILS NFR BLD AUTO: 61.4 %
PLATELET # BLD AUTO: 191 10E9/L (ref 150–450)
POTASSIUM SERPL-SCNC: 3.6 MMOL/L (ref 3.4–5.3)
PROT SERPL-MCNC: 8.5 G/DL (ref 6.8–8.8)
RBC # BLD AUTO: 5 10E12/L (ref 3.8–5.2)
SODIUM SERPL-SCNC: 139 MMOL/L (ref 133–144)
WBC # BLD AUTO: 9.8 10E9/L (ref 4–11)

## 2019-08-01 PROCEDURE — 99215 OFFICE O/P EST HI 40 MIN: CPT | Performed by: PHYSICIAN ASSISTANT

## 2019-08-01 PROCEDURE — 85025 COMPLETE CBC W/AUTO DIFF WBC: CPT | Performed by: PHYSICIAN ASSISTANT

## 2019-08-01 PROCEDURE — 93000 ELECTROCARDIOGRAM COMPLETE: CPT | Performed by: PHYSICIAN ASSISTANT

## 2019-08-01 PROCEDURE — 36415 COLL VENOUS BLD VENIPUNCTURE: CPT | Performed by: PHYSICIAN ASSISTANT

## 2019-08-01 PROCEDURE — 80053 COMPREHEN METABOLIC PANEL: CPT | Performed by: PHYSICIAN ASSISTANT

## 2019-08-01 RX ORDER — METOPROLOL SUCCINATE 25 MG/1
25 TABLET, EXTENDED RELEASE ORAL DAILY
Qty: 30 TABLET | Refills: 1 | Status: SHIPPED | OUTPATIENT
Start: 2019-08-01 | End: 2019-08-16

## 2019-08-01 ASSESSMENT — MIFFLIN-ST. JEOR: SCORE: 1096.51

## 2019-08-01 ASSESSMENT — PAIN SCALES - GENERAL: PAINLEVEL: NO PAIN (0)

## 2019-08-01 NOTE — TELEPHONE ENCOUNTER
"Called patient.      Patient is taking the hydralazine twice daily.  (RN documentation error below).    Patient took BP an hour ago and it was 181/125.  Patient took it now while on the phone with me and it's 173/114, P 73.  Patient admits her BP machine is many years old and has never been checked against the clinic reading.     Patient denies chest pain, shortness of breath, vision changes, tingling/ numbness.  She does have a slight headache now and her legs are more \"wobbly\" than they normally are.      This RN huddled with Ayana Gallardo NP and she would like patient to go to urgent care now to have her BP evaluated. Informed patient of this plan and she is happy with it.  She will go to the [facility] urgent care now.    Routing to Ayana Gallardo NP to co-sign above verbal order.    Coty HAQN, RN      "

## 2019-08-01 NOTE — TELEPHONE ENCOUNTER
"Patient triaged this patient with elevated BP.  See readings below.  Patient checked BP while on the phone with this RN and it is now 166/90, P 63.    Denies chest pain, shortness of breath, dizziness, tingling/ numbness, weakness, confusion, headache.  Patient has pending appointment with PCP in 2 weeks.     Patient is taking the following for BP:    -amlodipine 10 mg daily  - vasotec 20 mg twice daily  - hydralazine 25 mg daily  - hydrochlorothiazide 25 mg daily    Routing to provider to advise if patient should be seen or increase BP medication until seen by PCP in 2 weeks.     Additional Information    Negative: Sounds like a life-threatening emergency to the triager    Negative: Pregnant > 20 weeks and new hand or face swelling    Negative: Pregnant > 20 weeks and BP > 140/90    Negative: Systolic BP >= 160 OR Diastolic >= 100, and any cardiac or neurologic symptoms (e.g., chest pain, difficulty breathing, unsteady gait, blurred vision)    Negative: Patient sounds very sick or weak to the triager    Negative: BP Systolic BP >= 140 OR Diastolic >= 90 and postpartum < 4 weeks    Negative: Systolic BP >= 180 OR Diastolic >= 110, and missed most recent dose of blood pressure medication    Answer Assessment - Initial Assessment Questions  1. BLOOD PRESSURE: \"What is the blood pressure?\" \"Did you take at least two measurements 5 minutes apart?\"      BPs this AM have been 161/108 and 181/124, pulse 56.  This RN had patient check her BP while on phone with RN and it is now 166/90, pulse 63  2. ONSET: \"When did you take your blood pressure?\"      This morning AND while on phone with RN  3. HOW: \"How did you obtain the blood pressure?\" (e.g., visiting nurse, automatic home BP monitor)      Home BP machine   4. HISTORY: \"Do you have a history of high blood pressure?\"      yes  5. MEDICATIONS: \"Are you taking any medications for blood pressure?\" \"Have you missed any doses recently?\"      Patient missed BP medication 2 days " "ago  6. OTHER SYMPTOMS: \"Do you have any symptoms?\" (e.g., headache, chest pain, blurred vision, difficulty breathing, weakness)      Denies chest pain, shortness of breath, dizziness, tingling/ numbness, weakness, confusion, headache  7. PREGNANCY: \"Is there any chance you are pregnant?\" \"When was your last menstrual period?\"      no    Protocols used: HIGH BLOOD PRESSURE-A-OH    Coty Lima BSN, RN    "

## 2019-08-01 NOTE — TELEPHONE ENCOUNTER
Please clarify if she is taking Hydralazine 25 mg daily or BID as it is currently ordered for BID.  If she is taking it BID, I would recommend increasing to Hydralazine 25 mg TID.  Continue all other antihypertensives as prescribed.  Please have her call back tomorrow with updated BP readings.     JORDIN Haile

## 2019-08-01 NOTE — PROGRESS NOTES
Subjective     Orquidea Stevens is a 78 year old female who presents to clinic today for the following health issues:    HPI     Hypertension Follow-up      Do you check your blood pressure regularly outside of the clinic? Yes     Are you following a low salt diet? No    Are your blood pressures ever more than 140 on the top number (systolic) OR more   than 90 on the bottom number (diastolic), for example 140/90? Yes - pt saw 181/124, 161/108,166/90,181/125,173/114 this AM    Missed her meds two days ago but otherwise taking them as prescribed.    Mild frontal and rt temporal headache which started earlier today about 10 am.  Spouse first noticed 3 weeks ago patient mixing up words and not knowing what she is saying sometimes.  No chest pain or dyspnea.  Hasn't been checking blood sugars      Amount of exercise or physical activity: not regularly    Problems taking medications regularly: No    Medication side effects: slurred speech at night, weakness in legs, HA    Diet: low salt     patient is on high dose ASA, ACEI and statin    Allergies   Allergen Reactions     Atorvastatin Muscle Pain (Myalgia)     Detrol [Tolterodine Tartrate]      Unsure reaction     Latex Rash     Lovastatin Muscle Pain (Myalgia)     in legs     Simvastatin Muscle Pain (Myalgia)     in legs     Zocor [Hmg-Coa-R Inhibitors] Cramps       Patient Active Problem List   Diagnosis     IBS (irritable bowel syndrome)     Urinary incontinence     Fatty liver     CAD (coronary artery disease)     History of squamous cell carcinoma of skin     Squamous cell carcinoma     Allergic state     Actinic keratosis     Spastic neurogenic bladder     Rosacea     TYPE 2 DIABETES, HBA1C GOAL < 8%     HYPERLIPIDEMIA LDL GOAL <100     Advanced directives, counseling/discussion     GERD (gastroesophageal reflux disease)     Essential hypertension     Atrophic vaginitis     Dermatochalasis     Ptosis of eyelid. OU     Restless leg syndrome     Hypertriglyceridemia      Type 2 diabetes mellitus without complication, without long-term current use of insulin (H)     Tear of medial cartilage or meniscus of knee, current     Pure hypercholesterolemia     Pseudophakia of both eyes       Past Medical History:   Diagnosis Date     Actinic keratosis      Actinic keratosis      Allergies      Arthritis      CAD (coronary artery disease)      Cataract      Diabetes mellitus type II      Fatty liver      GERD (gastroesophageal reflux disease)      HH (hiatus hernia)      Hyperlipidaemia LDL goal <100      Hypertension goal BP (blood pressure) < 140/90      IBS (irritable bowel syndrome)      Rosacea      Spastic neurogenic bladder      Squamous cell carcinoma 10/27/2008    R. Nasal Sidewall     Type 2 diabetes, HbA1C goal < 8% (H) 10/31/2010     Urinary incontinence      Vitamin D deficiency          Current Outpatient Medications on File Prior to Visit:  alcohol swab prep pads Use to swab area of injection/robe as directed.   amLODIPine (NORVASC) 10 MG tablet Take 1 tablet (10 mg) by mouth daily Will call when needs refills   aspirin 325 MG EC tablet take 1 Tab by mouth daily.   atorvastatin (LIPITOR) 80 MG tablet Take 1 tablet (80 mg) by mouth daily   blood glucose (ACCU-CHEK SOFTCLIX) lancing device Lancing device to be used with lancets.   blood glucose (NO BRAND SPECIFIED) test strip Use to test blood sugar 2 times daily or as directed.   blood glucose monitoring (ACCU-CHEK SUSY PLUS) meter device kit Use to test blood sugar 2 times daily or as directed.   CALCIUM 500 OR 2 daily   CRANBERRY Two tablets daily   enalapril (VASOTEC) 20 MG tablet Take 1 tablet (20 mg) by mouth 2 times daily Will call when needs refill   gabapentin (NEURONTIN) 300 MG capsule TAKE 3 CAPSULES BY MOUTH TWICE DAILY.   hydrALAZINE (APRESOLINE) 25 MG tablet TAKE 1 TABLET BY MOUTH TWICE DAILY   hydrochlorothiazide (HYDRODIURIL) 25 MG tablet Take 1 tablet (25 mg) by mouth daily Will call when needs refill  "  Lactobacillus (ACIDOPHILUS) CAPS Take 2 capsules by mouth daily.   Lutein 20 MG CAPS Take  by mouth.   metFORMIN (GLUCOPHAGE) 500 MG tablet Take 1 tablet (500 mg) by mouth 2 times daily (with meals) Will call when needs refill   oxybutynin (DITROPAN) 5 MG tablet Take 2 tablets (10 mg) by mouth daily   TURMERIC PO 1 cap daily   VITAMIN D 1000 UNIT OR CAPS 1 CAPSULE DAILY     No current facility-administered medications on file prior to visit.     Social History     Tobacco Use     Smoking status: Never Smoker     Smokeless tobacco: Never Used     Tobacco comment: Lives in smoke free household   Substance Use Topics     Alcohol use: No     Drug use: No       Family History   Problem Relation Age of Onset     Respiratory Mother         COPD     Hypertension Mother      Respiratory Father         COPD     Diabetes Father      Heart Disease Father      Heart Disease Brother      Diabetes Maternal Grandfather      Thyroid Disease Daughter      Other Cancer Daughter 57        stage 4 rectal cancer     Prostate Cancer Brother      Cancer No family hx of      Cerebrovascular Disease No family hx of      Glaucoma No family hx of      Macular Degeneration No family hx of        ROS:  General: negative for fever  ENT: NO throat pain  Resp: negative for chest pain   CV: negative for chest pain  SKIN: hx SCCC  : hx incontinence  Neurologic:+ for headache  MS: No joint pain  HEM/LYMPH: no lymph node pain  ENDO: hx DM      OBJECTIVE:  BP (!) 142/62 (BP Location: Right arm, Patient Position: Chair, Cuff Size: Adult Regular)   Pulse 72   Temp 98.3  F (36.8  C) (Oral)   Resp 16   Ht 1.619 m (5' 3.75\")   Wt 64 kg (141 lb 3.2 oz)   SpO2 98%   BMI 24.43 kg/m     General:   awake, alert, and cooperative.  NAD.   Head: Normocephalic, atraumatic.  Eyes: Conjunctiva clear, non icteric. PERRLA. EOMI.  Nose: No lesions.  Mouth / Throat: Normal dentition.  No oral lesions. Pharynx non erythematous, tonsils without hypertrophy. Tongue " "midline.  Neck: Supple. IRIS grossly.   Heart: Regular rate and rhythm. No murmur.  Lungs: Chest is clear; no wheezes or rales.   Neuro: Alert and oriented - normal speech. Cranial nerves intact.  Normal strength. Using extremities freely.    EKG WNL  Ct- nonacute, old fracture, mucoid retention cyst    ASSESSMENT:well appearing, benign exam, intermittent speech issues (none appreciated in clinic today) nonacute over the past 3 weeks, acute headache is mild and atypical w/o any deficiencies.  \"Code stroke\" not indicated and patient amenable to outpt workup which will be done promptly with stat CT this afternoon.   She is already pretty well medical optimized for stroke prevention.       ICD-10-CM    1. Essential hypertension I10 EKG 12-lead complete w/read - Clinics     metoprolol succinate ER (TOPROL-XL) 25 MG 24 hr tablet     Comprehensive metabolic panel     CBC with platelets differential   2. Acute intractable headache, unspecified headache type R51 CT Head w/o Contrast     NEUROLOGY ADULT REFERRAL   3. Speech dysfunction R47.89 CT Head w/o Contrast     NEUROLOGY ADULT REFERRAL   4. Type 2 diabetes mellitus without complication, without long-term current use of insulin (H) E11.9        Per [facility], [employer] requires PA for CT.  [facility] admins their PAs- auth approved number [id number]    PLAN:   Follow up as above and 2 weeks for BP recheck:    Advised about symptoms which might herald more serious problems.          "

## 2019-08-01 NOTE — LETTER
[address]  [address]  [phone]    August 2, 2019      Orquidea Stevens  [address] [facility] [address]          Ms. Stevens,     All of your labs were basically normal.     Please contact the clinic if you have additional questions or if symptoms persist.  Thank you.     Sincerely,     Micky Ribeiro

## 2019-08-01 NOTE — PATIENT INSTRUCTIONS
Patient Education     Discharge Instructions for High Blood Pressure (Hypertension)  You have been diagnosed with high blood pressure (also called hypertension). This means the force of blood against your artery walls is too strong. It also means your heart is working hard to move blood. High blood pressure usually has no symptoms, but over time, it can cause serious health problems. High blood pressure raises your risk for heart attack, stroke, heart failure, kidney disease, and blindness. With help from your doctor, you can manage your blood pressure and protect your health.  Blood pressure measurements are given as 2 numbers. Systolic blood pressure is the upper number. This is the pressure when the heart contracts. Diastolic blood pressure is the lower number. This is the pressure when the heart relaxes between beats.  Blood pressure is categorized as normal, elevated, or stage 1 or stage 2 high blood pressure:    Normal blood pressure is systolic of less than 120 and diastolic of less than 80 (120/80)    Elevated blood pressure is systolic of 120 to 129 and diastolic less than 80    Stage 1 high blood pressure is systolic is 130 to 139 or diastolic between 80 to 89    Stage 2 high blood pressure is when systolic is 140 or higher or the diastolic is 90 or higher  Taking medicine    Learn to take your own blood pressure. Keep a record of your results. Ask your doctor which readings mean that you need medical attention.    Take your blood pressure medicine exactly as directed. Don t skip doses. Missing doses can cause your blood pressure to get out of control.    If you do miss a dose (or doses) check with your healthcare provider about what to do.    Don't take medicines that contain heart stimulants, including over-the-counter medicines. Check for warnings about high blood pressure on the label. Ask the pharmacist before purchasing something you haven't used before    Check with your doctor or pharmacist  "before taking a decongestant. Some decongestants can worsen high blood pressure.  Lifestyle changes    Maintain a healthy weight. Get help to lose any extra pounds.    Cut back on salt.  ? Limit canned, dried, packaged, and fast foods.  ? Don t add salt to your food at the table.  ? Season foods with herbs instead of salt when you cook.  ? Request no added salt when you go to a restaurant.  ? The American Heart Association (AHA) says the \"ideal\" amount of sodium is no more than 1,500 mg a day.  But because Americans eat so much salt, you can make a positive change by cutting back to even 2,400 mg of sodium a day.     Follow the DASH (Dietary Approaches to Stop Hypertension) eating plan. This plan recommends vegetables, fruits, whole gains, and other heart healthy foods.    Begin an exercise program. Ask your healthcare provider how to get started. The AHA recommends aerobic exercise 3 to 4 times a week for an average of 40 minutes at a time, with your provider's approval. Simple activities like walking or gardening can help.    Break the smoking habit. Enroll in a stop-smoking program to improve your chances of success. Ask your healthcare provider about programs and medicines to help you stop smoking.    Limit drinks that contain caffeine such as coffee, black or green tea, and cola to 2 per day.    Never take stimulants such as amphetamines or cocaine. These drugs can be deadly for someone with high blood pressure.    Control your stress. Learn ways to manage stress.    Limit alcohol to no more than 1 drink a day for women and 2 drinks a day for men.  Follow-up care  Make a follow-up appointment as directed.  When to call your healthcare provider  Call your healthcare provider right away if you have any of the following:    Chest pain or shortness of breath (call 911)    Moderate to severe headache    Weakness in the muscles of your face, arms, or legs    Trouble speaking    Extreme " drowsiness    Confusion    Fainting or dizziness    Pulsating or rushing sound in your ears    Unexplained nosebleed    Weakness, tingling, or numbness of your face, arms, or legs    Change in vision    Blood pressure measured at home that is greater than 180/110   Date Last Reviewed: 4/27/2016 2000-2018 [employer]. [address] [place] [address]. All rights reserved. This information is not intended as a substitute for professional medical care. Always follow your healthcare professional's instructions.           Patient Education      * Headache (Unspecified)    The cause of your headache today is not clear, but it does not appear to be the sign of any serious illness.  Under stress, some people tense the muscles of their shoulder, neck and scalp without knowing it. If this condition lasts long enough, a TENSION HEADACHE can occur.  A MIGRAINE HEADACHE is caused by changes in blood flow to the brain. It can be mild or severe. A migraine attack may be triggered by emotional stress, hormone changes during the menstrual cycle, oral contraceptives, alcohol use, certain foods containing tyramine, eye strain, weather changes, missing meals, lack of sleep or oversleeping.  Other causes of headache include a viral illness, sinus, ear or throat infection, dental pain and TMJ (jaw joint) pain.  Home Care:    If you were given pain medicine for this headache, do not drive yourself home. Arrange for a ride, instead. When you get home, try to sleep. You should feel much better when you wake up.    If you are having nausea or vomiting, follow a light diet until your headache is relieved.    If you have a migraine type headache, use sunglasses when in the daylight or around bright indoor lighting until symptoms improve. Bright glaring light can worsen this kind of headache.  Follow Up  with your doctor if the headache is not better within the next 24 hours. If you have frequent headaches you should  discuss a treatment plan with your primary care doctor. By being aware of the earliest signs of headache, and starting treatment right away, you may be able to stop the pain yourself.  Get Prompt Medical Attention  if any of the following occur:    Worsening of your head pain or no improvement within 24 hours    Repeated vomiting (unable to keep liquids down)    Fever over 101 F (38.3 C)    Stiff neck    Extreme drowsiness, confusion or fainting    Weakness of an arm or leg or one side of the face    Difficulty with speech or vision    7975-9913 [employer]. [address] [place] [address]. All rights reserved. This information is not intended as a substitute for professional medical care. Always follow your healthcare professional's instructions.  This information has been modified by your health care provider with permission from the publisher.  Modifications clinically reviewed by Dr. Derick Aiken on 7/20/18.

## 2019-08-02 NOTE — RESULT ENCOUNTER NOTE
Please send letter if doesn't check mychart.  Dale Ribeiro PA-C    Ms. Stevens,    All of your labs were basically normal.    Please contact the clinic if you have additional questions or if symptoms persist.  Thank you.    Sincerely,    Micky Ribeiro

## 2019-08-12 NOTE — PROGRESS NOTES
Subjective     Orquidea Stevens is a 78 year old female who presents to clinic today for the following health issues:    HPI   Diabetes Follow-up      How often are you checking your blood sugar? A few times a month -130    What time of day are you checking your blood sugars (select all that apply)?  Before meals    Have you had any blood sugars above 200?  No    Have you had any blood sugars below 70?  No    What symptoms do you notice when your blood sugar is low?  None    What concerns do you have today about your diabetes? None     Do you have any of these symptoms? (Select all that apply)  No numbness or tingling in feet.  No redness, sores or blisters on feet.  No complaints of excessive thirst.  No reports of blurry vision.  No significant changes to weight.     Have you had a diabetic eye exam in the last 12 months? Yes- Date of last eye exam:      Diabetes Management Resources    Hyperlipidemia Follow-Up      Are you having any of the following symptoms? (Select all that apply)  No complaints of shortness of breath, chest pain or pressure.  No increased sweating or nausea with activity.  No left-sided neck or arm pain.  No complaints of pain in calves when walking 1-2 blocks.    Are you regularly taking any medication or supplement to lower your cholesterol?   yes    Are you having muscle aches or other side effects that you think could be caused by your cholesterol lowering medication?  Yes- legs    Hypertension Follow-up      Do you check your blood pressure regularly outside of the clinic? Yes     Are you following a low salt diet? Yes    Are your blood pressures ever more than 140 on the top number (systolic) OR more   than 90 on the bottom number (diastolic), for example 140/90? Yes    BP Readings from Last 2 Encounters:   08/16/19 121/51   08/01/19 (!) 142/62     Hemoglobin A1C (%)   Date Value   12/10/2018 7.4 (H)   05/03/2018 6.8 (H)     LDL Cholesterol Calculated (mg/dL)   Date Value  "  05/03/2018 44   05/05/2017 51         How many servings of fruits and vegetables do you eat daily?  2-3    On average, how many sweetened beverages do you drink each day (soda, juice, sweet tea, etc)?   0-diet pop twice a week  How many days per week do you miss taking your medication? 7    What makes it hard for you to take your medications?  nothing     PT with diabetes well controlled on medication  Is on statin/asa/ace  BP at goal  Needs refill of gabapentin as well for her restless leg    Has h/o CAD with one stent placement over 20 years ago  Has followed with cardiology . No symptoms at this time    Had recent episode of elevated BP associated with headache  She was evaluated and started on metoprolol. She is tolerating that well  Her pulse today is in 50s but she is asymptomatic with this    She has no concerns at this time    Reviewed and updated as needed this visit by Provider         Review of Systems   ROS COMP: Constitutional, HEENT, cardiovascular, pulmonary, gi and gu systems are negative, except as otherwise noted.      Objective    /51   Pulse 57   Temp 98.6  F (37  C) (Oral)   Resp 20   Ht 1.619 m (5' 3.75\")   Wt 64.9 kg (143 lb)   SpO2 98%   BMI 24.74 kg/m    Body mass index is 24.74 kg/m .  Physical Exam   GENERAL: healthy, alert and no distress  NECK: no adenopathy, no asymmetry, masses, or scars and thyroid normal to palpation  RESP: lungs clear to auscultation - no rales, rhonchi or wheezes  CV: regular rate and rhythm, normal S1 S2, no S3 or S4, no murmur, click or rub, no peripheral edema and peripheral pulses strong  ABDOMEN: soft, nontender, no hepatosplenomegaly, no masses and bowel sounds normal  MS: no gross musculoskeletal defects noted, no edema  Diabetic foot exam: normal DP and PT pulses, no trophic changes or ulcerative lesions and normal sensory exam    Diagnostic Test Results:  Labs reviewed in Epic        Assessment & Plan     1. Type 2 diabetes mellitus without " complication, without long-term current use of insulin (H)  At goal on meds, follow-up in 6 months  - PAF COMPLETED; Future  - Lipid panel reflex to direct LDL Fasting; Future  - Albumin Random Urine Quantitative with Creat Ratio; Future  - HEMOGLOBIN A1C; Future  - FOOT EXAM  - metFORMIN (GLUCOPHAGE) 500 MG tablet; Take 1 tablet (500 mg) by mouth 2 times daily (with meals) Will call when needs refill  Dispense: 180 tablet; Refill: 1    2. Coronary artery disease involving native coronary artery of native heart without angina pectoris  Stable on statin, aspirin, ace    3. Hypertension goal BP (blood pressure) < 140/90  At goal on meds  - amLODIPine (NORVASC) 10 MG tablet; Take 1 tablet (10 mg) by mouth daily Will call when needs refills  Dispense: 90 tablet; Refill: 3  - enalapril (VASOTEC) 20 MG tablet; Take 1 tablet (20 mg) by mouth 2 times daily Will call when needs refill  Dispense: 180 tablet; Refill: 1  - hydrALAZINE (APRESOLINE) 25 MG tablet; Take 1 tablet (25 mg) by mouth 2 times daily  Dispense: 180 tablet; Refill: 1  - hydrochlorothiazide (HYDRODIURIL) 25 MG tablet; Take 1 tablet (25 mg) by mouth daily Will call when needs refill  Dispense: 90 tablet; Refill: 1  - metoprolol succinate ER (TOPROL-XL) 25 MG 24 hr tablet; Take 1 tablet (25 mg) by mouth daily  Dispense: 90 tablet; Refill: 1    4. Hyperlipidemia LDL goal <100  On statin  - atorvastatin (LIPITOR) 80 MG tablet; Take 1 tablet (80 mg) by mouth daily  Dispense: 90 tablet; Refill: 3    5. Hypertriglyceridemia  On statin  - atorvastatin (LIPITOR) 80 MG tablet; Take 1 tablet (80 mg) by mouth daily  Dispense: 90 tablet; Refill: 3    6. Restless leg syndrome  Refill as is working well  - gabapentin (NEURONTIN) 300 MG capsule; TAKE 3 CAPSULES BY MOUTH TWICE DAILY.  Dispense: 540 capsule; Refill: 1           No follow-ups on file.    Lisette Agustin MD  [facility]

## 2019-08-16 ENCOUNTER — OFFICE VISIT (OUTPATIENT)
Dept: FAMILY MEDICINE | Facility: CLINIC | Age: 79
End: 2019-08-16
Payer: COMMERCIAL

## 2019-08-16 VITALS
HEART RATE: 57 BPM | DIASTOLIC BLOOD PRESSURE: 51 MMHG | SYSTOLIC BLOOD PRESSURE: 121 MMHG | RESPIRATION RATE: 20 BRPM | TEMPERATURE: 98.6 F | WEIGHT: 143 LBS | BODY MASS INDEX: 24.41 KG/M2 | HEIGHT: 64 IN | OXYGEN SATURATION: 98 %

## 2019-08-16 DIAGNOSIS — G25.81 RESTLESS LEG SYNDROME: ICD-10-CM

## 2019-08-16 DIAGNOSIS — I25.10 CORONARY ARTERY DISEASE INVOLVING NATIVE CORONARY ARTERY OF NATIVE HEART WITHOUT ANGINA PECTORIS: ICD-10-CM

## 2019-08-16 DIAGNOSIS — E11.9 TYPE 2 DIABETES MELLITUS WITHOUT COMPLICATION, WITHOUT LONG-TERM CURRENT USE OF INSULIN (H): Primary | ICD-10-CM

## 2019-08-16 DIAGNOSIS — E78.1 HYPERTRIGLYCERIDEMIA: ICD-10-CM

## 2019-08-16 DIAGNOSIS — E78.5 HYPERLIPIDEMIA LDL GOAL <100: ICD-10-CM

## 2019-08-16 DIAGNOSIS — I10 HYPERTENSION GOAL BP (BLOOD PRESSURE) < 140/90: ICD-10-CM

## 2019-08-16 PROCEDURE — 99207 C FOOT EXAM  NO CHARGE: CPT | Performed by: FAMILY MEDICINE

## 2019-08-16 PROCEDURE — 99214 OFFICE O/P EST MOD 30 MIN: CPT | Performed by: FAMILY MEDICINE

## 2019-08-16 RX ORDER — ENALAPRIL MALEATE 20 MG/1
20 TABLET ORAL 2 TIMES DAILY
Qty: 180 TABLET | Refills: 1 | Status: SHIPPED | OUTPATIENT
Start: 2019-08-16 | End: 2020-03-02

## 2019-08-16 RX ORDER — METOPROLOL SUCCINATE 25 MG/1
25 TABLET, EXTENDED RELEASE ORAL DAILY
Qty: 90 TABLET | Refills: 1 | Status: SHIPPED | OUTPATIENT
Start: 2019-08-16 | End: 2020-03-02

## 2019-08-16 RX ORDER — HYDROCHLOROTHIAZIDE 25 MG/1
25 TABLET ORAL DAILY
Qty: 90 TABLET | Refills: 1 | Status: SHIPPED | OUTPATIENT
Start: 2019-08-16 | End: 2020-03-02

## 2019-08-16 RX ORDER — HYDRALAZINE HYDROCHLORIDE 25 MG/1
25 TABLET, FILM COATED ORAL 2 TIMES DAILY
Qty: 180 TABLET | Refills: 1 | Status: SHIPPED | OUTPATIENT
Start: 2019-08-16 | End: 2020-03-02

## 2019-08-16 RX ORDER — AMLODIPINE BESYLATE 10 MG/1
10 TABLET ORAL DAILY
Qty: 90 TABLET | Refills: 3 | Status: SHIPPED | OUTPATIENT
Start: 2019-08-16 | End: 2020-10-16

## 2019-08-16 RX ORDER — GABAPENTIN 300 MG/1
CAPSULE ORAL
Qty: 540 CAPSULE | Refills: 1 | Status: SHIPPED | OUTPATIENT
Start: 2019-08-16 | End: 2020-03-02

## 2019-08-16 RX ORDER — ATORVASTATIN CALCIUM 80 MG/1
80 TABLET, FILM COATED ORAL DAILY
Qty: 90 TABLET | Refills: 3 | Status: SHIPPED | OUTPATIENT
Start: 2019-08-16 | End: 2020-09-01

## 2019-08-16 ASSESSMENT — MIFFLIN-ST. JEOR: SCORE: 1104.67

## 2019-08-16 NOTE — NURSING NOTE
"Chief Complaint   Patient presents with     Diabetes       Initial /51   Pulse 57   Temp 98.6  F (37  C) (Oral)   Resp 20   Ht 1.619 m (5' 3.75\")   Wt 64.9 kg (143 lb)   SpO2 98%   BMI 24.74 kg/m   Estimated body mass index is 24.74 kg/m  as calculated from the following:    Height as of this encounter: 1.619 m (5' 3.75\").    Weight as of this encounter: 64.9 kg (143 lb).    Lizeth Ibrahim, CMA    "
Cardiac

## 2019-08-21 DIAGNOSIS — E11.9 TYPE 2 DIABETES MELLITUS WITHOUT COMPLICATION, WITHOUT LONG-TERM CURRENT USE OF INSULIN (H): ICD-10-CM

## 2019-08-21 LAB
CHOLEST SERPL-MCNC: 130 MG/DL
HBA1C MFR BLD: 7.9 % (ref 0–5.6)
HDLC SERPL-MCNC: 51 MG/DL
LDLC SERPL CALC-MCNC: 49 MG/DL
NONHDLC SERPL-MCNC: 79 MG/DL
TRIGL SERPL-MCNC: 148 MG/DL

## 2019-08-21 PROCEDURE — 36415 COLL VENOUS BLD VENIPUNCTURE: CPT | Performed by: FAMILY MEDICINE

## 2019-08-21 PROCEDURE — 80061 LIPID PANEL: CPT | Performed by: FAMILY MEDICINE

## 2019-08-21 PROCEDURE — 83036 HEMOGLOBIN GLYCOSYLATED A1C: CPT | Performed by: FAMILY MEDICINE

## 2019-08-21 NOTE — LETTER
August 22, 2019    Orquidea Stevens  [address] [facility] [address]            Dear Orquidea,    Your cholesterol looks great  Your diabetes is still at goal but is slowly increasing;  Please follow-up in 6 months.    If you have any questions or concerns, please call myself or my nurse at [phone].    Sincerely,    Lisette Agustin MD/allyn    Results for orders placed or performed in visit on 08/21/19   HEMOGLOBIN A1C   Result Value Ref Range    Hemoglobin A1C 7.9 (H) 0 - 5.6 %   Lipid panel reflex to direct LDL Fasting   Result Value Ref Range    Cholesterol 130 <200 mg/dL    Triglycerides 148 <150 mg/dL    HDL Cholesterol 51 >49 mg/dL    LDL Cholesterol Calculated 49 <100 mg/dL    Non HDL Cholesterol 79 <130 mg/dL

## 2019-08-27 DIAGNOSIS — E11.9 TYPE 2 DIABETES MELLITUS WITHOUT COMPLICATION, WITHOUT LONG-TERM CURRENT USE OF INSULIN (H): ICD-10-CM

## 2019-08-28 RX ORDER — METFORMIN HCL 500 MG
TABLET, EXTENDED RELEASE 24 HR ORAL
Qty: 180 TABLET | Refills: 0 | OUTPATIENT
Start: 2019-08-28

## 2019-10-07 ENCOUNTER — TELEPHONE (OUTPATIENT)
Dept: FAMILY MEDICINE | Facility: CLINIC | Age: 79
End: 2019-10-07

## 2019-10-07 DIAGNOSIS — E11.9 TYPE 2 DIABETES, HBA1C GOAL < 8% (H): Primary | ICD-10-CM

## 2019-10-07 RX ORDER — METFORMIN HCL 500 MG
500 TABLET, EXTENDED RELEASE 24 HR ORAL 2 TIMES DAILY WITH MEALS
Qty: 180 TABLET | Refills: 1 | Status: SHIPPED | OUTPATIENT
Start: 2019-10-07 | End: 2020-03-02

## 2019-10-07 NOTE — TELEPHONE ENCOUNTER
Patient thinks she should be on the ER form of metformin.  Is this correct?    Coty Lima BSN, RN

## 2019-10-07 NOTE — TELEPHONE ENCOUNTER
Reason for Call:  Medication or medication refill:    Do you use a [facility]?  Name of the pharmacy and phone number for the current request:  [facility] [phone]    Name of the medication requested: metformin ER but was filled just metformin please send a script for the ER metformin    Other request:     Can we leave a detailed message on this number? YES    Phone number patient can be reached at: Cell number on file:    Telephone Information:   Home [phone]       Best Time:     Call taken on 10/7/2019 at 3:07 PM by Rachel Hernandez

## 2019-11-18 ENCOUNTER — TRANSFERRED RECORDS (OUTPATIENT)
Dept: HEALTH INFORMATION MANAGEMENT | Facility: CLINIC | Age: 79
End: 2019-11-18

## 2019-11-29 ENCOUNTER — TELEPHONE (OUTPATIENT)
Dept: FAMILY MEDICINE | Facility: CLINIC | Age: 79
End: 2019-11-29

## 2019-11-29 NOTE — TELEPHONE ENCOUNTER
Please abstract the following data from this visit with this patient into the appropriate field in Epic:    Tests that can be patient reported without a hard copy:    Mammogram done on this date: 11/18/19 (approximately), by this group: the breast center, results were Negative.     Other Tests found in the patient's chart through Chart Review/Care Everywhere:        Note to Abstraction: If this section is blank, no results were found via Chart Review/Care Everywhere.

## 2019-12-24 ENCOUNTER — DOCUMENTATION ONLY (OUTPATIENT)
Dept: OPHTHALMOLOGY | Facility: CLINIC | Age: 79
End: 2019-12-24

## 2020-02-11 ENCOUNTER — TELEPHONE (OUTPATIENT)
Dept: FAMILY MEDICINE | Facility: CLINIC | Age: 80
End: 2020-02-11

## 2020-02-11 DIAGNOSIS — K21.9 GASTROESOPHAGEAL REFLUX DISEASE, ESOPHAGITIS PRESENCE NOT SPECIFIED: Primary | ICD-10-CM

## 2020-02-11 NOTE — TELEPHONE ENCOUNTER
Patient calling states has a history of acid reflux, would like a script for prilosec called into pharmacy.

## 2020-02-11 NOTE — TELEPHONE ENCOUNTER
She is due for diabetes visit  Have her schedule an appt for that and I can give her a rx for the prilosec than    Lisette Agustin MD

## 2020-02-11 NOTE — TELEPHONE ENCOUNTER
I spoke to the patient and I scheduled an appointment for her on 3/2/20.  The patient is requesting a refill for 90 days.  Please advise.  Mary Dietz,

## 2020-02-17 DIAGNOSIS — R32 URINARY INCONTINENCE, UNSPECIFIED TYPE: ICD-10-CM

## 2020-02-18 RX ORDER — OXYBUTYNIN CHLORIDE 5 MG/1
TABLET ORAL
Qty: 180 TABLET | Refills: 0 | Status: SHIPPED | OUTPATIENT
Start: 2020-02-18 | End: 2020-02-29

## 2020-02-18 NOTE — TELEPHONE ENCOUNTER
"Requested Prescriptions   Signed Prescriptions Disp Refills    OXYBUTYNIN 5 MG PO tablet 180 tablet 0     Sig: TAKE 2 TABLETS BY MOUTH EVERY DAY       Muscarinic Antagonists (Urinary Incontinence Agents) Passed - 2/17/2020 10:51 AM        Passed - Recent (12 mo) or future (30 days) visit within the authorizing provider's specialty     Patient has had an office visit with the authorizing provider or a provider within the authorizing providers department within the previous 12 mos or has a future within next 30 days. See \"Patient Info\" tab in inbasket, or \"Choose Columns\" in Meds & Orders section of the refill encounter.              Passed - Medication is Oxybutynin and patient is 5 years of age or older        Passed - Patient does not have a diagnosis of glaucoma on the problem list     If glaucoma diagnosis is new, refer refill to physician.          Passed - Medication is active on med list        Passed - Patient is 18 years of age or older          "

## 2020-02-27 NOTE — PROGRESS NOTES
Subjective     Orquidea Stevens is a 79 year old female who presents to clinic today for the following health issues:    HPI   Diabetes Follow-up    How often are you checking your blood sugar? A few times a week (143)   What time of day are you checking your blood sugars (select all that apply)?  After meals  Have you had any blood sugars above 200?  No  Have you had any blood sugars below 70?  No    What symptoms do you notice when your blood sugar is low?  None    What concerns do you have today about your diabetes? None     Do you have any of these symptoms? (Select all that apply)  Numbness in feet and Blurry vision due in may or June for eye exam        Hyperlipidemia Follow-Up      Are you regularly taking any medication or supplement to lower your cholesterol?   Yes- atorvastatin    Are you having muscle aches or other side effects that you think could be caused by your cholesterol lowering medication?  No    Hypertension Follow-up      Do you check your blood pressure regularly outside of the clinic? Yes     Are you following a low salt diet? Yes    Are your blood pressures ever more than 140 on the top number (systolic) OR more   than 90 on the bottom number (diastolic), for example 140/90? Yes    BP Readings from Last 2 Encounters:   03/02/20 128/68   08/16/19 121/51     Hemoglobin A1C (%)   Date Value   03/02/2020 7.5 (H)   08/21/2019 7.9 (H)     LDL Cholesterol Calculated (mg/dL)   Date Value   08/21/2019 49   05/03/2018 44       Pt with diabetes at goal  On statin/aspirin and acei  UTD with eye doctor.   No concerns per pt    Pt with HTN, well controlled on meds  Pt on statin and LDL at goal    Pt with incontinence controlled on ditropan  Pt with gerd controlled with prilosec  PT with restless leg controlled with gabpentin            Reviewed and updated as needed this visit by Provider         Review of Systems   ROS COMP: Constitutional, HEENT, cardiovascular, pulmonary, gi and gu systems are  "negative, except as otherwise noted.      Objective    /68   Pulse 59   Temp 98.6  F (37  C) (Oral)   Ht 1.619 m (5' 3.75\")   Wt 64 kg (141 lb)   BMI 24.39 kg/m    Body mass index is 24.39 kg/m .  Physical Exam   GENERAL: healthy, alert and no distress  EYES: Eyes grossly normal to inspection, PERRL and conjunctivae and sclerae normal  HENT: ear canals and TM's normal, nose and mouth without ulcers or lesions  NECK: no adenopathy, no asymmetry, masses, or scars and thyroid normal to palpation  RESP: lungs clear to auscultation - no rales, rhonchi or wheezes  CV: regular rate and rhythm, normal S1 S2, no S3 or S4, no murmur, click or rub, no peripheral edema and peripheral pulses strong  ABDOMEN: soft, nontender, no hepatosplenomegaly, no masses and bowel sounds normal  MS: no gross musculoskeletal defects noted, no edema  PSYCH: mentation appears normal, affect normal/bright    Diagnostic Test Results:  Labs reviewed in Epic        Assessment & Plan     1. Encounter for Medicare annual wellness exam  See below    2. Type 2 diabetes mellitus without complication, without long-term current use of insulin (H)  At goal on meds, follow-up in 6 months  - Hemoglobin A1c  - Basic metabolic panel  - metFORMIN (GLUCOPHAGE-XR) 500 MG 24 hr tablet; Take 1 tablet (500 mg) by mouth 2 times daily (with meals)  Dispense: 180 tablet; Refill: 1  - Albumin Random Urine Quantitative with Creat Ratio    3. Hypertension goal BP (blood pressure) < 140/90  At goal on meds  - enalapril (VASOTEC) 20 MG tablet; Take 1 tablet (20 mg) by mouth 2 times daily Will call when needs refill  Dispense: 180 tablet; Refill: 1  - hydrALAZINE (APRESOLINE) 25 MG tablet; Take 1 tablet (25 mg) by mouth 2 times daily  Dispense: 180 tablet; Refill: 1  - hydrochlorothiazide (HYDRODIURIL) 25 MG tablet; Take 1 tablet (25 mg) by mouth daily Will call when needs refill  Dispense: 90 tablet; Refill: 1  - metoprolol succinate ER (TOPROL-XL) 25 MG 24 hr " "tablet; Take 1 tablet (25 mg) by mouth daily  Dispense: 90 tablet; Refill: 1    4. Restless leg syndrome  Working well  - gabapentin (NEURONTIN) 300 MG capsule; TAKE 3 CAPSULES BY MOUTH TWICE DAILY.  Dispense: 540 capsule; Refill: 1    5. Gastroesophageal reflux disease, esophagitis presence not specified  Use as needed  - omeprazole (PRILOSEC) 20 MG DR capsule; Take 1 capsule (20 mg) by mouth daily  Dispense: 90 capsule; Refill: 0    6. Urinary incontinence, unspecified type  Refill as needed  - oxybutynin (DITROPAN) 5 MG tablet; TAKE 2 TABLETS BY MOUTH EVERY DAY  Dispense: 180 tablet; Refill: 3    7. Hyperlipidemia LDL goal <100  On statin and at goal    8. Hypertriglyceridemia  On statin             Return in about 53 weeks (around 3/8/2021) for Annual Wellness Visit.    Lisette Agustin MD  [facility]    SUBJECTIVE:   Orquidea Stevens is a 79 year old female who presents for Preventive Visit.      Are you in the first 12 months of your Medicare Part B coverage?  No    Physical Health:    In general, how would you rate your overall physical health? fair    Outside of work, how many days during the week do you exercise? 2-3 days/week    Outside of work, approximately how many minutes a day do you exercise?30-45 minutes    If you drink alcohol do you typically have >3 drinks per day or >7 drinks per week? No    Do you usually eat at least 4 servings of fruit and vegetables a day, include whole grains & fiber and avoid regularly eating high fat or \"junk\" foods? NO    Do you have any problems taking medications regularly?  No    Do you have any side effects from medications? none    Needs assistance for the following daily activities: no assistance needed    Which of the following safety concerns are present in your home?  none identified     Hearing impairment: No    In the past 6 months, have you been bothered by leaking of urine? yes    Mental Health:    In general, how would you rate your overall " mental or emotional health? good  PHQ-2 Score: 0    Do you feel safe in your environment? Yes    Have you ever done Advance Care Planning? (For example, a Health Directive, POLST, or a discussion with a medical provider or your loved ones about your wishes): No, advance care planning information given to patient to review.  Patient plans to discuss their wishes with loved ones or provider.      Additional concerns to address?  YES    Fall risk:  Fallen 2 or more times in the past year?: No  Any fall with injury in the past year?: No    Cognitive Screenin) Repeat 3 items (Leader, Season, Table)    2) Clock draw: NORMAL  3) 3 item recall: Recalls 3 objects  Results: 3 items recalled: COGNITIVE IMPAIRMENT LESS LIKELY    Mini-CogTM Copyright STELLA Coley. Licensed by the author for use in [facility] [employer]; reprinted with permission (michelle@[facility]). All rights reserved.      Do you have sleep apnea, excessive snoring or daytime drowsiness?: no            Reviewed and updated as needed this visit by clinical staff  Tobacco  Allergies  Meds  Med Hx  Surg Hx  Fam Hx  Soc Hx        Reviewed and updated as needed this visit by Provider        Social History     Tobacco Use     Smoking status: Never Smoker     Smokeless tobacco: Never Used     Tobacco comment: Lives in smoke free household   Substance Use Topics     Alcohol use: No                           Current providers sharing in care for this patient include:   Patient Care Team:  Lisette Amos MD as PCP - General (Family Practice)  Lisette Amos MD as Assigned PCP    The following health maintenance items are reviewed in Epic and correct as of today:  Health Maintenance   Topic Date Due     MEDICARE ANNUAL WELLNESS VISIT  2016     MICROALBUMIN  2019     DEXA  2019     PHQ-2  2020     A1C  2020     EYE EXAM  2020     BMP  2020     DIABETIC FOOT EXAM  2020     FALL RISK ASSESSMENT  2020     LIPID   "08/21/2020     ADVANCE CARE PLANNING  10/31/2021     DTAP/TDAP/TD IMMUNIZATION (3 - Td) 06/27/2022     INFLUENZA VACCINE  Completed     PNEUMOCOCCAL IMMUNIZATION 65+ LOW/MEDIUM RISK  Completed     ZOSTER IMMUNIZATION  Completed     IPV IMMUNIZATION  Aged Out     MENINGITIS IMMUNIZATION  Aged Out     Lab work is in process  Mammogram Screening: Patient over age 75, has elected to stop mammography screening.    ROS:  Constitutional, HEENT, cardiovascular, pulmonary, gi and gu systems are negative, except as otherwise noted.    OBJECTIVE:   /68   Pulse 59   Temp 98.6  F (37  C) (Oral)   Ht 1.619 m (5' 3.75\")   Wt 64 kg (141 lb)   BMI 24.39 kg/m   Estimated body mass index is 24.39 kg/m  as calculated from the following:    Height as of this encounter: 1.619 m (5' 3.75\").    Weight as of this encounter: 64 kg (141 lb).  EXAM:   See other exam    Diagnostic Test Results:  Labs reviewed in Epic    ASSESSMENT / PLAN:       ICD-10-CM    1. Encounter for Medicare annual wellness exam Z00.00    2. Type 2 diabetes mellitus without complication, without long-term current use of insulin (H) E11.9 Hemoglobin A1c     Basic metabolic panel     metFORMIN (GLUCOPHAGE-XR) 500 MG 24 hr tablet     Albumin Random Urine Quantitative with Creat Ratio   3. Hypertension goal BP (blood pressure) < 140/90 I10 enalapril (VASOTEC) 20 MG tablet     hydrALAZINE (APRESOLINE) 25 MG tablet     hydrochlorothiazide (HYDRODIURIL) 25 MG tablet     metoprolol succinate ER (TOPROL-XL) 25 MG 24 hr tablet   4. Restless leg syndrome G25.81 gabapentin (NEURONTIN) 300 MG capsule   5. Gastroesophageal reflux disease, esophagitis presence not specified K21.9 omeprazole (PRILOSEC) 20 MG DR capsule   6. Urinary incontinence, unspecified type R32 oxybutynin (DITROPAN) 5 MG tablet   7. Hyperlipidemia LDL goal <100 E78.5    8. Hypertriglyceridemia E78.1        COUNSELING:  Reviewed preventive health counseling, as reflected in patient instructions       " "Regular exercise       Healthy diet/nutrition       Vision screening       Dental care    Estimated body mass index is 24.39 kg/m  as calculated from the following:    Height as of this encounter: 1.619 m (5' 3.75\").    Weight as of this encounter: 64 kg (141 lb).         reports that she has never smoked. She has never used smokeless tobacco.      Appropriate preventive services were discussed with this patient, including applicable screening as appropriate for cardiovascular disease, diabetes, osteopenia/osteoporosis, and glaucoma.  As appropriate for age/gender, discussed screening for colorectal cancer, prostate cancer, breast cancer, and cervical cancer. Checklist reviewing preventive services available has been given to the patient.    Reviewed patients plan of care and provided an AVS. The Intermediate Care Plan ( asthma action plan, low back pain action plan, and migraine action plan) for Orquidea meets the Care Plan requirement. This Care Plan has been established and reviewed with the Patient.    Counseling Resources:  ATP IV Guidelines  Pooled Cohorts Equation Calculator  Breast Cancer Risk Calculator  FRAX Risk Assessment  ICSI Preventive Guidelines  Dietary Guidelines for Americans, 2010  USDA's MyPlate  ASA Prophylaxis  Lung CA Screening    Lisette Agustin MD  [facility]  "

## 2020-03-01 DIAGNOSIS — I10 HYPERTENSION GOAL BP (BLOOD PRESSURE) < 140/90: ICD-10-CM

## 2020-03-01 DIAGNOSIS — G25.81 RESTLESS LEG SYNDROME: ICD-10-CM

## 2020-03-02 ENCOUNTER — OFFICE VISIT (OUTPATIENT)
Dept: FAMILY MEDICINE | Facility: CLINIC | Age: 80
End: 2020-03-02
Payer: COMMERCIAL

## 2020-03-02 VITALS
HEIGHT: 64 IN | WEIGHT: 141 LBS | DIASTOLIC BLOOD PRESSURE: 68 MMHG | TEMPERATURE: 98.6 F | HEART RATE: 59 BPM | SYSTOLIC BLOOD PRESSURE: 128 MMHG | BODY MASS INDEX: 24.07 KG/M2

## 2020-03-02 DIAGNOSIS — E78.1 HYPERTRIGLYCERIDEMIA: ICD-10-CM

## 2020-03-02 DIAGNOSIS — E78.5 HYPERLIPIDEMIA LDL GOAL <100: ICD-10-CM

## 2020-03-02 DIAGNOSIS — E11.9 TYPE 2 DIABETES MELLITUS WITHOUT COMPLICATION, WITHOUT LONG-TERM CURRENT USE OF INSULIN (H): ICD-10-CM

## 2020-03-02 DIAGNOSIS — I10 HYPERTENSION GOAL BP (BLOOD PRESSURE) < 140/90: ICD-10-CM

## 2020-03-02 DIAGNOSIS — R32 URINARY INCONTINENCE, UNSPECIFIED TYPE: ICD-10-CM

## 2020-03-02 DIAGNOSIS — G25.81 RESTLESS LEG SYNDROME: ICD-10-CM

## 2020-03-02 DIAGNOSIS — Z00.00 ENCOUNTER FOR MEDICARE ANNUAL WELLNESS EXAM: Primary | ICD-10-CM

## 2020-03-02 DIAGNOSIS — K21.9 GASTROESOPHAGEAL REFLUX DISEASE, ESOPHAGITIS PRESENCE NOT SPECIFIED: ICD-10-CM

## 2020-03-02 LAB
ANION GAP SERPL CALCULATED.3IONS-SCNC: 5 MMOL/L (ref 3–14)
BUN SERPL-MCNC: 17 MG/DL (ref 7–30)
CALCIUM SERPL-MCNC: 9.8 MG/DL (ref 8.5–10.1)
CHLORIDE SERPL-SCNC: 101 MMOL/L (ref 94–109)
CO2 SERPL-SCNC: 31 MMOL/L (ref 20–32)
CREAT SERPL-MCNC: 0.58 MG/DL (ref 0.52–1.04)
CREAT UR-MCNC: 62 MG/DL
GFR SERPL CREATININE-BSD FRML MDRD: 87 ML/MIN/{1.73_M2}
GLUCOSE SERPL-MCNC: 166 MG/DL (ref 70–99)
HBA1C MFR BLD: 7.5 % (ref 0–5.6)
MICROALBUMIN UR-MCNC: 10 MG/L
MICROALBUMIN/CREAT UR: 16.56 MG/G CR (ref 0–25)
POTASSIUM SERPL-SCNC: 3.9 MMOL/L (ref 3.4–5.3)
SODIUM SERPL-SCNC: 137 MMOL/L (ref 133–144)

## 2020-03-02 PROCEDURE — 82043 UR ALBUMIN QUANTITATIVE: CPT | Performed by: FAMILY MEDICINE

## 2020-03-02 PROCEDURE — 80048 BASIC METABOLIC PNL TOTAL CA: CPT | Performed by: FAMILY MEDICINE

## 2020-03-02 PROCEDURE — 36415 COLL VENOUS BLD VENIPUNCTURE: CPT | Performed by: FAMILY MEDICINE

## 2020-03-02 PROCEDURE — 99397 PER PM REEVAL EST PAT 65+ YR: CPT | Performed by: FAMILY MEDICINE

## 2020-03-02 PROCEDURE — 83036 HEMOGLOBIN GLYCOSYLATED A1C: CPT | Performed by: FAMILY MEDICINE

## 2020-03-02 PROCEDURE — 99214 OFFICE O/P EST MOD 30 MIN: CPT | Mod: 25 | Performed by: FAMILY MEDICINE

## 2020-03-02 RX ORDER — METOPROLOL SUCCINATE 25 MG/1
25 TABLET, EXTENDED RELEASE ORAL DAILY
Qty: 90 TABLET | Refills: 1 | Status: SHIPPED | OUTPATIENT
Start: 2020-03-02 | End: 2020-10-20

## 2020-03-02 RX ORDER — OXYBUTYNIN CHLORIDE 5 MG/1
TABLET ORAL
Qty: 180 TABLET | Refills: 3 | Status: SHIPPED | OUTPATIENT
Start: 2020-03-02 | End: 2022-08-11

## 2020-03-02 RX ORDER — METFORMIN HCL 500 MG
500 TABLET, EXTENDED RELEASE 24 HR ORAL 2 TIMES DAILY WITH MEALS
Qty: 180 TABLET | Refills: 1 | Status: SHIPPED | OUTPATIENT
Start: 2020-03-02 | End: 2020-11-16

## 2020-03-02 RX ORDER — GABAPENTIN 300 MG/1
CAPSULE ORAL
Qty: 540 CAPSULE | Refills: 1 | Status: SHIPPED | OUTPATIENT
Start: 2020-03-02 | End: 2020-09-09

## 2020-03-02 RX ORDER — HYDROCHLOROTHIAZIDE 25 MG/1
25 TABLET ORAL DAILY
Qty: 90 TABLET | Refills: 1 | Status: SHIPPED | OUTPATIENT
Start: 2020-03-02 | End: 2021-04-19

## 2020-03-02 RX ORDER — GABAPENTIN 300 MG/1
CAPSULE ORAL
Qty: 540 CAPSULE | Refills: 0 | OUTPATIENT
Start: 2020-03-02

## 2020-03-02 RX ORDER — ENALAPRIL MALEATE 20 MG/1
20 TABLET ORAL 2 TIMES DAILY
Qty: 180 TABLET | Refills: 1 | Status: SHIPPED | OUTPATIENT
Start: 2020-03-02 | End: 2020-10-16

## 2020-03-02 RX ORDER — HYDRALAZINE HYDROCHLORIDE 25 MG/1
TABLET, FILM COATED ORAL
Qty: 180 TABLET | Refills: 0 | OUTPATIENT
Start: 2020-03-02

## 2020-03-02 RX ORDER — HYDRALAZINE HYDROCHLORIDE 25 MG/1
25 TABLET, FILM COATED ORAL 2 TIMES DAILY
Qty: 180 TABLET | Refills: 1 | Status: SHIPPED | OUTPATIENT
Start: 2020-03-02 | End: 2020-09-01

## 2020-03-02 ASSESSMENT — MIFFLIN-ST. JEOR: SCORE: 1090.6

## 2020-03-02 NOTE — PATIENT INSTRUCTIONS
Patient Education   Personalized Prevention Plan  You are due for the preventive services outlined below.  Your care team is available to assist you in scheduling these services.  If you have already completed any of these items, please share that information with your care team to update in your medical record.  Health Maintenance Due   Topic Date Due     Annual Wellness Visit  06/08/2016     Kidney Microalbumin Urine Test  05/03/2019     Osteoporosis Screening  08/03/2019     PHQ-2  01/01/2020     A1C Lab  02/21/2020

## 2020-03-02 NOTE — LETTER
March 3, 2020      Orquidea Stevens  [address] [facility] [address]        Dear ,    We are writing to inform you of your test results.    There is no significant protein in your urine. This is good   Your electrolytes and kidney function are normal   Your diabetes is well controlled.    Resulted Orders   Hemoglobin A1c   Result Value Ref Range    Hemoglobin A1C 7.5 (H) 0 - 5.6 %      Comment:      Normal <5.7% Prediabetes 5.7-6.4%  Diabetes 6.5% or higher - adopted from ADA   consensus guidelines.     Basic metabolic panel   Result Value Ref Range    Sodium 137 133 - 144 mmol/L    Potassium 3.9 3.4 - 5.3 mmol/L    Chloride 101 94 - 109 mmol/L    Carbon Dioxide 31 20 - 32 mmol/L    Anion Gap 5 3 - 14 mmol/L    Glucose 166 (H) 70 - 99 mg/dL      Comment:      Fasting specimen    Urea Nitrogen 17 7 - 30 mg/dL    Creatinine 0.58 0.52 - 1.04 mg/dL    GFR Estimate 87 >60 mL/min/[1.73_m2]      Comment:      Non  GFR Calc  Starting 12/18/2018, serum creatinine based estimated GFR (eGFR) will be   calculated using the Chronic Kidney Disease Epidemiology Collaboration   (CKD-EPI) equation.      GFR Estimate If Black >90 >60 mL/min/[1.73_m2]      Comment:       GFR Calc  Starting 12/18/2018, serum creatinine based estimated GFR (eGFR) will be   calculated using the Chronic Kidney Disease Epidemiology Collaboration   (CKD-EPI) equation.      Calcium 9.8 8.5 - 10.1 mg/dL   Albumin Random Urine Quantitative with Creat Ratio   Result Value Ref Range    Creatinine Urine 62 mg/dL    Albumin Urine mg/L 10 mg/L    Albumin Urine mg/g Cr 16.56 0 - 25 mg/g Cr       If you have any questions or concerns, please call the clinic at the number listed above.       Sincerely,        Lisette Agustin MD / chuy

## 2020-05-03 DIAGNOSIS — I10 HYPERTENSION GOAL BP (BLOOD PRESSURE) < 140/90: ICD-10-CM

## 2020-05-04 RX ORDER — METOPROLOL SUCCINATE 25 MG/1
TABLET, EXTENDED RELEASE ORAL
Qty: 90 TABLET | Refills: 0 | OUTPATIENT
Start: 2020-05-04

## 2020-08-01 ENCOUNTER — TRANSFERRED RECORDS (OUTPATIENT)
Dept: HEALTH INFORMATION MANAGEMENT | Facility: CLINIC | Age: 80
End: 2020-08-01

## 2020-08-01 LAB — RETINOPATHY: NORMAL

## 2020-08-31 DIAGNOSIS — E78.1 HYPERTRIGLYCERIDEMIA: ICD-10-CM

## 2020-08-31 DIAGNOSIS — I10 HYPERTENSION GOAL BP (BLOOD PRESSURE) < 140/90: ICD-10-CM

## 2020-08-31 DIAGNOSIS — E78.5 HYPERLIPIDEMIA LDL GOAL <100: ICD-10-CM

## 2020-09-01 RX ORDER — HYDRALAZINE HYDROCHLORIDE 25 MG/1
25 TABLET, FILM COATED ORAL 2 TIMES DAILY
Qty: 60 TABLET | Refills: 0 | Status: SHIPPED | OUTPATIENT
Start: 2020-09-01 | End: 2020-10-16

## 2020-09-01 RX ORDER — ATORVASTATIN CALCIUM 80 MG/1
80 TABLET, FILM COATED ORAL DAILY
Qty: 30 TABLET | Refills: 0 | Status: SHIPPED | OUTPATIENT
Start: 2020-09-01 | End: 2020-10-16

## 2020-09-01 NOTE — TELEPHONE ENCOUNTER
Routing refill request to provider for review/approval because:  Labs not current:  Lipids    Coty Lima BSN, RN

## 2020-09-08 DIAGNOSIS — G25.81 RESTLESS LEG SYNDROME: ICD-10-CM

## 2020-09-09 RX ORDER — GABAPENTIN 300 MG/1
CAPSULE ORAL
Qty: 180 CAPSULE | Refills: 0 | Status: SHIPPED | OUTPATIENT
Start: 2020-09-09 | End: 2020-10-16

## 2020-09-30 ENCOUNTER — ANCILLARY PROCEDURE (OUTPATIENT)
Dept: GENERAL RADIOLOGY | Facility: CLINIC | Age: 80
End: 2020-09-30
Attending: INTERNAL MEDICINE
Payer: COMMERCIAL

## 2020-09-30 ENCOUNTER — OFFICE VISIT (OUTPATIENT)
Dept: URGENT CARE | Facility: URGENT CARE | Age: 80
End: 2020-09-30
Payer: COMMERCIAL

## 2020-09-30 VITALS
DIASTOLIC BLOOD PRESSURE: 66 MMHG | HEART RATE: 59 BPM | SYSTOLIC BLOOD PRESSURE: 146 MMHG | TEMPERATURE: 97.3 F | OXYGEN SATURATION: 96 %

## 2020-09-30 DIAGNOSIS — M25.551 HIP PAIN, RIGHT: Primary | ICD-10-CM

## 2020-09-30 DIAGNOSIS — M25.551 HIP PAIN, RIGHT: ICD-10-CM

## 2020-09-30 DIAGNOSIS — I10 ESSENTIAL HYPERTENSION: ICD-10-CM

## 2020-09-30 PROCEDURE — 99214 OFFICE O/P EST MOD 30 MIN: CPT | Performed by: INTERNAL MEDICINE

## 2020-09-30 PROCEDURE — 73502 X-RAY EXAM HIP UNI 2-3 VIEWS: CPT

## 2020-09-30 RX ORDER — LIDOCAINE 4 G/G
1 PATCH TOPICAL EVERY 24 HOURS
Qty: 10 PATCH | Refills: 0 | Status: CANCELLED | OUTPATIENT
Start: 2020-09-30

## 2020-09-30 RX ORDER — CYCLOBENZAPRINE HCL 5 MG
5 TABLET ORAL 3 TIMES DAILY PRN
Qty: 20 TABLET | Refills: 0 | Status: SHIPPED | OUTPATIENT
Start: 2020-09-30 | End: 2021-12-30

## 2020-10-01 NOTE — PROGRESS NOTES
"SUBJECTIVE:  Orquidea Stevens is an 80 year old female who presents for right hip pain.  Radiates down leg to ankle.  Is \"more than tender\"  Hurts if lying in bed.  Sometimes hip feels okay but other parts of leg will hurt.  Leg sometimes feels weak along with the pain.  No recent injuries, falls, or accidents.  Can't think of anything that would have started the pain.  Has h/o sciatica but this feels different to her.  No swelling in leg.  No bruising or redness.  No skin rashes.  Took some aspirin and tylenol which helped a little.  No fevers, chills, sweats.  No n/v.  No stool changes.  No pain in back or shoulders or arms.  Other leg feels fine.  No changes in bladder or bowels or incontinence except one time 3 days ago had held bladder quite a long time while at a ball game and driving home and did have an accident on the way to the bathroom at home.  No other incontinence.  Has used lidoderm patch on her leg which has helped some.    PMH:   has a past medical history of Actinic keratosis, Actinic keratosis, Allergies, Arthritis, CAD (coronary artery disease), Cataract, Diabetes mellitus type II, Fatty liver, GERD (gastroesophageal reflux disease), HH (hiatus hernia), Hyperlipidaemia LDL goal <100, Hypertension goal BP (blood pressure) < 140/90, IBS (irritable bowel syndrome), Rosacea, Spastic neurogenic bladder, Squamous cell carcinoma (10/27/2008), Type 2 diabetes, HbA1C goal < 8% (H) (10/31/2010), Urinary incontinence, and Vitamin D deficiency.  Patient Active Problem List   Diagnosis     IBS (irritable bowel syndrome)     Urinary incontinence     Fatty liver     CAD (coronary artery disease)     History of squamous cell carcinoma of skin     Squamous cell carcinoma     Allergic state     Actinic keratosis     Spastic neurogenic bladder     Rosacea     TYPE 2 DIABETES, HBA1C GOAL < 8%     HYPERLIPIDEMIA LDL GOAL <100     Advanced directives, counseling/discussion     GERD (gastroesophageal reflux disease)     " Essential hypertension     Atrophic vaginitis     Dermatochalasis     Ptosis of eyelid. OU     Restless leg syndrome     Hypertriglyceridemia     Type 2 diabetes mellitus without complication, without long-term current use of insulin (H)     Tear of medial cartilage or meniscus of knee, current     Pure hypercholesterolemia     Pseudophakia of both eyes     Social History     Socioeconomic History     Marital status:      Spouse name: None     Number of children: 3     Years of education: None     Highest education level: None   Occupational History     Employer: RETIRED   Social Needs     Financial resource strain: None     Food insecurity     Worry: None     Inability: None     Transportation needs     Medical: None     Non-medical: None   Tobacco Use     Smoking status: Never Smoker     Smokeless tobacco: Never Used     Tobacco comment: Lives in smoke free household   Substance and Sexual Activity     Alcohol use: No     Drug use: No     Sexual activity: Never     Partners: Male   Lifestyle     Physical activity     Days per week: None     Minutes per session: None     Stress: None   Relationships     Social connections     Talks on phone: None     Gets together: None     Attends Cheondoism service: None     Active member of club or organization: None     Attends meetings of clubs or organizations: None     Relationship status: None     Intimate partner violence     Fear of current or ex partner: None     Emotionally abused: None     Physically abused: None     Forced sexual activity: None   Other Topics Concern     Parent/sibling w/ CABG, MI or angioplasty before 65F 55M? Not Asked   Social History Narrative     None     Family History   Problem Relation Age of Onset     Respiratory Mother         COPD     Hypertension Mother      Respiratory Father         COPD     Diabetes Father      Heart Disease Father      Heart Disease Brother      Diabetes Maternal Grandfather      Thyroid Disease Daughter       Other Cancer Daughter 57        stage 4 rectal cancer     Prostate Cancer Brother      Cancer No family hx of      Cerebrovascular Disease No family hx of      Glaucoma No family hx of      Macular Degeneration No family hx of        ALLERGIES:  Atorvastatin; Detrol [tolterodine tartrate]; Latex; Lovastatin; Simvastatin; and Zocor [hmg-coa-r inhibitors]    Current Outpatient Medications   Medication     alcohol swab prep pads     amLODIPine (NORVASC) 10 MG tablet     aspirin 325 MG EC tablet     atorvastatin (LIPITOR) 80 MG tablet     blood glucose (ACCU-CHEK SOFTCLIX) lancing device     blood glucose (NO BRAND SPECIFIED) test strip     CALCIUM 500 OR     CRANBERRY     cyclobenzaprine (FLEXERIL) 5 MG tablet     enalapril (VASOTEC) 20 MG tablet     gabapentin (NEURONTIN) 300 MG capsule     hydrALAZINE (APRESOLINE) 25 MG tablet     hydrochlorothiazide (HYDRODIURIL) 25 MG tablet     Lactobacillus (ACIDOPHILUS) CAPS     Lutein 20 MG CAPS     metFORMIN (GLUCOPHAGE-XR) 500 MG 24 hr tablet     metoprolol succinate ER (TOPROL-XL) 25 MG 24 hr tablet     omeprazole (PRILOSEC) 20 MG DR capsule     oxybutynin (DITROPAN) 5 MG tablet     TURMERIC PO     VITAMIN D 1000 UNIT OR CAPS     No current facility-administered medications for this visit.          ROS:  ROS is done and is negative for general/constitutional, eye, ENT, Respiratory, cardiovascular, GI, , Skin, musculoskeletal except as noted elsewhere.  All other review of systems negative except as noted elsewhere.      OBJECTIVE:  BP (!) 146/66   Pulse 59   Temp 97.3  F (36.3  C) (Tympanic)   SpO2 96%   GENERAL APPEARANCE: Alert, in no acute distress  EYES: normal  NECK:No adenopathy,masses or thyromegaly  RESP: normal and clear to auscultation  CV:regular rate and rhythm and no murmurs, clicks, or gallops  ABDOMEN: Abdomen soft, non-tender. BS normal. No masses, organomegaly  SKIN: no ulcers, lesions or rash  MUSCULOSKELETAL:right hip - no erythema, edema, or  bruising; moderate tenderness along lateral hip region; remainder of leg is non-tender on exam.  Normal rom with mild pain at endpoints.  No edema or tenderness of leg below hip.        RESULTS  Xrays right hip - no fractures identified, mild degenerative changes c/w chronic, on my reading.    Recent Results (from the past 48 hour(s))   XR Hip Right 2-3 Views    Narrative    HIP RIGHT TWO TO THREE VIEWS 9/30/2020 7:55 PM     HISTORY: Hip pain, right    COMPARISON: None.      Impression    IMPRESSION: No acute bony or soft tissue abnormality. Mild  degenerative change at the right hip. Possible chondrocalcinosis right  hip.       ASSESSMENT/PLAN:    ASSESSMENT / PLAN:  (M25.198) Hip pain, right  (primary encounter diagnosis)  Comment: currently most c/w muscular and soft tissue.  At this time not c/w fracture or dvt.  Plan: XR Hip Right 2-3 Views, ДМ[facility]ТРИ[facility] PT, HAND, AND         CHIROPRACTIC REFERRAL, cyclobenzaprine         (FLEXERIL) 5 MG tablet        Reviewed medication instructions and side effects. Follow up if experiences side effects.. Advised that flexeril can make drowsy or altered and is not to drive, operate machinery or consume alcohol or street drugs when taking.  I reviewed supportive care, otc meds to use if needed, expected course, and signs of concern.  Schedule with PT as soon as she can.  Follow up with pcp if she does not improve within 10 day(s) or if worsens in any way.  Reviewed red flag symptoms and is to go to the ER if experiences any of these.    (I10) Essential hypertension  Comment: bp above ideal, which may be due to pain  Plan: continue meds.  F/u with pcp.      PPE worn: shield and mask.    See [facility] for orders, medications, letters, patient instructions    Asia Lee M.D.

## 2020-10-06 ENCOUNTER — THERAPY VISIT (OUTPATIENT)
Dept: PHYSICAL THERAPY | Facility: CLINIC | Age: 80
End: 2020-10-06
Attending: INTERNAL MEDICINE
Payer: COMMERCIAL

## 2020-10-06 DIAGNOSIS — M25.551 HIP PAIN, RIGHT: Primary | ICD-10-CM

## 2020-10-06 PROCEDURE — 97161 PT EVAL LOW COMPLEX 20 MIN: CPT | Mod: GP | Performed by: PHYSICAL THERAPIST

## 2020-10-06 PROCEDURE — 97110 THERAPEUTIC EXERCISES: CPT | Mod: GP | Performed by: PHYSICAL THERAPIST

## 2020-10-06 ASSESSMENT — ACTIVITIES OF DAILY LIVING (ADL)
SITTING_FOR_15_MINUTES: SLIGHT DIFFICULTY
LIGHT_TO_MODERATE_WORK: SLIGHT DIFFICULTY
HOW_WOULD_YOU_RATE_YOUR_CURRENT_LEVEL_OF_FUNCTION_DURING_YOUR_USUAL_ACTIVITIES_OF_DAILY_LIVING_FROM_0_TO_100_WITH_100_BEING_YOUR_LEVEL_OF_FUNCTION_PRIOR_TO_YOUR_HIP_PROBLEM_AND_0_BEING_THE_INABILITY_TO_PERFORM_ANY_OF_YOUR_USUAL_DAILY_ACTIVITIES?: 0
WALKING_APPROXIMATELY_10_MINUTES: NO DIFFICULTY AT ALL
GOING_UP_1_FLIGHT_OF_STAIRS: SLIGHT DIFFICULTY
WALKING_INITIALLY: SLIGHT DIFFICULTY
GOING_DOWN_1_FLIGHT_OF_STAIRS: SLIGHT DIFFICULTY
GETTING_INTO_AND_OUT_OF_AN_AVERAGE_CAR: SLIGHT DIFFICULTY
GETTING_INTO_AND_OUT_OF_A_BATHTUB: NO DIFFICULTY AT ALL
PUTTING_ON_SOCKS_AND_SHOES: NO DIFFICULTY AT ALL
STANDING_FOR_15_MINUTES: NO DIFFICULTY AT ALL
ROLLING_OVER_IN_BED: NO DIFFICULTY AT ALL
HOS_ADL_COUNT: 14
WALKING_UP_STEEP_HILLS: NO DIFFICULTY AT ALL
HOS_ADL_ITEM_SCORE_TOTAL: 50
DEEP_SQUATTING: NO DIFFICULTY AT ALL
HOS_ADL_SCORE(%): 89.29
WALKING_15_MINUTES_OR_GREATER: SLIGHT DIFFICULTY
HOS_ADL_HIGHEST_POTENTIAL_SCORE: 56
WALKING_DOWN_STEEP_HILLS: NO DIFFICULTY AT ALL
STEPPING_UP_AND_DOWN_CURBS: NO DIFFICULTY AT ALL

## 2020-10-06 NOTE — LETTER
[facility]  [address]  [place] [address]  [phone]    2020    Re: Orquidea Stevens   :   1940  MRN:  [id number]   REFERRING PHYSICIAN:   Asia [facility]    Date of Initial Evaluation:  10/6/2020  Visits:  Rxs Used: 1  Reason for Referral:  Hip pain, right    EVALUATION SUMMARY    [facility] Initial Evaluation  Subjective:    Patient Health History  Orquidea Stevens being seen for help with my hip and legs.     Problem began: 2020.   Problem occurred: unknown   Pain is reported as 4/10 on pain scale.  General health as reported by patient is good.  Pertinent medical history includes: diabetes and high blood pressure.   Red flags:  None as reported by patient.  Medical allergies: latex.   Surgeries include:  None.    Current medications:  High blood pressure medication.    Current occupation is retired.      HIP:    PROM:   L  R   Flexion 110 110   Extension 15 15   Abduction 50 50   IR 15 10   ER 35 30   AROM lumbar flx: full and painfull, ext and BSB full and painfree  Repeated flx was worse, repeated extension was no change    Strength:   L R   HIP     Flex 5/5 4/5   Ext 4/5 4/5   Abd 4/5 4/5   KNEE     Flex 5/5 5/5   Ext 5/5 5/5     Special tests:   L R   Khris's neg neg   Edmond neg neg   ZOEY Neg with decr ROM Neg with decr ROM   FADIR neg neg     Other: Pilar's + R>L, slump test negative, SLR + on R    Palpation: mild TTP  Lateral hip, spring testing lumbar and sacrum negative    Functional: some recent balance difficulties but also reports she has been dizzy more recently. Had basal cell carcinoma removed from nose    Gait: slow with mild unsteadiness without antalgia    Pt has vague R hip pain described from low back down to R ankle following L5 distribution after doing a lot of gardening 2-3weeks ago in a flexed position. She has had sciatica in the past and she feels this is different and endorses she had a fall episode in her  home when she rotated her trunk which increased her back immediately causing her to fall. Plan to treat limited ROM of R>L hips and lumbar extension positions.    Assessment/Plan:    Patient is a 80 year old female with right side hip complaints.    Patient has the following significant findings with corresponding treatment plan.                Diagnosis 1:  R hip pain  Pain -  hot/cold therapy, manual therapy, education and home program  Decreased ROM/flexibility - manual therapy, therapeutic exercise, therapeutic activity and home program  Decreased joint mobility - manual therapy, therapeutic exercise and therapeutic activity  Decreased strength - therapeutic exercise, therapeutic activities and home program  Impaired balance - neuro re-education, gait training, therapeutic activities, adaptive equipment/assistive device and home program    Cumulative Therapy Evaluation is: Low complexity.    Previous and current functional limitations:  (See Goal Flow Sheet for this information)    Short term and Long term goals: (See Goal Flow Sheet for this information)     Communication ability:  Patient appears to be able to clearly communicate and understand verbal and written communication and follow directions correctly.  Treatment Explanation - The following has been discussed with the patient:   RX ordered/plan of care  Anticipated outcomes  Possible risks and side effects  This patient would benefit from PT intervention to resume normal activities.   Rehab potential is good.    Frequency:  1 X week, once daily  Duration:  for 10 weeks  Discharge Plan:  Achieve all LTG.  Independent in home treatment program.  Reach maximal therapeutic benefit.              Thank you for your referral.    INQUIRIES  Therapist: EDEL [facility]  [address]  [place] [address]  Phone: [phone]  Fax: [phone]

## 2020-10-06 NOTE — PROGRESS NOTES
[place] [facility] Initial Evaluation  Subjective:    Patient Health History  Orquidea Stevens being seen for help with my hip and legs.     Problem began: 9/22/2020.   Problem occurred: unknown   Pain is reported as 4/10 on pain scale.  General health as reported by patient is good.  Pertinent medical history includes: diabetes and high blood pressure.   Red flags:  None as reported by patient.  Medical allergies: latex.   Surgeries include:  None.    Current medications:  High blood pressure medication.    Current occupation is retired.                                       Objective:        HIP:    PROM:   L  R   Flexion 110 110   Extension 15 15   Abduction 50 50   IR 15 10   ER 35 30   AROM lumbar flx: full and painfull, ext and BSB full and painfree  Repeated flx was worse, repeated extension was no change    Strength:   L R   HIP     Flex 5/5 4/5   Ext 4/5 4/5   Abd 4/5 4/5   KNEE     Flex 5/5 5/5   Ext 5/5 5/5     Special tests:   L R   Khris's neg neg   Edmond neg neg   ZOEY Neg with decr ROM Neg with decr ROM   FADIR neg neg     Other: Pilar's + R>L, slump test negative, SLR + on R    Palpation: mild TTP  Lateral hip, spring testing lumbar and sacrum negative    Functional: some recent balance difficulties but also reports she has been dizzy more recently. Had basal cell carcinoma removed from nose    Gait: slow with mild unsteadiness without antalgia      Pt has vague R hip pain described from low back down to R ankle following L5 distribution after doing a lot of gardening 2-3weeks ago in a flexed position. She has had sciatica in the past and she feels this is different and endorses she had a fall episode in her home when she rotated her trunk which increased her back immediately causing her to fall. Plan to treat limited ROM of R>L hips and lumbar extension positions.            System    Physical Exam    General     ROS    Assessment/Plan:    Patient is a 80 year old female with right side  hip complaints.    Patient has the following significant findings with corresponding treatment plan.                Diagnosis 1:  R hip pain  Pain -  hot/cold therapy, manual therapy, education and home program  Decreased ROM/flexibility - manual therapy, therapeutic exercise, therapeutic activity and home program  Decreased joint mobility - manual therapy, therapeutic exercise and therapeutic activity  Decreased strength - therapeutic exercise, therapeutic activities and home program  Impaired balance - neuro re-education, gait training, therapeutic activities, adaptive equipment/assistive device and home program    Cumulative Therapy Evaluation is: Low complexity.    Previous and current functional limitations:  (See Goal Flow Sheet for this information)    Short term and Long term goals: (See Goal Flow Sheet for this information)     Communication ability:  Patient appears to be able to clearly communicate and understand verbal and written communication and follow directions correctly.  Treatment Explanation - The following has been discussed with the patient:   RX ordered/plan of care  Anticipated outcomes  Possible risks and side effects  This patient would benefit from PT intervention to resume normal activities.   Rehab potential is good.    Frequency:  1 X week, once daily  Duration:  for 10 weeks  Discharge Plan:  Achieve all LTG.  Independent in home treatment program.  Reach maximal therapeutic benefit.    Please refer to the daily flowsheet for treatment today, total treatment time and time spent performing 1:1 timed codes.

## 2020-10-13 ENCOUNTER — THERAPY VISIT (OUTPATIENT)
Dept: PHYSICAL THERAPY | Facility: CLINIC | Age: 80
End: 2020-10-13
Payer: COMMERCIAL

## 2020-10-13 DIAGNOSIS — M25.551 HIP PAIN, RIGHT: Primary | ICD-10-CM

## 2020-10-13 PROCEDURE — 97110 THERAPEUTIC EXERCISES: CPT | Mod: GP | Performed by: SPECIALIST/TECHNOLOGIST

## 2020-10-20 ENCOUNTER — TELEPHONE (OUTPATIENT)
Dept: FAMILY MEDICINE | Facility: CLINIC | Age: 80
End: 2020-10-20

## 2020-10-20 ENCOUNTER — OFFICE VISIT (OUTPATIENT)
Dept: FAMILY MEDICINE | Facility: CLINIC | Age: 80
End: 2020-10-20
Payer: COMMERCIAL

## 2020-10-20 VITALS
TEMPERATURE: 98.1 F | HEIGHT: 64 IN | BODY MASS INDEX: 24.24 KG/M2 | HEART RATE: 64 BPM | SYSTOLIC BLOOD PRESSURE: 148 MMHG | DIASTOLIC BLOOD PRESSURE: 65 MMHG | WEIGHT: 142 LBS

## 2020-10-20 DIAGNOSIS — E11.9 TYPE 2 DIABETES MELLITUS WITHOUT COMPLICATION, WITHOUT LONG-TERM CURRENT USE OF INSULIN (H): ICD-10-CM

## 2020-10-20 DIAGNOSIS — E78.5 HYPERLIPIDEMIA LDL GOAL <100: ICD-10-CM

## 2020-10-20 DIAGNOSIS — E78.1 HYPERTRIGLYCERIDEMIA: ICD-10-CM

## 2020-10-20 DIAGNOSIS — G25.81 RESTLESS LEG SYNDROME: ICD-10-CM

## 2020-10-20 DIAGNOSIS — I10 HYPERTENSION GOAL BP (BLOOD PRESSURE) < 140/90: ICD-10-CM

## 2020-10-20 DIAGNOSIS — R35.0 URINARY FREQUENCY: Primary | ICD-10-CM

## 2020-10-20 DIAGNOSIS — R82.90 NONSPECIFIC FINDING ON EXAMINATION OF URINE: ICD-10-CM

## 2020-10-20 LAB
ALBUMIN UR-MCNC: NEGATIVE MG/DL
ANION GAP SERPL CALCULATED.3IONS-SCNC: 7 MMOL/L (ref 3–14)
APPEARANCE UR: ABNORMAL
BACTERIA #/AREA URNS HPF: ABNORMAL /HPF
BILIRUB UR QL STRIP: NEGATIVE
BUN SERPL-MCNC: 19 MG/DL (ref 7–30)
CALCIUM SERPL-MCNC: 9.4 MG/DL (ref 8.5–10.1)
CHLORIDE SERPL-SCNC: 102 MMOL/L (ref 94–109)
CHOLEST SERPL-MCNC: 127 MG/DL
CO2 SERPL-SCNC: 28 MMOL/L (ref 20–32)
COLOR UR AUTO: YELLOW
CREAT SERPL-MCNC: 0.68 MG/DL (ref 0.52–1.04)
GFR SERPL CREATININE-BSD FRML MDRD: 82 ML/MIN/{1.73_M2}
GLUCOSE SERPL-MCNC: 199 MG/DL (ref 70–99)
GLUCOSE UR STRIP-MCNC: NEGATIVE MG/DL
HBA1C MFR BLD: 7.6 % (ref 0–5.6)
HDLC SERPL-MCNC: 45 MG/DL
HGB UR QL STRIP: NEGATIVE
KETONES UR STRIP-MCNC: NEGATIVE MG/DL
LDLC SERPL CALC-MCNC: 42 MG/DL
LEUKOCYTE ESTERASE UR QL STRIP: ABNORMAL
NITRATE UR QL: NEGATIVE
NON-SQ EPI CELLS #/AREA URNS LPF: ABNORMAL /LPF
NONHDLC SERPL-MCNC: 82 MG/DL
PH UR STRIP: 6 PH (ref 5–7)
POTASSIUM SERPL-SCNC: 4.1 MMOL/L (ref 3.4–5.3)
RBC #/AREA URNS AUTO: ABNORMAL /HPF
SODIUM SERPL-SCNC: 137 MMOL/L (ref 133–144)
SOURCE: ABNORMAL
SP GR UR STRIP: 1.01 (ref 1–1.03)
TRANS CELLS #/AREA URNS HPF: ABNORMAL /HPF
TRIGL SERPL-MCNC: 201 MG/DL
UROBILINOGEN UR STRIP-ACNC: 0.2 EU/DL (ref 0.2–1)
WBC #/AREA URNS AUTO: ABNORMAL /HPF

## 2020-10-20 PROCEDURE — 83036 HEMOGLOBIN GLYCOSYLATED A1C: CPT | Performed by: FAMILY MEDICINE

## 2020-10-20 PROCEDURE — 99207 PR FOOT EXAM NO CHARGE: CPT | Performed by: FAMILY MEDICINE

## 2020-10-20 PROCEDURE — 80061 LIPID PANEL: CPT | Performed by: FAMILY MEDICINE

## 2020-10-20 PROCEDURE — 81001 URINALYSIS AUTO W/SCOPE: CPT | Performed by: FAMILY MEDICINE

## 2020-10-20 PROCEDURE — 99214 OFFICE O/P EST MOD 30 MIN: CPT | Performed by: FAMILY MEDICINE

## 2020-10-20 PROCEDURE — 87086 URINE CULTURE/COLONY COUNT: CPT | Performed by: FAMILY MEDICINE

## 2020-10-20 PROCEDURE — 80048 BASIC METABOLIC PNL TOTAL CA: CPT | Performed by: FAMILY MEDICINE

## 2020-10-20 RX ORDER — ENALAPRIL MALEATE 20 MG/1
20 TABLET ORAL 2 TIMES DAILY
Qty: 180 TABLET | Refills: 1 | Status: SHIPPED | OUTPATIENT
Start: 2020-10-20 | End: 2020-10-22

## 2020-10-20 RX ORDER — ATORVASTATIN CALCIUM 80 MG/1
80 TABLET, FILM COATED ORAL DAILY
Qty: 90 TABLET | Refills: 3 | Status: SHIPPED | OUTPATIENT
Start: 2020-10-20 | End: 2021-08-17

## 2020-10-20 RX ORDER — HYDRALAZINE HYDROCHLORIDE 25 MG/1
25 TABLET, FILM COATED ORAL 2 TIMES DAILY
Qty: 180 TABLET | Refills: 1 | Status: SHIPPED | OUTPATIENT
Start: 2020-10-20 | End: 2021-04-19

## 2020-10-20 RX ORDER — AMLODIPINE BESYLATE 10 MG/1
10 TABLET ORAL DAILY
Qty: 90 TABLET | Refills: 3 | Status: SHIPPED | OUTPATIENT
Start: 2020-10-20 | End: 2021-08-17

## 2020-10-20 RX ORDER — METFORMIN HCL 500 MG
500 TABLET, EXTENDED RELEASE 24 HR ORAL 2 TIMES DAILY WITH MEALS
Qty: 180 TABLET | Refills: 1 | Status: CANCELLED | OUTPATIENT
Start: 2020-10-20

## 2020-10-20 RX ORDER — NITROFURANTOIN 25; 75 MG/1; MG/1
100 CAPSULE ORAL 2 TIMES DAILY
Qty: 10 CAPSULE | Refills: 0 | Status: SHIPPED | OUTPATIENT
Start: 2020-10-20 | End: 2021-08-10

## 2020-10-20 RX ORDER — METOPROLOL SUCCINATE 25 MG/1
25 TABLET, EXTENDED RELEASE ORAL DAILY
Qty: 90 TABLET | Refills: 1 | Status: SHIPPED | OUTPATIENT
Start: 2020-10-20 | End: 2021-04-19

## 2020-10-20 RX ORDER — GABAPENTIN 300 MG/1
CAPSULE ORAL
Qty: 180 CAPSULE | Refills: 1 | Status: SHIPPED | OUTPATIENT
Start: 2020-10-20 | End: 2020-11-13

## 2020-10-20 RX ORDER — VITAMIN E 268 MG
CAPSULE ORAL DAILY
COMMUNITY

## 2020-10-20 ASSESSMENT — MIFFLIN-ST. JEOR: SCORE: 1090.14

## 2020-10-20 NOTE — TELEPHONE ENCOUNTER
Reason for call:  Other   Patient called regarding (reason for call): prescription  Additional comments: Enalapril 20 mg pharmacy needs verbal ok.     Phone number to reach patient:  Other phone number:  [phone]    Best Time:  any    Can we leave a detailed message on this number?  YES    Travel screening: Not Applicable

## 2020-10-20 NOTE — PROGRESS NOTES
Subjective     Orquidea Stevens is a 80 year old female who presents to clinic today for the following health issues:    HPI         Diabetes Follow-up    How often are you checking your blood sugar? One time daily  What time of day are you checking your blood sugars (select all that apply)?  Before meals  Have you had any blood sugars above 200?  Yes twice  Have you had any blood sugars below 70?  No    What symptoms do you notice when your blood sugar is low?  None    What concerns do you have today about your diabetes? Other: glucose elevated     Do you have any of these symptoms? (Select all that apply)  No numbness or tingling in feet.  No redness, sores or blisters on feet.  No complaints of excessive thirst.  No reports of blurry vision.  No significant changes to weight.    Have you had a diabetic eye exam in the last 12 months? Yes-  Location: Aug 2020 [facility]                 Hyperlipidemia Follow-Up      Are you regularly taking any medication or supplement to lower your cholesterol?   Yes- atorvastatin    Are you having muscle aches or other side effects that you think could be caused by your cholesterol lowering medication?  No    Hypertension Follow-up      Do you check your blood pressure regularly outside of the clinic? No     Are you following a low salt diet? Yes    Are your blood pressures ever more than 140 on the top number (systolic) OR more   than 90 on the bottom number (diastolic), for example 140/90?     BP Readings from Last 2 Encounters:   10/20/20 (!) 148/65   09/30/20 (!) 146/66     Hemoglobin A1C (%)   Date Value   10/20/2020 7.6 (H)   03/02/2020 7.5 (H)     LDL Cholesterol Calculated (mg/dL)   Date Value   08/21/2019 49   05/03/2018 44         How many servings of fruits and vegetables do you eat daily?      On average, how many sweetened beverages do you drink each day (Examples: soda, juice, sweet tea, etc.  Do NOT count diet or artificially sweetened beverages)?   0    How many  "days per week do you exercise enough to make your heart beat faster?     How many minutes a day do you exercise enough to make your heart beat faster?     How many days per week do you miss taking your medication? 0      Pt with diabetes, at goal on meds  Occasional high one, can increase metformin to 1.5 bid  Is on statin/ace and aspirin  UTD with eye doctor    Pt with htn, elevated today, usually is here, normal at home  Pt with elevated cholesterol on statin  Pt with restless legs on gabapentin    Feels like she has a UTI, has urgency and frequency for last few days        Review of Systems   Constitutional, HEENT, cardiovascular, pulmonary, gi and gu systems are negative, except as otherwise noted.      Objective    BP (!) 148/65   Pulse 64   Temp 98.1  F (36.7  C) (Oral)   Ht 1.619 m (5' 3.75\")   Wt 64.4 kg (142 lb)   BMI 24.57 kg/m    Body mass index is 24.57 kg/m .  Physical Exam   GENERAL: healthy, alert and no distress  NECK: no adenopathy, no asymmetry, masses, or scars and thyroid normal to palpation  RESP: lungs clear to auscultation - no rales, rhonchi or wheezes  CV: regular rate and rhythm, normal S1 S2, no S3 or S4, no murmur, click or rub, no peripheral edema and peripheral pulses strong  ABDOMEN: soft, nontender, no hepatosplenomegaly, no masses and bowel sounds normal  MS: no gross musculoskeletal defects noted, no edema    Results for orders placed or performed in visit on 10/20/20 (from the past 24 hour(s))   Hemoglobin A1c   Result Value Ref [facility]    Hemoglobin A1C 7.6 (H) 0 - 5.6 %           Assessment & Plan     Type 2 diabetes mellitus without complication, without long-term current use of insulin (H)  Controlled on meds, follow-up in 6 months  - FOOT EXAM  - Hemoglobin A1c  - Lipid panel reflex to direct LDL Fasting  - Basic metabolic panel    Hypertension goal BP (blood pressure) < 140/90  Elevated here, normal at home  - amLODIPine (NORVASC) 10 MG tablet; Take 1 tablet (10 mg) by " mouth daily Will call when needs refills  - enalapril (VASOTEC) 20 MG tablet; Take 1 tablet (20 mg) by mouth 2 times daily  - hydrALAZINE (APRESOLINE) 25 MG tablet; Take 1 tablet (25 mg) by mouth 2 times daily  - metoprolol succinate ER (TOPROL-XL) 25 MG 24 hr tablet; Take 1 tablet (25 mg) by mouth daily  - Basic metabolic panel    Hyperlipidemia LDL goal <100  On statin  - atorvastatin (LIPITOR) 80 MG tablet; Take 1 tablet (80 mg) by mouth daily  - Lipid panel reflex to direct LDL Fasting    Hypertriglyceridemia  On statin  - atorvastatin (LIPITOR) 80 MG tablet; Take 1 tablet (80 mg) by mouth daily    Restless leg syndrome  refill  - gabapentin (NEURONTIN) 300 MG capsule; TAKE THREE CAPSULES BY MOUTH TWO TIMES A DAY    Urinary frequency  + UA< treated with macrobid  - *UA reflex to Microscopic and Culture ([place] and [facility] (except [place] and [place])            No follow-ups on file.    Lisette Olson MD  [facility]

## 2020-10-20 NOTE — LETTER
October 21, 2020      Orquidea Stevens  [address] [address]        Dear ,    We are writing to inform you of your test results.    Your electrolytes and kidney function are normal   Your cholesterol looks good   Your diabetes is at goal   See you in 6 months     Resulted Orders   Hemoglobin A1c   Result Value Ref [facility]    Hemoglobin A1C 7.6 (H) 0 - 5.6 %      Comment:      Normal <5.7% Prediabetes 5.7-6.4%  Diabetes 6.5% or higher - adopted from ADA   consensus guidelines.     Lipid panel reflex to direct LDL Fasting   Result Value Ref [facility]    Cholesterol 127 <200 mg/dL    Triglycerides 201 (H) <150 mg/dL      Comment:      Borderline high:  150-199 mg/dl  High:             200-499 mg/dl  Very high:       >499 mg/dl  Non Fasting      HDL Cholesterol 45 (L) >49 mg/dL    LDL Cholesterol Calculated 42 <100 mg/dL      Comment:      Desirable:       <100 mg/dl    Non HDL Cholesterol 82 <130 mg/dL   Basic metabolic panel   Result Value Ref [facility]    Sodium 137 133 - 144 mmol/L    Potassium 4.1 3.4 - 5.3 mmol/L    Chloride 102 94 - 109 mmol/L    Carbon Dioxide 28 20 - 32 mmol/L    Anion Gap 7 3 - 14 mmol/L    Glucose 199 (H) 70 - 99 mg/dL      Comment:      Non Fasting    Urea Nitrogen 19 7 - 30 mg/dL    Creatinine 0.68 0.52 - 1.04 mg/dL    GFR Estimate 82 >60 mL/min/[1.73_m2]      Comment:      Non  GFR Calc  Starting 12/18/2018, serum creatinine based estimated GFR (eGFR) will be   calculated using the Chronic Kidney Disease Epidemiology Collaboration   (CKD-EPI) equation.      GFR Estimate If Black >90 >60 mL/min/[1.73_m2]      Comment:       GFR Calc  Starting 12/18/2018, serum creatinine based estimated GFR (eGFR) will be   calculated using the Chronic Kidney Disease Epidemiology Collaboration   (CKD-EPI) equation.      Calcium 9.4 8.5 - 10.1 mg/dL   *UA reflex to Microscopic and Culture ([facility] and [facility] (except [place] and [place])    Result Value Ref [facility]    Color Urine Yellow     Appearance Urine Slightly Cloudy     Glucose Urine Negative NEG^Negative mg/dL    Bilirubin Urine Negative NEG^Negative    Ketones Urine Negative NEG^Negative mg/dL    Specific Gravity Urine 1.010 1.003 - 1.035    Blood Urine Negative NEG^Negative    pH Urine 6.0 5.0 - 7.0 pH    Protein Albumin Urine Negative NEG^Negative mg/dL    Urobilinogen Urine 0.2 0.2 - 1.0 EU/dL    Nitrite Urine Negative NEG^Negative    Leukocyte Esterase Urine Moderate (A) NEG^Negative    Source Midstream Urine    Urine Microscopic   Result Value Ref [facility]    WBC Urine  (A) OTO5^0 - 5 /HPF    RBC Urine O - 2 OTO2^O - 2 /HPF    Squamous Epithelial /LPF Urine Few FEW^Few /LPF    Transitional Epi Few FEW^Few /HPF    Bacteria Urine Few (A) NEG^Negative /HPF   Urine Culture Aerobic Bacterial   Result Value Ref [facility]    Specimen Description Midstream Urine     Special Requests Specimen received in preservative     Culture Micro PENDING        If you have any questions or concerns, please call the clinic at the number listed above.       Sincerely,        Lisette Olson MD/sp

## 2020-10-21 ENCOUNTER — TELEPHONE (OUTPATIENT)
Dept: FAMILY MEDICINE | Facility: CLINIC | Age: 80
End: 2020-10-21

## 2020-10-21 DIAGNOSIS — R32 URINARY INCONTINENCE, UNSPECIFIED TYPE: ICD-10-CM

## 2020-10-21 NOTE — TELEPHONE ENCOUNTER
Reason for call:  Other   Patient called regarding (reason for call): prescription  Additional comments: Question on Oxybutynin should it be take twice daily or 2 at one time? Please call.     Phone number to reach patient:  Other phone number:    [phone]    Best Time:  any    Can we leave a detailed message on this number?  YES    Travel screening: Not Applicable

## 2020-10-21 NOTE — TELEPHONE ENCOUNTER
Returned call to pharmacy and gave them providers message below.   Medication is not XL, it is regular.   Coty Lima BSN, RN

## 2020-10-22 ENCOUNTER — TELEPHONE (OUTPATIENT)
Dept: FAMILY MEDICINE | Facility: CLINIC | Age: 80
End: 2020-10-22

## 2020-10-22 DIAGNOSIS — I10 HYPERTENSION GOAL BP (BLOOD PRESSURE) < 140/90: ICD-10-CM

## 2020-10-22 LAB
BACTERIA SPEC CULT: NORMAL
BACTERIA SPEC CULT: NORMAL
Lab: NORMAL
SPECIMEN SOURCE: NORMAL

## 2020-10-22 RX ORDER — ENALAPRIL MALEATE 20 MG/1
20 TABLET ORAL 2 TIMES DAILY
Qty: 180 TABLET | Refills: 1 | Status: SHIPPED | OUTPATIENT
Start: 2020-10-22 | End: 2021-04-19

## 2020-10-28 ENCOUNTER — TELEPHONE (OUTPATIENT)
Dept: FAMILY MEDICINE | Facility: CLINIC | Age: 80
End: 2020-10-28

## 2020-10-28 DIAGNOSIS — R82.90 ABNORMAL URINE FINDINGS: Primary | ICD-10-CM

## 2020-10-28 DIAGNOSIS — R30.0 DYSURIA: ICD-10-CM

## 2020-10-28 DIAGNOSIS — R30.0 DYSURIA: Primary | ICD-10-CM

## 2020-10-28 LAB
ALBUMIN UR-MCNC: 30 MG/DL
APPEARANCE UR: ABNORMAL
BACTERIA #/AREA URNS HPF: ABNORMAL /HPF
BILIRUB UR QL STRIP: NEGATIVE
COLOR UR AUTO: YELLOW
GLUCOSE UR STRIP-MCNC: NEGATIVE MG/DL
HGB UR QL STRIP: ABNORMAL
KETONES UR STRIP-MCNC: NEGATIVE MG/DL
LEUKOCYTE ESTERASE UR QL STRIP: ABNORMAL
NITRATE UR QL: NEGATIVE
NON-SQ EPI CELLS #/AREA URNS LPF: ABNORMAL /LPF
PH UR STRIP: 5.5 PH (ref 5–7)
RBC #/AREA URNS AUTO: ABNORMAL /HPF
SOURCE: ABNORMAL
SP GR UR STRIP: 1.02 (ref 1–1.03)
UROBILINOGEN UR STRIP-ACNC: 0.2 EU/DL (ref 0.2–1)
WBC #/AREA URNS AUTO: ABNORMAL /HPF

## 2020-10-28 PROCEDURE — 87086 URINE CULTURE/COLONY COUNT: CPT | Performed by: FAMILY MEDICINE

## 2020-10-28 PROCEDURE — 81001 URINALYSIS AUTO W/SCOPE: CPT | Performed by: FAMILY MEDICINE

## 2020-10-28 NOTE — TELEPHONE ENCOUNTER
Generally mixed joe means no infection.    I would recommend she repeat urinalysis. Make sure it is clean catch  Orders are in. She just needs to make lab appointment.    Lisette Olson MD

## 2020-10-28 NOTE — TELEPHONE ENCOUNTER
Called patient and gave providers message/ instructions.  Patient states (s)he understands this.     TC, please assist patient with lab appointment to give urine sample. This RN cannot find soon enough appointment on lab schedule. Orders in Epic.     Coty HAQN, RN

## 2020-10-28 NOTE — TELEPHONE ENCOUNTER
Patient was treated for a UTI 8 days ago.  Urine culture showed > 100,000 Colonies/ml, mixed urogential joe.   Patient took 5 days of Macrobid.  Yesterday patient /complains of pressure and discomfort, with urination.  Denies hematuria, back pain.  Afebrile.   Please advise  Constance Yun RN

## 2020-10-28 NOTE — TELEPHONE ENCOUNTER
Patient calling was treated for a uti recently and still is having symptom has finished all medications as advised. Please call to discuss.

## 2020-10-29 LAB
BACTERIA SPEC CULT: NO GROWTH
Lab: NORMAL
SPECIMEN SOURCE: NORMAL

## 2020-11-13 ENCOUNTER — TELEPHONE (OUTPATIENT)
Dept: FAMILY MEDICINE | Facility: CLINIC | Age: 80
End: 2020-11-13

## 2020-11-13 DIAGNOSIS — G25.81 RESTLESS LEG SYNDROME: ICD-10-CM

## 2020-11-13 NOTE — TELEPHONE ENCOUNTER
Reason for call:  Medication   If this is a refill request, has the caller requested the refill from the pharmacy already? No  Will the patient be using a [facility]? No  Name of the pharmacy and phone number for the current request:   [id number] - [place], [place] - [address] [facility] Phone:  [phone]   Fax:  [phone]              Name of the medication requested:   gabapentin (NEURONTIN) 300 MG capsule    Other request:   Patient would like a 90 day refill.    Phone number to reach patient:  Home number on file [phone] (home)    Best Time:  Any     Can we leave a detailed message on this number?  YES    Travel screening: Negative

## 2020-11-14 RX ORDER — GABAPENTIN 300 MG/1
CAPSULE ORAL
Qty: 540 CAPSULE | Refills: 1 | Status: SHIPPED | OUTPATIENT
Start: 2020-11-14 | End: 2021-05-19

## 2020-11-15 DIAGNOSIS — E11.9 TYPE 2 DIABETES MELLITUS WITHOUT COMPLICATION, WITHOUT LONG-TERM CURRENT USE OF INSULIN (H): ICD-10-CM

## 2020-11-16 RX ORDER — METFORMIN HCL 500 MG
TABLET, EXTENDED RELEASE 24 HR ORAL
Qty: 180 TABLET | Refills: 1 | Status: SHIPPED | OUTPATIENT
Start: 2020-11-16 | End: 2021-05-19

## 2020-11-16 NOTE — TELEPHONE ENCOUNTER
Refill request received within 30 days of last office visit with pcp.  Prescription is routed to the provider to please address refill.   Constance Yun RN

## 2020-11-17 ENCOUNTER — OFFICE VISIT (OUTPATIENT)
Dept: FAMILY MEDICINE | Facility: CLINIC | Age: 80
End: 2020-11-17
Payer: COMMERCIAL

## 2020-11-17 VITALS
DIASTOLIC BLOOD PRESSURE: 64 MMHG | HEART RATE: 62 BPM | SYSTOLIC BLOOD PRESSURE: 128 MMHG | BODY MASS INDEX: 24.63 KG/M2 | HEIGHT: 63 IN | TEMPERATURE: 98.1 F | WEIGHT: 139 LBS

## 2020-11-17 DIAGNOSIS — H61.23 BILATERAL IMPACTED CERUMEN: Primary | ICD-10-CM

## 2020-11-17 PROCEDURE — 99213 OFFICE O/P EST LOW 20 MIN: CPT | Performed by: FAMILY MEDICINE

## 2020-11-17 ASSESSMENT — MIFFLIN-ST. JEOR: SCORE: 1068.59

## 2020-11-17 NOTE — PROGRESS NOTES
"Subjective     Orquidea Stevens is a 80 year old female who presents to clinic today for the following health issues:    HPI         Ear plugged, would like ear wash  No other concerns          Review of Systems   Constitutional, HEENT, cardiovascular, pulmonary, gi and gu systems are negative, except as otherwise noted.      Objective    /64   Pulse 62   Temp 98.1  F (36.7  C) (Oral)   Ht 1.607 m (5' 3.25\")   Wt 63 kg (139 lb)   BMI 24.43 kg/m    Body mass index is 24.43 kg/m .  Physical Exam   GENERAL: healthy, alert and no distress  HENT: ear canals and TM's normal, nose and mouth without ulcers or lesions. Both ears flushed with warm water til cerumen was removed. Pt tolerated procedure    No results found for this or any previous visit (from the past 24 hour(s)).        Assessment & Plan     Bilateral impacted cerumen  Follow-up as needed              No follow-ups on file.    Lisette Olson MD  [facility]    "

## 2020-11-23 ENCOUNTER — TRANSFERRED RECORDS (OUTPATIENT)
Dept: HEALTH INFORMATION MANAGEMENT | Facility: CLINIC | Age: 80
End: 2020-11-23

## 2021-01-31 DIAGNOSIS — K21.9 GASTROESOPHAGEAL REFLUX DISEASE: ICD-10-CM

## 2021-02-10 ENCOUNTER — IMMUNIZATION (OUTPATIENT)
Dept: NURSING | Facility: CLINIC | Age: 81
End: 2021-02-10
Payer: COMMERCIAL

## 2021-02-10 PROCEDURE — 0001A PR COVID VAC PFIZER DIL RECON 30 MCG/0.3 ML IM: CPT

## 2021-02-10 PROCEDURE — 91300 PR COVID VAC PFIZER DIL RECON 30 MCG/0.3 ML IM: CPT

## 2021-03-03 ENCOUNTER — IMMUNIZATION (OUTPATIENT)
Dept: NURSING | Facility: CLINIC | Age: 81
End: 2021-03-03
Attending: FAMILY MEDICINE
Payer: COMMERCIAL

## 2021-03-03 PROCEDURE — 91300 PR COVID VAC PFIZER DIL RECON 30 MCG/0.3 ML IM: CPT

## 2021-03-03 PROCEDURE — 0002A PR COVID VAC PFIZER DIL RECON 30 MCG/0.3 ML IM: CPT

## 2021-04-17 ENCOUNTER — HEALTH MAINTENANCE LETTER (OUTPATIENT)
Age: 81
End: 2021-04-17

## 2021-05-19 DIAGNOSIS — G25.81 RESTLESS LEG SYNDROME: ICD-10-CM

## 2021-05-19 DIAGNOSIS — E11.9 TYPE 2 DIABETES MELLITUS WITHOUT COMPLICATION, WITHOUT LONG-TERM CURRENT USE OF INSULIN (H): ICD-10-CM

## 2021-05-19 RX ORDER — METFORMIN HCL 500 MG
500 TABLET, EXTENDED RELEASE 24 HR ORAL 2 TIMES DAILY WITH MEALS
Qty: 60 TABLET | Refills: 0 | Status: SHIPPED | OUTPATIENT
Start: 2021-05-19 | End: 2021-07-26

## 2021-05-19 RX ORDER — GABAPENTIN 300 MG/1
CAPSULE ORAL
Qty: 540 CAPSULE | Refills: 0 | Status: SHIPPED | OUTPATIENT
Start: 2021-05-19 | End: 2021-08-09

## 2021-06-06 ENCOUNTER — HEALTH MAINTENANCE LETTER (OUTPATIENT)
Age: 81
End: 2021-06-06

## 2021-06-20 DIAGNOSIS — K21.9 GASTROESOPHAGEAL REFLUX DISEASE: ICD-10-CM

## 2021-06-20 DIAGNOSIS — K21.9 GASTROESOPHAGEAL REFLUX DISEASE WITHOUT ESOPHAGITIS: Primary | ICD-10-CM

## 2021-06-25 ENCOUNTER — OFFICE VISIT (OUTPATIENT)
Dept: OPHTHALMOLOGY | Facility: CLINIC | Age: 81
End: 2021-06-25
Payer: COMMERCIAL

## 2021-06-25 DIAGNOSIS — H04.123 DRY EYE SYNDROME, BILATERAL: ICD-10-CM

## 2021-06-25 DIAGNOSIS — H52.01 HYPEROPIA OF RIGHT EYE: ICD-10-CM

## 2021-06-25 DIAGNOSIS — H52.223 REGULAR ASTIGMATISM OF BOTH EYES: ICD-10-CM

## 2021-06-25 DIAGNOSIS — H52.4 PRESBYOPIA OF BOTH EYES: ICD-10-CM

## 2021-06-25 DIAGNOSIS — Z01.00 EXAMINATION OF EYES AND VISION: Primary | ICD-10-CM

## 2021-06-25 DIAGNOSIS — H18.519 FUCHS' ENDOTHELIAL DYSTROPHY: ICD-10-CM

## 2021-06-25 DIAGNOSIS — D31.32 CHOROIDAL NEVUS OF BOTH EYES: ICD-10-CM

## 2021-06-25 DIAGNOSIS — E11.9 TYPE 2 DIABETES MELLITUS WITHOUT COMPLICATION, WITHOUT LONG-TERM CURRENT USE OF INSULIN (H): ICD-10-CM

## 2021-06-25 DIAGNOSIS — D31.31 CHOROIDAL NEVUS OF BOTH EYES: ICD-10-CM

## 2021-06-25 DIAGNOSIS — Z96.1 PSEUDOPHAKIA: ICD-10-CM

## 2021-06-25 PROCEDURE — 92015 DETERMINE REFRACTIVE STATE: CPT | Performed by: STUDENT IN AN ORGANIZED HEALTH CARE EDUCATION/TRAINING PROGRAM

## 2021-06-25 PROCEDURE — 92014 COMPRE OPH EXAM EST PT 1/>: CPT | Performed by: STUDENT IN AN ORGANIZED HEALTH CARE EDUCATION/TRAINING PROGRAM

## 2021-06-25 ASSESSMENT — REFRACTION_MANIFEST
OS_CYLINDER: +0.75
OD_SPHERE: PLANO
OD_ADD: +3.25
OS_ADD: +3.25
OS_AXIS: 165
OD_AXIS: 070
OS_SPHERE: +1.25
OD_CYLINDER: +2.00

## 2021-06-25 ASSESSMENT — CONF VISUAL FIELD
OD_NORMAL: 1
OS_NORMAL: 1

## 2021-06-25 ASSESSMENT — REFRACTION_WEARINGRX
SPECS_TYPE: PAL
OS_CYLINDER: +1.25
OS_SPHERE: +0.75
OD_SPHERE: PLANO
OD_ADD: +3.00
OD_AXIS: 074
OS_AXIS: 130
OD_CYLINDER: +2.00

## 2021-06-25 ASSESSMENT — VISUAL ACUITY
CORRECTION_TYPE: GLASSES
METHOD: SNELLEN - LINEAR
OD_CC: 20/40
OS_CC: 20/30-2
OS_CC: J2+
OD_CC: J3

## 2021-06-25 ASSESSMENT — CUP TO DISC RATIO
OD_RATIO: 0.1
OS_RATIO: 0.1

## 2021-06-25 ASSESSMENT — EXTERNAL EXAM - RIGHT EYE: OD_EXAM: NORMAL

## 2021-06-25 ASSESSMENT — EXTERNAL EXAM - LEFT EYE: OS_EXAM: NORMAL

## 2021-06-25 ASSESSMENT — TONOMETRY
IOP_METHOD: APPLANATION
OS_IOP_MMHG: 18
OD_IOP_MMHG: 18

## 2021-06-25 NOTE — LETTER
6/25/2021         RE: Orquidea Stevens  [address] [facility] [id number]        Dear Colleague,    Thank you for referring your patient, Orquidea Stevens, to the [facility]. Please see a copy of my visit note below.     Current Eye Medications:  Tears prn     Subjective:  Diabetic eye exam  Pt reports that he vision is blurry in the morning and harder to see when driving. Pt reports vision much blurrier  in her right eye  Pt also reports her eye have been watering for the past two weeks.     Lab Results   Component Value Date    A1C 7.6 10/20/2020    A1C 7.5 03/02/2020    A1C 7.9 08/21/2019    A1C 7.4 12/10/2018    A1C 6.8 05/03/2018       Objective:  See Ophthalmology Exam.      Assessment:  Orquidea Stevens is a 80 year old female who presents with:   Encounter Diagnoses   Name Primary?     Examination of eyes and vision      Hyperopia of right eye      Regular astigmatism of both eyes      Presbyopia of both eyes        Type 2 diabetes mellitus without complication, without long-term current use of insulin (H) Negative diabetic retinopathy      Fuchs' endothelial dystrophy - Both Eyes Discussed pathophysiology and recommend Nolan 128 ointment at bedtime.      Choroidal nevus of both eyes      Pseudophakia - Both Eyes s/p YAG cap OU      Dry eye syndrome, bilateral      Plan:  Try Nolan 5% ointment at bedtime in both eyes     Use artificial tears more consistently (2-3 times per day) or try a gel tear up to four times a day (like Refresh Liquigel or GenTeal Gel Tears)     Glasses prescription given - optional to update    Keep blood sugars and blood pressure under good control.    Lucie Gordillo MD  [phone]    Patient Education   Diabetes weakens the blood vessels all over the body, including the eyes. Damage to the blood vessels in the eyes can cause swelling or bleeding into part of the eye (called the retina). This is called diabetic retinopathy (JULIAN-tin-AH-puh-thee).  If not treated, this disease can cause vision loss or blindness.   Symptoms may include blurred or distorted vision, but many people have no symptoms. It's important to see your eye doctor regularly to check for problems.   Early treatment and good control can help protect your vision. Here are the things you can do to help prevent vision loss:      1. Keep your blood sugar levels under tight control.      2. Bring high blood pressure under control.      3. No smoking.      4. Have yearly dilated eye exams.             Again, thank you for allowing me to participate in the care of your patient.        Sincerely,        Lucie Gordillo MD

## 2021-06-25 NOTE — PATIENT INSTRUCTIONS
Try Nolan 5% ointment at bedtime in both eyes     Use artificial tears more consistently (2-3 times per day) or try a gel tear up to four times a day (like Refresh Liquigel or GenTeal Gel Tears)     Glasses prescription given - optional to update    Keep blood sugars and blood pressure under good control.    Lucie Gordillo MD  [phone]    Patient Education   Diabetes weakens the blood vessels all over the body, including the eyes. Damage to the blood vessels in the eyes can cause swelling or bleeding into part of the eye (called the retina). This is called diabetic retinopathy ([facility]-tin--puh-thee). If not treated, this disease can cause vision loss or blindness.   Symptoms may include blurred or distorted vision, but many people have no symptoms. It's important to see your eye doctor regularly to check for problems.   Early treatment and good control can help protect your vision. Here are the things you can do to help prevent vision loss:      1. Keep your blood sugar levels under tight control.      2. Bring high blood pressure under control.      3. No smoking.      4. Have yearly dilated eye exams.

## 2021-06-25 NOTE — PROGRESS NOTES
Current Eye Medications:  Tears prn     Subjective:  Diabetic eye exam  Pt reports that he vision is blurry in the morning and harder to see when driving. Pt reports vision much blurrier  in her right eye  Pt also reports her eye have been watering for the past two weeks.     Lab Results   Component Value Date    A1C 7.6 10/20/2020    A1C 7.5 03/02/2020    A1C 7.9 08/21/2019    A1C 7.4 12/10/2018    A1C 6.8 05/03/2018       Objective:  See Ophthalmology Exam.      Assessment:  Orquidea Stevens is a 80 year old female who presents with:   Encounter Diagnoses   Name Primary?     Examination of eyes and vision      Hyperopia of right eye      Regular astigmatism of both eyes      Presbyopia of both eyes        Type 2 diabetes mellitus without complication, without long-term current use of insulin (H) Negative diabetic retinopathy      Fuchs' endothelial dystrophy - Both Eyes Discussed pathophysiology and recommend Nolan 128 ointment at bedtime.      Choroidal nevus of both eyes      Pseudophakia - Both Eyes s/p YAG cap OU      Dry eye syndrome, bilateral      Plan:  Try Nolan 5% ointment at bedtime in both eyes     Use artificial tears more consistently (2-3 times per day) or try a gel tear up to four times a day (like Refresh Liquigel or GenTeal Gel Tears)     Glasses prescription given - optional to update    Keep blood sugars and blood pressure under good control.    Lucie Gordillo MD  [phone]    Patient Education   Diabetes weakens the blood vessels all over the body, including the eyes. Damage to the blood vessels in the eyes can cause swelling or bleeding into part of the eye (called the retina). This is called diabetic retinopathy ([facility]-tin[facility]-thee). If not treated, this disease can cause vision loss or blindness.   Symptoms may include blurred or distorted vision, but many people have no symptoms. It's important to see your eye doctor regularly to check for problems.   Early treatment and good  control can help protect your vision. Here are the things you can do to help prevent vision loss:      1. Keep your blood sugar levels under tight control.      2. Bring high blood pressure under control.      3. No smoking.      4. Have yearly dilated eye exams.

## 2021-07-22 ENCOUNTER — MYC MEDICAL ADVICE (OUTPATIENT)
Dept: FAMILY MEDICINE | Facility: CLINIC | Age: 81
End: 2021-07-22

## 2021-07-25 DIAGNOSIS — E11.9 TYPE 2 DIABETES MELLITUS WITHOUT COMPLICATION, WITHOUT LONG-TERM CURRENT USE OF INSULIN (H): ICD-10-CM

## 2021-07-25 DIAGNOSIS — K21.9 GASTROESOPHAGEAL REFLUX DISEASE WITHOUT ESOPHAGITIS: ICD-10-CM

## 2021-07-26 RX ORDER — METFORMIN HCL 500 MG
500 TABLET, EXTENDED RELEASE 24 HR ORAL 2 TIMES DAILY WITH MEALS
Qty: 60 TABLET | Refills: 0 | Status: SHIPPED | OUTPATIENT
Start: 2021-07-26 | End: 2021-08-15

## 2021-08-08 DIAGNOSIS — G25.81 RESTLESS LEG SYNDROME: ICD-10-CM

## 2021-08-09 RX ORDER — GABAPENTIN 300 MG/1
CAPSULE ORAL
Qty: 540 CAPSULE | Refills: 0 | Status: SHIPPED | OUTPATIENT
Start: 2021-08-09 | End: 2021-08-17

## 2021-08-09 NOTE — TELEPHONE ENCOUNTER
Routing refill request to provider for review/approval because:  Drug not on the [facility] refill protocol   Monica Payne RN, BSN   [facility]

## 2021-08-10 ENCOUNTER — TELEPHONE (OUTPATIENT)
Dept: FAMILY MEDICINE | Facility: CLINIC | Age: 81
End: 2021-08-10

## 2021-08-10 NOTE — TELEPHONE ENCOUNTER
"[facility] pharmacy, has a policy states, they are unable to fill Gabapentin prior to 3 days before refill is due, which will be 8/16/21.  Patient reports will be out by 8/12/21.   \"Uncertain where the other tablets went\"  [facility] Treats Gabapentin like a controlled substance.     Will you give a Verbal Orders for an early refill of the Gabapentin?     Please advise  Constance Yun RN     "

## 2021-08-17 ENCOUNTER — OFFICE VISIT (OUTPATIENT)
Dept: FAMILY MEDICINE | Facility: CLINIC | Age: 81
End: 2021-08-17
Payer: COMMERCIAL

## 2021-08-17 VITALS
RESPIRATION RATE: 17 BRPM | SYSTOLIC BLOOD PRESSURE: 134 MMHG | WEIGHT: 144 LBS | DIASTOLIC BLOOD PRESSURE: 62 MMHG | HEIGHT: 63 IN | BODY MASS INDEX: 25.52 KG/M2 | HEART RATE: 60 BPM | OXYGEN SATURATION: 100 % | TEMPERATURE: 99 F

## 2021-08-17 DIAGNOSIS — Z00.00 ENCOUNTER FOR MEDICARE ANNUAL WELLNESS EXAM: ICD-10-CM

## 2021-08-17 DIAGNOSIS — E78.5 HYPERLIPIDEMIA LDL GOAL <100: ICD-10-CM

## 2021-08-17 DIAGNOSIS — R30.0 DYSURIA: ICD-10-CM

## 2021-08-17 DIAGNOSIS — E78.1 HYPERTRIGLYCERIDEMIA: ICD-10-CM

## 2021-08-17 DIAGNOSIS — G25.81 RESTLESS LEG SYNDROME: ICD-10-CM

## 2021-08-17 DIAGNOSIS — I10 HYPERTENSION GOAL BP (BLOOD PRESSURE) < 140/90: ICD-10-CM

## 2021-08-17 DIAGNOSIS — K58.0 IRRITABLE BOWEL SYNDROME WITH DIARRHEA: ICD-10-CM

## 2021-08-17 DIAGNOSIS — E11.9 TYPE 2 DIABETES MELLITUS WITHOUT COMPLICATION, WITHOUT LONG-TERM CURRENT USE OF INSULIN (H): Primary | ICD-10-CM

## 2021-08-17 LAB
ALBUMIN UR-MCNC: NEGATIVE MG/DL
ANION GAP SERPL CALCULATED.3IONS-SCNC: 5 MMOL/L (ref 3–14)
APPEARANCE UR: CLEAR
BILIRUB UR QL STRIP: NEGATIVE
BUN SERPL-MCNC: 13 MG/DL (ref 7–30)
CALCIUM SERPL-MCNC: 9.9 MG/DL (ref 8.5–10.1)
CHLORIDE BLD-SCNC: 106 MMOL/L (ref 94–109)
CHOLEST SERPL-MCNC: 134 MG/DL
CO2 SERPL-SCNC: 28 MMOL/L (ref 20–32)
COLOR UR AUTO: YELLOW
CREAT SERPL-MCNC: 0.7 MG/DL (ref 0.52–1.04)
CREAT UR-MCNC: 30 MG/DL
FASTING STATUS PATIENT QL REPORTED: NO
GFR SERPL CREATININE-BSD FRML MDRD: 82 ML/MIN/1.73M2
GLUCOSE BLD-MCNC: 246 MG/DL (ref 70–99)
GLUCOSE UR STRIP-MCNC: 100 MG/DL
HBA1C MFR BLD: 9.8 % (ref 0–5.6)
HDLC SERPL-MCNC: 50 MG/DL
HGB UR QL STRIP: NEGATIVE
KETONES UR STRIP-MCNC: NEGATIVE MG/DL
LDLC SERPL CALC-MCNC: 52 MG/DL
LEUKOCYTE ESTERASE UR QL STRIP: NEGATIVE
MICROALBUMIN UR-MCNC: <5 MG/L
MICROALBUMIN/CREAT UR: NORMAL MG/G{CREAT}
NITRATE UR QL: NEGATIVE
NONHDLC SERPL-MCNC: 84 MG/DL
PH UR STRIP: 5.5 [PH] (ref 5–7)
POTASSIUM BLD-SCNC: 4 MMOL/L (ref 3.4–5.3)
SODIUM SERPL-SCNC: 139 MMOL/L (ref 133–144)
SP GR UR STRIP: 1.01 (ref 1–1.03)
TRIGL SERPL-MCNC: 160 MG/DL
UROBILINOGEN UR STRIP-ACNC: 0.2 E.U./DL

## 2021-08-17 PROCEDURE — 82043 UR ALBUMIN QUANTITATIVE: CPT | Performed by: FAMILY MEDICINE

## 2021-08-17 PROCEDURE — 80048 BASIC METABOLIC PNL TOTAL CA: CPT | Performed by: FAMILY MEDICINE

## 2021-08-17 PROCEDURE — 99214 OFFICE O/P EST MOD 30 MIN: CPT | Mod: 25 | Performed by: FAMILY MEDICINE

## 2021-08-17 PROCEDURE — 36415 COLL VENOUS BLD VENIPUNCTURE: CPT | Performed by: FAMILY MEDICINE

## 2021-08-17 PROCEDURE — 81003 URINALYSIS AUTO W/O SCOPE: CPT | Performed by: FAMILY MEDICINE

## 2021-08-17 PROCEDURE — 99397 PER PM REEVAL EST PAT 65+ YR: CPT | Performed by: FAMILY MEDICINE

## 2021-08-17 PROCEDURE — 80061 LIPID PANEL: CPT | Performed by: FAMILY MEDICINE

## 2021-08-17 PROCEDURE — 83036 HEMOGLOBIN GLYCOSYLATED A1C: CPT | Performed by: FAMILY MEDICINE

## 2021-08-17 RX ORDER — METOPROLOL SUCCINATE 25 MG/1
25 TABLET, EXTENDED RELEASE ORAL DAILY
Qty: 90 TABLET | Refills: 1 | Status: SHIPPED | OUTPATIENT
Start: 2021-08-17 | End: 2021-12-30

## 2021-08-17 RX ORDER — AMLODIPINE BESYLATE 10 MG/1
10 TABLET ORAL DAILY
Qty: 90 TABLET | Refills: 3 | Status: SHIPPED | OUTPATIENT
Start: 2021-08-17 | End: 2022-08-11

## 2021-08-17 RX ORDER — METFORMIN HCL 500 MG
500 TABLET, EXTENDED RELEASE 24 HR ORAL 2 TIMES DAILY WITH MEALS
Qty: 180 TABLET | Refills: 1 | Status: SHIPPED | OUTPATIENT
Start: 2021-08-17 | End: 2021-08-23 | Stop reason: SINTOL

## 2021-08-17 RX ORDER — ENALAPRIL MALEATE 20 MG/1
20 TABLET ORAL 2 TIMES DAILY
Qty: 180 TABLET | Refills: 1 | Status: SHIPPED | OUTPATIENT
Start: 2021-08-17 | End: 2022-01-05

## 2021-08-17 RX ORDER — HYDRALAZINE HYDROCHLORIDE 25 MG/1
25 TABLET, FILM COATED ORAL 2 TIMES DAILY
Qty: 180 TABLET | Refills: 1 | Status: SHIPPED | OUTPATIENT
Start: 2021-08-17 | End: 2022-01-05

## 2021-08-17 RX ORDER — GABAPENTIN 300 MG/1
CAPSULE ORAL
Qty: 540 CAPSULE | Refills: 0 | Status: SHIPPED | OUTPATIENT
Start: 2021-08-17 | End: 2022-01-05

## 2021-08-17 RX ORDER — ATORVASTATIN CALCIUM 80 MG/1
80 TABLET, FILM COATED ORAL DAILY
Qty: 90 TABLET | Refills: 3 | Status: SHIPPED | OUTPATIENT
Start: 2021-08-17 | End: 2022-08-11

## 2021-08-17 RX ORDER — HYDROCHLOROTHIAZIDE 25 MG/1
25 TABLET ORAL DAILY
Qty: 90 TABLET | Refills: 1 | Status: SHIPPED | OUTPATIENT
Start: 2021-08-17 | End: 2022-01-05

## 2021-08-17 ASSESSMENT — MIFFLIN-ST. JEOR: SCORE: 1091.27

## 2021-08-17 ASSESSMENT — PAIN SCALES - GENERAL: PAINLEVEL: NO PAIN (0)

## 2021-08-17 NOTE — PROGRESS NOTES
Assessment & Plan     Encounter for Medicare annual wellness exam      Type 2 diabetes mellitus without complication, without long-term current use of insulin (H)  At goal on meds, due for labwork, follow-up in 6months  - metFORMIN (GLUCOPHAGE-XR) 500 MG 24 hr tablet; Take 1 tablet (500 mg) by mouth 2 times daily (with meals)  - **A1C FUTURE 3mo; Future  - Albumin Random Urine Quantitative with Creat Ratio; Future  - **A1C FUTURE 3mo  - Albumin Random Urine Quantitative with Creat Ratio    Hypertension goal BP (blood pressure) < 140/90  At goal on meds  - amLODIPine (NORVASC) 10 MG tablet; Take 1 tablet (10 mg) by mouth daily Will call when needs refills  - enalapril (VASOTEC) 20 MG tablet; Take 1 tablet (20 mg) by mouth 2 times daily  - hydrALAZINE (APRESOLINE) 25 MG tablet; Take 1 tablet (25 mg) by mouth 2 times daily  - hydrochlorothiazide (HYDRODIURIL) 25 MG tablet; Take 1 tablet (25 mg) by mouth daily  - metoprolol succinate ER (TOPROL-XL) 25 MG 24 hr tablet; Take 1 tablet (25 mg) by mouth daily  - **Basic metabolic panel FUTURE 2mo; Future  - **Basic metabolic panel FUTURE 2mo    Hyperlipidemia LDL goal <100  On statin  - atorvastatin (LIPITOR) 80 MG tablet; Take 1 tablet (80 mg) by mouth daily  - Lipid panel reflex to direct LDL Non-fasting; Future  - Lipid panel reflex to direct LDL Non-fasting    Hypertriglyceridemia  On statin  - atorvastatin (LIPITOR) 80 MG tablet; Take 1 tablet (80 mg) by mouth daily    Restless leg syndrome  meds working well  - gabapentin (NEURONTIN) 300 MG capsule; TAKE THREE CAPSULES BY MOUTH TWO TIMES A DAY    Dysuria  Normal UA  - UA Macro with Reflex to Micro and Culture - lab collect; Future  - UA Macro with Reflex to Micro and Culture - lab collect    Irritable bowel syndrome with diarrhea  Stop metformin to see if it is worsening her diarrhea               BMI:   Estimated body mass index is 25.31 kg/m  as calculated from the following:    Height as of this encounter: 1.607  "m (5' 3.25\").    Weight as of this encounter: 65.3 kg (144 lb).           Return in about 53 weeks (around 8/23/2022) for Annual Wellness Visit.    Lisette Olson MD  [facility]    Manasa Heard is a 80 year old who presents for the following health issues     HPI     Diabetes Follow-up     How often are you checking your blood sugar? A few times a month  What time of day are you checking your blood sugars (select all that apply)?  Before meals  Have you had any blood sugars above 200?  No  Have you had any blood sugars below 70?  Yes     What symptoms do you notice when your blood sugar is low?  None    What concerns do you have today about your diabetes? None     Do you have any of these symptoms? (Select all that apply)  Numbness in feet        Hyperlipidemia Follow-Up      Are you regularly taking any medication or supplement to lower your cholesterol?   Yes- atorvastatin    Are you having muscle aches or other side effects that you think could be caused by your cholesterol lowering medication?  No    Hypertension Follow-up      Do you check your blood pressure regularly outside of the clinic? No     Are you following a low salt diet? Yes    Are your blood pressures ever more than 140 on the top number (systolic) OR more   than 90 on the bottom number (diastolic), for example 140/90? No       Last week had pressure in bladder area. history of uti. Would like to check.      BP Readings from Last 2 Encounters:   08/17/21 (!) 147/70   11/17/20 128/64     Hemoglobin A1C (%)   Date Value   10/20/2020 7.6 (H)   03/02/2020 7.5 (H)     LDL Cholesterol Calculated (mg/dL)   Date Value   10/20/2020 42   08/21/2019 49       Pt with diabetes at goal on metformin 500 bid  She has a lot of diarrhea and is a bit limiting but also has known ibs  We did switch her to the extended release metformin but she did not notice any difference  Recommended holding her metformin for a couple of days to " "see if her diarrhea improves  She is on ace, aspirin and statin  She is UTD with eye MD    Pt with HTN, well controlled on norvasc and enalaprl, hydrochlorothiazide and hydralazine and toprol xl    Pt with elevated cholesterol and triglycerides on medication. due to for labwork today    Pt with restless leg and needs refill of her gabapentin. It is working well  Pt with some vague urinary symptoms and just wants to make sure she does not have a bladder infection.       Review of Systems   Constitutional, HEENT, cardiovascular, pulmonary, gi and gu systems are negative, except as otherwise noted.      Objective    BP (!) 147/70   Pulse 60   Temp 99  F (37.2  C) (Oral)   Resp 17   Ht 1.607 m (5' 3.25\")   Wt 65.3 kg (144 lb)   SpO2 100%   BMI 25.31 kg/m    Body mass index is 25.31 kg/m .  Physical Exam   GENERAL: healthy, alert and no distress  EYES: Eyes grossly normal to inspection, PERRL and conjunctivae and sclerae normal  HENT: ear canals and TM's normal, nose and mouth without ulcers or lesions  NECK: no adenopathy, no asymmetry, masses, or scars and thyroid normal to palpation  RESP: lungs clear to auscultation - no rales, rhonchi or wheezes  CV: regular rate and rhythm, normal S1 S2, no S3 or S4, no murmur, click or rub, no peripheral edema and peripheral pulses strong  ABDOMEN: soft, nontender, no hepatosplenomegaly, no masses and bowel sounds normal  MS: no gross musculoskeletal defects noted, no edema  SKIN: no suspicious lesions or rashes  NEURO: Normal strength and tone, mentation intact and speech normal  PSYCH: mentation appears normal, affect normal/bright  Diabetic foot exam: normal DP and PT pulses, no trophic changes or ulcerative lesions and normal sensory exam    Results for orders placed or performed in visit on 08/17/21 (from the past 24 hour(s))   UA Macro with Reflex to Micro and Culture - lab collect    Specimen: Urine, Midstream   Result Value Ref Range    Color Urine Yellow Colorless, " Straw, Light Yellow, Yellow    Appearance Urine Clear Clear    Glucose Urine 100  (A) Negative mg/dL    Bilirubin Urine Negative Negative    Ketones Urine Negative Negative mg/dL    Specific Gravity Urine 1.010 1.003 - 1.035    Blood Urine Negative Negative    pH Urine 5.5 5.0 - 7.0    Protein Albumin Urine Negative Negative mg/dL    Urobilinogen Urine 0.2 0.2, 1.0 E.U./dL    Nitrite Urine Negative Negative    Leukocyte Esterase Urine Negative Negative    Narrative    Microscopic not indicated   **A1C FUTURE 3mo   Result Value Ref Range    Hemoglobin A1C 9.8 (H) 0.0 - 5.6 %    Narrative    RESULTS CONFIRMED BY REPEAT TEST.  misbah

## 2021-08-17 NOTE — PROGRESS NOTES
"  SUBJECTIVE:   Orquidea Stevens is a 80 year old female who presents for Preventive Visit.      Patient has been advised of split billing requirements and indicates understanding: Yes  Are you in the first 12 months of your Medicare Part B coverage?  No    Physical Health:    In general, how would you rate your overall physical health? excellent    Outside of work, how many days during the week do you exercise? constantly moving    Outside of work, approximately how many minutes a day do you exercise?not applicable    If you drink alcohol do you typically have >3 drinks per day or >7 drinks per week? No    Do you usually eat at least 4 servings of fruit and vegetables a day, include whole grains & fiber and avoid regularly eating high fat or \"junk\" foods? NO    Do you have any problems taking medications regularly?  No    Do you have any side effects from medications? none    Needs assistance for the following daily activities: no assistance needed    Which of the following safety concerns are present in your home?  none identified     Hearing impairment: No    In the past 6 months, have you been bothered by leaking of urine? yes    Mental Health:    In general, how would you rate your overall mental or emotional health? excellent  PHQ-2 Score: 0    Do you feel safe in your environment? Yes    Have you ever done Advance Care Planning? (For example, a Health Directive, POLST, or a discussion with a medical provider or your loved ones about your wishes): No, advance care planning information given to patient to review.  Patient declined advance care planning discussion at this time.    Additional concerns to address?      Fall risk:  Fallen 2 or more times in the past year?: No  Any fall with injury in the past year?: No    Cognitive Screenin) Repeat 3 items (Leader, Season, Table)      2) Clock draw:   NORMAL  3) 3 item recall: Recalls 3 objects  Results: 3 items recalled: COGNITIVE IMPAIRMENT LESS " "LIKELY    Mini-CogTM Copyright S Brijesh. Licensed by the author for use in [facility]; reprinted with permission (michelle@.[facility]). All rights reserved.      Do you have sleep apnea, excessive snoring or daytime drowsiness?: no            Reviewed and updated as needed this visit by clinical staff  Tobacco  Allergies  Meds   Med Hx  Surg Hx  Fam Hx  Soc Hx        Reviewed and updated as needed this visit by Provider                Social History     Tobacco Use     Smoking status: Never Smoker     Smokeless tobacco: Never Used     Tobacco comment: Lives in smoke free household   Substance Use Topics     Alcohol use: No                           Current providers sharing in care for this patient include:   Patient Care Team:  Lisette Olson MD as PCP - General (Family Practice)  Lisette Olson MD as Assigned PCP  Lucie Gordillo MD as Assigned Surgical Provider    The following health maintenance items are reviewed in Epic and correct as of today:  Health Maintenance   Topic Date Due     ANNUAL REVIEW OF HM ORDERS  Never done     DEXA  08/03/2019     MEDICARE ANNUAL WELLNESS VISIT  03/02/2021     MICROALBUMIN  03/02/2021     FALL RISK ASSESSMENT  03/02/2021     A1C  04/20/2021     INFLUENZA VACCINE (1) 09/01/2021     BMP  10/20/2021     LIPID  10/20/2021     DIABETIC FOOT EXAM  10/20/2021     EYE EXAM  06/25/2022     DTAP/TDAP/TD IMMUNIZATION (3 - Td or Tdap) 06/27/2022     ADVANCE CARE PLANNING  03/02/2025     PHQ-2  Completed     Pneumococcal Vaccine: 65+ Years  Completed     ZOSTER IMMUNIZATION  Completed     COVID-19 Vaccine  Completed     IPV IMMUNIZATION  Aged Out     MENINGITIS IMMUNIZATION  Aged Out     Lab work is in process      ROS:  Constitutional, HEENT, cardiovascular, pulmonary, gi and gu systems are negative, except as otherwise noted.    OBJECTIVE:   /62   Pulse 60   Temp 99  F (37.2  C) (Oral)   Resp 17   Ht 1.607 m (5' 3.25\")   Wt 65.3 kg (144 lb)   " "SpO2 100%   BMI 25.31 kg/m   Estimated body mass index is 25.31 kg/m  as calculated from the following:    Height as of this encounter: 1.607 m (5' 3.25\").    Weight as of this encounter: 65.3 kg (144 lb).  EXAM:   See other note for exam    Diagnostic Test Results:  Labs reviewed in Epic    ASSESSMENT / PLAN:   1. Encounter for Medicare annual wellness exam        Patient has been advised of split billing requirements and indicates understanding: Yes    COUNSELING:  Reviewed preventive health counseling, as reflected in patient instructions       Regular exercise       Healthy diet/nutrition       Vision screening       Dental care    Estimated body mass index is 25.31 kg/m  as calculated from the following:    Height as of this encounter: 1.607 m (5' 3.25\").    Weight as of this encounter: 65.3 kg (144 lb).        She reports that she has never smoked. She has never used smokeless tobacco.    Appropriate preventive services were discussed with this patient, including applicable screening as appropriate for cardiovascular disease, diabetes, osteopenia/osteoporosis, and glaucoma.  As appropriate for age/gender, discussed screening for colorectal cancer, prostate cancer, breast cancer, and cervical cancer. Checklist reviewing preventive services available has been given to the patient.    Reviewed patients plan of care and provided an AVS. The Intermediate Care Plan ( asthma action plan, low back pain action plan, and migraine action plan) for Orquidea meets the Care Plan requirement. This Care Plan has been established and reviewed with the Patient.    Counseling Resources:  ATP IV Guidelines  Pooled Cohorts Equation Calculator  Breast Cancer Risk Calculator  BRCA-Related Cancer Risk Assessment: FHS-7 Tool  FRAX Risk Assessment  ICSI Preventive Guidelines  Dietary Guidelines for Americans, 2010  USDA's MyPlate  ASA Prophylaxis  Lung CA Screening    Lisette Olson MD  [facility]  "

## 2021-08-17 NOTE — PATIENT INSTRUCTIONS
Patient Education   Personalized Prevention Plan  You are due for the preventive services outlined below.  Your care team is available to assist you in scheduling these services.  If you have already completed any of these items, please share that information with your care team to update in your medical record.  Health Maintenance Due   Topic Date Due     ANNUAL REVIEW OF HM ORDERS  Never done     Osteoporosis Screening  08/03/2019     Kidney Microalbumin Urine Test  03/02/2021     FALL RISK ASSESSMENT  03/02/2021     A1C Lab  04/20/2021

## 2021-08-20 ENCOUNTER — TELEPHONE (OUTPATIENT)
Dept: FAMILY MEDICINE | Facility: CLINIC | Age: 81
End: 2021-08-20

## 2021-08-20 DIAGNOSIS — E11.9 TYPE 2 DIABETES, HBA1C GOAL < 8% (H): Primary | ICD-10-CM

## 2021-08-20 NOTE — TELEPHONE ENCOUNTER
Provider:  Please advise.  Thank you. Asia Conklin R.N.    Per OV note dated 8/17/2021 from  Dr. Agustin is as follows:   Irritable bowel syndrome with diarrhea  Stop metformin to see if it is worsening her diarrhea

## 2021-08-20 NOTE — TELEPHONE ENCOUNTER
Reason for Call:  Other      Detailed comments: Stopped taking metFORMIN (GLUCOPHAGE-XR)  For 2days and today 8/20/2021 blood sugare was 233. Also no diarrhea today.    Phone Number Patient can be reached at: Cell number on file:    Telephone Information:   Mobile [phone]    Or home # [phone]    Best Time:  As soon possible    Can we leave a detailed message on this number? YES    Call taken on 8/20/2021 at 3:13 PM by Jennifer Chavez

## 2021-08-21 RX ORDER — GLIPIZIDE 5 MG/1
5 TABLET, FILM COATED, EXTENDED RELEASE ORAL DAILY
Qty: 30 TABLET | Refills: 1 | Status: SHIPPED | OUTPATIENT
Start: 2021-08-21 | End: 2021-08-27

## 2021-08-21 NOTE — TELEPHONE ENCOUNTER
Have her stop the metformin.   Start glipizide 5 mg daily with breakfast.   If still not at goal could increase morning dose to 10 mg or could do 5 mg bid with breakfast and evening meal    Side effect of this medication is low blood sugars. She needs to make sure she eats if she takes this medication  Have her let us know what her BSs are doing in a couple of weeks.    Lisette Olson MD

## 2021-08-23 NOTE — TELEPHONE ENCOUNTER
Sugars yesterday were 323.  Patient notified of provider's message as written below. We did an overview of the information below and I also sent it in her MyChart for her to review before starting the medication today. I asked that she educate her  in regards to low blood sugar incase he would need to help and to have sugar raising item in the house. Patient verbalized good understanding, had no further questions and needed no further support.Asia Conklin R.N.        Low Blood Sugar (Hypoglycemia)     Fast-acting sugar can be a glass of nonfat milk.        Having too little sugar (glucose) in your blood is called low blood sugar (hypoglycemia). Low blood sugar often means anything lower than 70 mg/dL. Talk with your healthcare provider about your target range. Ask what level is too low for you. Diabetes itself doesn t cause low blood sugar. But some treatments for diabetes may raise your risk for it. These include pills or insulin. Low blood sugar may make you pass out or have a seizure. So always treat low blood sugar right away. But don't overeat.  Special note: Always carry a source of fast-acting sugar and a snack in case of hypoglycemia.  What you may notice  If you have low blood sugar, you may have one or more of these symptoms:    Shakiness or dizziness    Cold, clammy skin or sweating    Feeling hungry    Headache    Nervousness    A hard, fast heartbeat    Weakness    Confusion or irritability    Blurred eyesight    Having nightmares or waking up confused or sweating    Numbness or tingling in the lips or tongue  What you should do  Here are tips to follow if you have hypoglycemia:     First check your blood sugar. If it's too low (out of your target range), eat or drink 15 to 20 grams of fast-acting sugar. This may be 3 to 4 glucose tablets, 4 ounces (half a cup) of fruit juice or regular (nondiet) soda, or 1 tablespoon of honey. Don t take more than this, or your blood sugar may go too  high.    Don't eat foods high in protein such as milk or nuts to treat hypoglycemia. Protein may increase your insulin response. It may lower your blood sugar even more.    Wait 15 minutes. Then recheck your blood sugar if you can.    If your blood sugar is still too low, repeat the steps above and check your blood sugar again. If your blood sugar still has not gone back to your target range, contact your healthcare provider. Or seek emergency care.    Once your blood sugar is back at target range, eat a snack or meal.  Preventing low blood sugar  Things you can do include the following:     If your condition needs a strict treatment plan, eat meals and snacks at the same times each day. Don t skip meals!    If your treatment plan lets you change when and what you eat, learn how to change the time and dose of your rapid-acting insulin to match this.     Ask your healthcare provider if it's safe to drink alcohol. Never drink on an empty stomach.    Take your medicine at the prescribed times.    Always carry a source of fast-acting sugar and a snack when you re away from home.    If you have had several hypoglycemic episodes, talk with your healthcare provider. See if you may be able to take less medicine. You also may have a condition where you no longer recognize the symptoms of low blood sugar until the value falls to dangerous levels.  Other things to do  Other tips include:    Carry a medical ID card or wear a medical alert bracelet or necklace. It should say that you have diabetes. It should also say what to do if you pass out or have a seizure.    Make sure your family, friends, and coworkers know the signs of low blood sugar. Tell them what to do if your blood sugar falls very low and you can t treat yourself.    Keep a glucagon emergency kit handy. Be sure your family, friends, and coworkers know how and when to use it. Check it often. Replace the glucagon before it expires.    Talk with your healthcare team  about other things you can do to prevent low blood sugar.  If you have unexplained hypoglycemia or hypoglycemia several times, call your healthcare provider.  [employer] last reviewed this educational content on 11/1/2018 2000-2021 [employer]. All rights reserved. This information is not intended as a substitute for professional medical care. Always follow your healthcare professional's instructions.

## 2021-08-27 ENCOUNTER — TELEPHONE (OUTPATIENT)
Dept: FAMILY MEDICINE | Facility: CLINIC | Age: 81
End: 2021-08-27

## 2021-08-27 DIAGNOSIS — E11.9 TYPE 2 DIABETES, HBA1C GOAL < 8% (H): ICD-10-CM

## 2021-08-27 RX ORDER — GLIPIZIDE 5 MG/1
10 TABLET, FILM COATED, EXTENDED RELEASE ORAL DAILY
Qty: 1 TABLET | Refills: 0
Start: 2021-08-27 | End: 2021-09-02

## 2021-08-27 NOTE — TELEPHONE ENCOUNTER
Patient stopped Metformin due to side effects.  Was prescribe Glipizide 5 mg which she has had 5 doses.  Concerned due to elevated blood sugars.    This morning blood sugar 237  Yesterday blood sugar 205  3 days ago blood sugar 187.   Please advise  Constance Yun RN

## 2021-08-27 NOTE — TELEPHONE ENCOUNTER
Have her double her dose to 10 mg daily and let me know early next week what her BSs are running.    Lisette Olson MD

## 2021-09-02 ENCOUNTER — TELEPHONE (OUTPATIENT)
Dept: FAMILY MEDICINE | Facility: CLINIC | Age: 81
End: 2021-09-02

## 2021-09-02 DIAGNOSIS — E11.9 TYPE 2 DIABETES, HBA1C GOAL < 8% (H): ICD-10-CM

## 2021-09-02 RX ORDER — GLIPIZIDE 5 MG/1
10 TABLET, FILM COATED, EXTENDED RELEASE ORAL
Qty: 120 TABLET | Refills: 0 | Status: SHIPPED | OUTPATIENT
Start: 2021-09-02 | End: 2021-11-30

## 2021-09-02 NOTE — TELEPHONE ENCOUNTER
Patient calling to report her blood sugars.   She is now taking glipizide 5 mg 2 tablets daily but her numbers are still high. The last 3 mornings readings are:  221  223  214.  No side effects.  Patient says she would like to just go back to metformin and give it another try.     Routing to provider to advise.  Coty HAQN, RN

## 2021-09-02 NOTE — TELEPHONE ENCOUNTER
Called patient and gave providers message/ instructions.  Patient states (s)he understands this.   The below order was sent to pharmacy.     Coty HAQN, RN

## 2021-09-10 DIAGNOSIS — K21.9 GASTROESOPHAGEAL REFLUX DISEASE WITHOUT ESOPHAGITIS: ICD-10-CM

## 2021-09-13 NOTE — TELEPHONE ENCOUNTER
Prescription approved per [facility] Refill Protocol.  Monica Payne RN, BSN   [facility] [place]

## 2021-09-26 ENCOUNTER — HEALTH MAINTENANCE LETTER (OUTPATIENT)
Age: 81
End: 2021-09-26

## 2021-09-30 ENCOUNTER — TELEPHONE (OUTPATIENT)
Dept: FAMILY MEDICINE | Facility: CLINIC | Age: 81
End: 2021-09-30

## 2021-09-30 DIAGNOSIS — E11.9 TYPE 2 DIABETES MELLITUS WITHOUT COMPLICATION, WITHOUT LONG-TERM CURRENT USE OF INSULIN (H): ICD-10-CM

## 2021-09-30 DIAGNOSIS — E11.9 TYPE 2 DIABETES, HBA1C GOAL < 8% (H): Primary | ICD-10-CM

## 2021-09-30 RX ORDER — METFORMIN HCL 500 MG
500 TABLET, EXTENDED RELEASE 24 HR ORAL 2 TIMES DAILY WITH MEALS
Qty: 180 TABLET | Refills: 1 | Status: SHIPPED | OUTPATIENT
Start: 2021-09-30 | End: 2021-11-30

## 2021-09-30 RX ORDER — METFORMIN HCL 500 MG
500 TABLET, EXTENDED RELEASE 24 HR ORAL 2 TIMES DAILY WITH MEALS
Qty: 60 TABLET | Refills: 0 | Status: CANCELLED | OUTPATIENT
Start: 2021-09-30

## 2021-09-30 NOTE — TELEPHONE ENCOUNTER
Reason for Call:  Other call back    Detailed comments: Pt called and said she was put on a new medication for her blood sugar but with this new medication she cannot get her BG under 200 please advise thank you     Phone Number Patient can be reached at: Home number on file [phone] (home)    Best Time: anytime    Can we leave a detailed message on this number? YES    Call taken on 9/30/2021 at 10:34 AM by Corinna Lee

## 2021-09-30 NOTE — TELEPHONE ENCOUNTER
Provider:  Are you willing to send her previous Metformin XR to the pharmacy so the patient can take this until she can be seen by diabetic ed.  Thank you. Asia Conklin R.N.    Comment: If you have not heard from the scheduling office within 2 business days, please call [phone] for [facility]     Patient notified of provider's message as written below. Patient was given the diabetic eds phone number. Patient would like to go back to her metformin in the meantime. She is ok with the diarrhea at least until she sees diabetic ed.  She was taking Metformin XR.  She did start taking a papaya pill and this has helped also.      glipiZIDE (GLUCOTROL XL) 5 MG 24 hr tablet 120 tablet 0 9/2/2021     Ok to leave a message with the provider's response.

## 2021-09-30 NOTE — TELEPHONE ENCOUNTER
Attempted to reach pt to relay provider message below as written. There was no answer. Left message to return call to a nurse at [phone].    Kenzie Maldonado, [place], RN

## 2021-09-30 NOTE — TELEPHONE ENCOUNTER
Then would recommend follow-up with diab ed to help with med management  I have sent the referral. They will call her    Lisette Olson MD

## 2021-10-04 ENCOUNTER — TELEPHONE (OUTPATIENT)
Dept: FAMILY MEDICINE | Facility: CLINIC | Age: 81
End: 2021-10-04
Payer: COMMERCIAL

## 2021-10-04 NOTE — TELEPHONE ENCOUNTER
Diabetes Education Scheduling Outreach #1:    Call to patient to schedule. No answer- someone picked up and hung up   Plan for 2nd outreach attempt within 1 week.    Jovita Rios  [facility]  Diabetes and Nutrition Scheduling

## 2021-10-06 ENCOUNTER — TELEPHONE (OUTPATIENT)
Dept: FAMILY MEDICINE | Facility: CLINIC | Age: 81
End: 2021-10-06
Payer: COMMERCIAL

## 2021-10-06 NOTE — TELEPHONE ENCOUNTER
Diabetes Education Scheduling Outreach #2:    Call to patient to schedule. Left message with phone number to call to schedule.      Jovita Rios  [facility]  Diabetes and Nutrition Scheduling

## 2021-11-30 ENCOUNTER — OFFICE VISIT (OUTPATIENT)
Dept: FAMILY MEDICINE | Facility: CLINIC | Age: 81
End: 2021-11-30
Payer: COMMERCIAL

## 2021-11-30 VITALS
BODY MASS INDEX: 25.34 KG/M2 | HEART RATE: 67 BPM | TEMPERATURE: 97.9 F | DIASTOLIC BLOOD PRESSURE: 75 MMHG | OXYGEN SATURATION: 98 % | HEIGHT: 63 IN | WEIGHT: 143 LBS | SYSTOLIC BLOOD PRESSURE: 130 MMHG

## 2021-11-30 DIAGNOSIS — R30.0 DYSURIA: ICD-10-CM

## 2021-11-30 DIAGNOSIS — N30.00 ACUTE CYSTITIS WITHOUT HEMATURIA: ICD-10-CM

## 2021-11-30 DIAGNOSIS — M54.41 ACUTE RIGHT-SIDED LOW BACK PAIN WITH RIGHT-SIDED SCIATICA: ICD-10-CM

## 2021-11-30 DIAGNOSIS — E11.9 TYPE 2 DIABETES MELLITUS WITHOUT COMPLICATION, WITHOUT LONG-TERM CURRENT USE OF INSULIN (H): Primary | ICD-10-CM

## 2021-11-30 LAB
ALBUMIN UR-MCNC: NEGATIVE MG/DL
APPEARANCE UR: ABNORMAL
BACTERIA #/AREA URNS HPF: ABNORMAL /HPF
BILIRUB UR QL STRIP: NEGATIVE
COLOR UR AUTO: YELLOW
GLUCOSE UR STRIP-MCNC: 100 MG/DL
HGB UR QL STRIP: NEGATIVE
KETONES UR STRIP-MCNC: NEGATIVE MG/DL
LEUKOCYTE ESTERASE UR QL STRIP: ABNORMAL
NITRATE UR QL: NEGATIVE
PH UR STRIP: 6 [PH] (ref 5–7)
RBC #/AREA URNS AUTO: ABNORMAL /HPF
SP GR UR STRIP: 1.01 (ref 1–1.03)
SQUAMOUS #/AREA URNS AUTO: ABNORMAL /LPF
UROBILINOGEN UR STRIP-ACNC: 0.2 E.U./DL
WBC #/AREA URNS AUTO: ABNORMAL /HPF

## 2021-11-30 PROCEDURE — 87086 URINE CULTURE/COLONY COUNT: CPT | Performed by: NURSE PRACTITIONER

## 2021-11-30 PROCEDURE — 99214 OFFICE O/P EST MOD 30 MIN: CPT | Performed by: NURSE PRACTITIONER

## 2021-11-30 PROCEDURE — 87186 SC STD MICRODIL/AGAR DIL: CPT | Performed by: NURSE PRACTITIONER

## 2021-11-30 PROCEDURE — 81001 URINALYSIS AUTO W/SCOPE: CPT | Performed by: NURSE PRACTITIONER

## 2021-11-30 RX ORDER — METFORMIN HCL 500 MG
1000 TABLET, EXTENDED RELEASE 24 HR ORAL 2 TIMES DAILY WITH MEALS
Start: 2021-11-30 | End: 2021-12-30

## 2021-11-30 RX ORDER — CEPHALEXIN 500 MG/1
500 CAPSULE ORAL 2 TIMES DAILY
Qty: 14 CAPSULE | Refills: 0 | Status: SHIPPED | OUTPATIENT
Start: 2021-11-30 | End: 2022-01-05

## 2021-11-30 RX ORDER — CYCLOBENZAPRINE HCL 5 MG
5 TABLET ORAL 3 TIMES DAILY PRN
Qty: 20 TABLET | Refills: 0 | Status: SHIPPED | OUTPATIENT
Start: 2021-11-30 | End: 2022-08-11

## 2021-11-30 ASSESSMENT — MIFFLIN-ST. JEOR: SCORE: 1081.73

## 2021-11-30 NOTE — PROGRESS NOTES
Assessment & Plan     Type 2 diabetes mellitus without complication, without long-term current use of insulin (H)  Chronic, not well controlled.    Metformin previously stopped due to diarrhea, switched to glipizide but per patient sugars consistently 200s despite dose increase to 10 mg BID. She stopped the glipizide an resumed Metformin 500 mg BID. She doesn't feel like stopping Metformin changed her chronic diarrhea issues. Okay with continuing Metformin.  Increase Metformin to 1000 mg BID.  Asked her to continue to monitor, give this a few weeks.  If AM BG is still > 1330, could have her add the glipizide back as a 2nd medication.  Her  takes glimepiride, she mentioned she might like to try that instead.    Has f/u with PCP scheduled for 1 month from now.   Will check labs at that time.   - metFORMIN (GLUCOPHAGE-XR) 500 MG 24 hr tablet; Take 2 tablets (1,000 mg) by mouth 2 times daily (with meals)  - Hemoglobin A1c; Future  - Basic metabolic panel  (Ca, Cl, CO2, Creat, Gluc, K, Na, BUN); Future    Acute cystitis without hematuria  Start cephalexin, culture in process.   - cephALEXin (KEFLEX) 500 MG capsule; Take 1 capsule (500 mg) by mouth 2 times daily for 7 days  - Urine Culture Aerobic Bacterial - lab collect    Dysuria  - UA reflex to Microscopic - lab collect  - Urine Microscopic Exam    Acute right-sided low back pain with right-sided sciatica  Requests flexeril, has tolerated in the past. Continue other home measures- tylenol, salon pas, stretches.  Referral to PT if not improving.   - cyclobenzaprine (FLEXERIL) 5 MG tablet; Take 1 tablet (5 mg) by mouth 3 times daily as needed for muscle spasms           Return in about 4 weeks (around 12/28/2021) for Diabetic check.    Maliha Campos, CNP  [facility]   Orquidea is a 81 year old who presents for the following health issues     History of Present Illness       Diabetes:   She presents for follow up of  "diabetes.  She is checking home blood glucose one time daily. She checks blood glucose after meals.  Blood glucose is sometimes over 200 and never under 70. She is aware of hypoglycemia symptoms including dizziness. She is concerned about blood sugar frequently over 200. She is having numbness in feet.             Has been having a hard time managing diabetes.  History of IBS.  It was thought that maybe Metformin was worsening the diarrhea.  Metformin was discontinued and she was stared on glipizide- at first 5 mg BID, then upped to 10 mg BID.  Reports blood sugars were still consistently 200s in the AM fasting after several weeks of this.  She called her PCP, glipizide stopped and she went back on the Metformin  mg BID.  Didn't feel like diarrhea really changed when she quit the Metformin previously, she is okay with going back on it.  Last A1C 9.8, up from 7.6- doesn't feel like she changed her diet or is exercising less.      Concern for UTI. Bladder pressure, frequency for the last few days..  No burning.  No fever or nausea.     Scabs on both ears x 3 weeks.  Skin dry, crust.  She has trouble with picking- picked scabbed on right ear and it came right back.     Right sciatica less than one week.   Salon pas.  Aspirin. Tylenol.  Right buttock pain, shoots down leg. Has had this problem several times in the past.  We discussed and no red flags present.  No falls or other trauma.  Requests flexeril, she has tolerated in the past and it has been helpful.       Review of Systems   Constitutional, HEENT, cardiovascular, pulmonary, gi and gu systems are negative, except as otherwise noted.      Objective    /75   Pulse 67   Temp 97.9  F (36.6  C)   Ht 1.607 m (5' 3.25\")   Wt 64.9 kg (143 lb)   SpO2 98%   BMI 25.13 kg/m    Body mass index is 25.13 kg/m .  Physical Exam   GENERAL: healthy, alert and no distress  NECK: no adenopathy, no asymmetry, masses, or scars and thyroid normal to palpation  RESP: " lungs clear to auscultation - no rales, rhonchi or wheezes  CV: regular rate and rhythm, normal S1 S2, no S3 or S4, no murmur, click or rub, no peripheral edema and peripheral pulses strong  ABDOMEN: soft, nontender, no hepatosplenomegaly, no masses and bowel sounds normal  MS: no gross musculoskeletal defects noted, no edema  SKIN: scant dry skin on left ear helix.  Right ear helix small scabbed area.   NEURO: Normal strength and tone, mentation intact and speech normal  PSYCH: mentation appears normal, affect normal/bright      UA RESULTS:  Recent Labs   Lab Test 11/30/21  1402   COLOR Yellow   APPEARANCE Slightly Cloudy*   URINEGLC 100 *   URINEBILI Negative   URINEKETONE Negative   SG 1.010   UBLD Negative   URINEPH 6.0   PROTEIN Negative   UROBILINOGEN 0.2   NITRITE Negative   LEUKEST Large*   RBCU 0-2   WBCU *         Recent Labs   Lab Test 08/17/21  1343 10/20/20  1226    137   POTASSIUM 4.0 4.1   CHLORIDE 106 102   CO2 28 28   ANIONGAP 5 7   * 199*   BUN 13 19   CR 0.70 0.68   ILEANA 9.9 9.4     Lab Results   Component Value Date    A1C 9.8 08/17/2021    A1C 7.6 10/20/2020    A1C 7.5 03/02/2020    A1C 7.9 08/21/2019    A1C 7.4 12/10/2018    A1C 6.8 05/03/2018

## 2021-11-30 NOTE — PATIENT INSTRUCTIONS
Increase Metformin to 1000 mg twice daily with meals. In two weeks if still not controlled, please call and we will need to add a 2nd medication (either glipizide or glimepiride).       Ear- use your A&D twice daily.  Avoid picking.  If wound is still present on the right ear in 1 month, we will refer you to dermatology.        Right low back pain- continue your home treatments  (tylenol, salon pas).  Heat and ice can also be helpful.  Flexeril 5 mg up to 3 times per day as needed.  If not improving in the near future, would recommend that we have you do some PT.     UTI- start cephalexin 1 pill twice daily for 7 days.  Drink plenty of water.

## 2021-12-02 LAB — BACTERIA UR CULT: ABNORMAL

## 2022-01-05 ENCOUNTER — OFFICE VISIT (OUTPATIENT)
Dept: FAMILY MEDICINE | Facility: CLINIC | Age: 82
End: 2022-01-05
Payer: COMMERCIAL

## 2022-01-05 VITALS
BODY MASS INDEX: 25.16 KG/M2 | HEIGHT: 63 IN | TEMPERATURE: 98.1 F | DIASTOLIC BLOOD PRESSURE: 62 MMHG | HEART RATE: 59 BPM | OXYGEN SATURATION: 99 % | RESPIRATION RATE: 18 BRPM | SYSTOLIC BLOOD PRESSURE: 129 MMHG | WEIGHT: 142 LBS

## 2022-01-05 DIAGNOSIS — I10 HYPERTENSION GOAL BP (BLOOD PRESSURE) < 140/90: ICD-10-CM

## 2022-01-05 DIAGNOSIS — R30.0 DYSURIA: ICD-10-CM

## 2022-01-05 DIAGNOSIS — K21.9 GASTROESOPHAGEAL REFLUX DISEASE WITHOUT ESOPHAGITIS: ICD-10-CM

## 2022-01-05 DIAGNOSIS — G25.81 RESTLESS LEG SYNDROME: ICD-10-CM

## 2022-01-05 DIAGNOSIS — G63 POLYNEUROPATHY ASSOCIATED WITH UNDERLYING DISEASE (H): ICD-10-CM

## 2022-01-05 DIAGNOSIS — E11.9 TYPE 2 DIABETES MELLITUS WITHOUT COMPLICATION, WITHOUT LONG-TERM CURRENT USE OF INSULIN (H): Primary | ICD-10-CM

## 2022-01-05 DIAGNOSIS — N30.00 ACUTE CYSTITIS WITHOUT HEMATURIA: ICD-10-CM

## 2022-01-05 LAB
ALBUMIN UR-MCNC: NEGATIVE MG/DL
ANION GAP SERPL CALCULATED.3IONS-SCNC: 6 MMOL/L (ref 3–14)
APPEARANCE UR: ABNORMAL
BACTERIA #/AREA URNS HPF: ABNORMAL /HPF
BILIRUB UR QL STRIP: NEGATIVE
BUN SERPL-MCNC: 21 MG/DL (ref 7–30)
CALCIUM SERPL-MCNC: 9.7 MG/DL (ref 8.5–10.1)
CHLORIDE BLD-SCNC: 103 MMOL/L (ref 94–109)
CO2 SERPL-SCNC: 29 MMOL/L (ref 20–32)
COLOR UR AUTO: YELLOW
CREAT SERPL-MCNC: 0.65 MG/DL (ref 0.52–1.04)
GFR SERPL CREATININE-BSD FRML MDRD: 87 ML/MIN/1.73M2
GLUCOSE BLD-MCNC: 172 MG/DL (ref 70–99)
GLUCOSE UR STRIP-MCNC: NEGATIVE MG/DL
HBA1C MFR BLD: 8.3 % (ref 0–5.6)
HGB UR QL STRIP: NEGATIVE
KETONES UR STRIP-MCNC: NEGATIVE MG/DL
LEUKOCYTE ESTERASE UR QL STRIP: ABNORMAL
NITRATE UR QL: NEGATIVE
PH UR STRIP: 5.5 [PH] (ref 5–7)
POTASSIUM BLD-SCNC: 4.3 MMOL/L (ref 3.4–5.3)
RBC #/AREA URNS AUTO: ABNORMAL /HPF
SODIUM SERPL-SCNC: 138 MMOL/L (ref 133–144)
SP GR UR STRIP: 1.01 (ref 1–1.03)
SQUAMOUS #/AREA URNS AUTO: ABNORMAL /LPF
UROBILINOGEN UR STRIP-ACNC: 0.2 E.U./DL
WBC #/AREA URNS AUTO: >100 /HPF

## 2022-01-05 PROCEDURE — 83036 HEMOGLOBIN GLYCOSYLATED A1C: CPT | Performed by: FAMILY MEDICINE

## 2022-01-05 PROCEDURE — 81001 URINALYSIS AUTO W/SCOPE: CPT | Performed by: FAMILY MEDICINE

## 2022-01-05 PROCEDURE — 87186 SC STD MICRODIL/AGAR DIL: CPT | Performed by: FAMILY MEDICINE

## 2022-01-05 PROCEDURE — 36415 COLL VENOUS BLD VENIPUNCTURE: CPT | Performed by: FAMILY MEDICINE

## 2022-01-05 PROCEDURE — 80048 BASIC METABOLIC PNL TOTAL CA: CPT | Performed by: FAMILY MEDICINE

## 2022-01-05 PROCEDURE — 99214 OFFICE O/P EST MOD 30 MIN: CPT | Performed by: FAMILY MEDICINE

## 2022-01-05 PROCEDURE — 87086 URINE CULTURE/COLONY COUNT: CPT | Performed by: FAMILY MEDICINE

## 2022-01-05 RX ORDER — METFORMIN HCL 500 MG
1000 TABLET, EXTENDED RELEASE 24 HR ORAL 2 TIMES DAILY WITH MEALS
Qty: 360 TABLET | Refills: 1 | Status: SHIPPED | OUTPATIENT
Start: 2022-01-05 | End: 2022-08-11

## 2022-01-05 RX ORDER — METOPROLOL SUCCINATE 25 MG/1
25 TABLET, EXTENDED RELEASE ORAL DAILY
Qty: 90 TABLET | Refills: 1 | Status: SHIPPED | OUTPATIENT
Start: 2022-01-05 | End: 2022-08-11

## 2022-01-05 RX ORDER — HYDRALAZINE HYDROCHLORIDE 25 MG/1
25 TABLET, FILM COATED ORAL 2 TIMES DAILY
Qty: 180 TABLET | Refills: 1 | Status: SHIPPED | OUTPATIENT
Start: 2022-01-05 | End: 2022-08-11

## 2022-01-05 RX ORDER — CEPHALEXIN 500 MG/1
500 CAPSULE ORAL 2 TIMES DAILY
Qty: 14 CAPSULE | Refills: 0 | Status: SHIPPED | OUTPATIENT
Start: 2022-01-05 | End: 2022-08-11

## 2022-01-05 RX ORDER — ENALAPRIL MALEATE 20 MG/1
20 TABLET ORAL 2 TIMES DAILY
Qty: 180 TABLET | Refills: 1 | Status: SHIPPED | OUTPATIENT
Start: 2022-01-05 | End: 2022-08-11

## 2022-01-05 RX ORDER — CEPHALEXIN 500 MG/1
500 CAPSULE ORAL 2 TIMES DAILY
Qty: 14 CAPSULE | Refills: 0 | Status: SHIPPED | OUTPATIENT
Start: 2022-01-05 | End: 2022-01-05

## 2022-01-05 RX ORDER — GABAPENTIN 300 MG/1
600 CAPSULE ORAL 2 TIMES DAILY
Qty: 360 CAPSULE | Refills: 1 | Status: SHIPPED | OUTPATIENT
Start: 2022-01-05 | End: 2022-08-11

## 2022-01-05 RX ORDER — HYDROCHLOROTHIAZIDE 25 MG/1
25 TABLET ORAL DAILY
Qty: 90 TABLET | Refills: 1 | Status: SHIPPED | OUTPATIENT
Start: 2022-01-05 | End: 2022-08-11

## 2022-01-05 ASSESSMENT — MIFFLIN-ST. JEOR: SCORE: 1077.2

## 2022-01-05 ASSESSMENT — PAIN SCALES - GENERAL: PAINLEVEL: NO PAIN (0)

## 2022-01-05 NOTE — PROGRESS NOTES
Assessment & Plan     Type 2 diabetes mellitus without complication, without long-term current use of insulin (H)  Home readings are looking much better, await A1c, tolerating the metformin 1000 bid  - metFORMIN (GLUCOPHAGE-XR) 500 MG 24 hr tablet; Take 2 tablets (1,000 mg) by mouth 2 times daily (with meals)  - **A1C FUTURE 3mo; Future  - **Basic metabolic panel FUTURE 2mo; Future  - **Basic metabolic panel FUTURE 2mo  - **A1C FUTURE 3mo    Hypertension goal BP (blood pressure) < 140/90  At goal on meds  - enalapril (VASOTEC) 20 MG tablet; Take 1 tablet (20 mg) by mouth 2 times daily  - hydrALAZINE (APRESOLINE) 25 MG tablet; Take 1 tablet (25 mg) by mouth 2 times daily  - hydrochlorothiazide (HYDRODIURIL) 25 MG tablet; Take 1 tablet (25 mg) by mouth daily  - metoprolol succinate ER (TOPROL-XL) 25 MG 24 hr tablet; Take 1 tablet (25 mg) by mouth daily    Restless leg syndrome  Working well, has decreased dose  - gabapentin (NEURONTIN) 300 MG capsule; Take 2 capsules (600 mg) by mouth 2 times daily    Gastroesophageal reflux disease without esophagitis  Working well  - omeprazole (PRILOSEC) 20 MG DR capsule; TAKE 1 CAPSULE BY MOUTH ONCE DAILY    Dysuria  Treat and recheck in one week  - UA Macro with Reflex to Micro and Culture - lab collect; Future  - UA Macro with Reflex to Micro and Culture - lab collect  - Urine Microscopic Exam  - Urine Culture    Polyneuropathy associated with underlying disease (H)  Working well  - gabapentin (NEURONTIN) 300 MG capsule; Take 2 capsules (600 mg) by mouth 2 times daily    Acute cystitis without hematuria    - cephALEXin (KEFLEX) 500 MG capsule; Take 1 capsule (500 mg) by mouth 2 times daily  - UA Macro with Reflex to Micro and Culture - lab collect; Future                 No follow-ups on file.    Lisette Olson MD  [facility]   Orquidea is a 81 year old who presents for the following health issues     HPI     Diabetes Follow-up    How  often are you checking your blood sugar? One time daily  What time of day are you checking your blood sugars (select all that apply)?  Before meals  Have you had any blood sugars above 200?  Yes   Have you had any blood sugars below 70?  No    What symptoms do you notice when your blood sugar is low?  None    What concerns do you have today about your diabetes? Other: elevated numbers      Do you have any of these symptoms? (Select all that apply)  Numbness in feet        Hyperlipidemia Follow-Up      Are you regularly taking any medication or supplement to lower your cholesterol?   Yes- atorvastatin    Are you having muscle aches or other side effects that you think could be caused by your cholesterol lowering medication?  No    Hypertension Follow-up      Do you check your blood pressure regularly outside of the clinic? No     Are you following a low salt diet? Yes    Are your blood pressures ever more than 140 on the top number (systolic) OR more   than 90 on the bottom number (diastolic), for example 140/90?     BP Readings from Last 2 Encounters:   01/05/22 129/62   11/30/21 130/75     Hemoglobin A1C POCT (%)   Date Value   10/20/2020 7.6 (H)   03/02/2020 7.5 (H)     Hemoglobin A1C (%)   Date Value   01/05/2022 8.3 (H)   08/17/2021 9.8 (H)     LDL Cholesterol Calculated (mg/dL)   Date Value   08/17/2021 52   10/20/2020 42   08/21/2019 49       Genitourinary - Female  Onset/Duration: 1 week   Description:   Painful urination (Dysuria): YES- pressure            Frequency: YES  Blood in urine (Hematuria): no  Delay in urine (Hesitency): no  Intensity: mild  Progression of Symptoms:  same  Accompanying Signs & Symptoms:  Fever/chills: no  Flank pain: no  Nausea and vomiting: no  Vaginal symptoms: none  Abdominal/Pelvic Pain: pressure   History:   History of frequent UTI s: YES  History of kidney stones: no  Sexually Active: YES  Possibility of pregnancy: No  Precipitating or alleviating factors: None  Therapies  "tried and outcome:  none      Pt with diabetes not well controlled  She was having diarrhea from metformin  She is on ace and aspirin. Intolerant of statin    Pt with htn well controlled on meds  Pt on gabapentin of restless leg. It is working well  Pt with GERd on ppi. It is controlled    Review of Systems   Constitutional, HEENT, cardiovascular, pulmonary, gi and gu systems are negative, except as otherwise noted.      Objective    /62   Pulse 59   Temp 98.1  F (36.7  C) (Oral)   Resp 18   Ht 1.607 m (5' 3.25\")   Wt 64.4 kg (142 lb)   SpO2 99%   BMI 24.96 kg/m    Body mass index is 24.96 kg/m .  Physical Exam   GENERAL: healthy, alert and no distress  NECK: no adenopathy, no asymmetry, masses, or scars and thyroid normal to palpation  RESP: lungs clear to auscultation - no rales, rhonchi or wheezes  CV: regular rate and rhythm, normal S1 S2, no S3 or S4, no murmur, click or rub, no peripheral edema and peripheral pulses strong  ABDOMEN: soft, nontender, no hepatosplenomegaly, no masses and bowel sounds normal  MS: no gross musculoskeletal defects noted, no edema    Results for orders placed or performed in visit on 01/05/22 (from the past 24 hour(s))   UA Macro with Reflex to Micro and Culture - lab collect    Specimen: Urine, Midstream   Result Value Ref Range    Color Urine Yellow Colorless, Straw, Light Yellow, Yellow    Appearance Urine Slightly Cloudy (A) Clear    Glucose Urine Negative Negative mg/dL    Bilirubin Urine Negative Negative    Ketones Urine Negative Negative mg/dL    Specific Gravity Urine 1.010 1.003 - 1.035    Blood Urine Negative Negative    pH Urine 5.5 5.0 - 7.0    Protein Albumin Urine Negative Negative mg/dL    Urobilinogen Urine 0.2 0.2, 1.0 E.U./dL    Nitrite Urine Negative Negative    Leukocyte Esterase Urine Large (A) Negative   Urine Microscopic Exam   Result Value Ref Range    Bacteria Urine Few (A) None Seen /HPF    RBC Urine 0-2 0-2 /HPF /HPF    WBC Urine >100 (A) 0-5 " /HPF /HPF    Squamous Epithelials Urine Few (A) None Seen /LPF   **A1C FUTURE 3mo   Result Value Ref Range    Hemoglobin A1C 8.3 (H) 0.0 - 5.6 %

## 2022-01-06 LAB — BACTERIA UR CULT: ABNORMAL

## 2022-01-11 ENCOUNTER — TRANSFERRED RECORDS (OUTPATIENT)
Dept: HEALTH INFORMATION MANAGEMENT | Facility: CLINIC | Age: 82
End: 2022-01-11
Payer: COMMERCIAL

## 2022-01-12 ENCOUNTER — LAB (OUTPATIENT)
Dept: LAB | Facility: CLINIC | Age: 82
End: 2022-01-12
Payer: COMMERCIAL

## 2022-01-12 DIAGNOSIS — R30.0 DYSURIA: ICD-10-CM

## 2022-01-12 DIAGNOSIS — N30.00 ACUTE CYSTITIS WITHOUT HEMATURIA: ICD-10-CM

## 2022-01-12 LAB
ALBUMIN UR-MCNC: NEGATIVE MG/DL
APPEARANCE UR: CLEAR
BACTERIA #/AREA URNS HPF: ABNORMAL /HPF
BILIRUB UR QL STRIP: NEGATIVE
COLOR UR AUTO: YELLOW
GLUCOSE UR STRIP-MCNC: NEGATIVE MG/DL
HGB UR QL STRIP: NEGATIVE
HYALINE CASTS #/AREA URNS LPF: ABNORMAL /LPF
KETONES UR STRIP-MCNC: NEGATIVE MG/DL
LEUKOCYTE ESTERASE UR QL STRIP: ABNORMAL
NITRATE UR QL: NEGATIVE
PH UR STRIP: 5.5 [PH] (ref 5–7)
RBC #/AREA URNS AUTO: ABNORMAL /HPF
SP GR UR STRIP: 1.01 (ref 1–1.03)
SQUAMOUS #/AREA URNS AUTO: ABNORMAL /LPF
TRANS CELLS #/AREA URNS HPF: ABNORMAL /HPF
UROBILINOGEN UR STRIP-ACNC: 0.2 E.U./DL
WBC #/AREA URNS AUTO: ABNORMAL /HPF

## 2022-01-12 PROCEDURE — 87086 URINE CULTURE/COLONY COUNT: CPT

## 2022-01-12 PROCEDURE — 81001 URINALYSIS AUTO W/SCOPE: CPT

## 2022-01-13 LAB — BACTERIA UR CULT: NO GROWTH

## 2022-07-19 ENCOUNTER — E-VISIT (OUTPATIENT)
Dept: FAMILY MEDICINE | Facility: CLINIC | Age: 82
End: 2022-07-19
Payer: COMMERCIAL

## 2022-07-19 ENCOUNTER — NURSE TRIAGE (OUTPATIENT)
Dept: FAMILY MEDICINE | Facility: CLINIC | Age: 82
End: 2022-07-19

## 2022-07-19 DIAGNOSIS — G47.00 INSOMNIA, UNSPECIFIED TYPE: Primary | ICD-10-CM

## 2022-07-19 PROCEDURE — 99421 OL DIG E/M SVC 5-10 MIN: CPT | Performed by: FAMILY MEDICINE

## 2022-07-19 NOTE — TELEPHONE ENCOUNTER
Patient contacted clinic requesting sleep medication. She explains that her  was just diagnosed with CA and she is not sleeping well. She reports that she tried Ambien 0.5 mg and this worked for her.    RN explained that she may have to make an appointment with PCP for medication request. Please advise.       Monica Payne RN, BSN   [facility] [place]

## 2022-07-19 NOTE — TELEPHONE ENCOUNTER
Patient notified of provider's message as written below. Patient verbalized good understanding, had no further questions and needed no further support.Asia Conklin R.N.

## 2022-07-20 RX ORDER — ZOLPIDEM TARTRATE 5 MG/1
5 TABLET ORAL
Qty: 30 TABLET | Refills: 1 | Status: SHIPPED | OUTPATIENT
Start: 2022-07-20 | End: 2022-11-08

## 2022-08-11 ENCOUNTER — OFFICE VISIT (OUTPATIENT)
Dept: FAMILY MEDICINE | Facility: CLINIC | Age: 82
End: 2022-08-11
Payer: COMMERCIAL

## 2022-08-11 VITALS
BODY MASS INDEX: 24.1 KG/M2 | WEIGHT: 136 LBS | OXYGEN SATURATION: 99 % | HEART RATE: 64 BPM | RESPIRATION RATE: 17 BRPM | TEMPERATURE: 97.9 F | HEIGHT: 63 IN | DIASTOLIC BLOOD PRESSURE: 64 MMHG | SYSTOLIC BLOOD PRESSURE: 135 MMHG

## 2022-08-11 DIAGNOSIS — R26.89 BALANCE DISORDER: ICD-10-CM

## 2022-08-11 DIAGNOSIS — K21.9 GASTROESOPHAGEAL REFLUX DISEASE WITHOUT ESOPHAGITIS: ICD-10-CM

## 2022-08-11 DIAGNOSIS — E11.9 TYPE 2 DIABETES MELLITUS WITHOUT COMPLICATION, WITHOUT LONG-TERM CURRENT USE OF INSULIN (H): ICD-10-CM

## 2022-08-11 DIAGNOSIS — R19.7 DIARRHEA, UNSPECIFIED TYPE: ICD-10-CM

## 2022-08-11 DIAGNOSIS — G63 POLYNEUROPATHY ASSOCIATED WITH UNDERLYING DISEASE (H): ICD-10-CM

## 2022-08-11 DIAGNOSIS — G25.81 RESTLESS LEG SYNDROME: ICD-10-CM

## 2022-08-11 DIAGNOSIS — R32 URINARY INCONTINENCE, UNSPECIFIED TYPE: ICD-10-CM

## 2022-08-11 DIAGNOSIS — E78.5 HYPERLIPIDEMIA LDL GOAL <100: ICD-10-CM

## 2022-08-11 DIAGNOSIS — I10 HYPERTENSION GOAL BP (BLOOD PRESSURE) < 140/90: ICD-10-CM

## 2022-08-11 DIAGNOSIS — R29.6 FALLS FREQUENTLY: ICD-10-CM

## 2022-08-11 DIAGNOSIS — E78.1 HYPERTRIGLYCERIDEMIA: ICD-10-CM

## 2022-08-11 DIAGNOSIS — Z00.00 MEDICARE ANNUAL WELLNESS VISIT, SUBSEQUENT: Primary | ICD-10-CM

## 2022-08-11 LAB
ALBUMIN SERPL-MCNC: 4 G/DL (ref 3.4–5)
ALP SERPL-CCNC: 55 U/L (ref 40–150)
ALT SERPL W P-5'-P-CCNC: 25 U/L (ref 0–50)
ANION GAP SERPL CALCULATED.3IONS-SCNC: 5 MMOL/L (ref 3–14)
AST SERPL W P-5'-P-CCNC: 15 U/L (ref 0–45)
BILIRUB SERPL-MCNC: 0.7 MG/DL (ref 0.2–1.3)
BUN SERPL-MCNC: 18 MG/DL (ref 7–30)
CALCIUM SERPL-MCNC: 9.9 MG/DL (ref 8.5–10.1)
CHLORIDE BLD-SCNC: 104 MMOL/L (ref 94–109)
CHOLEST SERPL-MCNC: 144 MG/DL
CO2 SERPL-SCNC: 30 MMOL/L (ref 20–32)
CREAT SERPL-MCNC: 0.62 MG/DL (ref 0.52–1.04)
CREAT UR-MCNC: 231 MG/DL
FASTING STATUS PATIENT QL REPORTED: NO
GFR SERPL CREATININE-BSD FRML MDRD: 88 ML/MIN/1.73M2
GLUCOSE BLD-MCNC: 172 MG/DL (ref 70–99)
HBA1C MFR BLD: 7.3 % (ref 0–5.6)
HDLC SERPL-MCNC: 54 MG/DL
LDLC SERPL CALC-MCNC: 56 MG/DL
MICROALBUMIN UR-MCNC: 24 MG/L
MICROALBUMIN/CREAT UR: 10.39 MG/G CR (ref 0–25)
NONHDLC SERPL-MCNC: 90 MG/DL
POTASSIUM BLD-SCNC: 4.5 MMOL/L (ref 3.4–5.3)
PROT SERPL-MCNC: 7.8 G/DL (ref 6.8–8.8)
SODIUM SERPL-SCNC: 139 MMOL/L (ref 133–144)
TRIGL SERPL-MCNC: 168 MG/DL

## 2022-08-11 PROCEDURE — 36415 COLL VENOUS BLD VENIPUNCTURE: CPT | Performed by: FAMILY MEDICINE

## 2022-08-11 PROCEDURE — 80061 LIPID PANEL: CPT | Performed by: FAMILY MEDICINE

## 2022-08-11 PROCEDURE — 90471 IMMUNIZATION ADMIN: CPT | Performed by: FAMILY MEDICINE

## 2022-08-11 PROCEDURE — 82043 UR ALBUMIN QUANTITATIVE: CPT | Performed by: FAMILY MEDICINE

## 2022-08-11 PROCEDURE — 99214 OFFICE O/P EST MOD 30 MIN: CPT | Mod: 25 | Performed by: FAMILY MEDICINE

## 2022-08-11 PROCEDURE — 80053 COMPREHEN METABOLIC PANEL: CPT | Performed by: FAMILY MEDICINE

## 2022-08-11 PROCEDURE — G0439 PPPS, SUBSEQ VISIT: HCPCS | Performed by: FAMILY MEDICINE

## 2022-08-11 PROCEDURE — 90715 TDAP VACCINE 7 YRS/> IM: CPT | Performed by: FAMILY MEDICINE

## 2022-08-11 PROCEDURE — 83036 HEMOGLOBIN GLYCOSYLATED A1C: CPT | Performed by: FAMILY MEDICINE

## 2022-08-11 RX ORDER — ATORVASTATIN CALCIUM 80 MG/1
80 TABLET, FILM COATED ORAL DAILY
Qty: 90 TABLET | Refills: 3 | Status: SHIPPED | OUTPATIENT
Start: 2022-08-11 | End: 2023-09-04

## 2022-08-11 RX ORDER — GABAPENTIN 300 MG/1
CAPSULE ORAL
Qty: 450 CAPSULE | Refills: 1 | Status: SHIPPED | OUTPATIENT
Start: 2022-08-11 | End: 2023-03-27

## 2022-08-11 RX ORDER — METFORMIN HCL 500 MG
1000 TABLET, EXTENDED RELEASE 24 HR ORAL 2 TIMES DAILY WITH MEALS
Qty: 360 TABLET | Refills: 1 | Status: SHIPPED | OUTPATIENT
Start: 2022-08-11 | End: 2022-10-03

## 2022-08-11 RX ORDER — OXYBUTYNIN CHLORIDE 5 MG/1
TABLET ORAL
Qty: 180 TABLET | Refills: 3 | Status: SHIPPED | OUTPATIENT
Start: 2022-08-11 | End: 2023-10-16

## 2022-08-11 RX ORDER — HYDROCHLOROTHIAZIDE 25 MG/1
25 TABLET ORAL DAILY
Qty: 90 TABLET | Refills: 1 | Status: SHIPPED | OUTPATIENT
Start: 2022-08-11 | End: 2022-09-28

## 2022-08-11 RX ORDER — ENALAPRIL MALEATE 20 MG/1
20 TABLET ORAL 2 TIMES DAILY
Qty: 180 TABLET | Refills: 1 | Status: SHIPPED | OUTPATIENT
Start: 2022-08-11 | End: 2023-02-24

## 2022-08-11 RX ORDER — AMLODIPINE BESYLATE 10 MG/1
10 TABLET ORAL DAILY
Qty: 90 TABLET | Refills: 3 | Status: SHIPPED | OUTPATIENT
Start: 2022-08-11 | End: 2023-09-04

## 2022-08-11 RX ORDER — HYDRALAZINE HYDROCHLORIDE 25 MG/1
25 TABLET, FILM COATED ORAL 2 TIMES DAILY
Qty: 180 TABLET | Refills: 1 | Status: SHIPPED | OUTPATIENT
Start: 2022-08-11 | End: 2023-02-24

## 2022-08-11 RX ORDER — METOPROLOL SUCCINATE 25 MG/1
25 TABLET, EXTENDED RELEASE ORAL DAILY
Qty: 90 TABLET | Refills: 1 | Status: SHIPPED | OUTPATIENT
Start: 2022-08-11 | End: 2023-02-24

## 2022-08-11 ASSESSMENT — PAIN SCALES - GENERAL: PAINLEVEL: NO PAIN (0)

## 2022-08-11 NOTE — PATIENT INSTRUCTIONS
Gabapentin 600 mg in am            900 mg in am  See if improves numbness and tingling in your feet    Metformin   take two tablets in the evening. Stop the morning dose and the antidiarrheal med and see if diarrhea improves

## 2022-08-11 NOTE — NURSING NOTE
Prior to immunization administration, verified patients identity using patient s name and date of birth. Please see Immunization Activity for additional information.     Screening Questionnaire for Adult Immunization    Are you sick today?   No   Do you have allergies to medications, food, a vaccine component or latex?   Yes   Have you ever had a serious reaction after receiving a vaccination?   No   Do you have a long-term health problem with heart, lung, kidney, or metabolic disease (e.g., diabetes), asthma, a blood disorder, no spleen, complement component deficiency, a cochlear implant, or a spinal fluid leak?  Are you on long-term aspirin therapy?   Yes   Do you have cancer, leukemia, HIV/AIDS, or any other immune system problem?   No   Do you have a parent, brother, or sister with an immune system problem?   No   In the past 3 months, have you taken medications that affect  your immune system, such as prednisone, other steroids, or anticancer drugs; drugs for the treatment of rheumatoid arthritis, Crohn s disease, or psoriasis; or have you had radiation treatments?   No   Have you had a seizure, or a brain or other nervous system problem?   No   During the past year, have you received a transfusion of blood or blood    products, or been given immune (gamma) globulin or antiviral drug?   No   For women: Are you pregnant or is there a chance you could become       pregnant during the next month?   No   Have you received any vaccinations in the past 4 weeks?   No     Immunization questionnaire was positive for at least one answer.  Notified Dr. Lisette Olson .        Per orders of Dr. Lisette Olson , injection of tdap given by Luep Loza MA. Patient instructed to remain in clinic for 15 minutes afterwards, and to report any adverse reaction to me immediately.       Screening performed by Lupe Loza MA on 8/11/2022 at 12:30 PM.

## 2022-08-11 NOTE — PROGRESS NOTES
Assessment & Plan     Medicare annual wellness visit, subsequent  completed    Type 2 diabetes mellitus without complication, without long-term current use of insulin (H)  Will decrease metformin as suspect is cause of diarrhea. If improves will need alternative diabetes med  - metFORMIN (GLUCOPHAGE XR) 500 MG 24 hr tablet; Take 2 tablets (1,000 mg) by mouth 2 times daily (with meals)  - Hemoglobin A1c; Future  - OPTOMETRY REFERRAL; Future  - Albumin Random Urine Quantitative with Creat Ratio; Future  - **Comprehensive metabolic panel FUTURE 2mo; Future    Hypertension goal BP (blood pressure) < 140/90  At goal on meds. Will stop hydrochlorothiazide as suspect it may contributes to balance issues due to orthostatic hypertension  - amLODIPine (NORVASC) 10 MG tablet; Take 1 tablet (10 mg) by mouth daily Will call when needs refills  - enalapril (VASOTEC) 20 MG tablet; Take 1 tablet (20 mg) by mouth 2 times daily  - hydrALAZINE (APRESOLINE) 25 MG tablet; Take 1 tablet (25 mg) by mouth 2 times daily  - hydrochlorothiazide (HYDRODIURIL) 25 MG tablet; Take 1 tablet (25 mg) by mouth daily  - metoprolol succinate ER (TOPROL XL) 25 MG 24 hr tablet; Take 1 tablet (25 mg) by mouth daily  - **Comprehensive metabolic panel FUTURE 2mo; Future    Hyperlipidemia LDL goal <100  On statin  - atorvastatin (LIPITOR) 80 MG tablet; Take 1 tablet (80 mg) by mouth daily  - Lipid panel reflex to direct LDL Non-fasting; Future  - **Comprehensive metabolic panel FUTURE 2mo; Future    Hypertriglyceridemia  On statin  - atorvastatin (LIPITOR) 80 MG tablet; Take 1 tablet (80 mg) by mouth daily  - **Comprehensive metabolic panel FUTURE 2mo; Future    Restless leg syndrome  Will increase evening dose by one to see if helps  - gabapentin (NEURONTIN) 300 MG capsule; Take 2 tablets ( 600 mg) QAM and 3 tablets ( 900 mg) QPM    Polyneuropathy associated with underlying disease (H)  Will increase evening dose by one to see if helps  - gabapentin  (NEURONTIN) 300 MG capsule; Take 2 tablets ( 600 mg) QAM and 3 tablets ( 900 mg) QPM    Gastroesophageal reflux disease without esophagitis  refill  - omeprazole (PRILOSEC) 20 MG DR capsule; TAKE 1 CAPSULE BY MOUTH ONCE DAILY    Urinary incontinence, unspecified type  Refill as is working well  - oxybutynin (DITROPAN) 5 MG tablet; TAKE 2 TABLETS BY MOUTH EVERY DAY    Falls frequently  Assessment by PT  - Physical Therapy Referral; Future    Balance disorder  same  - Physical Therapy Referral; Future    Diarrhea  Suspect due to metformin.cut dose in half and see if symptoms improve           No follow-ups on file.    Lisette Olson MD  [facility]    Manasa Heard is a 81 year old, presenting for the following health issues:  Diarrhea (/), Balance/ Vestibular, and restless leg      History of Present Illness       Reason for visit:  Get a physical and discuss further concerns I have    She eats 2-3 servings of fruits and vegetables daily.She consumes 0 sweetened beverage(s) daily.She exercises with enough effort to increase her heart rate 9 or less minutes per day.  She exercises with enough effort to increase her heart rate 3 or less days per week.   She is taking medications regularly.       Diabetes Follow-up    How often are you checking your blood sugar? A few times a week  What time of day are you checking your blood sugars (select all that apply)?  Before meals  Have you had any blood sugars above 200?  No  Have you had any blood sugars below 70?  No    What symptoms do you notice when your blood sugar is low?  None    What concerns do you have today about your diabetes? None     Do you have any of these symptoms? (Select all that apply)  Numbness in feet and Weight loss    Have you had a diabetic eye exam in the last 12 months? No        Hyperlipidemia Follow-Up      Are you regularly taking any medication or supplement to lower your cholesterol?   Yes- atorvasatin    Are  "you having muscle aches or other side effects that you think could be caused by your cholesterol lowering medication?  No    Hypertension Follow-up      Do you check your blood pressure regularly outside of the clinic? Yes     Are you following a low salt diet? Yes    Are your blood pressures ever more than 140 on the top number (systolic) OR more   than 90 on the bottom number (diastolic), for example 140/90? Yes    BP Readings from Last 2 Encounters:   08/11/22 135/64   01/05/22 129/62     Hemoglobin A1C (%)   Date Value   01/05/2022 8.3 (H)   08/17/2021 9.8 (H)   10/20/2020 7.6 (H)   03/02/2020 7.5 (H)     LDL Cholesterol Calculated (mg/dL)   Date Value   08/17/2021 52   10/20/2020 42   08/21/2019 49       Annual Wellness Visit    Patient has been advised of split billing requirements and indicates understanding: yes     Are you in the first 12 months of your Medicare Part B coverage?  No    Physical Health:    In general, how would you rate your overall physical health? fair    Outside of work, how many days during the week do you exercise? Daily activity    Outside of work, approximately how many minutes a day do you exercise?not applicable    If you drink alcohol do you typically have >3 drinks per day or >7 drinks per week? No    Do you usually eat at least 4 servings of fruit and vegetables a day, include whole grains & fiber and avoid regularly eating high fat or \"junk\" foods? NO    Do you have any problems taking medications regularly? No    Do you have any side effects from medications? none    Needs assistance for the following daily activities: no assistance needed    Which of the following safety concerns are present in your home?  none identified     Hearing impairment: No    In the past 6 months, have you been bothered by leaking of urine? yes    Mental Health:    In general, how would you rate your overall mental or emotional health? fair  PHQ-2 Score: 0    Do you feel safe in your environment? " Yes    Have you ever done Advance Care Planning? (For example, a Health Directive, POLST, or a discussion with a medical provider or your loved ones about your wishes)? No, advance care planning information given to patient to review.  Patient plans to discuss their wishes with loved ones or provider.      Fall risk:       Cognitive Screenin) Repeat 3 items (Leader, Season, Table)    2) Clock draw: NORMAL  3) 3 item recall: Recalls 3 objects  Results: 3 items recalled: COGNITIVE IMPAIRMENT LESS LIKELY    Mini-CogTM Copyright S Brijesh. Licensed by the author for use in [employer]; reprinted with permission (michelle@[facility]). All rights reserved.      Do you have sleep apnea, excessive snoring or daytime drowsiness?: no       Diarrrhea, balance, shuffling and restless legs  Walks hunched over and feels wobbly  Fell in driveway when taking out recycling  No pain  Legs feel weak  Has fatigue in general  Will feel off balance  No lightheadedness or vertigo  Sometimes eyes feel warmish and watering.     Pt with diarrhea for months to over a year  Occurs every morning and lasts into the early afternoon  She takes antidiarrheal meds daily  No black or bloody  Suspect due to metformin given duration of symptoms  Have her decrease to 2 tablets daily and reassess diarrhea    Pt with diabetes on metfomin  On ace, statin and aspirin  Has appt with eye MD      Current providers sharing in care for this patient include  :Patient Care Team:  Lisette Olson MD as PCP - General (Family Practice)  Lisette Olson MD as Assigned PCP  Lucie Gordillo MD as Assigned Surgical Provider    Patient has been advised of split billing requirements and indicates understanding: Yes      Review of Systems   Constitutional, HEENT, cardiovascular, pulmonary, gi and gu systems are negative, except as otherwise noted.      Objective    /64   Pulse 64   Temp 97.9  F (36.6  C) (Oral)   Resp 17   Ht 1.607 m  "(5' 3.25\")   Wt 61.7 kg (136 lb)   SpO2 99%   BMI 23.90 kg/m    Body mass index is 23.9 kg/m .  Physical Exam   GENERAL: healthy, alert and no distress  EYES: Eyes grossly normal to inspection, PERRL and conjunctivae and sclerae normal  HENT: ear canals and TM's normal, nose and mouth without ulcers or lesions  NECK: no adenopathy, no asymmetry, masses, or scars and thyroid normal to palpation  RESP: lungs clear to auscultation - no rales, rhonchi or wheezes  CV: regular rate and rhythm, normal S1 S2, no S3 or S4, no murmur, click or rub, no peripheral edema and peripheral pulses strong  ABDOMEN: soft, nontender, no hepatosplenomegaly, no masses and bowel sounds normal  MS: no gross musculoskeletal defects noted, no edema  SKIN: no suspicious lesions or rashes  NEURO: Normal strength and tone, mentation intact and speech normal  PSYCH: mentation appears normal, affect normal/bright    No results found for this or any previous visit (from the past 24 hour(s)).                .  ..  "

## 2022-08-19 ENCOUNTER — OFFICE VISIT (OUTPATIENT)
Dept: OPHTHALMOLOGY | Facility: CLINIC | Age: 82
End: 2022-08-19
Payer: COMMERCIAL

## 2022-08-19 DIAGNOSIS — D31.31 CHOROIDAL NEVUS OF BOTH EYES: ICD-10-CM

## 2022-08-19 DIAGNOSIS — H52.4 PRESBYOPIA OF BOTH EYES: ICD-10-CM

## 2022-08-19 DIAGNOSIS — Z01.00 EXAMINATION OF EYES AND VISION: Primary | ICD-10-CM

## 2022-08-19 DIAGNOSIS — H04.123 DRY EYE SYNDROME, BILATERAL: ICD-10-CM

## 2022-08-19 DIAGNOSIS — H52.223 REGULAR ASTIGMATISM OF BOTH EYES: ICD-10-CM

## 2022-08-19 DIAGNOSIS — Z96.1 PSEUDOPHAKIA: ICD-10-CM

## 2022-08-19 DIAGNOSIS — H18.513 FUCHS' CORNEAL DYSTROPHY OF BOTH EYES: ICD-10-CM

## 2022-08-19 DIAGNOSIS — H52.01 HYPEROPIA OF RIGHT EYE: ICD-10-CM

## 2022-08-19 DIAGNOSIS — E11.9 TYPE 2 DIABETES MELLITUS WITHOUT COMPLICATION, WITHOUT LONG-TERM CURRENT USE OF INSULIN (H): ICD-10-CM

## 2022-08-19 DIAGNOSIS — D31.32 CHOROIDAL NEVUS OF BOTH EYES: ICD-10-CM

## 2022-08-19 PROCEDURE — 92014 COMPRE OPH EXAM EST PT 1/>: CPT | Performed by: STUDENT IN AN ORGANIZED HEALTH CARE EDUCATION/TRAINING PROGRAM

## 2022-08-19 PROCEDURE — 92015 DETERMINE REFRACTIVE STATE: CPT | Performed by: STUDENT IN AN ORGANIZED HEALTH CARE EDUCATION/TRAINING PROGRAM

## 2022-08-19 ASSESSMENT — VISUAL ACUITY
OD_CC+: -2
OD_CC: 20/40
OS_CC: 20/50
OS_PH_CC: 20/25
OS_PH_CC+: -2
OS_CC+: -2
METHOD: SNELLEN - LINEAR
CORRECTION_TYPE: GLASSES

## 2022-08-19 ASSESSMENT — REFRACTION_MANIFEST
OD_SPHERE: +1.00
OD_AXIS: 059
OS_AXIS: 172
OS_CYLINDER: +1.00
OS_ADD: +3.00
OS_SPHERE: PLANO
OD_ADD: +3.00
OD_CYLINDER: +1.00

## 2022-08-19 ASSESSMENT — REFRACTION_WEARINGRX
OS_AXIS: 130
OS_AXIS: 1710
OD_CYLINDER: +1.25
OS_ADD: +3.00
OS_SPHERE: +0.75
OD_ADD: +3.00
OS_CYLINDER: +1.25
OS_SPHERE: +1.25
OD_ADD: +3.00
SPECS_TYPE: PAL
OD_AXIS: 060
SPECS_TYPE: PAL
OD_CYLINDER: +2.00
OD_SPHERE: +1.25
OS_CYLINDER: +1.25
OD_SPHERE: PLANO
OD_AXIS: 074

## 2022-08-19 ASSESSMENT — CUP TO DISC RATIO
OD_RATIO: 0.1
OS_RATIO: 0.1

## 2022-08-19 ASSESSMENT — EXTERNAL EXAM - LEFT EYE: OS_EXAM: NORMAL

## 2022-08-19 ASSESSMENT — EXTERNAL EXAM - RIGHT EYE: OD_EXAM: NORMAL

## 2022-08-19 ASSESSMENT — TONOMETRY
IOP_METHOD: APPLANATION
OD_IOP_MMHG: 16
OS_IOP_MMHG: 16

## 2022-08-19 ASSESSMENT — CONF VISUAL FIELD: OS_SUPERIOR_TEMPORAL_RESTRICTION: 3

## 2022-08-19 NOTE — PATIENT INSTRUCTIONS
Recommend resuming Nolan 5% ointment at bedtime in both eyes      Use artificial tears more consistently (2-3 times per day) or try a gel tear up to four times a day (like Refresh Liquigel or GenTeal Gel Tears)      Glasses prescription given     Keep blood sugars and blood pressure under good control.     Lucie Gordillo MD  [phone]    Patient Education   Diabetes weakens the blood vessels all over the body, including the eyes. Damage to the blood vessels in the eyes can cause swelling or bleeding into part of the eye (called the retina). This is called diabetic retinopathy (JULIAN-tin--puh-thee). If not treated, this disease can cause vision loss or blindness.   Symptoms may include blurred or distorted vision, but many people have no symptoms. It's important to see your eye doctor regularly to check for problems.   Early treatment and good control can help protect your vision. Here are the things you can do to help prevent vision loss:      1. Keep your blood sugar levels under tight control.      2. Bring high blood pressure under control.      3. No smoking.      4. Have yearly dilated eye exams.       When last in, was offered Rx for an inhaler. Declined at the time. now requesting to have Rx for inhaler called in. Having some SOB thinks due to smoking. Please call into [facility] [phone].  Pt is reachable at [phone]    Please address today

## 2022-08-19 NOTE — PROGRESS NOTES
Current Eye Medications: thera tears daily-at best both eyes.  has to do drops for her.      Subjective:  Here for complete eye exam. Eyes have been watering again. She is really bad with drops. Hasn't been taking Nolan at night for a couple of months. Right eye is still more blurred than the left eye. Wakes up and the eyes just feel warm.     Hemoglobin A1C   Date Value Ref Range Status   08/11/2022 7.3 (H) 0.0 - 5.6 % Final     Comment:     Normal <5.7%   Prediabetes 5.7-6.4%    Diabetes 6.5% or higher     Note: Adopted from ADA consensus guidelines.   10/20/2020 7.6 (H) 0 - 5.6 % Final     Comment:     Normal <5.7% Prediabetes 5.7-6.4%  Diabetes 6.5% or higher - adopted from ADA   consensus guidelines.          Objective:  See Ophthalmology Exam.      Assessment:  Orquidea Stevens is a 82 year old female who presents with:   Encounter Diagnoses   Name Primary?     Examination of eyes and vision      Type 2 diabetes mellitus without complication, without long-term current use of insulin (H) Negative diabetic retinopathy      Fuchs' corneal dystrophy of both eyes      Pseudophakia - Both Eyes s/p YAG cap OU      Choroidal nevus of both eyes      Dry eye syndrome, bilateral        Hyperopia of right eye      Regular astigmatism of both eyes      Presbyopia of both eyes        Plan:  Recommend resuming Nolan 5% ointment at bedtime in both eyes      Use artificial tears more consistently (2-3 times per day) or try a gel tear up to four times a day (like Refresh Liquigel or GenTeal Gel Tears)      Glasses prescription given     Keep blood sugars and blood pressure under good control.     Lucie Gordillo MD  [phone]    Patient Education   Diabetes weakens the blood vessels all over the body, including the eyes. Damage to the blood vessels in the eyes can cause swelling or bleeding into part of the eye (called the retina). This is called diabetic retinopathy (JULIAN-tin--puh-thee). If not treated, this  disease can cause vision loss or blindness.   Symptoms may include blurred or distorted vision, but many people have no symptoms. It's important to see your eye doctor regularly to check for problems.   Early treatment and good control can help protect your vision. Here are the things you can do to help prevent vision loss:      1. Keep your blood sugar levels under tight control.      2. Bring high blood pressure under control.      3. No smoking.      4. Have yearly dilated eye exams.

## 2022-08-19 NOTE — LETTER
8/19/2022         RE: Orquidea Stevens  [id number] [facility] [address]        Dear Colleague,    Thank you for referring your patient, Orquidea Stevens, to the [facility]. Please see a copy of my visit note below.     Current Eye Medications: thera tears daily-at best both eyes.  has to do drops for her.      Subjective:  Here for complete eye exam. Eyes have been watering again. She is really bad with drops. Hasn't been taking Nolan at night for a couple of months. Right eye is still more blurred than the left eye. Wakes up and the eyes just feel warm.     Hemoglobin A1C   Date Value Ref Range Status   08/11/2022 7.3 (H) 0.0 - 5.6 % Final     Comment:     Normal <5.7%   Prediabetes 5.7-6.4%    Diabetes 6.5% or higher     Note: Adopted from ADA consensus guidelines.   10/20/2020 7.6 (H) 0 - 5.6 % Final     Comment:     Normal <5.7% Prediabetes 5.7-6.4%  Diabetes 6.5% or higher - adopted from ADA   consensus guidelines.          Objective:  See Ophthalmology Exam.      Assessment:  Orquidea Stevens is a 82 year old female who presents with:   Encounter Diagnoses   Name Primary?     Examination of eyes and vision      Type 2 diabetes mellitus without complication, without long-term current use of insulin (H) Negative diabetic retinopathy      Fuchs' corneal dystrophy of both eyes      Pseudophakia - Both Eyes s/p YAG cap OU      Choroidal nevus of both eyes      Dry eye syndrome, bilateral        Hyperopia of right eye      Regular astigmatism of both eyes      Presbyopia of both eyes        Plan:  Recommend resuming Nolan 5% ointment at bedtime in both eyes      Use artificial tears more consistently (2-3 times per day) or try a gel tear up to four times a day (like Refresh Liquigel or GenTeal Gel Tears)      Glasses prescription given     Keep blood sugars and blood pressure under good control.     Lucie Gordillo MD  [phone]    Patient Education   Diabetes weakens  the blood vessels all over the body, including the eyes. Damage to the blood vessels in the eyes can cause swelling or bleeding into part of the eye (called the retina). This is called diabetic retinopathy (JULIAN-tin-AH-puh-thee). If not treated, this disease can cause vision loss or blindness.   Symptoms may include blurred or distorted vision, but many people have no symptoms. It's important to see your eye doctor regularly to check for problems.   Early treatment and good control can help protect your vision. Here are the things you can do to help prevent vision loss:      1. Keep your blood sugar levels under tight control.      2. Bring high blood pressure under control.      3. No smoking.      4. Have yearly dilated eye exams.                 Again, thank you for allowing me to participate in the care of your patient.        Sincerely,        Lucie Gordillo MD

## 2022-09-13 NOTE — PROGRESS NOTES
Assessment & Plan     TYPE 2 DIABETES, HBA1C GOAL < 8%  Worse with lower metformin dose  Consider starting glp1 but will need help with injections  - [facility]B Adult Diabetes Educator Referral; Future    Diarrhea, unspecified type  Improved with lower dose of metformin    Lightheadedness  Improved with stopping the hydrochlorothiazide  Add 1/2 tablet back as now with mild pedal edema    Medication side effects                     No follow-ups on file.    Lisette Olson MD  [facility]    Manasa Heard is a 82 year old, presenting for the following health issues:  Follow Up (Diarrhea and balance) and Diabetes      HPI     Diabetes Follow-up    How often are you checking your blood sugar? A few times a week  What time of day are you checking your blood sugars (select all that apply)?  Before meals  Have you had any blood sugars above 200?  No  Have you had any blood sugars below 70?  No    What symptoms do you notice when your blood sugar is low?  None    What concerns do you have today about your diabetes? None and Other: pt has noticed glucose going up after discontinuing 4 tablets daily     Do you have any of these symptoms? (Select all that apply)  Weight loss and Weight gain      BP Readings from Last 2 Encounters:   09/14/22 134/58   08/11/22 135/64     Hemoglobin A1C (%)   Date Value   08/11/2022 7.3 (H)   01/05/2022 8.3 (H)   10/20/2020 7.6 (H)   03/02/2020 7.5 (H)     LDL Cholesterol Calculated (mg/dL)   Date Value   08/11/2022 56   08/17/2021 52   10/20/2020 42   08/21/2019 49           How many servings of fruits and vegetables do you eat daily?  0-1    On average, how many sweetened beverages do you drink each day (Examples: soda, juice, sweet tea, etc.  Do NOT count diet or artificially sweetened beverages)?   1    How many days per week do you exercise enough to make your heart beat faster? 3 or less    How many minutes a day do you exercise enough to make your heart beat  faster? 10 - 19    How many days per week do you miss taking your medication? 0    Diarrhea is 70% better since decreasing metformin to 1000 mg in evening dose  BS are now up.   Pt states glipizide did not work in past  Will have her consider glp 1 like ozempic  Will have her follow-up with diab ed as she is concerned about using a needle    Stopped hydrochlorothiazide and lightheadedness is better  Balance is getting better as well.  Now with slight swelling in feet.   Better with compression socks  Will have her add 1/2 tablet of hydrochlorothiazide back as she is probably losing less fluids now that her diarrhea is improved    Review of Systems   Constitutional, HEENT, cardiovascular, pulmonary, gi and gu systems are negative, except as otherwise noted.      Objective    /58   Pulse 92   Temp 97.4  F (36.3  C) (Tympanic)   Resp 16   Wt 63.5 kg (140 lb)   SpO2 100%   BMI 24.60 kg/m    Body mass index is 24.6 kg/m .  Physical Exam   GENERAL: healthy, alert and no distress  RESP: lungs clear to auscultation - no rales, rhonchi or wheezes  CV: regular rate and rhythm, normal S1 S2, no S3 or S4, no murmur, click or rub, no peripheral edema and peripheral pulses strong    No results found for this or any previous visit (from the past 24 hour(s)).

## 2022-09-14 ENCOUNTER — OFFICE VISIT (OUTPATIENT)
Dept: FAMILY MEDICINE | Facility: CLINIC | Age: 82
End: 2022-09-14
Payer: COMMERCIAL

## 2022-09-14 VITALS
BODY MASS INDEX: 24.6 KG/M2 | DIASTOLIC BLOOD PRESSURE: 58 MMHG | SYSTOLIC BLOOD PRESSURE: 134 MMHG | WEIGHT: 140 LBS | HEART RATE: 92 BPM | RESPIRATION RATE: 16 BRPM | OXYGEN SATURATION: 100 % | TEMPERATURE: 97.4 F

## 2022-09-14 DIAGNOSIS — E11.9 TYPE 2 DIABETES, HBA1C GOAL < 8% (H): Primary | ICD-10-CM

## 2022-09-14 DIAGNOSIS — R42 LIGHTHEADEDNESS: ICD-10-CM

## 2022-09-14 DIAGNOSIS — T88.7XXA MEDICATION SIDE EFFECTS: ICD-10-CM

## 2022-09-14 DIAGNOSIS — R19.7 DIARRHEA, UNSPECIFIED TYPE: ICD-10-CM

## 2022-09-14 PROCEDURE — 99214 OFFICE O/P EST MOD 30 MIN: CPT | Performed by: FAMILY MEDICINE

## 2022-09-14 ASSESSMENT — PAIN SCALES - GENERAL: PAINLEVEL: NO PAIN (0)

## 2022-09-15 ENCOUNTER — TELEPHONE (OUTPATIENT)
Dept: FAMILY MEDICINE | Facility: CLINIC | Age: 82
End: 2022-09-15

## 2022-09-15 NOTE — TELEPHONE ENCOUNTER
Diabetes Education Scheduling Outreach #1:    Call to patient to schedule. Left message with phone number to call to schedule.    Also sent [employer] message for second attempt. Requested patient to call to schedule.    Lizeth Sylvester OnCall  Diabetes and Nutrition Scheduling

## 2022-09-28 ENCOUNTER — TELEPHONE (OUTPATIENT)
Dept: FAMILY MEDICINE | Facility: CLINIC | Age: 82
End: 2022-09-28

## 2022-09-28 DIAGNOSIS — I10 HYPERTENSION GOAL BP (BLOOD PRESSURE) < 140/90: ICD-10-CM

## 2022-09-28 RX ORDER — HYDROCHLOROTHIAZIDE 25 MG/1
12.5 TABLET ORAL DAILY
Qty: 90 TABLET | Refills: 1 | COMMUNITY
Start: 2022-09-28 | End: 2023-03-27

## 2022-09-28 NOTE — TELEPHONE ENCOUNTER
At last visit she was told to start taking 1/2 tablet due to swelling  A full tablet causes her dizziness    Lisette Olson MD

## 2022-09-28 NOTE — TELEPHONE ENCOUNTER
Patient calling to report that for the last 4-5 days she has developed bilateral lower extremity edema that is up to just below her knees during the day.  She is now wearing compression stockings and by morning the edema is completely gone and legs are back to normal. Patient reports that her hydrochlorothiazide was discontinued at 8/11/22 appointment and she wonders if this could be the reason.      Denies chest pain, cough, shortness of breath, dizziness, weakness and lightheadedness.    Routing to provider to advise.  Coty Lmia BSN, RN

## 2022-09-28 NOTE — TELEPHONE ENCOUNTER
Called patient and gave providers message/ instructions.  Patient states (s)he understands this.     Coty HAQN, RN

## 2022-10-03 ENCOUNTER — ALLIED HEALTH/NURSE VISIT (OUTPATIENT)
Dept: EDUCATION SERVICES | Facility: CLINIC | Age: 82
End: 2022-10-03
Attending: FAMILY MEDICINE
Payer: COMMERCIAL

## 2022-10-03 DIAGNOSIS — E11.9 DIABETES MELLITUS WITHOUT COMPLICATION (H): Primary | ICD-10-CM

## 2022-10-03 DIAGNOSIS — E11.9 TYPE 2 DIABETES MELLITUS WITHOUT COMPLICATION, WITHOUT LONG-TERM CURRENT USE OF INSULIN (H): ICD-10-CM

## 2022-10-03 DIAGNOSIS — E11.9 TYPE 2 DIABETES, HBA1C GOAL < 8% (H): ICD-10-CM

## 2022-10-03 PROCEDURE — G0108 DIAB MANAGE TRN  PER INDIV: HCPCS | Mod: AE

## 2022-10-03 RX ORDER — METFORMIN HCL 500 MG
500 TABLET, EXTENDED RELEASE 24 HR ORAL 2 TIMES DAILY WITH MEALS
Qty: 180 TABLET | Refills: 1
Start: 2022-10-03 | End: 2023-02-07 | Stop reason: SINTOL

## 2022-10-03 NOTE — PROGRESS NOTES
Diabetes Self-Management Education & Support    Presents for: Individual review    Type of Service: In Person Visit    Assessment Type:   ASSESSMENT:  Orquidea comes to learn about her diabetes and medications she can take.  If she takes the full dose of Metformin she gets diarrhea,  But she also suffers from IBD, so that may be causing some of the issues.  She is to take 50% of the Metformin, and follow meal planning and increase her fibers.  I will ask her PCP for nutrition referral so she can learn how to eat with IBS.  If her BG are not under control, will add Glipizide to her medications.  Trulicity is 274.00 per month, she feels too much    Patient's most recent   Lab Results   Component Value Date    A1C 7.3 08/11/2022    A1C 7.6 10/20/2020     is meeting goal of <8.0    Diabetes knowledge and skills assessment:   Patient is knowledgeable in diabetes management concepts related to: Healthy Eating, Monitoring, Reducing Risks and Healthy Coping    Continue education with the following diabetes management concepts: Healthy Eating, Being Active, Monitoring, Taking Medication and Healthy Coping    Based on learning assessment above, most appropriate setting for further diabetes education would be: Individual setting.      PLAN    1. Metformin  mg take 1 pill with breakfast and 1 pill with dinner  2. If needed we can have Dr. Amos put in nutrition consult for IBS, and then you can do a video/telephone visit.  3. Try walking at least 15 minutes every other day and increase as you tolerate  4. Protein all meals, or Protein shake  5. 3 meals per day, have 3-4 carb choices each meal and 2-3 snacks each day , 1-2 carb choices  6. If we cannot get your BG under control then will think about Glipizide XL  7.   Topics to cover at upcoming visits: Healthy Eating and Taking Medication    Follow-up: 11/7    See Care Plan for co-developed, patient-state behavior change goals.  AVS provided for patient today.    Education  "Materials Provided:   [employer] [place] Understanding Diabetes Booklet, Carbohydrate Counting and My Plate Planner      SUBJECTIVE/OBJECTIVE:  Presents for: Individual review  Accompanied by: Self  Diabetes education in the past 24mo: No  Focus of Visit: Monitoring, Diabetes Pathophysiology, Assistance w/ making life changes, Healthy Eating, Taking Medication, GLP-1 Start  GLP-1 Type: Trulicity  Diabetes type: Type 2  Disease course: Stable  How confident are you filling out medical forms by yourself:: Somewhat  Transportation concerns: No  Difficulty affording diabetes testing supplies?: Sometimes  Other concerns:: None  Cultural Influences/Ethnic Background:  Not  or       Diabetes Symptoms & Complications:  Fatigue: Yes  Neuropathy: Yes  Polydipsia: No  Polyphagia: No  Polyuria: Yes  Visual change: Yes  Slow healing wounds: No  Symptom course: Worsening  Weight trend: Fluctuating  Autonomic neuropathy: No  CVA: No  Heart disease: No  Nephropathy: No  Peripheral neuropathy: Yes  Peripheral Vascular Disease: No  Retinopathy: No  Sexual dysfunction: No    Patient Problem List and Family Medical History reviewed for relevant medical history, current medical status, and diabetes risk factors.    Vitals:  There were no vitals taken for this visit.  Estimated body mass index is 24.6 kg/m  as calculated from the following:    Height as of 8/11/22: 1.607 m (5' 3.25\").    Weight as of 9/14/22: 63.5 kg (140 lb).   Last 3 BP:   BP Readings from Last 3 Encounters:   09/14/22 134/58   08/11/22 135/64   01/05/22 129/62       History   Smoking Status     Never Smoker   Smokeless Tobacco     Never Used     Comment: Lives in smoke free household       Labs:  Lab Results   Component Value Date    A1C 7.3 08/11/2022    A1C 7.6 10/20/2020     Lab Results   Component Value Date     08/11/2022     10/20/2020     Lab Results   Component Value Date    LDL 56 08/11/2022    LDL 42 10/20/2020     HDL Cholesterol "   Date Value Ref Range Status   10/20/2020 45 (L) >49 mg/dL Final     Direct Measure HDL   Date Value Ref Range Status   08/11/2022 54 >=50 mg/dL Final   ]  GFR Estimate   Date Value Ref Range Status   08/11/2022 88 >60 mL/min/1.73m2 Final     Comment:     Effective December 21, 2021 eGFRcr in adults is calculated using the 2021 CKD-EPI creatinine equation which includes age and gender (Avtar et al., NEJ, DOI: 10.1056/GQJAov8202669)   10/20/2020 82 >60 mL/min/[1.73_m2] Final     Comment:     Non  GFR Calc  Starting 12/18/2018, serum creatinine based estimated GFR (eGFR) will be   calculated using the Chronic Kidney Disease Epidemiology Collaboration   (CKD-EPI) equation.       GFR Estimate If Black   Date Value Ref Range Status   10/20/2020 >90 >60 mL/min/[1.73_m2] Final     Comment:      GFR Calc  Starting 12/18/2018, serum creatinine based estimated GFR (eGFR) will be   calculated using the Chronic Kidney Disease Epidemiology Collaboration   (CKD-EPI) equation.       Lab Results   Component Value Date    CR 0.62 08/11/2022    CR 0.68 10/20/2020     No results found for: MICROALBUMIN    Healthy Eating:  Meal planning/habits: None, Low salt, Frequent snacking  How many times a week on average do you eat food made away from home (restaurant/take-out)?: 2  Meals include: Breakfast, Lunch, Dinner, Afternoon Snack, Evening Snack  Breakfast: cheerios and decaf coffee and creamer, HB egg, watermelon or toast and PB  Lunch: chx thigh and leg, mashed potatoes and gravy and bisquit diet pop. or tator tot hotdish or fish breaded and leo slaw and tator tots  Dinner: popcorn or BBQ ribs and buttered carrots and milk or carrots and 1 BBQ rib and tator tots  Snacks: ice cream, vanilla or popcorn, pretzels.  Beverages: Water, Diet soda  Has patient met with a dietitian in the past?: No    Being Active:  Being Active Assessed Today: Yes  Exercise:: Yes  Days per week of moderate to strenuous  exercise (like a brisk walk): 2  On average, minutes per day of exercise at this level: 20  How intense was your typical exercise? : Light (like stretching or slow walking)  Exercise Minutes per Week: 40  Barrier to exercise: Time    Monitoring:  Monitoring Assessed Today: Yes  Did patient bring glucose meter to appointment? : Yes  Blood Glucose Meter: One Touch  Times checking blood sugar at home (number): 1  Times checking blood sugar at home (per): Day  Blood glucose trend: Fluctuating     230, 221, 180, 231, 165, 153, 191, 181, 176, 179, 171, 199, 172, 190, 160, 181,    Taking Medications:  Diabetes Medication(s)     Biguanides       metFORMIN (GLUCOPHAGE XR) 500 MG 24 hr tablet    Take 2 tablets (1,000 mg) by mouth 2 times daily (with meals)     Patient taking differently: Take 1,000 mg by mouth At Bedtime          Taking Medication Assessed Today: Yes  Current Treatments: Oral Medication (taken by mouth)  Problems taking diabetes medications regularly?: No  Diabetes medication side effects?: Yes (diarrhea)    Problem Solving:  Problem Solving Assessed Today: Yes  Is the patient at risk for hypoglycemia?: No  Is the patient at risk for DKA?: No              Reducing Risks:  Reducing Risks Assessed Today: Yes  Diabetes Risks: Age over 45 years, Family History  CAD Risks: Diabetes Mellitus, Family history, Post-menopausal  Has dilated eye exam at least once a year?: Yes  Sees dentist every 6 months?: Yes  Feet checked by healthcare provider in the last year?: Yes    Healthy Coping:  Healthy Coping Assessed Today: Yes  Emotional response to diabetes: Ready to learn, Concern for health and well-being  Informal Support system:: Children, Spouse  Stage of change: ACTION (Actively working towards change)  Patient Activation Measure Survey Score:  KANE Score (Last Two) 5/23/2011   KANE Raw Score 35   Activation Score 45.2   KANE Level 1         Care Plan and Education Provided:  Care Plan: Diabetes   Updates made by  Marilu Bar RN since 10/3/2022 12:00 AM      Problem: HbA1C Not In Goal       Goal: Establish Regular Follow-Ups with PCP    Start Date: 10/3/2022   This Visit's Progress: 70%      Task: Discuss with PCP the recommended timing for patient's next follow up visit(s) Completed 10/3/2022   Responsible User: Marilu Bar RN      Task: Discuss schedule for PCP visits with patient Completed 10/3/2022   Responsible User: Marilu Bar RN      Goal: Get HbA1C Level in Goal    Start Date: 10/3/2022   This Visit's Progress: On track      Task: Educate patient on diabetes education self-management topics Completed 10/3/2022   Responsible User: Marilu Bar RN      Task: Educate patient on benefits of regular glucose monitoring Completed 10/3/2022   Responsible User: Marilu Bar RN      Task: Refer patient to appropriate extended care team member, as needed (Medication Therapy Management, Behavioral Health, Physical Therapy, etc.)    Responsible User: Marilu Bar RN      Task: Discuss diabetes treatment plan with patient Completed 10/3/2022   Responsible User: Marilu Bar RN      Problem: Diabetes Self-Management Education Needed to Optimize Self-Care Behaviors       Goal: Understand diabetes pathophysiology and disease progression    Start Date: 10/3/2022   This Visit's Progress: 50%      Task: Provide education on diabetes pathophysiology and disease progression specfic to patient's diabetes type Completed 10/3/2022   Responsible User: Marilu Bar RN      Goal: Healthy Eating - follow a healthy eating pattern for diabetes    Start Date: 10/3/2022   This Visit's Progress: 70%   Note:    Has IBS     Task: Provide education on portion control and consistency in amount, composition and timing of food intake Completed 10/3/2022   Responsible User: Marilu Bar RN      Task: Provide education on managing carbohydrate intake (carbohydrate counting, plate planning method, etc.) Completed 10/3/2022    Responsible User: Marilu Bar RN      Task: Provide education on weight management    Responsible User: Marilu Bar RN      Task: Provide education on heart healthy eating    Responsible User: Marilu Bar RN      Task: Provide education on eating out    Responsible User: Marilu Bar RN      Task: Develop individualized healthy eating plan with patient Completed 10/3/2022   Responsible User: Marilu Bar RN      Goal: Being Active - get regular physical activity, working up to at least 150 minutes per week    Start Date: 10/3/2022   This Visit's Progress: 30%      Task: Provide education on relationship of activity to glucose and precautions to take if at risk for low glucose Completed 10/3/2022   Responsible User: Marilu Bar RN      Task: Discuss barriers to physical activity with patient Completed 10/3/2022   Responsible User: Marilu Bar RN      Task: Develop physical activity plan with patient Completed 10/3/2022   Responsible User: Marilu Bar RN      Task: Explore community resources including walking groups, assistance programs, and home videos    Responsible User: Marilu Bar RN      Goal: Monitoring - monitor glucose and ketones as directed    Start Date: 10/3/2022   This Visit's Progress: 90%      Task: Provide education on blood glucose monitoring (purpose, proper technique, frequency, glucose targets, interpreting results, when to use glucose control solution, sharps disposal) Completed 10/3/2022   Responsible User: Marilu Bar RN      Task: Provide education on continuous glucose monitoring (sensor placement, use of landry or /reader, understanding glucose trends, alerts and alarms, differences between sensor glucose and blood glucose)    Responsible User: Marilu Bar RN      Task: Provide education on ketone monitoring (when to monitor, frequency, etc.)    Responsible User: Marilu Bar RN      Goal: Taking Medication - patient is consistently taking  medications as directed    Start Date: 10/3/2022   This Visit's Progress: 50%      Task: Provide education on action of prescribed medication, including when to take and possible side effects Completed 10/3/2022   Responsible User: Marilu Bar RN      Task: Provide education on insulin and injectable diabetes medications, including administration, storage, site selection and rotation for injection sites    Responsible User: Marilu Bar RN      Task: Discuss barriers to medication adherence with patient and provide management technique ideas as appropriate Completed 10/3/2022   Responsible User: Marilu Bar RN      Task: Provide education on frequency and refill details of medications Completed 10/3/2022   Responsible User: Marilu Bar RN      Goal: Problem Solving - know how to prevent and manage short-term diabetes complications    Start Date: 10/3/2022   This Visit's Progress: 100%      Task: Provide education on high blood glucose - causes, signs/symptoms, prevention and treatment Completed 10/3/2022   Responsible User: Marilu Bar RN      Task: Provide education on low blood glucose - causes, signs/symptoms, prevention, treatment, carrying a carbohydrate source at all times, and medical identification Completed 10/3/2022   Responsible User: Marilu Bar RN      Task: Provide education on safe travel with diabetes    Responsible User: Marilu Bar RN      Task: Provide education on how to care for diabetes on sick days    Responsible User: Marilu Bar RN      Task: Provide education on when to call a health care provider Completed 10/3/2022   Responsible User: Marilu Bar RN      Goal: Reducing Risks - know how to prevent and treat long-term diabetes complications       Task: Provide education on major complications of diabetes, prevention, early diagnostic measures and treatment of complications    Responsible User: Marilu Bar RN      Task: Provide education on recommended  care for dental, eye and foot health    Responsible User: Marilu Bar RN      Task: Provide education on Hemoglobin A1c - goals and relationship to blood glucose levels    Responsible User: Marilu Bar RN      Task: Provide education on recommendations for heart health - lipid levels and goals, blood pressure and goals, and aspirin therapy, if indicated    Responsible User: Marilu Bar RN      Task: Provide education on tobacco cessation    Responsible User: Marilu Bar RN      Goal: Healthy Coping - use available resources to cope with the challenges of managing diabetes    Start Date: 10/3/2022   This Visit's Progress: 80%      Task: Discuss recognizing feelings about having diabetes Completed 10/3/2022   Responsible User: Marilu Bar RN      Task: Provide education on the benefits of making appropriate lifestyle changes Completed 10/3/2022   Responsible User: Marilu Bar RN      Task: Provide education on benefits of utilizing support systems Completed 10/3/2022   Responsible User: Marilu Bar RN      Task: Discuss methods for coping with stress Completed 10/3/2022   Responsible User: Marilu Bar RN      Task: Provide education on when to seek professional counseling    Responsible User: Marilu Bar RN Sue Truhler RN/JACKSON Sylvester Diabetes Educator      Time Spent: 60 minutes  Encounter Type: Individual    Any diabetes medication dose changes were made via the CDE Protocol per the patient's primary care provider. A copy of this encounter was shared with the provider.

## 2022-10-03 NOTE — PATIENT INSTRUCTIONS
Diabetes Support Resources:  Metformin  mg take 1 pill with breakfast and 1 pill with dinner  If needed we can have Dr. Amos put in nutrition consult for IBS, and then you can do a video/telephone visit.  Try walking at least 15 minutes every other day and increase as you tolerate  Protein all meals, or Protein shake  3 meals per day, have 3-4 carb choices each meal and 2-3 snacks each day , 1-2 carb choices  If we cannot get your BG under control then will think about Glipizide XL     Bring blood glucose meter and logbook with you to all doctor and follow-up appointments.    Diabetes Education Telephone Visit Follow-up:    We realize your time is valuable and your health is important! We offer a convenient Telephone Visit follow up! It s a quick way to check in for a medication dose adjustment without having to come back to clinic as soon.    Telephone Visits are often covered by insurance. Please check with your insurance plan to see if this type of visit is covered. If not, the cost is less expensive than an office visit:    Up to 10 minutes (Code 20956): $30  11-20 minutes (Code 38511): $59  More than 20 minutes (Code 16050): $85    Talk with your Diabetes Educator if you want to learn more.      [place] [facility]:  For Your Diabetes Education and Nutrition Appointments Call:  [phone]   For Diabetes Education or Nutrition Related Questions:   Phone: [phone]  Send mParticle Message   If you need a medication refill please contact your pharmacy. Please allow 3 business days for your refills to be completed.

## 2022-10-03 NOTE — LETTER
10/3/2022         RE: Orquidea Stevens  [address] [facility] [id number]        Dear Colleague,    Thank you for referring your patient, Orquidea Stevens, to the [facility]. Please see a copy of my visit note below.    Diabetes Self-Management Education & Support    Presents for: Individual review    Type of Service: In Person Visit    Assessment Type:   ASSESSMENT:  Orquidea comes to learn about her diabetes and medications she can take.  If she takes the full dose of Metformin she gets diarrhea,  But she also suffers from IBD, so that may be causing some of the issues.  She is to take 50% of the Metformin, and follow meal planning and increase her fibers.  I will ask her PCP for nutrition referral so she can learn how to eat with IBS.  If her BG are not under control, will add Glipizide to her medications.  Trulicity is 274.00 per month, she feels too much    Patient's most recent   Lab Results   Component Value Date    A1C 7.3 08/11/2022    A1C 7.6 10/20/2020     is meeting goal of <8.0    Diabetes knowledge and skills assessment:   Patient is knowledgeable in diabetes management concepts related to: Healthy Eating, Monitoring, Reducing Risks and Healthy Coping    Continue education with the following diabetes management concepts: Healthy Eating, Being Active, Monitoring, Taking Medication and Healthy Coping    Based on learning assessment above, most appropriate setting for further diabetes education would be: Individual setting.      PLAN    1. Metformin  mg take 1 pill with breakfast and 1 pill with dinner  2. If needed we can have Dr. Amos put in nutrition consult for IBS, and then you can do a video/telephone visit.  3. Try walking at least 15 minutes every other day and increase as you tolerate  4. Protein all meals, or Protein shake  5. 3 meals per day, have 3-4 carb choices each meal and 2-3 snacks each day , 1-2 carb choices  6. If we cannot get your BG under  "control then will think about Glipizide XL  7.   Topics to cover at upcoming visits: Healthy Eating and Taking Medication    Follow-up: 11/7    See Care Plan for co-developed, patient-state behavior change goals.  AVS provided for patient today.    Education Materials Provided:  PADDY [facility] Understanding Diabetes Booklet, Carbohydrate Counting and My Plate Planner      SUBJECTIVE/OBJECTIVE:  Presents for: Individual review  Accompanied by: Self  Diabetes education in the past 24mo: No  Focus of Visit: Monitoring, Diabetes Pathophysiology, Assistance w/ making life changes, Healthy Eating, Taking Medication, GLP-1 Start  GLP-1 Type: Trulicity  Diabetes type: Type 2  Disease course: Stable  How confident are you filling out medical forms by yourself:: Somewhat  Transportation concerns: No  Difficulty affording diabetes testing supplies?: Sometimes  Other concerns:: None  Cultural Influences/Ethnic Background:  Not  or       Diabetes Symptoms & Complications:  Fatigue: Yes  Neuropathy: Yes  Polydipsia: No  Polyphagia: No  Polyuria: Yes  Visual change: Yes  Slow healing wounds: No  Symptom course: Worsening  Weight trend: Fluctuating  Autonomic neuropathy: No  CVA: No  Heart disease: No  Nephropathy: No  Peripheral neuropathy: Yes  Peripheral Vascular Disease: No  Retinopathy: No  Sexual dysfunction: No    Patient Problem List and Family Medical History reviewed for relevant medical history, current medical status, and diabetes risk factors.    Vitals:  There were no vitals taken for this visit.  Estimated body mass index is 24.6 kg/m  as calculated from the following:    Height as of 8/11/22: 1.607 m (5' 3.25\").    Weight as of 9/14/22: 63.5 kg (140 lb).   Last 3 BP:   BP Readings from Last 3 Encounters:   09/14/22 134/58   08/11/22 135/64   01/05/22 129/62       History   Smoking Status     Never Smoker   Smokeless Tobacco     Never Used     Comment: Lives in smoke free household       Labs:  Lab " Results   Component Value Date    A1C 7.3 08/11/2022    A1C 7.6 10/20/2020     Lab Results   Component Value Date     08/11/2022     10/20/2020     Lab Results   Component Value Date    LDL 56 08/11/2022    LDL 42 10/20/2020     HDL Cholesterol   Date Value Ref Range Status   10/20/2020 45 (L) >49 mg/dL Final     Direct Measure HDL   Date Value Ref Range Status   08/11/2022 54 >=50 mg/dL Final   ]  GFR Estimate   Date Value Ref Range Status   08/11/2022 88 >60 mL/min/1.73m2 Final     Comment:     Effective December 21, 2021 eGFRcr in adults is calculated using the 2021 CKD-EPI creatinine equation which includes age and gender (Avtar et al., NEJ, DOI: 10.1056/KLUAap2359477)   10/20/2020 82 >60 mL/min/[1.73_m2] Final     Comment:     Non  GFR Calc  Starting 12/18/2018, serum creatinine based estimated GFR (eGFR) will be   calculated using the Chronic Kidney Disease Epidemiology Collaboration   (CKD-EPI) equation.       GFR Estimate If Black   Date Value Ref Range Status   10/20/2020 >90 >60 mL/min/[1.73_m2] Final     Comment:      GFR Calc  Starting 12/18/2018, serum creatinine based estimated GFR (eGFR) will be   calculated using the Chronic Kidney Disease Epidemiology Collaboration   (CKD-EPI) equation.       Lab Results   Component Value Date    CR 0.62 08/11/2022    CR 0.68 10/20/2020     No results found for: MICROALBUMIN    Healthy Eating:  Meal planning/habits: None, Low salt, Frequent snacking  How many times a week on average do you eat food made away from home (restaurant/take-out)?: 2  Meals include: Breakfast, Lunch, Dinner, Afternoon Snack, Evening Snack  Breakfast: cheerios and decaf coffee and creamer, HB egg, watermelon or toast and PB  Lunch: chx thigh and leg, mashed potatoes and gravy and bisquit diet pop. or tator tot hotdish or fish breaded and leo slaw and tator tots  Dinner: popcorn or BBQ ribs and buttered carrots and milk or carrots and 1 BBQ rib  and ramon ham  Snacks: ice cream, vanilla or popcorn, pretzels.  Beverages: Water, Diet soda  Has patient met with a dietitian in the past?: No    Being Active:  Being Active Assessed Today: Yes  Exercise:: Yes  Days per week of moderate to strenuous exercise (like a brisk walk): 2  On average, minutes per day of exercise at this level: 20  How intense was your typical exercise? : Light (like stretching or slow walking)  Exercise Minutes per Week: 40  Barrier to exercise: Time    Monitoring:  Monitoring Assessed Today: Yes  Did patient bring glucose meter to appointment? : Yes  Blood Glucose Meter: One Touch  Times checking blood sugar at home (number): 1  Times checking blood sugar at home (per): Day  Blood glucose trend: Fluctuating     230, 221, 180, 231, 165, 153, 191, 181, 176, 179, 171, 199, 172, 190, 160, 181,    Taking Medications:  Diabetes Medication(s)     Biguanides       metFORMIN (GLUCOPHAGE XR) 500 MG 24 hr tablet    Take 2 tablets (1,000 mg) by mouth 2 times daily (with meals)     Patient taking differently: Take 1,000 mg by mouth At Bedtime          Taking Medication Assessed Today: Yes  Current Treatments: Oral Medication (taken by mouth)  Problems taking diabetes medications regularly?: No  Diabetes medication side effects?: Yes (diarrhea)    Problem Solving:  Problem Solving Assessed Today: Yes  Is the patient at risk for hypoglycemia?: No  Is the patient at risk for DKA?: No              Reducing Risks:  Reducing Risks Assessed Today: Yes  Diabetes Risks: Age over 45 years, Family History  CAD Risks: Diabetes Mellitus, Family history, Post-menopausal  Has dilated eye exam at least once a year?: Yes  Sees dentist every 6 months?: Yes  Feet checked by healthcare provider in the last year?: Yes    Healthy Coping:  Healthy Coping Assessed Today: Yes  Emotional response to diabetes: Ready to learn, Concern for health and well-being  Informal Support system:: Children, Spouse  Stage of change: ACTION  (Actively working towards change)  Patient Activation Measure Survey Score:  KANE Score (Last Two) 5/23/2011   KANE Raw Score 35   Activation Score 45.2   KANE Level 1         Care Plan and Education Provided:  Care Plan: Diabetes   Updates made by Marilu Bar RN since 10/3/2022 12:00 AM      Problem: HbA1C Not In Goal       Goal: Establish Regular Follow-Ups with PCP    Start Date: 10/3/2022   This Visit's Progress: 70%      Task: Discuss with PCP the recommended timing for patient's next follow up visit(s) Completed 10/3/2022   Responsible User: Marilu Bar RN      Task: Discuss schedule for PCP visits with patient Completed 10/3/2022   Responsible User: Marilu Bar RN      Goal: Get HbA1C Level in Goal    Start Date: 10/3/2022   This Visit's Progress: On track      Task: Educate patient on diabetes education self-management topics Completed 10/3/2022   Responsible User: Marilu Bar RN      Task: Educate patient on benefits of regular glucose monitoring Completed 10/3/2022   Responsible User: Marilu Bar RN      Task: Refer patient to appropriate extended care team member, as needed (Medication Therapy Management, Behavioral Health, Physical Therapy, etc.)    Responsible User: Marilu Bar RN      Task: Discuss diabetes treatment plan with patient Completed 10/3/2022   Responsible User: Marilu Bar RN      Problem: Diabetes Self-Management Education Needed to Optimize Self-Care Behaviors       Goal: Understand diabetes pathophysiology and disease progression    Start Date: 10/3/2022   This Visit's Progress: 50%      Task: Provide education on diabetes pathophysiology and disease progression specfic to patient's diabetes type Completed 10/3/2022   Responsible User: Marilu Bar RN      Goal: Healthy Eating - follow a healthy eating pattern for diabetes    Start Date: 10/3/2022   This Visit's Progress: 70%   Note:    Has IBS     Task: Provide education on portion control and consistency in  amount, composition and timing of food intake Completed 10/3/2022   Responsible User: Marilu Bar RN      Task: Provide education on managing carbohydrate intake (carbohydrate counting, plate planning method, etc.) Completed 10/3/2022   Responsible User: Marilu Bar RN      Task: Provide education on weight management    Responsible User: Marilu Bar RN      Task: Provide education on heart healthy eating    Responsible User: Marilu Bar RN      Task: Provide education on eating out    Responsible User: Marilu Bar RN      Task: Develop individualized healthy eating plan with patient Completed 10/3/2022   Responsible User: Marilu Bar RN      Goal: Being Active - get regular physical activity, working up to at least 150 minutes per week    Start Date: 10/3/2022   This Visit's Progress: 30%      Task: Provide education on relationship of activity to glucose and precautions to take if at risk for low glucose Completed 10/3/2022   Responsible User: Marilu Bar RN      Task: Discuss barriers to physical activity with patient Completed 10/3/2022   Responsible User: Marilu Bar RN      Task: Develop physical activity plan with patient Completed 10/3/2022   Responsible User: Marilu Bar RN      Task: Explore community resources including walking groups, assistance programs, and home videos    Responsible User: Marilu Bar RN      Goal: Monitoring - monitor glucose and ketones as directed    Start Date: 10/3/2022   This Visit's Progress: 90%      Task: Provide education on blood glucose monitoring (purpose, proper technique, frequency, glucose targets, interpreting results, when to use glucose control solution, sharps disposal) Completed 10/3/2022   Responsible User: Marilu Bar RN      Task: Provide education on continuous glucose monitoring (sensor placement, use of landry or /reader, understanding glucose trends, alerts and alarms, differences between sensor glucose and  blood glucose)    Responsible User: Marilu Bar RN      Task: Provide education on ketone monitoring (when to monitor, frequency, etc.)    Responsible User: Marilu Bar RN      Goal: Taking Medication - patient is consistently taking medications as directed    Start Date: 10/3/2022   This Visit's Progress: 50%      Task: Provide education on action of prescribed medication, including when to take and possible side effects Completed 10/3/2022   Responsible User: Marilu Bar RN      Task: Provide education on insulin and injectable diabetes medications, including administration, storage, site selection and rotation for injection sites    Responsible User: Marilu Bar RN      Task: Discuss barriers to medication adherence with patient and provide management technique ideas as appropriate Completed 10/3/2022   Responsible User: Marilu Bar RN      Task: Provide education on frequency and refill details of medications Completed 10/3/2022   Responsible User: Marilu Bar RN      Goal: Problem Solving - know how to prevent and manage short-term diabetes complications    Start Date: 10/3/2022   This Visit's Progress: 100%      Task: Provide education on high blood glucose - causes, signs/symptoms, prevention and treatment Completed 10/3/2022   Responsible User: Marilu Bar RN      Task: Provide education on low blood glucose - causes, signs/symptoms, prevention, treatment, carrying a carbohydrate source at all times, and medical identification Completed 10/3/2022   Responsible User: Marilu Bar RN      Task: Provide education on safe travel with diabetes    Responsible User: Marilu Bar RN      Task: Provide education on how to care for diabetes on sick days    Responsible User: Marilu Bar RN      Task: Provide education on when to call a health care provider Completed 10/3/2022   Responsible User: Marilu Bar RN      Goal: Reducing Risks - know how to prevent and treat long-term  diabetes complications       Task: Provide education on major complications of diabetes, prevention, early diagnostic measures and treatment of complications    Responsible User: Marilu Bar RN      Task: Provide education on recommended care for dental, eye and foot health    Responsible User: Marilu Bar RN      Task: Provide education on Hemoglobin A1c - goals and relationship to blood glucose levels    Responsible User: Marilu Bar RN      Task: Provide education on recommendations for heart health - lipid levels and goals, blood pressure and goals, and aspirin therapy, if indicated    Responsible User: Marilu Bar RN      Task: Provide education on tobacco cessation    Responsible User: Marilu Bar RN      Goal: Healthy Coping - use available resources to cope with the challenges of managing diabetes    Start Date: 10/3/2022   This Visit's Progress: 80%      Task: Discuss recognizing feelings about having diabetes Completed 10/3/2022   Responsible User: Marilu Bar RN      Task: Provide education on the benefits of making appropriate lifestyle changes Completed 10/3/2022   Responsible User: Marilu Bar RN      Task: Provide education on benefits of utilizing support systems Completed 10/3/2022   Responsible User: Marilu Bar RN      Task: Discuss methods for coping with stress Completed 10/3/2022   Responsible User: Marilu Bar RN      Task: Provide education on when to seek professional counseling    Responsible User: Marilu Bar RN Sue Truhler RN/JACKSON Sylvester Diabetes Educator      Time Spent: 60 minutes  Encounter Type: Individual    Any diabetes medication dose changes were made via the CDE Protocol per the patient's primary care provider. A copy of this encounter was shared with the provider.

## 2022-11-08 DIAGNOSIS — G47.00 INSOMNIA, UNSPECIFIED TYPE: ICD-10-CM

## 2022-11-08 RX ORDER — ZOLPIDEM TARTRATE 5 MG/1
TABLET ORAL
Qty: 30 TABLET | Refills: 0 | Status: SHIPPED | OUTPATIENT
Start: 2022-11-08 | End: 2023-12-07

## 2022-12-13 NOTE — TELEPHONE ENCOUNTER
She just needs to be patient as we increase her meds  BSs in the 200s are not going to harm her on a short term basis  Have her increase her dose of glipizide to 10 mg bid  Can send in new rx if needed.     Lisette Olson MD     oral

## 2022-12-19 NOTE — INTERVAL H&P NOTE
This note is for the purpose of making the H&P performed in the clinic within the last 30 days available in the hospital surgical encounter.   Reason?: Possible non-covered service Detail Level: Detailed Payment Option: Option 1: Bill Medicare, await for decision on payment. Reason?: Additional Information Procedure (Limit To 20 Characters): LN Cost Of Treatment Patient Responsible For Paying?: 200

## 2023-01-18 ENCOUNTER — TELEPHONE (OUTPATIENT)
Dept: FAMILY MEDICINE | Facility: CLINIC | Age: 83
End: 2023-01-18
Payer: COMMERCIAL

## 2023-01-18 DIAGNOSIS — E11.9 TYPE 2 DIABETES, HBA1C GOAL < 8% (H): ICD-10-CM

## 2023-01-18 RX ORDER — GLIPIZIDE 5 MG/1
5 TABLET, FILM COATED, EXTENDED RELEASE ORAL DAILY
Qty: 30 TABLET | Refills: 1 | Status: SHIPPED | OUTPATIENT
Start: 2023-01-18 | End: 2023-02-07

## 2023-01-18 NOTE — TELEPHONE ENCOUNTER
Patient informed of provider note as below and prescription for Glipizide sent to pharmacy    Corinne HAQN, RN

## 2023-01-18 NOTE — TELEPHONE ENCOUNTER
Have her start with one tablet a day to see how she tolerates it. If tolerating, we can increase dose as needed to get her BSs to goal    Lisette Olson MD

## 2023-01-18 NOTE — TELEPHONE ENCOUNTER
Is she wanting a different medication to treat her diabetes because the metformin has a side effect of diarrhea?  Did she ever follow-up with diab ed?    Lisette Olson MD

## 2023-01-18 NOTE — TELEPHONE ENCOUNTER
Patient has been on Metformin XR twice a day and has had diarrhea since restarting. Has diarrhea 3-4 x/day  Would like a different medication  Patient saw the diabetic educator once and didn't go back   Patient leaves for [place] in a couple of weeks for a month    To provider to advise    Corinne RODRIGEZ, RN

## 2023-01-18 NOTE — TELEPHONE ENCOUNTER
Patient has always been on 500mg twice a day  Patient felt the Glipizide was not working for her blood sugars and believes that also cause diarrhea but unsure  Patient wondering if can try Glipizide again    To provider to advise    Corinne RODRIGEZ, RN

## 2023-01-18 NOTE — TELEPHONE ENCOUNTER
Is she able to tolerate any metformi? Per last note she was able to tolerated 1000 mg per day?  She has tried glipizide in the past and did not tolerate. I do not remember why.    She did see diabetic educator who recommended trulicity but pt did not want to pay that much  Unfortunately after metformin and glipizide, all of the diabetic meds are going to be more spendy and a lot of them are once a week injectables.  She could call her insurance and find out which diabetes medications are tier 1 and we could start there.     Lisette Olson MD

## 2023-02-06 ENCOUNTER — TRANSFERRED RECORDS (OUTPATIENT)
Dept: HEALTH INFORMATION MANAGEMENT | Facility: CLINIC | Age: 83
End: 2023-02-06

## 2023-02-07 ENCOUNTER — TELEPHONE (OUTPATIENT)
Dept: FAMILY MEDICINE | Facility: CLINIC | Age: 83
End: 2023-02-07
Payer: COMMERCIAL

## 2023-02-07 DIAGNOSIS — E11.9 TYPE 2 DIABETES, HBA1C GOAL < 8% (H): ICD-10-CM

## 2023-02-07 RX ORDER — GLIPIZIDE 10 MG/1
10 TABLET, FILM COATED, EXTENDED RELEASE ORAL DAILY
Qty: 90 TABLET | Refills: 0 | Status: SHIPPED | OUTPATIENT
Start: 2023-02-07 | End: 2023-03-27

## 2023-02-07 NOTE — TELEPHONE ENCOUNTER
Pt requesting glipizide dose increase. Pt states she is tolerating 5 mg daily without SE. Please review and advise.   Pt leaves for [place] in a few days, and returns in March. Diabetes follow-up with PCP is scheduled 3/27/23.    Pt states her FBG numbers have been elevated while on glipizide 5 mg tablets x 18 days. No improvement on glipizide.  See 1/18/23 telephone encounter regarding the change to glipizide 5mg. Pt states she d/c'd metformin when she started glipizide.    glipiZIDE (GLUCOTROL XL) 5 MG 24 hr tablet 30 tablet 1 refill 1/18/2023     Sig - Route: Take 1 tablet (5 mg) by mouth daily       Pt reports the following daily FBG readings: 169, 157, 156, 160, 166, 192, 195.  Pt states she checks her FBG at approximately 9:30am, after having eaten last snack > 12 hours prior.    Kenzie Maldonado, [place], RN

## 2023-02-08 NOTE — TELEPHONE ENCOUNTER
A prescription for glipizide xl 10 mg has been sent to her pharmacy.  Have her continue to monitor her BSs. Risk of getting too low with higher dose so she needs to make sure she eats if she takes this medication    Lisette Olson MD

## 2023-02-08 NOTE — TELEPHONE ENCOUNTER
Patient returned call to the clinic. Informed pt of provider's message below. Answered pt's questions regarding administration instructions. Pt verbalized understanding and agreement to plan.     Maliha Roberts RN    [facility]

## 2023-02-08 NOTE — TELEPHONE ENCOUNTER
This writer attempted to contact Orquidea on 02/08/23      Reason for call relay provider medication change and instructions, as noted below  and left message.      If patient calls back:   Registered Nurse called. Send to RN team at [facility].        Nyasia Sebastian RN  [facility]

## 2023-02-24 DIAGNOSIS — I10 HYPERTENSION GOAL BP (BLOOD PRESSURE) < 140/90: ICD-10-CM

## 2023-02-24 RX ORDER — METOPROLOL SUCCINATE 25 MG/1
TABLET, EXTENDED RELEASE ORAL
Qty: 90 TABLET | Refills: 0 | Status: SHIPPED | OUTPATIENT
Start: 2023-02-24 | End: 2023-03-27

## 2023-02-24 RX ORDER — HYDRALAZINE HYDROCHLORIDE 25 MG/1
TABLET, FILM COATED ORAL
Qty: 180 TABLET | Refills: 0 | Status: SHIPPED | OUTPATIENT
Start: 2023-02-24 | End: 2023-03-27

## 2023-02-24 RX ORDER — ENALAPRIL MALEATE 20 MG/1
TABLET ORAL
Qty: 180 TABLET | Refills: 0 | Status: SHIPPED | OUTPATIENT
Start: 2023-02-24 | End: 2023-03-27

## 2023-03-27 ENCOUNTER — OFFICE VISIT (OUTPATIENT)
Dept: FAMILY MEDICINE | Facility: CLINIC | Age: 83
End: 2023-03-27
Payer: COMMERCIAL

## 2023-03-27 VITALS
HEART RATE: 51 BPM | HEIGHT: 63 IN | WEIGHT: 142 LBS | TEMPERATURE: 98.8 F | BODY MASS INDEX: 25.16 KG/M2 | DIASTOLIC BLOOD PRESSURE: 65 MMHG | OXYGEN SATURATION: 99 % | RESPIRATION RATE: 18 BRPM | SYSTOLIC BLOOD PRESSURE: 139 MMHG

## 2023-03-27 DIAGNOSIS — G25.81 RESTLESS LEG SYNDROME: ICD-10-CM

## 2023-03-27 DIAGNOSIS — G63 POLYNEUROPATHY ASSOCIATED WITH UNDERLYING DISEASE (H): ICD-10-CM

## 2023-03-27 DIAGNOSIS — K21.9 GASTROESOPHAGEAL REFLUX DISEASE WITHOUT ESOPHAGITIS: ICD-10-CM

## 2023-03-27 DIAGNOSIS — I10 HYPERTENSION GOAL BP (BLOOD PRESSURE) < 140/90: ICD-10-CM

## 2023-03-27 DIAGNOSIS — E11.9 TYPE 2 DIABETES MELLITUS WITHOUT COMPLICATION, WITHOUT LONG-TERM CURRENT USE OF INSULIN (H): Primary | ICD-10-CM

## 2023-03-27 LAB — HBA1C MFR BLD: 8.6 % (ref 0–5.6)

## 2023-03-27 PROCEDURE — 80048 BASIC METABOLIC PNL TOTAL CA: CPT | Performed by: FAMILY MEDICINE

## 2023-03-27 PROCEDURE — 36415 COLL VENOUS BLD VENIPUNCTURE: CPT | Performed by: FAMILY MEDICINE

## 2023-03-27 PROCEDURE — 99214 OFFICE O/P EST MOD 30 MIN: CPT | Performed by: FAMILY MEDICINE

## 2023-03-27 PROCEDURE — 83036 HEMOGLOBIN GLYCOSYLATED A1C: CPT | Performed by: FAMILY MEDICINE

## 2023-03-27 PROCEDURE — 99207 PR FOOT EXAM NO CHARGE: CPT | Mod: 25 | Performed by: FAMILY MEDICINE

## 2023-03-27 RX ORDER — ENALAPRIL MALEATE 20 MG/1
20 TABLET ORAL 2 TIMES DAILY
Qty: 180 TABLET | Refills: 1 | Status: SHIPPED | OUTPATIENT
Start: 2023-03-27 | End: 2023-10-16

## 2023-03-27 RX ORDER — HYDROCHLOROTHIAZIDE 25 MG/1
12.5 TABLET ORAL DAILY
Qty: 90 TABLET | Refills: 1 | Status: SHIPPED | OUTPATIENT
Start: 2023-03-27 | End: 2023-10-16

## 2023-03-27 RX ORDER — GLIPIZIDE 10 MG/1
10 TABLET, FILM COATED, EXTENDED RELEASE ORAL 2 TIMES DAILY
Qty: 180 TABLET | Refills: 1 | Status: SHIPPED | OUTPATIENT
Start: 2023-03-27 | End: 2023-10-16

## 2023-03-27 RX ORDER — HYDRALAZINE HYDROCHLORIDE 25 MG/1
25 TABLET, FILM COATED ORAL 2 TIMES DAILY
Qty: 180 TABLET | Refills: 1 | Status: SHIPPED | OUTPATIENT
Start: 2023-03-27 | End: 2023-10-16

## 2023-03-27 RX ORDER — METOPROLOL SUCCINATE 25 MG/1
25 TABLET, EXTENDED RELEASE ORAL DAILY
Qty: 90 TABLET | Refills: 3 | Status: SHIPPED | OUTPATIENT
Start: 2023-03-27 | End: 2024-04-14

## 2023-03-27 RX ORDER — GABAPENTIN 300 MG/1
CAPSULE ORAL
Qty: 450 CAPSULE | Refills: 1 | Status: SHIPPED | OUTPATIENT
Start: 2023-03-27 | End: 2023-10-16

## 2023-03-27 ASSESSMENT — PAIN SCALES - GENERAL: PAINLEVEL: NO PAIN (0)

## 2023-03-27 ASSESSMENT — PATIENT HEALTH QUESTIONNAIRE - PHQ9
SUM OF ALL RESPONSES TO PHQ QUESTIONS 1-9: 12
SUM OF ALL RESPONSES TO PHQ QUESTIONS 1-9: 12
10. IF YOU CHECKED OFF ANY PROBLEMS, HOW DIFFICULT HAVE THESE PROBLEMS MADE IT FOR YOU TO DO YOUR WORK, TAKE CARE OF THINGS AT HOME, OR GET ALONG WITH OTHER PEOPLE: SOMEWHAT DIFFICULT

## 2023-03-27 NOTE — PROGRESS NOTES
Assessment & Plan     Type 2 diabetes mellitus without complication, without long-term current use of insulin (H)  BSs up per pt. Will increase glipizide to 10 bid  - blood glucose (NO BRAND SPECIFIED) test strip; Use to test blood sugar 2 times daily or as directed./ One Touch Ultra Blue  - glipiZIDE (GLUCOTROL XL) 10 MG 24 hr tablet; Take 1 tablet (10 mg) by mouth 2 times daily  - Hemoglobin A1c FUTURE 3mo; Future  - **Basic metabolic panel FUTURE 2mo; Future  - FOOT EXAM  - Hemoglobin A1c FUTURE 3mo  - **Basic metabolic panel FUTURE 2mo    Hypertension goal BP (blood pressure) < 140/90  At goal on meds,   - enalapril (VASOTEC) 20 MG tablet; Take 1 tablet (20 mg) by mouth 2 times daily  - hydrALAZINE (APRESOLINE) 25 MG tablet; Take 1 tablet (25 mg) by mouth 2 times daily  - hydrochlorothiazide (HYDRODIURIL) 25 MG tablet; Take 0.5 tablets (12.5 mg) by mouth daily  - metoprolol succinate ER (TOPROL XL) 25 MG 24 hr tablet; Take 1 tablet (25 mg) by mouth daily    Restless leg syndrome  Working well  - gabapentin (NEURONTIN) 300 MG capsule; Take 2 tablets ( 600 mg) QAM and 3 tablets ( 900 mg) QPM    Polyneuropathy associated with underlying disease (H)  Working well  - gabapentin (NEURONTIN) 300 MG capsule; Take 2 tablets ( 600 mg) QAM and 3 tablets ( 900 mg) QPM    Gastroesophageal reflux disease without esophagitis  On ppit  - omeprazole (PRILOSEC) 20 MG DR capsule; TAKE 1 CAPSULE BY MOUTH ONCE DAILY             Depression Screening Follow Up    PHQ 3/27/2023   PHQ-9 Total Score 12   Q9: Thoughts of better off dead/self-harm past 2 weeks Not at all     Last PHQ-9 3/27/2023   1.  Little interest or pleasure in doing things 2   2.  Feeling down, depressed, or hopeless 2   3.  Trouble falling or staying asleep, or sleeping too much 2   4.  Feeling tired or having little energy 2   5.  Poor appetite or overeating 2   6.  Feeling bad about yourself 0   7.  Trouble concentrating 0   8.  Moving slowly or restless 2   Q9:  Thoughts of better off dead/self-harm past 2 weeks 0   PHQ-9 Total Score 12       Follow Up Actions Taken  pt is grieving. her   3 months ago         Lisette Olson MD  [facility]   Orquidea is a 82 year old, presenting for the following health issues:  Diabetes    Additional Questions 3/27/2023   Roomed by jeanie     History of Present Illness       Diabetes:   She presents for follow up of diabetes.  She is checking home blood glucose one time daily. She checks blood glucose before meals.  Blood glucose is never over 200 and never under 70. She is aware of hypoglycemia symptoms including blurred vision. She is concerned about other. She is having blurry vision.         She eats 0-1 servings of fruits and vegetables daily.She consumes 1 sweetened beverage(s) daily.She exercises with enough effort to increase her heart rate 10 to 19 minutes per day.  She exercises with enough effort to increase her heart rate 3 or less days per week.   She is taking medications regularly.    Today's PHQ-9         PHQ-9 Total Score: 12    PHQ-9 Q9 Thoughts of better off dead/self-harm past 2 weeks :   Not at all    How difficult have these problems made it for you to do your work, take care of things at home, or get along with other people: Somewhat difficult     Difficulty in sleeping,  passed away 3 months ago    Pt with diabetes. Running in upper 180s  Will have her increase her glipizide to 10 bid  Take second dose with supper. May need to take snack before bed  Discussed signs of low blood sugar  Is on aspirin, statin and acei.     BP at goal  Restless leg and neuropathy controlled with gabapentin  On ppi for gerd     just  3 months ago  She is doing okay  Daughters check on her daily.   Trouble sleeping. Recommend trial of melatonin                    Review of Systems   Constitutional, HEENT, cardiovascular, pulmonary, gi and gu systems are negative, except  "as otherwise noted.      Objective    /65   Pulse 51   Temp 98.8  F (37.1  C) (Oral)   Resp 18   Ht 1.607 m (5' 3.25\")   Wt 64.4 kg (142 lb)   SpO2 99%   BMI 24.96 kg/m    Body mass index is 24.96 kg/m .  Physical Exam   GENERAL: healthy, alert and no distress  NECK: no adenopathy, no asymmetry, masses, or scars and thyroid normal to palpation  RESP: lungs clear to auscultation - no rales, rhonchi or wheezes  CV: regular rate and rhythm, normal S1 S2, no S3 or S4, no murmur, click or rub, no peripheral edema and peripheral pulses strong  ABDOMEN: soft, nontender, no hepatosplenomegaly, no masses and bowel sounds normal  MS: no gross musculoskeletal defects noted, no edema    No results found for this or any previous visit (from the past 24 hour(s)).                "

## 2023-03-28 LAB
ANION GAP SERPL CALCULATED.3IONS-SCNC: 5 MMOL/L (ref 3–14)
BUN SERPL-MCNC: 12 MG/DL (ref 7–30)
CALCIUM SERPL-MCNC: 9.9 MG/DL (ref 8.5–10.1)
CHLORIDE BLD-SCNC: 105 MMOL/L (ref 94–109)
CO2 SERPL-SCNC: 29 MMOL/L (ref 20–32)
CREAT SERPL-MCNC: 0.66 MG/DL (ref 0.52–1.04)
GFR SERPL CREATININE-BSD FRML MDRD: 87 ML/MIN/1.73M2
GLUCOSE BLD-MCNC: 169 MG/DL (ref 70–99)
POTASSIUM BLD-SCNC: 3.9 MMOL/L (ref 3.4–5.3)
SODIUM SERPL-SCNC: 139 MMOL/L (ref 133–144)

## 2023-03-31 ENCOUNTER — TELEPHONE (OUTPATIENT)
Dept: FAMILY MEDICINE | Facility: CLINIC | Age: 83
End: 2023-03-31
Payer: COMMERCIAL

## 2023-03-31 NOTE — TELEPHONE ENCOUNTER
Patient calling and had office visit on 03/27/23 and instructed to increase glipizide to 15 mg total a day per patient report. Not able to cut medication due to ER tablet. Called pharmacy and pharmacist agreed, unable and not advised to cut 10 mg in half due to extended release tablets.     According to office notes, increased glipizide to 10 mg bid (20 mg daily) per provider.    Routing to provider to clarify prescription of glipiZIDE (GLUCOTROL XL) 10 MG 24 hr tablet. Do you want patient to take 10 mg of glipizide 10 mg bid?    Maliha Guardado RN

## 2023-05-19 ENCOUNTER — MEDICAL CORRESPONDENCE (OUTPATIENT)
Dept: HEALTH INFORMATION MANAGEMENT | Facility: CLINIC | Age: 83
End: 2023-05-19

## 2023-05-26 ENCOUNTER — OFFICE VISIT (OUTPATIENT)
Dept: OPHTHALMOLOGY | Facility: CLINIC | Age: 83
End: 2023-05-26
Payer: COMMERCIAL

## 2023-05-26 DIAGNOSIS — D31.32 CHOROIDAL NEVUS OF BOTH EYES: ICD-10-CM

## 2023-05-26 DIAGNOSIS — H04.123 DRY EYE SYNDROME, BILATERAL: ICD-10-CM

## 2023-05-26 DIAGNOSIS — D31.31 CHOROIDAL NEVUS OF BOTH EYES: ICD-10-CM

## 2023-05-26 DIAGNOSIS — Z96.1 PSEUDOPHAKIA: ICD-10-CM

## 2023-05-26 DIAGNOSIS — H18.513 FUCHS' CORNEAL DYSTROPHY OF BOTH EYES: Primary | ICD-10-CM

## 2023-05-26 PROCEDURE — 92012 INTRM OPH EXAM EST PATIENT: CPT | Performed by: STUDENT IN AN ORGANIZED HEALTH CARE EDUCATION/TRAINING PROGRAM

## 2023-05-26 RX ORDER — SILICONE ADHESIVE 1.5" X 3"
1 SHEET (EA) TOPICAL 4 TIMES DAILY PRN
Qty: 15 ML | Refills: 11 | Status: SHIPPED | OUTPATIENT
Start: 2023-05-26 | End: 2023-12-07

## 2023-05-26 RX ORDER — SODIUM CHLORIDE 5 %
0.25 OINTMENT (GRAM) OPHTHALMIC (EYE) DAILY
Qty: 3.5 G | Refills: 11 | Status: SHIPPED | OUTPATIENT
Start: 2023-05-26 | End: 2024-04-14

## 2023-05-26 ASSESSMENT — VISUAL ACUITY
OS_PH_CC: 20/25
METHOD: SNELLEN - LINEAR
OD_PH_CC: 20/25
OS_CC+: -1
OD_PH_CC+: -2
OD_CC+: -2
CORRECTION_TYPE: GLASSES
OS_CC: 20/40
OD_CC: 20/40

## 2023-05-26 ASSESSMENT — EXTERNAL EXAM - LEFT EYE: OS_EXAM: NORMAL

## 2023-05-26 ASSESSMENT — EXTERNAL EXAM - RIGHT EYE: OD_EXAM: NORMAL

## 2023-05-26 NOTE — PATIENT INSTRUCTIONS
"Recommend resuming Nolan 128 (5%) ointment at bedtime in both eyes or can use Nolan 128 (5%) drops in the morning and at lunchtime    Use artificial tears more consistently (2-3 times per day) (Refresh Optive or Systane Balance. Avoid \"get the red out\" drops).     Lucie Gordillo MD  [phone]    "

## 2023-05-26 NOTE — PROGRESS NOTES
" Current Eye Medications:  Artificial tears both eyes on a rare occasion.  She is unable to put drops in her eyes.  Her  used to administer her drops, but he passed away.       Subjective:  Patient complains of blurry vision in each eye, which is worse in the morning and gradually improves throughout the day.  Patient complains of vision in both eyes looking blurry, hazy, and wet feeling.  Some days, tears run down her cheeks.     She went to an Optometrist due to the changes in her vision, and her insurance would not cover an exam with Dr. Gordillo.  The Optometrist told her she should return to see Dr. Gordillo due to corneal dystrophy.  Both eyes feel \"weird\" but are not uncomfortable.        Her  Fredrick passed away in December.       Objective:  See Ophthalmology Exam.       Assessment:  Orquidea Stevens is a 82 year old female who presents with:   Encounter Diagnoses   Name Primary?     Fuchs' corneal dystrophy of both eyes Discussed strategies for helping to administer her own drops. Prescriptions for Nolan ointment and drops sent, but discussed that they are available over the counter as well.     Pseudophakia - Both Eyes s/p YAG cap OU      Choroidal nevus of both eyes      Dry eye syndrome, bilateral        Plan:  Recommend resuming Nolan 128 (5%) ointment at bedtime in both eyes or can use Nolan 128 (5%) drops in the morning and at lunchtime    Use artificial tears more consistently (2-3 times per day) (Refresh Optive or Systane Balance. Avoid \"get the red out\" drops).     Lucie Gordillo MD  [phone]      "

## 2023-05-26 NOTE — LETTER
"    5/26/2023         RE: Orquidea Stevens  [address] [facility] [address]        Dear Colleague,    Thank you for referring your patient, Orquidea Stevens, to the [facility]. Please see a copy of my visit note below.     Current Eye Medications:  Artificial tears both eyes on a rare occasion.  She is unable to put drops in her eyes.  Her  used to administer her drops, but he passed away.       Subjective:  Patient complains of blurry vision in each eye, which is worse in the morning and gradually improves throughout the day.  Patient complains of vision in both eyes looking blurry, hazy, and wet feeling.  Some days, tears run down her cheeks.     She went to an Optometrist due to the changes in her vision, and her insurance would not cover an exam with Dr. Gordillo.  The Optometrist told her she should return to see Dr. Gordillo due to corneal dystrophy.  Both eyes feel \"weird\" but are not uncomfortable.        Her  Fredrick passed away in December.       Objective:  See Ophthalmology Exam.       Assessment:  Orquidea Stevens is a 82 year old female who presents with:   Encounter Diagnoses   Name Primary?     Fuchs' corneal dystrophy of both eyes Discussed strategies for helping to administer her own drops. Prescriptions for Nolan ointment and drops sent, but discussed that they are available over the counter as well.     Pseudophakia - Both Eyes s/p YAG cap OU      Choroidal nevus of both eyes      Dry eye syndrome, bilateral        Plan:  Recommend resuming Nolan 128 (5%) ointment at bedtime in both eyes or can use Nolan 128 (5%) drops in the morning and at lunchtime    Use artificial tears more consistently (2-3 times per day) (Refresh Optive or Systane Balance. Avoid \"get the red out\" drops).     Lucie Gordillo MD  [phone]          Again, thank you for allowing me to participate in the care of your patient.        Sincerely,        Lucie Gordillo MD    "

## 2023-06-08 ENCOUNTER — MYC MEDICAL ADVICE (OUTPATIENT)
Dept: OPHTHALMOLOGY | Facility: CLINIC | Age: 83
End: 2023-06-08
Payer: COMMERCIAL

## 2023-06-08 ENCOUNTER — NURSE TRIAGE (OUTPATIENT)
Dept: NURSING | Facility: CLINIC | Age: 83
End: 2023-06-08
Payer: COMMERCIAL

## 2023-06-08 NOTE — TELEPHONE ENCOUNTER
S: R/O reaction to eye drops.  B: Patient gave writer permission to speak with her daughter (Ladi) about her health. Writer speaking to patient and daughter for this call.    Saw eye doctor on 5/29 for C/O blurry vision.  DX with corneal dystrophy. Started taking Nolan in both eyes.    Symptoms are:    Blurriness has gotten worse since using Nolan    Burning when eye drops first go in    Eye lides are puffy and almost swollen shut    Watery eyes    Secondary triage call will be made.    A: Writer paged on call provider Dr. Calos Calle.  She would like the patient to be seen in the ED tonight.  Writer called patient and her daughter back.  Patient does not want to go into the ED tonight.  Would be amenable to see opthalmology on Friday.  Gave phone number [phone] to call in the morning to get scheduled to see ophthalmology.     R: Protocol and care advice reviewed with patient & daughter.  They verbalized understanding of on call providers directions.  They don't want to be seen in the ED.  Call back with any new or worsening symptoms, concerns, or questions.    Adriana May RN, MA  [facility] Triage Nurse Advisor    Reason for Disposition    [1] SEVERE eyelid swelling (i.e., shut or almost) AND [2] involves both eyes (Exception: itchy eyes, which  are probably an allergic reaction)    Additional Information    Negative: Unresponsive, passed out or very weak    Negative: [1] Difficulty swallowing or slurred speech AND [2] sudden onset    Negative: Difficulty breathing or wheezing    Negative: Sounds like a life-threatening emergency to the triager    Negative: [1] SEVERE eyelid swelling (i.e., shut or almost) AND [2] fever    Negative: [1] Eyelid (outer) is very red AND [2] fever    Negative: Patient sounds very sick or weak to the triager    Negative: [1] Pregnant 20 or more weeks AND [2] sudden weight gain (e.g., more than 3 lbs or 1.4 kg in one week)    Protocols used: EYE - SWELLING-A-AH

## 2023-06-09 NOTE — TELEPHONE ENCOUNTER
Called patient. I told her to stop michelle drops and start preservative free artificial tears per Dr. Gordillo. Patient states that she did stop michelle yesterday and eyes are feeling better today, Patient will call back if symptoms worsen.

## 2023-06-13 NOTE — TELEPHONE ENCOUNTER
Called patient. Left message to call back when able - need to go through into below.     ** will await call back.   Yes I agree with plan documented below.     Ayana Gallardo, APRN

## 2023-07-12 ENCOUNTER — PATIENT OUTREACH (OUTPATIENT)
Dept: CARE COORDINATION | Facility: CLINIC | Age: 83
End: 2023-07-12
Payer: COMMERCIAL

## 2023-07-26 ENCOUNTER — PATIENT OUTREACH (OUTPATIENT)
Dept: CARE COORDINATION | Facility: CLINIC | Age: 83
End: 2023-07-26
Payer: COMMERCIAL

## 2023-08-08 ENCOUNTER — OFFICE VISIT (OUTPATIENT)
Dept: FAMILY MEDICINE | Facility: CLINIC | Age: 83
End: 2023-08-08
Payer: COMMERCIAL

## 2023-08-08 VITALS
HEART RATE: 50 BPM | BODY MASS INDEX: 25.91 KG/M2 | SYSTOLIC BLOOD PRESSURE: 111 MMHG | OXYGEN SATURATION: 97 % | RESPIRATION RATE: 16 BRPM | DIASTOLIC BLOOD PRESSURE: 63 MMHG | WEIGHT: 146.2 LBS | HEIGHT: 63 IN | TEMPERATURE: 98.2 F

## 2023-08-08 DIAGNOSIS — H61.21 IMPACTED CERUMEN OF RIGHT EAR: Primary | ICD-10-CM

## 2023-08-08 PROCEDURE — 69210 REMOVE IMPACTED EAR WAX UNI: CPT | Mod: RT | Performed by: PHYSICIAN ASSISTANT

## 2023-08-08 ASSESSMENT — PAIN SCALES - GENERAL: PAINLEVEL: NO PAIN (0)

## 2023-08-08 NOTE — PROGRESS NOTES
"  Assessment & Plan     (H61.21) Impacted cerumen of right ear  (primary encounter diagnosis)  Comment: Right ear with impacted cerumen.  Irrigated by staff.  Remainder was removed with a curette.  Patient tolerated this well.  Discussed potential for Debrox eardrops in the future.  Return with any worsening or new concerns.  Plan: REMOVE IMPACTED CERUMEN         [facility]    Manasa Heard is a 82 year old, presenting for the following health issues:  Plugged Ears (Both ears plugged. )      History of Present Illness       Reason for visit:  Ear Wash    She eats 2-3 servings of fruits and vegetables daily.She consumes 1 sweetened beverage(s) daily.She exercises with enough effort to increase her heart rate 10 to 19 minutes per day.  She exercises with enough effort to increase her heart rate 7 days per week.   She is taking medications regularly.     May 20.  The patient was at [facility] for evaluation of hearing.  Was noted to have cerumen impaction on the left side.  Was seen at [facility] after which she had improvement, however, since followed again for hearing test and was found to have right-sided cerumen impaction.  Patient denies any ear pain.  No significant congestion.  Has not tried any over-the-counter regimens for this.        Review of Systems   Constitutional, HEENT, cardiovascular, pulmonary, gi and gu systems are negative, except as otherwise noted.      Objective    /63   Pulse 50   Temp 98.2  F (36.8  C) (Tympanic)   Resp 16   Ht 1.607 m (5' 3.25\")   Wt 66.3 kg (146 lb 3.2 oz)   SpO2 97%   BMI 25.69 kg/m    Body mass index is 25.69 kg/m .  Physical Exam   GENERAL: healthy, alert and no distress  EYES: Eyes grossly normal to inspection, PERRL and conjunctivae and sclerae normal  HENT: Right TM secured by cerumen.  Left TM unremarkable.  Nose and mouth without ulcers or lesions  RESP: lungs clear to auscultation - no rales, " rhonchi or wheezes  CV: regular rate and rhythm, normal S1 S2, no S3 or S4, no murmur, click or rub,    MS: no gross musculoskeletal defects noted, no edema

## 2023-09-02 DIAGNOSIS — E78.1 HYPERTRIGLYCERIDEMIA: ICD-10-CM

## 2023-09-02 DIAGNOSIS — E78.5 HYPERLIPIDEMIA LDL GOAL <100: ICD-10-CM

## 2023-09-02 DIAGNOSIS — I10 HYPERTENSION GOAL BP (BLOOD PRESSURE) < 140/90: ICD-10-CM

## 2023-09-04 RX ORDER — AMLODIPINE BESYLATE 10 MG/1
10 TABLET ORAL DAILY
Qty: 90 TABLET | Refills: 0 | Status: SHIPPED | OUTPATIENT
Start: 2023-09-04 | End: 2023-10-16

## 2023-09-04 RX ORDER — ATORVASTATIN CALCIUM 80 MG/1
80 TABLET, FILM COATED ORAL DAILY
Qty: 90 TABLET | Refills: 0 | Status: SHIPPED | OUTPATIENT
Start: 2023-09-04 | End: 2023-10-16

## 2023-09-24 ENCOUNTER — HEALTH MAINTENANCE LETTER (OUTPATIENT)
Age: 83
End: 2023-09-24

## 2023-10-16 ENCOUNTER — TELEPHONE (OUTPATIENT)
Dept: FAMILY MEDICINE | Facility: CLINIC | Age: 83
End: 2023-10-16

## 2023-10-16 ENCOUNTER — OFFICE VISIT (OUTPATIENT)
Dept: FAMILY MEDICINE | Facility: CLINIC | Age: 83
End: 2023-10-16
Payer: COMMERCIAL

## 2023-10-16 VITALS
TEMPERATURE: 98.5 F | OXYGEN SATURATION: 98 % | SYSTOLIC BLOOD PRESSURE: 142 MMHG | HEIGHT: 63 IN | DIASTOLIC BLOOD PRESSURE: 62 MMHG | BODY MASS INDEX: 25.69 KG/M2 | RESPIRATION RATE: 19 BRPM | HEART RATE: 71 BPM

## 2023-10-16 DIAGNOSIS — E78.1 HYPERTRIGLYCERIDEMIA: ICD-10-CM

## 2023-10-16 DIAGNOSIS — E11.9 TYPE 2 DIABETES MELLITUS WITHOUT COMPLICATION, WITHOUT LONG-TERM CURRENT USE OF INSULIN (H): ICD-10-CM

## 2023-10-16 DIAGNOSIS — Z00.00 MEDICARE ANNUAL WELLNESS VISIT, SUBSEQUENT: Primary | ICD-10-CM

## 2023-10-16 DIAGNOSIS — G25.81 RESTLESS LEG SYNDROME: ICD-10-CM

## 2023-10-16 DIAGNOSIS — M54.32 LEFT SIDED SCIATICA: ICD-10-CM

## 2023-10-16 DIAGNOSIS — E78.5 HYPERLIPIDEMIA LDL GOAL <100: ICD-10-CM

## 2023-10-16 DIAGNOSIS — G63 POLYNEUROPATHY ASSOCIATED WITH UNDERLYING DISEASE (H): ICD-10-CM

## 2023-10-16 DIAGNOSIS — I10 HYPERTENSION GOAL BP (BLOOD PRESSURE) < 140/90: ICD-10-CM

## 2023-10-16 DIAGNOSIS — I25.10 CORONARY ARTERY DISEASE INVOLVING NATIVE CORONARY ARTERY OF NATIVE HEART WITHOUT ANGINA PECTORIS: ICD-10-CM

## 2023-10-16 DIAGNOSIS — K21.9 GASTROESOPHAGEAL REFLUX DISEASE WITHOUT ESOPHAGITIS: ICD-10-CM

## 2023-10-16 DIAGNOSIS — R32 URINARY INCONTINENCE, UNSPECIFIED TYPE: ICD-10-CM

## 2023-10-16 LAB
ERYTHROCYTE [DISTWIDTH] IN BLOOD BY AUTOMATED COUNT: 11.8 % (ref 10–15)
HBA1C MFR BLD: 10 % (ref 0–5.6)
HCT VFR BLD AUTO: 44.6 % (ref 35–47)
HGB BLD-MCNC: 15.3 G/DL (ref 11.7–15.7)
MCH RBC QN AUTO: 30.2 PG (ref 26.5–33)
MCHC RBC AUTO-ENTMCNC: 34.3 G/DL (ref 31.5–36.5)
MCV RBC AUTO: 88 FL (ref 78–100)
PLATELET # BLD AUTO: 235 10E3/UL (ref 150–450)
RBC # BLD AUTO: 5.07 10E6/UL (ref 3.8–5.2)
WBC # BLD AUTO: 9.3 10E3/UL (ref 4–11)

## 2023-10-16 PROCEDURE — G0439 PPPS, SUBSEQ VISIT: HCPCS | Performed by: FAMILY MEDICINE

## 2023-10-16 PROCEDURE — 82570 ASSAY OF URINE CREATININE: CPT | Performed by: FAMILY MEDICINE

## 2023-10-16 PROCEDURE — 85027 COMPLETE CBC AUTOMATED: CPT | Performed by: FAMILY MEDICINE

## 2023-10-16 PROCEDURE — 80048 BASIC METABOLIC PNL TOTAL CA: CPT | Performed by: FAMILY MEDICINE

## 2023-10-16 PROCEDURE — 99214 OFFICE O/P EST MOD 30 MIN: CPT | Mod: 25 | Performed by: FAMILY MEDICINE

## 2023-10-16 PROCEDURE — 82043 UR ALBUMIN QUANTITATIVE: CPT | Performed by: FAMILY MEDICINE

## 2023-10-16 PROCEDURE — 36415 COLL VENOUS BLD VENIPUNCTURE: CPT | Performed by: FAMILY MEDICINE

## 2023-10-16 PROCEDURE — 80061 LIPID PANEL: CPT | Performed by: FAMILY MEDICINE

## 2023-10-16 PROCEDURE — 83036 HEMOGLOBIN GLYCOSYLATED A1C: CPT | Performed by: FAMILY MEDICINE

## 2023-10-16 RX ORDER — ENALAPRIL MALEATE 20 MG/1
20 TABLET ORAL 2 TIMES DAILY
Qty: 180 TABLET | Refills: 1 | Status: SHIPPED | OUTPATIENT
Start: 2023-10-16 | End: 2024-04-14

## 2023-10-16 RX ORDER — OXYBUTYNIN CHLORIDE 5 MG/1
TABLET ORAL
Qty: 180 TABLET | Refills: 3 | Status: SHIPPED | OUTPATIENT
Start: 2023-10-16

## 2023-10-16 RX ORDER — PREDNISONE 20 MG/1
20 TABLET ORAL DAILY
Qty: 10 TABLET | Refills: 0 | Status: SHIPPED | OUTPATIENT
Start: 2023-10-16 | End: 2023-11-02

## 2023-10-16 RX ORDER — HYDRALAZINE HYDROCHLORIDE 25 MG/1
25 TABLET, FILM COATED ORAL 2 TIMES DAILY
Qty: 180 TABLET | Refills: 1 | Status: SHIPPED | OUTPATIENT
Start: 2023-10-16 | End: 2024-04-14

## 2023-10-16 RX ORDER — ALPRAZOLAM 0.25 MG
0.25 TABLET ORAL 3 TIMES DAILY PRN
Qty: 10 TABLET | Refills: 0 | Status: SHIPPED | OUTPATIENT
Start: 2023-10-16 | End: 2023-12-06

## 2023-10-16 RX ORDER — RESPIRATORY SYNCYTIAL VIRUS VACCINE 120MCG/0.5
0.5 KIT INTRAMUSCULAR ONCE
Qty: 1 EACH | Refills: 0 | Status: CANCELLED | OUTPATIENT
Start: 2023-10-16 | End: 2023-10-16

## 2023-10-16 RX ORDER — AMLODIPINE BESYLATE 10 MG/1
10 TABLET ORAL DAILY
Qty: 90 TABLET | Refills: 0 | Status: SHIPPED | OUTPATIENT
Start: 2023-10-16 | End: 2023-12-05

## 2023-10-16 RX ORDER — GLIPIZIDE 10 MG/1
10 TABLET, FILM COATED, EXTENDED RELEASE ORAL 2 TIMES DAILY
Qty: 180 TABLET | Refills: 1 | Status: SHIPPED | OUTPATIENT
Start: 2023-10-16 | End: 2023-12-20

## 2023-10-16 RX ORDER — HYDROCHLOROTHIAZIDE 25 MG/1
12.5 TABLET ORAL DAILY
Qty: 90 TABLET | Refills: 1 | Status: SHIPPED | OUTPATIENT
Start: 2023-10-16 | End: 2024-04-15

## 2023-10-16 RX ORDER — ATORVASTATIN CALCIUM 80 MG/1
80 TABLET, FILM COATED ORAL DAILY
Qty: 90 TABLET | Refills: 0 | Status: SHIPPED | OUTPATIENT
Start: 2023-10-16 | End: 2023-12-05

## 2023-10-16 RX ORDER — GABAPENTIN 300 MG/1
CAPSULE ORAL
Qty: 450 CAPSULE | Refills: 1 | Status: SHIPPED | OUTPATIENT
Start: 2023-10-16 | End: 2024-04-14

## 2023-10-16 ASSESSMENT — ENCOUNTER SYMPTOMS: BACK PAIN: 1

## 2023-10-16 NOTE — PROGRESS NOTES
"  {PROVIDER CHARTING PREFERENCE:547284}    Manasa Heard is a 83 year old, presenting for the following health issues:  Back Pain      10/16/2023     1:52 PM   Additional Questions   Roomed by jeanie       Back Pain     History of Present Illness       Reason for visit:  Back pain    She eats 2-3 servings of fruits and vegetables daily.She consumes 1 sweetened beverage(s) daily.She exercises with enough effort to increase her heart rate 9 or less minutes per day.  She exercises with enough effort to increase her heart rate 3 or less days per week.   She is taking medications regularly.      Pain stated yesterday around noon. Pain traveling from low back to top of left ankle    Left sided sciatica starting yesterday around noon.  It is shooting all the way down her leg  Neighbor had to drive her here  Normally she can drive.   Is doing some exercises  Tried muscle relaxer and not helpful    Review of Systems   Musculoskeletal:  Positive for back pain.      {ROS COMP (Optional):945549}      Objective    BP (!) 150/67   Pulse 71   Temp 98.5  F (36.9  C) (Oral)   Resp 19   Ht 1.607 m (5' 3.25\")   SpO2 98%   BMI 25.69 kg/m    Body mass index is 25.69 kg/m .  Physical Exam   {Exam List (Optional):385783}    {Diagnostic Test Results (Optional):757590}    {AMBULATORY ATTESTATION (Optional):171253}              "

## 2023-10-16 NOTE — TELEPHONE ENCOUNTER
Pt states had visit with Dr. Agustin today and she thought Dr. Agustin told her to take two 20mg tablets of prednisone daily but on her medication bottle it states to take one 20mg tablet daily.    Routing to provider, can you please clarify dosing  Maliha Roberts RN    [facility]

## 2023-10-16 NOTE — LETTER
October 23, 2023      Orquidea Stevens  [address] [address]        Dear Orquidea,     Your A1c is too high at 10.  Glipizide is no longer controlling your diabetes.  We need a second medication.  I would recommend a once a week injectable like ozempic  Let me know if you are agreeable or if you have any questions.       There is some protein in your urine. This is due to your diabetes.  Your cholesterol is okay.  Your electrolytes, kidney function  are normal.     Resulted Orders   Lipid panel reflex to direct LDL Non-fasting   Result Value Ref Range    Cholesterol 159 <200 mg/dL    Triglycerides 169 (H) <150 mg/dL    Direct Measure HDL 50 >=50 mg/dL    LDL Cholesterol Calculated 75 <=100 mg/dL    Non HDL Cholesterol 109 <130 mg/dL    Narrative    Cholesterol  Desirable:  <200 mg/dL    Triglycerides  Normal:  Less than 150 mg/dL  Borderline High:  150-199 mg/dL  High:  200-499 mg/dL  Very High:  Greater than or equal to 500 mg/dL    Direct Measure HDL  Female:  Greater than or equal to 50 mg/dL   Male:  Greater than or equal to 40 mg/dL    LDL Cholesterol  Desirable:  <100mg/dL  Above Desirable:  100-129 mg/dL   Borderline High:  130-159 mg/dL   High:  160-189 mg/dL   Very High:  >= 190 mg/dL    Non HDL Cholesterol  Desirable:  130 mg/dL  Above Desirable:  130-159 mg/dL  Borderline High:  160-189 mg/dL  High:  190-219 mg/dL  Very High:  Greater than or equal to 220 mg/dL   Albumin Random Urine Quantitative with Creat Ratio   Result Value Ref Range    Creatinine Urine mg/dL 138.0 mg/dL      Comment:      The reference ranges have not been established in urine creatinine. The results should be integrated into the clinical context for interpretation.    Albumin Urine mg/L 90.9 mg/L      Comment:      The reference ranges have not been established in urine albumin. The results should be integrated into the clinical context for interpretation.    Albumin Urine mg/g Cr 65.87 (H) 0.00 - 25.00 mg/g Cr       Comment:      Microalbuminuria is defined as an albumin:creatinine ratio of 17 to 299 for males and 25 to 299 for females. A ratio of albumin:creatinine of 300 or higher is indicative of overt proteinuria.  Due to biologic variability, positive results should be confirmed by a second, first-morning random or 24-hour timed urine specimen. If there is discrepancy, a third specimen is recommended. When 2 out of 3 results are in the microalbuminuria range, this is evidence for incipient nephropathy and warrants increased efforts at glucose control, blood pressure control, and institution of therapy with an angiotensin-converting-enzyme (ACE) inhibitor (if the patient can tolerate it).     HEMOGLOBIN A1C   Result Value Ref Range    Hemoglobin A1C 10.0 (H) 0.0 - 5.6 %      Comment:      Normal <5.7%   Prediabetes 5.7-6.4%    Diabetes 6.5% or higher     Note: Adopted from ADA consensus guidelines.   **Basic metabolic panel FUTURE 2mo   Result Value Ref Range    Sodium 137 135 - 145 mmol/L      Comment:      Reference intervals for this test were updated on 09/26/2023 to more accurately reflect our healthy population. There may be differences in the flagging of prior results with similar values performed with this method. Interpretation of those prior results can be made in the context of the updated reference intervals.     Potassium 4.3 3.4 - 5.3 mmol/L    Chloride 99 98 - 107 mmol/L    Carbon Dioxide (CO2) 24 22 - 29 mmol/L    Anion Gap 14 7 - 15 mmol/L    Urea Nitrogen 15.1 8.0 - 23.0 mg/dL    Creatinine 0.63 0.51 - 0.95 mg/dL    GFR Estimate 87 >60 mL/min/1.73m2    Calcium 10.2 8.8 - 10.2 mg/dL    Glucose 238 (H) 70 - 99 mg/dL   **CBC with platelets FUTURE 2mo   Result Value Ref Range    WBC Count 9.3 4.0 - 11.0 10e3/uL    RBC Count 5.07 3.80 - 5.20 10e6/uL    Hemoglobin 15.3 11.7 - 15.7 g/dL    Hematocrit 44.6 35.0 - 47.0 %    MCV 88 78 - 100 fL    MCH 30.2 26.5 - 33.0 pg    MCHC 34.3 31.5 - 36.5 g/dL    RDW 11.8  10.0 - 15.0 %    Platelet Count 235 150 - 450 10e3/uL     If you have any questions or concerns, please call the clinic at the number listed above.     Sincerely,        Lisette Olson MD/bmc

## 2023-10-16 NOTE — TELEPHONE ENCOUNTER
Reason for Call:  Appointment Request    Patient requesting this type of appt:  in person office visit    Requested provider: Lisette Olson    Reason patient unable to be scheduled: Not within requested timeframe    When does patient want to be seen/preferred time: Same day    Comments: Sciatic pain - left leg and left butt cheek    [facility] or would you prefer to receive a phone call?:   Patient would prefer a phone call   Okay to leave a detailed message?: Yes at Home number on file [phone] (home)    Call taken on 10/16/2023 at 11:32 AM by ANN MARIE MG

## 2023-10-16 NOTE — PROGRESS NOTES
Assessment & Plan     Medicare annual wellness visit, subsequent  completed    Left sided sciatica  Trial of prednisone to settle it down  Already on gabapentin, muscle relaxer not helping, using heat and cold and stretching   Add xanax to be used sparingly at night to help with sleep  Will not be long term. Did not tolerate ambien in the past  - predniSONE (DELTASONE) 20 MG tablet; Take 1 tablet (20 mg) by mouth daily  - ALPRAZolam (XANAX) 0.25 MG tablet; Take 1 tablet (0.25 mg) by mouth 3 times daily as needed for anxiety    Coronary artery disease involving native coronary artery of native heart without angina pectoris  Recheck cholesterol  - Lipid panel reflex to direct LDL Non-fasting; Future  - Lipid panel reflex to direct LDL Non-fasting    Type 2 diabetes mellitus without complication, without long-term current use of insulin (H)  Overdue for labwork  Is on statin, ace and aspirin,   UTD with eye exam  - Albumin Random Urine Quantitative with Creat Ratio; Future  - HEMOGLOBIN A1C; Future  - **Basic metabolic panel FUTURE 2mo; Future  - glipiZIDE (GLUCOTROL XL) 10 MG 24 hr tablet; Take 1 tablet (10 mg) by mouth 2 times daily  - **CBC with platelets FUTURE 2mo; Future  - Albumin Random Urine Quantitative with Creat Ratio  - HEMOGLOBIN A1C  - **Basic metabolic panel FUTURE 2mo  - **CBC with platelets FUTURE 2mo    Hypertension goal BP (blood pressure) < 140/90  Not at goal but close as in a lot of pain with sciatica  - amLODIPine (NORVASC) 10 MG tablet; Take 1 tablet (10 mg) by mouth daily  - enalapril (VASOTEC) 20 MG tablet; Take 1 tablet (20 mg) by mouth 2 times daily  - hydrALAZINE (APRESOLINE) 25 MG tablet; Take 1 tablet (25 mg) by mouth 2 times daily  - hydrochlorothiazide (HYDRODIURIL) 25 MG tablet; Take 0.5 tablets (12.5 mg) by mouth daily    Hyperlipidemia LDL goal <100  At goal on meds  - atorvastatin (LIPITOR) 80 MG tablet; Take 1 tablet (80 mg) by mouth daily    Hypertriglyceridemia  On statin  -  "atorvastatin (LIPITOR) 80 MG tablet; Take 1 tablet (80 mg) by mouth daily    Restless leg syndrome  Meds working well  - gabapentin (NEURONTIN) 300 MG capsule; Take 2 tablets ( 600 mg) QAM and 3 tablets ( 900 mg) QPM    Polyneuropathy associated with underlying disease (H24)  Meds working well  - gabapentin (NEURONTIN) 300 MG capsule; Take 2 tablets ( 600 mg) QAM and 3 tablets ( 900 mg) QPM    Gastroesophageal reflux disease without esophagitis  Meds working well  - omeprazole (PRILOSEC) 20 MG DR capsule; TAKE 1 CAPSULE BY MOUTH ONCE DAILY    Urinary incontinence, unspecified type  Refill as is working well  - oxyBUTYnin (DITROPAN) 5 MG tablet; TAKE 2 TABLETS BY MOUTH EVERY DAY             BMI:   Estimated body mass index is 25.69 kg/m  as calculated from the following:    Height as of this encounter: 1.607 m (5' 3.25\").    Weight as of 8/8/23: 66.3 kg (146 lb 3.2 oz).           Lisette Olson MD  [facility]   Orquidea is a 83 year old, presenting for the following health issues:  Back Pain      10/16/2023     1:52 PM   Additional Questions   Roomed by jeanie       Back Pain     History of Present Illness       Reason for visit:  Back pain    She eats 2-3 servings of fruits and vegetables daily.She consumes 1 sweetened beverage(s) daily.She exercises with enough effort to increase her heart rate 9 or less minutes per day.  She exercises with enough effort to increase her heart rate 3 or less days per week.   She is taking medications regularly.   Pain stated yesterday around noon. Pain traveling from low back to top of left ankle    Left sided sciatica starting yesterday around noon.  It is shooting all the way down her leg  Neighbor had to drive her here  Normally she can drive.   Is doing some exercises  Tried muscle relaxer and not helpful      Diabetes Follow-up    How often are you checking your blood sugar? Every other day   What concerns do you have today about your " "diabetes? None   Do you have any of these symptoms? (Select all that apply)  No numbness or tingling in feet.  No redness, sores or blisters on feet.  No complaints of excessive thirst.  No reports of blurry vision.  No significant changes to weight.          Hyperlipidemia Follow-Up    Are you regularly taking any medication or supplement to lower your cholesterol?   Yes- atorvastatin   Are you having muscle aches or other side effects that you think could be caused by your cholesterol lowering medication?  No    Hypertension Follow-up    Do you check your blood pressure regularly outside of the clinic? Yes   Are you following a low salt diet? Yes  Are your blood pressures ever more than 140 on the top number (systolic) OR more   than 90 on the bottom number (diastolic), for example 140/90? Yes    BP Readings from Last 2 Encounters:   10/16/23 (!) 142/62   08/08/23 111/63     Hemoglobin A1C (%)   Date Value   03/27/2023 8.6 (H)   08/11/2022 7.3 (H)   10/20/2020 7.6 (H)   03/02/2020 7.5 (H)     LDL Cholesterol Calculated (mg/dL)   Date Value   08/11/2022 56   08/17/2021 52   10/20/2020 42   08/21/2019 49     Annual Wellness Visit    Patient has been advised of split billing requirements and indicates understanding: Yes     Are you in the first 12 months of your Medicare Part B coverage?  No    Physical Health:  In general, how would you rate your overall physical health? good  Outside of work, how many days during the week do you exercise?none  Outside of work, approximately how many minutes a day do you exercise?not applicable  If you drink alcohol do you typically have >3 drinks per day or >7 drinks per week? No  Do you usually eat at least 4 servings of fruit and vegetables a day, include whole grains & fiber and avoid regularly eating high fat or \"junk\" foods? Yes  Do you have any problems taking medications regularly? No  Do you have any side effects from medications? none  Needs assistance for the following " daily activities: no assistance needed  Which of the following safety concerns are present in your home?  none identified   Hearing impairment: Yes,  had hearing test [facility]  In the past 6 months, have you been bothered by leaking of urine?  With sneezing       Due for vaccines but hold off since on short course of prednisone  Mental Health:  In general, how would you rate your overall mental or emotional health? fair      Today's PHQ-9 Score:       3/27/2023     2:26 PM   PHQ-9 SCORE   PHQ-9 Total Score MyChart 12 (Moderate depression)   PHQ-9 Total Score 12       Do you feel safe in your environment? Yes    Have you ever done Advance Care Planning? (For example, a Health Directive, POLST, or a discussion with a medical provider or your loved ones about your wishes)? No, advance care planning information given to patient to review.  Patient declined advance care planning discussion at this time.    Fall risk:  Fallen 2 or more times in the past year?: No  Any fall with injury in the past year?: No    Cognitive Screening: no concerns based on direct observation    Do you have sleep apnea, excessive snoring or daytime drowsiness? : no    Social History     Tobacco Use    Smoking status: Never    Smokeless tobacco: Never    Tobacco comments:     Lives in smoke free household   Substance Use Topics    Alcohol use: No              No data to display              Do you have a current opioid prescription? No  Do you use any other controlled substances or medications that are not prescribed by a provider? None    Current providers sharing in care for this patient include:   Patient Care Team:  Lisette Olson MD as PCP - General (Family Practice)  Lisette Olson MD as Assigned PCP  Lucie Gordillo MD as Assigned Surgical Provider  Marilu Bar RN as Diabetes Educator (Diabetes Education)    The following health maintenance items are reviewed in Epic and correct as of today:  Health Maintenance  "  Topic Date Due    ANNUAL REVIEW OF  ORDERS  Never done    HEPATITIS B IMMUNIZATION (1 of 3 - Risk 3-dose series) Never done    RSV VACCINE 60+ (1 - 1-dose 60+ series) Never done    DEXA  08/03/2019    MEDICARE ANNUAL WELLNESS VISIT  08/11/2023    LIPID  08/11/2023    MICROALBUMIN  08/11/2023    COVID-19 Vaccine (6 - 2023-24 season) 09/01/2023    A1C  09/27/2023    BMP  03/27/2024    DIABETIC FOOT EXAM  03/27/2024    EYE EXAM  05/26/2024    FALL RISK ASSESSMENT  10/16/2024    ADVANCE CARE PLANNING  08/11/2027    DTAP/TDAP/TD IMMUNIZATION (3 - Td or Tdap) 08/11/2032    PHQ-2 (once per calendar year)  Completed    INFLUENZA VACCINE  Completed    Pneumococcal Vaccine: 65+ Years  Completed    ZOSTER IMMUNIZATION  Completed    IPV IMMUNIZATION  Aged Out    HPV IMMUNIZATION  Aged Out    MENINGITIS IMMUNIZATION  Aged Out    MAMMO SCREENING  Discontinued       Patient has been advised of split billing requirements and indicates understanding: Yes    Appropriate preventive services were discussed with this patient, including applicable screening as appropriate for fall prevention, nutrition, physical activity, Tobacco-use cessation, weight loss and cognition.  Checklist reviewing preventive services available has been given to the patient.      Review of Systems   Musculoskeletal:  Positive for back pain.      Constitutional, HEENT, cardiovascular, pulmonary, gi and gu systems are negative, except as otherwise noted.      Objective    BP (!) 142/62   Pulse 71   Temp 98.5  F (36.9  C) (Oral)   Resp 19   Ht 1.607 m (5' 3.25\")   SpO2 98%   BMI 25.69 kg/m    Body mass index is 25.69 kg/m .  Physical Exam   GENERAL: healthy, alert and no distress  EYES: Eyes grossly normal to inspection, PERRL and conjunctivae and sclerae normal  HENT: ear canals and TM's normal, nose and mouth without ulcers or lesions  NECK: no adenopathy, no asymmetry, masses, or scars and thyroid normal to palpation  RESP: lungs clear to auscultation " - no rales, rhonchi or wheezes  CV: regular rate and rhythm, normal S1 S2, no S3 or S4, no murmur, click or rub, no peripheral edema and peripheral pulses strong  ABDOMEN: soft, nontender, no hepatosplenomegaly, no masses and bowel sounds normal  MS: no gross musculoskeletal defects noted, no edema  SKIN: no suspicious lesions or rashes  NEURO: Normal strength and tone, mentation intact and speech normal  PSYCH: mentation appears normal, affect normal/bright  BACK: No pain to palpation over lower back or SI joint, negative SLR test  No results found for this or any previous visit (from the past 24 hour(s)).

## 2023-10-17 LAB
ANION GAP SERPL CALCULATED.3IONS-SCNC: 14 MMOL/L (ref 7–15)
BUN SERPL-MCNC: 15.1 MG/DL (ref 8–23)
CALCIUM SERPL-MCNC: 10.2 MG/DL (ref 8.8–10.2)
CHLORIDE SERPL-SCNC: 99 MMOL/L (ref 98–107)
CHOLEST SERPL-MCNC: 159 MG/DL
CREAT SERPL-MCNC: 0.63 MG/DL (ref 0.51–0.95)
CREAT UR-MCNC: 138 MG/DL
DEPRECATED HCO3 PLAS-SCNC: 24 MMOL/L (ref 22–29)
EGFRCR SERPLBLD CKD-EPI 2021: 87 ML/MIN/1.73M2
GLUCOSE SERPL-MCNC: 238 MG/DL (ref 70–99)
HDLC SERPL-MCNC: 50 MG/DL
LDLC SERPL CALC-MCNC: 75 MG/DL
MICROALBUMIN UR-MCNC: 90.9 MG/L
MICROALBUMIN/CREAT UR: 65.87 MG/G CR (ref 0–25)
NONHDLC SERPL-MCNC: 109 MG/DL
POTASSIUM SERPL-SCNC: 4.3 MMOL/L (ref 3.4–5.3)
SODIUM SERPL-SCNC: 137 MMOL/L (ref 135–145)
TRIGL SERPL-MCNC: 169 MG/DL

## 2023-10-17 NOTE — TELEPHONE ENCOUNTER
Called patient and was told by family member she is asleep.   Gave providers message to female family member.    Coty HAQN, RN

## 2023-10-19 ENCOUNTER — TELEPHONE (OUTPATIENT)
Dept: FAMILY MEDICINE | Facility: CLINIC | Age: 83
End: 2023-10-19

## 2023-10-19 ENCOUNTER — OFFICE VISIT (OUTPATIENT)
Dept: URGENT CARE | Facility: URGENT CARE | Age: 83
End: 2023-10-19
Payer: COMMERCIAL

## 2023-10-19 VITALS
HEART RATE: 70 BPM | RESPIRATION RATE: 20 BRPM | TEMPERATURE: 97.2 F | DIASTOLIC BLOOD PRESSURE: 79 MMHG | SYSTOLIC BLOOD PRESSURE: 142 MMHG | OXYGEN SATURATION: 100 %

## 2023-10-19 DIAGNOSIS — M79.652 PAIN OF LEFT THIGH: ICD-10-CM

## 2023-10-19 DIAGNOSIS — M54.42 ACUTE LEFT-SIDED LOW BACK PAIN WITH LEFT-SIDED SCIATICA: Primary | ICD-10-CM

## 2023-10-19 PROCEDURE — 99214 OFFICE O/P EST MOD 30 MIN: CPT | Performed by: STUDENT IN AN ORGANIZED HEALTH CARE EDUCATION/TRAINING PROGRAM

## 2023-10-19 RX ORDER — CYCLOBENZAPRINE HCL 5 MG
5 TABLET ORAL 3 TIMES DAILY PRN
Qty: 30 TABLET | Refills: 0 | Status: SHIPPED | OUTPATIENT
Start: 2023-10-19 | End: 2024-04-15

## 2023-10-19 NOTE — TELEPHONE ENCOUNTER
Pt calling because she was prescribed prednisone on her 10/16 visit.  States that she has been taking the medication and it is not helping with her left leg pain.     Pt wants to know if there is anything else she can take to better control the pain.      Naheed Mohamud RN  [facility]

## 2023-10-20 ENCOUNTER — TELEPHONE (OUTPATIENT)
Dept: FAMILY MEDICINE | Facility: CLINIC | Age: 83
End: 2023-10-20
Payer: COMMERCIAL

## 2023-10-20 DIAGNOSIS — M54.16 LUMBAR RADICULOPATHY: Primary | ICD-10-CM

## 2023-10-20 NOTE — PATIENT INSTRUCTIONS
Alternate Ibuprofen 400-600 mg (2-3 tabs) and acetaminophen 1000 mg (3 regular strength or 2 extra strength) every 6 hours for 1 week then as needed for pain. Use the cyclobenzaprine at bedtime to help with pain and sleep.     Example schedule for regular pain  8 AM- ibuprofen   2 PM- acetaminophen   8 PM - ibuprofen    Example schedule for severe pain  8 AM - ibuprofen  11 AM -acetaminophen  2 PM- ibuprofen  5 PM- acetaminophen  8 PM- ibuprofen

## 2023-10-20 NOTE — PROGRESS NOTES
Assessment & Plan     Acute left-sided low back pain with left-sided sciatica  Pain of left thigh  Differential diagnosis for thigh pain and unilateral warmth are concerning for DVT, septic arthritis, cellulitis, gout, osteoarthritis or RA. Based on history and physical examination DVT is less likely as there is not discoloration of the skin, overlying swelling and a Wells score of 0. She denies numbness, tingling, coldness. There is not evidence of swelling. Gout is less likely given her pain is more in the thigh rather than a joint but there is swelling inferior to the knee.This likely is due to a muscle spasm causing sciatica. Recommended that she schedule acetaminophen and ibuprofen for 1 week and then as needed for pain. She has completed her course of prednisone.    Will treat patient at this time symptomatically and supportively, this will include encouraging: using NSAIDs/Tylenol, muscle relaxants to help decrease pain and inflammation, resting, applying ice as needed. Further encouraged creams and rubs such as IcyHot  We discussed that acute back pain from a strain or sprain usually gets better in 1-2 weeks but back pain from disc disease, arthritis or spinal canal narrowing can last much longer, for this reason I encouraged the patient to  follow-up with their PCP or spine speciality for further evaluation and treatment if no improvement in the next 2-3 weeks. However if symptoms worsen they should follow up immediately; educated them to monitor for worsening symptoms such as: new weakness, numbness or tingling; new fever/chills, loss of strength in legs; or loss of bowel or bladder function.    - cyclobenzaprine (FLEXERIL) 5 MG tablet  Dispense: 30 tablet; Refill: 0     38 minutes spent by me on the date of the encounter doing chart review, history and exam, documentation and further activities per the note. Billed based on complexity of care.     No follow-ups on file.    MD PADDY Camejo  [facility] [place]    Manasa Heard is a 83 year old female who presents to clinic today for the following health issues:  Chief Complaint   Patient presents with    Urgent Care    Musculoskeletal Problem     Per patient was seen on 10/16 for joint pain given prednisone but states she has taken three days and the pain is not getting better but worst.      HPI    Pain started on Sunday and started prednisone on Monday when she saw her PCP. Pain is located in her left leg, starts at the waist down to her ankle. Took 3 acetaminophen on Sunday. Took a half of Xanax which was not helpful. Denies numbness or tingling in her lower extremities. Maintains control of her bowel and bladder. Pain is described as a burning pain and her leg gets warm. Also has been using heat and ice. Leg is not more swollen on the left than on the right. Feels worse than the sciatic pain that she has had in the past, she can't sit, stand due to the pain. Lying in certain positions in bed aggravates the pain. No recent falls, surgeries, flights.     Review of Systems  Constitutional, HEENT, cardiovascular, pulmonary, gi and gu systems are negative, except as otherwise noted.      Objective    BP (!) 142/79   Pulse 70   Temp 97.2  F (36.2  C) (Tympanic)   Resp 20   SpO2 100%   Physical Exam   GENERAL: healthy, alert and no distress  RESP: lungs clear to auscultation - no rales, rhonchi or wheezes  CV: regular rate and rhythm, normal S1 S2, no S3 or S4, no murmur, click or rub, no peripheral edema and peripheral pulses strong  MS: normal muscle tone, normal range of motion, no edema, peripheral pulses normal, and tenderness to palpation of left mid thigh, edema inferior to the left knee  SKIN: no suspicious lesions or rashes  NEURO: Normal strength and tone, sensory exam grossly normal, light touch and pinprick normal, and mentation intact  Comprehensive back pain exam:  Tenderness of left L3-5, Range of motion not  limited by pain, and Lower extremity strength functional and equal on both sides

## 2023-10-20 NOTE — TELEPHONE ENCOUNTER
Order/Referral Request    Who is requesting: patient    Orders being requested: Referral for injection    Reason service is needed/diagnosis: Still having extreme pain in her hip and runs down her leg- was seen in UC and they advised maybe to get an injection to help with the pain.    When are orders needed by: asap    Has this been discussed with Provider: Yes    Does patient have a preference on a Group/Provider/Facility? [facility] Fax # [phone]    Does patient have an appointment scheduled?: No    Where to send orders: Place orders within Epic    Could we send this information to you in [place] or would you prefer to receive a phone call?:   Patient would prefer a phone call   Okay to leave a detailed message?: Yes at Cell number on file:    Telephone Information:   Mobile [phone]

## 2023-10-21 NOTE — TELEPHONE ENCOUNTER
In order to get an injection, she would need an MRI of her lumbar spine to see where to inject it. You can't just inject the sciatic nerve. Need to see where is is getting pinched  I have placed order an order for an MRI if she would like to get this done    Lisette Olson MD

## 2023-10-22 ENCOUNTER — TELEPHONE (OUTPATIENT)
Dept: FAMILY MEDICINE | Facility: CLINIC | Age: 83
End: 2023-10-22
Payer: COMMERCIAL

## 2023-10-22 NOTE — TELEPHONE ENCOUNTER
Reason for Call:  Appointment Request    Patient requesting this type of appt:  back pain    Requested provider: Lisette Olson    Reason patient unable to be scheduled: Not within requested timeframe    When does patient want to be seen/preferred time: 1-2 days    Comments: Needs stronger pain meds than what was prescribed.     Could we [facility] this information to you [facility] or would you prefer to receive a phone call?:   Patient would prefer a phone call   Okay to leave a detailed message?: Yes at Cell number on file:    Telephone Information:   Mobile [phone]       Call taken on 10/22/2023 at 11:58 AM by TWAN FIELD

## 2023-10-23 NOTE — TELEPHONE ENCOUNTER
I left a message to return a call to [phone].  Asia Conklin R.N.    [facility] [phone]

## 2023-10-23 NOTE — TELEPHONE ENCOUNTER
Tried calling patient, voicemail is full, I could not leave a message.    Appointment made with Dr Olson today, arrival time 11:20 AM.    Jessica Liriano [facility]

## 2023-10-23 NOTE — TELEPHONE ENCOUNTER
Left detailed message on patient's home phone number an appointment has been scheduled today at 11:20 am with Dr. Olson.  Katelin SWANSON    [facility]

## 2023-10-24 NOTE — TELEPHONE ENCOUNTER
Call to patient - she is at the ED now for her hip pain and waiting for an injection  No follow up or MRI needed at this time    Corinne RODRIGEZ, RN

## 2023-10-26 DIAGNOSIS — E11.9 TYPE 2 DIABETES MELLITUS WITHOUT COMPLICATION, WITHOUT LONG-TERM CURRENT USE OF INSULIN (H): Primary | ICD-10-CM

## 2023-10-26 NOTE — TELEPHONE ENCOUNTER
Joleen, patient daughter calling. No CTC on file, but verbal permission provided by patient to speak with daughter.     Patient requesting Ozempic to be sent to [facility] on [address]. See result note from 10/16/23.    Also, assisted patient with hospital follow up. Patient admitted for uncontrolled diabetes and left lumbar radiculopathy.     Future Appointments 10/26/2023 - 4/23/2024        Date Visit Type Length Department Provider     11/1/2023  2:30 PM ED/HOSP FOLLOW UP 30 min AN FAMILY PRACTICE Lisette Olson MD    Location Instructions:     [facility] is located at [address], just north of the intersection with [address]. The patient parking lot and entrance is on the building s north side.                   Maliha Guardado RN

## 2023-10-31 ENCOUNTER — TELEPHONE (OUTPATIENT)
Dept: FAMILY MEDICINE | Facility: CLINIC | Age: 83
End: 2023-10-31
Payer: COMMERCIAL

## 2023-10-31 ENCOUNTER — MEDICAL CORRESPONDENCE (OUTPATIENT)
Dept: HEALTH INFORMATION MANAGEMENT | Facility: CLINIC | Age: 83
End: 2023-10-31
Payer: COMMERCIAL

## 2023-10-31 NOTE — TELEPHONE ENCOUNTER
New home care start orders requested following recent [facility] hospitalization.      Home Care is calling regarding an established patient with [facility].       Requesting orders from: Lisette Olson  Provider is following patient: No       Orders Requested    Physical Therapy  Request for initial certification (first set of orders)   Frequency:  2 x per week x 3 weeks , then 1 x per week x 1 week, then 2 x per week for 3 weeks.      Occupational Therapy  Request for  1 x per week x 1 week, then 2 x per week x 2 weeks, then 1 x per week x 2 weeks        HHA (Home Health Aide)  Request for initial certification (first set of orders)   Frequency:  1x/wk for 5 wks    Information was gathered and will be sent to provider for review.  RN will contact Home Care with information after provider review.  Information was gathered and will be sent to provider to confirm provider will be following patient.  RN will contact Home Care with information after provider review.  Confirmed ok to leave a detailed message with call back.  Contact information confirmed and updated as needed.    Kenzie Maldonado RN

## 2023-11-02 NOTE — TELEPHONE ENCOUNTER
RN attempted to return call to MICHAEL Anna, from [facility]. There was no answer, and RN left voicemail relaying Dr. Olson's approval of the following requested orders on [facility]'s secure confidential voicemail:    Physical Therapy  Request for initial certification (first set of orders)   Frequency:  2 x per week x 3 weeks , then 1 x per week x 1 week, then 2 x per week for 3 weeks.        Occupational Therapy  Request for  1 x per week x 1 week, then 2 x per week x 2 weeks, then 1 x per week x 2 weeks          HHA (Home Health Aide)  Request for initial certification (first set of orders)   Frequency:  1x/wk for 5 wks    ELIA Cisneros, RN

## 2023-11-10 ENCOUNTER — TELEPHONE (OUTPATIENT)
Dept: FAMILY MEDICINE | Facility: CLINIC | Age: 83
End: 2023-11-10
Payer: COMMERCIAL

## 2023-11-10 NOTE — TELEPHONE ENCOUNTER
Reason for call:  Other   Patient called regarding (reason for call): appointment    Additional comments:   ED [facility] 3 days in end Oct begin of Nov, sciatica pinched nerve     Phone number to reach patient:  Home number on file [phone] (home)     Best Time:  any    Can we leave a detailed message on this number?  YES    Travel screening: Not Applicable

## 2023-11-10 NOTE — TELEPHONE ENCOUNTER
Type of form/letter: HHC from [employer] [facility]       Detailed comments: cert period 10/31/23-12/29/23    Have you been seen for this request: Yes     Do we have the form/letter: Yes: Placed in your basket for review    Who is the form from? Home care    Where did/will the form come from? form was faxed in    When is form/letter needed by:     How would you like the form/letter returned: Fax : [phone]    Patient Notified form requests are processed in 3-5 business days:Yes    Sola Kaiser,    [facility]

## 2023-11-14 DIAGNOSIS — Z53.9 DIAGNOSIS NOT YET DEFINED: Primary | ICD-10-CM

## 2023-11-14 PROCEDURE — G0180 MD CERTIFICATION HHA PATIENT: HCPCS | Performed by: FAMILY MEDICINE

## 2023-11-14 NOTE — TELEPHONE ENCOUNTER
Signed [facility] forms faxed to [employer], [facility]; originals placed in brown folder.  Katelin SWANSON    [facility]

## 2023-11-15 ENCOUNTER — OFFICE VISIT (OUTPATIENT)
Dept: FAMILY MEDICINE | Facility: CLINIC | Age: 83
End: 2023-11-15
Payer: COMMERCIAL

## 2023-11-15 VITALS
TEMPERATURE: 98.4 F | SYSTOLIC BLOOD PRESSURE: 132 MMHG | DIASTOLIC BLOOD PRESSURE: 61 MMHG | HEIGHT: 63 IN | BODY MASS INDEX: 23.74 KG/M2 | WEIGHT: 134 LBS | OXYGEN SATURATION: 99 % | HEART RATE: 74 BPM | RESPIRATION RATE: 16 BRPM

## 2023-11-15 DIAGNOSIS — E78.00 PURE HYPERCHOLESTEROLEMIA: ICD-10-CM

## 2023-11-15 DIAGNOSIS — E11.65 TYPE 2 DIABETES MELLITUS WITH HYPERGLYCEMIA, WITHOUT LONG-TERM CURRENT USE OF INSULIN (H): ICD-10-CM

## 2023-11-15 DIAGNOSIS — M54.16 LUMBAR RADICULOPATHY: Primary | ICD-10-CM

## 2023-11-15 DIAGNOSIS — I10 ESSENTIAL HYPERTENSION: ICD-10-CM

## 2023-11-15 PROCEDURE — 99214 OFFICE O/P EST MOD 30 MIN: CPT | Performed by: FAMILY MEDICINE

## 2023-11-15 RX ORDER — RESPIRATORY SYNCYTIAL VIRUS VACCINE 120MCG/0.5
0.5 KIT INTRAMUSCULAR ONCE
Qty: 1 EACH | Refills: 0 | Status: CANCELLED | OUTPATIENT
Start: 2023-11-15 | End: 2023-11-15

## 2023-11-15 ASSESSMENT — PAIN SCALES - GENERAL: PAINLEVEL: MILD PAIN (2)

## 2023-11-15 NOTE — PROGRESS NOTES
Assessment & Plan     Lumbar radiculopathy  Seeing [facility]/PT and chiropractor and pain much improved after shot    Type 2 diabetes mellitus with hyperglycemia, without long-term current use of insulin (H)  Not well controlled  Does not tolerate metformin  On glipizide 20 mg and A1c still at 10  On insulin in hospital but she quit that when she left  She is now on ozempic and tolerating but hates doing the shots  Also does not like the price  Discussed oral ozempic, she could call her insurance to see if it is covered  Vs victoza or trulicity for coverage  Discussed diab meds are all expensive, call insurance to find out what is covered  She is on ozempic now at 0.25 mg and if decides to continue with it she can call and we can go next dose up    Essential hypertension  At goal on meds    Pure hypercholesterolemia  On staitn           MED REC REQUIRED  Post Medication Reconciliation Status: discharge medications reconciled and changed, per note/orders      Lisette Olson MD  [facility]   Orquidea is a 83 year old, presenting for the following health issues:  Hospital F/U      11/15/2023     2:19 PM   Additional Questions   Roomed by jaenie       [facility]         Hospital Follow-up Visit:    Hospital[facility]Nursing Home/IP Rehab [facility]   Date of Admission: 10/22/23  Date of Discharge: 10/26/23  Reason(s) for Admission: Acute left lumbar  radiculopathy       Seen at [facility]    Tried Prp         Was your hosDISCHAR DIAGNOSES and  HOSPITAL COURSE:  This is a 83 y.o. female with a history of T2DM, CAD, HTN, HLD, RLS, polyneuropathy, squamous cell carcinoma, GERD who presented with 1 left lower back pain radiating down the front and side of her left leg. Presented to urgent care 3 days prior to ED presentation, was given ibuprofen and Tylenol and Flexeril but pain persisted.    Acute left lumbar radiculopathy: X-ray lumbar spine without evidence of acute fracture or  traumatic malalignment.   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 [facility] was consulted who confirmed no surgical intervention was necessary. She is now s/p left L5-S1 transforaminal epidural steroid injection with IR 10/24. She had good improvement in pain and mobility following this. PT/OT worked with her, recommended home health PT, OT and HHA services to which patient was open to. Her daughter will also be staying with her for the time being. She was continued on Tylenol, Flexeril (PTA on this, was switched to Robaxin inpatient but felt the scheduled dosing regimen caused too much drowsiness/lethargy so wanted to switch to PRN muscle relaxant and Robaxin was not on her formulary so switched back to Flexeril), gabapentin. She has not required any narcotic since prior to the [facility]ES so no narcotics were prescribed nor felt needed. Bowel regimen recommended. She will follow up with [facility] in 2 weeks - her daughter was going to be calling already today she states. Return precautions discussed. She expressed understanding and felt comfortable with the plan.     Leukocytosis: Suspect stress demargination in setting of pain and possible volume contraction from mild dehydration. No obvious infectious symptoms. UA + bacteruria but no symptoms of UTI so UC not collected. Leukocytosis resolved.     DEVIN: Admission creatinine 1.23, up from baseline of 0.7. Suspect prerenal etiology secondary to dehydration, decreased oral intake and chronic diuretic and ACEI therapy. DEVIN resolved, creatinine normalized.  Saline locked and resumed PTA enalapril and HCTZ once renal function normalized. Creatinine stayed normal, 0.72 on day of d/c.     Hypokalemia  Replaced during stay. K+  "normal on day of d/c.     CAD: No acute concerns  HTN: Normotensive.  HLD  Continue PTA ASA, amlodipine, hydralazine, metoprolol, enalapril, HCTZ and atorvastatin    T2DM: A1c 10.3% reflecting poor long-term glycemic control. She did have some steroid-induced hyperglycemia as well.   PTA glipizide continued. She was provided Lantus 10 units nightly while here and continuing this on discharge was discussed with the patient. She states she checks her sugars 1-2x/week at home, gets numbers in the mid 100 to mid 200s; she and her primary have talked about not adjusting management/therapy until numbers reach the 300s. She wished to NOT add or adjust her antihyperglycemic regimen, not use the Lantus at home but rather increase the frequency of her glucose checks to 1-2x/day and have close f/u with her PCP. Confirmed she has a working glucometer at home with test supplies. Encouraged ongoing diabetic diet. Recommend she have close f/u with PCP.     RLS  Polyneuropathy  Continue gabapentin    GERD  PTA omeprazole    Chronic urinary incontinence  PTA oxybutynin    Insomnia  PRN zolpidem and melatonin pitalization related to COVID-19? No   Problems taking medications regularly:  None  Medication changes since discharge: None  Problems adhering to non-medication therapy:  None    Summary of hospitalization:  See outside records, reviewed and scanned  Diagnostic Tests/Treatments reviewed.  Follow up needed: none  Other Healthcare Providers Involved in Patient s Care:         None  Update since discharge: improved.         Plan of care communicated with patient       Review of Systems   Constitutional, HEENT, cardiovascular, pulmonary, gi and gu systems are negative, except as otherwise noted.      Objective    /61   Pulse 74   Temp 98.4  F (36.9  C) (Oral)   Resp 16   Ht 1.607 m (5' 3.25\")   Wt 60.8 kg (134 lb)   SpO2 99%   BMI 23.55 kg/m    Body mass index is 23.55 kg/m .  Physical Exam   GENERAL: healthy, alert " and no distress  RESP: lungs clear to auscultation - no rales, rhonchi or wheezes  CV: regular rate and rhythm, normal S1 S2, no S3 or S4, no murmur, click or rub, no peripheral edema and peripheral pulses strong    No results found for this or any previous visit (from the past 24 hour(s)).

## 2023-11-16 ENCOUNTER — TELEPHONE (OUTPATIENT)
Dept: FAMILY MEDICINE | Facility: CLINIC | Age: 83
End: 2023-11-16
Payer: COMMERCIAL

## 2023-11-16 NOTE — TELEPHONE ENCOUNTER
Writer contacted home care staff and notified him of the orders.  Staff member verbalized understanding.      Kristina Kjellberg, MSN, RN

## 2023-11-16 NOTE — TELEPHONE ENCOUNTER
Home Care is calling regarding an established patient with  Whittl Higinio.        10/31/2023     4:51 PM   Home Care Information   Date of Home Care episode start 10/31/2023   Home Care agency [facility]     Requesting orders from: Lisette Olson  Provider is following patient: Yes  Is this a 60-day recertification request?  No    Orders Requested    Physical Therapy  Request for discontinuation of care   Patient requesting discontinuation of care    Occupational Therapy  Request for discontinuation of care   Patient requesting discontinuation of care    Information was gathered and will be sent to provider for review.  RN will contact Home Care with information after provider review.  Contact information confirmed and updated as needed.    Kristina M Kjellberg, RN

## 2023-11-20 ENCOUNTER — MEDICAL CORRESPONDENCE (OUTPATIENT)
Dept: HEALTH INFORMATION MANAGEMENT | Facility: CLINIC | Age: 83
End: 2023-11-20
Payer: COMMERCIAL

## 2023-11-24 ENCOUNTER — MYC MEDICAL ADVICE (OUTPATIENT)
Dept: FAMILY MEDICINE | Facility: CLINIC | Age: 83
End: 2023-11-24
Payer: COMMERCIAL

## 2023-11-28 ENCOUNTER — TELEPHONE (OUTPATIENT)
Dept: FAMILY MEDICINE | Facility: CLINIC | Age: 83
End: 2023-11-28
Payer: COMMERCIAL

## 2023-11-28 NOTE — TELEPHONE ENCOUNTER
The cheapest medications are going to be metformin and glipizide  I am assuming she does not tolerate metformin or she would be on it  In regards to glipizide she was on 20 mg was max dose and it was not controlling her diabetes    Ozempic was added and is working   I would have her call her insurance to see what her tier 1 drugs are. Most likely anything beyond metformin and glipizide are going to be expensive.  Out of pocket for meds like ozempic are usually 800-1000$ per month so insurance does cover quite a bit generally but still is expensive.     In the meantime, I would have her restart her glipizide until she can come in and see me    Lisette Olson MD

## 2023-11-28 NOTE — TELEPHONE ENCOUNTER
Returned call to patient and reviewed provider message and response below.    Per patient, she agreed to start back up on the glipizide.  Patient is not scheduled with PCP, provider was booked out too far so patient is actually scheduled to see Kyaw Teixeira PA-C on 12/12/23 for check up, prior to leaving for [place] for 2 months.  Patient leaves for [place] in January, will be there through March.    Patient asking if she should discuss this further with Kyaw at visit next month? (Would prefer to see PCP but didn't have any openings) Or if she can just go on with taking the glipizide now and while she is in [place] and touch base with PCP when returns back in [place] next April?    Patient noted she had sent a list of cheaper alternatives via a [employer] message to provider-see 11/24/23 [facility] [employer] [facility] encounter in Chart Review.   However, patient stated she really does not want to take any insulin so she feels she would rather just be on the glipizide for now.  Patient noted she did have a lot of diarrhea with the Metformin, this is why she is not on it.    Patient asking for additional advice.        Nyasia Sebastian RN  Clinical Triage/Primary Care  [facility]

## 2023-11-28 NOTE — TELEPHONE ENCOUNTER
Patient scheduled 12/6/23 at 2:30 pm with PCP.  Katelin SWANSON    [facility]

## 2023-11-28 NOTE — TELEPHONE ENCOUNTER
"Patient calling in regarding changing her medication from Ozempic.  Stated the medication has become too expensive.      Has an upcoming appointment with provider to discuss alternatives.  States she will run out of the Ozempic on 12/2 and her appointment is on 12/14.  She is concerned about covering this gap when she is out of the Ozempic.      Has not been taking the glipizide as she felt that wasn't \"doing anything\".  This morning's fasting glucose was 150.      Routing to provider to review and advise on medication to fill the gap between Ozempic run out and her upcoming appointment.      Kristina Kjellberg, MSN, RN  [facility] Primary Care Triage    "

## 2023-11-28 NOTE — TELEPHONE ENCOUNTER
Okay to put her in one of my LEANN slots before she leaves for [place]  When she runs out of ozempic, have her restart the glipizide for now      Lisette Olson MD

## 2023-12-06 ENCOUNTER — OFFICE VISIT (OUTPATIENT)
Dept: FAMILY MEDICINE | Facility: CLINIC | Age: 83
End: 2023-12-06
Payer: COMMERCIAL

## 2023-12-06 VITALS
BODY MASS INDEX: 24.25 KG/M2 | DIASTOLIC BLOOD PRESSURE: 68 MMHG | HEART RATE: 66 BPM | SYSTOLIC BLOOD PRESSURE: 131 MMHG | TEMPERATURE: 98.1 F | RESPIRATION RATE: 16 BRPM | HEIGHT: 62 IN | WEIGHT: 131.8 LBS

## 2023-12-06 DIAGNOSIS — Z79.4 ENCOUNTER FOR LONG-TERM (CURRENT) INSULIN USE (H): ICD-10-CM

## 2023-12-06 DIAGNOSIS — E78.5 HYPERLIPIDEMIA LDL GOAL <100: ICD-10-CM

## 2023-12-06 DIAGNOSIS — I10 HYPERTENSION GOAL BP (BLOOD PRESSURE) < 140/90: ICD-10-CM

## 2023-12-06 DIAGNOSIS — E11.9 TYPE 2 DIABETES MELLITUS WITHOUT COMPLICATION, WITHOUT LONG-TERM CURRENT USE OF INSULIN (H): Primary | ICD-10-CM

## 2023-12-06 DIAGNOSIS — E78.1 HYPERTRIGLYCERIDEMIA: ICD-10-CM

## 2023-12-06 PROCEDURE — 99214 OFFICE O/P EST MOD 30 MIN: CPT | Performed by: FAMILY MEDICINE

## 2023-12-06 RX ORDER — RESPIRATORY SYNCYTIAL VIRUS VACCINE 120MCG/0.5
0.5 KIT INTRAMUSCULAR ONCE
Qty: 1 EACH | Refills: 0 | Status: CANCELLED | OUTPATIENT
Start: 2023-12-06 | End: 2023-12-06

## 2023-12-06 RX ORDER — AMLODIPINE BESYLATE 10 MG/1
10 TABLET ORAL DAILY
Qty: 90 TABLET | Refills: 3 | Status: SHIPPED | OUTPATIENT
Start: 2023-12-06 | End: 2024-04-15

## 2023-12-06 RX ORDER — FLASH GLUCOSE SCANNING READER
EACH MISCELLANEOUS
Qty: 1 EACH | Refills: 0 | Status: SHIPPED | OUTPATIENT
Start: 2023-12-06

## 2023-12-06 RX ORDER — ATORVASTATIN CALCIUM 80 MG/1
80 TABLET, FILM COATED ORAL DAILY
Qty: 90 TABLET | Refills: 3 | Status: SHIPPED | OUTPATIENT
Start: 2023-12-06 | End: 2024-06-12

## 2023-12-06 RX ORDER — FLASH GLUCOSE SENSOR
KIT MISCELLANEOUS
Qty: 2 EACH | Refills: 5 | Status: SHIPPED | OUTPATIENT
Start: 2023-12-06

## 2023-12-06 ASSESSMENT — PAIN SCALES - GENERAL: PAINLEVEL: NO PAIN (0)

## 2023-12-06 NOTE — PROGRESS NOTES
Assessment & Plan     Type 2 diabetes mellitus without complication, without long-term current use of insulin (H)  Not at goal with glipizide  Did not tolerate metformin  Ozempic too expensive  Pt agreeable to startin insulin  Will order cgm since now on insulin  - insulin glargine (LANTUS PEN) 100 UNIT/ML pen; Inject 10 Units Subcutaneous every morning  - [facility]B Adult Diabetes Educator Referral; Future    Encounter for long-term (current) insulin use (H)  Pt has had it before and states she is comfortable using this    Hypertension goal BP (blood pressure) < 140/90  At goal on meds  - amLODIPine (NORVASC) 10 MG tablet; Take 1 tablet (10 mg) by mouth daily    Hyperlipidemia LDL goal <100  At goal on meds  - atorvastatin (LIPITOR) 80 MG tablet; Take 1 tablet (80 mg) by mouth daily    Hypertriglyceridemia  At goal on meds  - atorvastatin (LIPITOR) 80 MG tablet; Take 1 tablet (80 mg) by mouth daily                 Lisette Olson MD  [facility]   Orquidea is a 83 year old, presenting for the following health issues:  Recheck Medication      12/6/2023     2:38 PM   Additional Questions   Roomed by trevon   Accompanied by JOSY       History of Present Illness       Diabetes:   She presents for follow up of diabetes.  She is checking home blood glucose a few times a week.   She checks blood glucose before meals.  Blood glucose is never over 200 and never under 70. She is aware of hypoglycemia symptoms including shakiness.    She has no concerns regarding her diabetes at this time.  She is having weight loss.            Hyperlipidemia:  She presents for follow up of hyperlipidemia.   She is taking medication to lower cholesterol. She is not having myalgia or other side effects to statin medications.    Hypertension: She presents for follow up of hypertension.  She does check blood pressure  regularly outside of the clinic. Outpatient blood pressures have not been over 140/90. She  "follows a low salt diet.     She eats 2-3 servings of fruits and vegetables daily.She consumes 0 sweetened beverage(s) daily.She exercises with enough effort to increase her heart rate 10 to 19 minutes per day.  She exercises with enough effort to increase her heart rate 7 days per week. She is missing 1 dose(s) of medications per week.  She is not taking prescribed medications regularly due to cost of medication, side effects and remembering to take.                 Review of Systems   Constitutional, HEENT, cardiovascular, pulmonary, gi and gu systems are negative, except as otherwise noted.      Objective    /68   Pulse 66   Temp 98.1  F (36.7  C) (Oral)   Resp 16   Ht 1.565 m (5' 1.61\")   Wt 59.8 kg (131 lb 12.8 oz)   BMI 24.41 kg/m    Body mass index is 24.41 kg/m .  Physical Exam   GENERAL: healthy, alert and no distress    No results found for this or any previous visit (from the past 24 hour(s)).                  "

## 2023-12-07 ENCOUNTER — TELEPHONE (OUTPATIENT)
Dept: FAMILY MEDICINE | Facility: CLINIC | Age: 83
End: 2023-12-07

## 2023-12-07 NOTE — TELEPHONE ENCOUNTER
"Patient called in today stating she doesn't like the Dexcom system that is in her arm or the Toujeo is a daily injection.  Patient states \"I am going to [place], and don't want to be a pincushion.\"  Educated patient on what the Dexcom monitor does and why it needs to be changed every two weeks.      Patient stated she wants to know if she can go on Jardiance instead since it is a pill and her brother is on it.  Discussed with patient that she may need more control of her diabetes at this time compared to her brother.  Patient verbalized understanding but still wants the message sent to the provider.        Routing to provider to review and advise.    Kristina Kjellberg, MSN, RN  [facility] Primary Care Triage          "

## 2023-12-07 NOTE — TELEPHONE ENCOUNTER
Please have her call her insurance to see if it is covered  It is not generic and out of pocket cost is about $600 per month. Before I order it, I want her to call her insurance    We ordered insulin because most likely she needs it as jardiance will probably not get her BSs to goal and it is $35 per month    Let her also know that jardiance works by getting rid of extra glucose through the urine. Because her diabetes is not well controlled, she will be peeing a lot and is at increased risk of bladder infections and vaginal yeast infections with this medication    Lisette Olson MD

## 2023-12-07 NOTE — TELEPHONE ENCOUNTER
Routed to provider as FYI only.    RN returned call to pt and relayed provider's 12/7/2023 10:56 AM message below as written.     Pt states she did contact her insurance this morning and found that the Jardiance would only cost $47/mo.  Pt states with the increased risk of bladder infection and yeast infection, she would prefer to continue with the insulin as prescribed.    Pt states while speaking with her insurance, she was informed that she may qualify for an extra discount on her medications through [facility]. Pt states she is awaiting a call back from [facility] to complete whatever paperwork they need.     Pt states no further action is needed from Dr. Olson regarding pt's use of [facility] for medications, until provider is reached out to by that group.    Kenzie Maldonado, SEVERINON, RN

## 2023-12-11 ENCOUNTER — MEDICAL CORRESPONDENCE (OUTPATIENT)
Dept: HEALTH INFORMATION MANAGEMENT | Facility: CLINIC | Age: 83
End: 2023-12-11

## 2023-12-13 ENCOUNTER — TELEPHONE (OUTPATIENT)
Dept: FAMILY MEDICINE | Facility: CLINIC | Age: 83
End: 2023-12-13
Payer: COMMERCIAL

## 2023-12-13 NOTE — TELEPHONE ENCOUNTER
[facility] is calling back. The diagnosis is wrong on the form. It is marked that patient has type 1 diabetes, but she has type 2 diabetes. They will be faxing a new form over.Jessica [facility]

## 2023-12-13 NOTE — TELEPHONE ENCOUNTER
Forms/Letter Request    Type of form/letter:  CMJEFFREY for freestyle agatha    Have you been seen for this request: N/A    Do we have the form/letter: Yes: re faxed 2 days ago     Who is the form from? [facility] (if other please explain)    Where did/will the form come from? form was faxed in    When is form/letter needed by: asap    How would you like the form/letter returned: Fax : [phone]    Patient Notified form requests are processed in 3-5 business days:Yes    Dorothy from [facility] [phone]

## 2023-12-20 ENCOUNTER — TELEPHONE (OUTPATIENT)
Dept: FAMILY MEDICINE | Facility: CLINIC | Age: 83
End: 2023-12-20
Payer: COMMERCIAL

## 2023-12-20 NOTE — TELEPHONE ENCOUNTER
Have her increase by 2 units every 4-5 days until BSs are closer to goal  Let me know her final dose  If she starts having lows, then she needs to decrease the dose    Lisette Olson MD

## 2023-12-20 NOTE — TELEPHONE ENCOUNTER
Provider:   Patient is unsure what goal is for her. What is her goal number?  What is a low blood sugar value for her?  I have sent the patient low blood sugar information. Thank you. Asia Conklin R.N.     Patient notified of provider's message as written below she had the questions above. Asia Conklin R.N.

## 2023-12-20 NOTE — TELEPHONE ENCOUNTER
1) typical goal for fasting and before meal BSs are   2) too low is below 80 especially if she is having symptoms of low blood sugars    Lisette Olson MD

## 2023-12-20 NOTE — TELEPHONE ENCOUNTER
Prior Authorization Retail Medication Request    Medication/Dose: Freestyle Alessio 14 Day Williams Device  Diagnosis and ICD code (if different than what is on RX):    Type 2 diabetes mellitus without complication, without long-term current use of insulin (H) [E11.9]  - Primary      Encounter for long-term (current) insulin use (H) [Z79.4]        New/renewal/insurance change PA/secondary ins. PA:  Previously Tried and Failed:    Rationale:      Insurance   Primary:  [employer]  Insurance ID:  [id number]     Secondary (if applicable):  Insurance ID:      Pharmacy Information (if different than what is on RX)  Name:  [facility]  Phone:  [phone]  Fax: [phone]    BASSETT:  [id number]

## 2023-12-20 NOTE — TELEPHONE ENCOUNTER
Called patient and read providers note. Patient understands message and verbalizes good understanding of plan of care.    Notified patient CGM was denied and a prior auth was initiated.    Maliha Guardado RN

## 2023-12-20 NOTE — TELEPHONE ENCOUNTER
Pt calling regarding insulin glargine. States that she started the medication after her visit on 12/6.    States that the medication is not bringing her blood sugar down.  Her blood sugar ranges from 150s to high 180s.  States that she does not miss a dose and makes sure she takes it every day.       Pt is asymptomatic.    Pt wants to know if she should increase her insulin dose or if the provider has another medication.    Routing to provider.    Naheed Mohamud RN  [facility]

## 2023-12-27 NOTE — TELEPHONE ENCOUNTER
PA Initiation    Medication: FREESTYLE KURT 14 DAY READER VICTORIANO  Insurance Company: [employer] - Phone [phone] Fax [phone]  Pharmacy Filling the Rx: [id number] - [place] - [address]  Filling Pharmacy Phone: [phone]  Filling Pharmacy Fax: [phone]  Start Date: 12/26/2023

## 2023-12-27 NOTE — TELEPHONE ENCOUNTER
Prior Authorization Approval    Medication: FREESTYLE KURT 14 DAY READER VICTORIANO  Authorization Effective Date: 11/27/2023  Authorization Expiration Date: 12/26/2026  Approved Dose/Quantity:   Reference #:     Insurance Company: [employer] - Phone [phone] Fax [phone]  Expected CoPay: $    CoPay Card Available:      Financial Assistance Needed:   Which Pharmacy is filling the prescription: [id number] - [place], [place] - [address]  Pharmacy Notified: YES  Patient Notified: **Instructed pharmacy to notify patient when script is ready to /ship.**

## 2023-12-28 NOTE — TELEPHONE ENCOUNTER
Pt calling back regarding her insulin.     States that the provider increased her dose to 12 units.  States that her blood sugar is still not going down.     Her blood sugars last few days have been:  168, 172, 149, 180, this morning 167.    States that she takes her blood sugar in the morning before eating.    Pt prefers to take a pill medication instead because she does not like giving herself shots.    States that her brother is on jardiance and wants to know if that is an option.    Naheed Mohamud RN  [facility]

## 2023-12-28 NOTE — TELEPHONE ENCOUNTER
We have already discussed this  With jardiance cost is going to be an issue.   That was the issue with all the other alternative medications  In regards to the insulin. Have her increase her dose by 2 units every 5 days until her BSs are at goal. If she gets too low, then needs to decrease her dose by 2 units.     Lisette Olson MD

## 2023-12-29 ENCOUNTER — TELEPHONE (OUTPATIENT)
Dept: FAMILY MEDICINE | Facility: CLINIC | Age: 83
End: 2023-12-29
Payer: COMMERCIAL

## 2023-12-29 DIAGNOSIS — E11.9 TYPE 2 DIABETES, HBA1C GOAL < 8% (H): Primary | ICD-10-CM

## 2023-12-29 NOTE — TELEPHONE ENCOUNTER
"Called and spoke with patient. Relayed provider message and instructions, as noted below.  Patient voiced clear understanding and agreed with increasing Lantus dose by 2 units every 5 days until BS is at goal. Patient currently using 14 U every morning, started this dose as of yesterday.   Patient is also planning to check with her insurance carrier after first of the year, to see what her policy says about other oral medications and coverage, she is going to ask again about Jardiance.  Patient reiterated that she does not feel confident she can continue to give herself daily insulin. Does not like injecting self every day and feels the Lantus isn't working for her, at least yet.  Encouraged patient to keep with insulin and follow provider recommendations for increasing units. Reiterated that this process can take time and lots of \"trials\" till we get patient's to correct dosing for management.  Patient understanding.  No more questions at this time.      Nyasia Sebastian RN  Clinical Triage/Primary Care  [facility]    "

## 2023-12-29 NOTE — TELEPHONE ENCOUNTER
I have sent in a prescription for jardiance she need to start at 10 mg  we could possibly increase to 25 mg in 4-12 weeks  Have her keep in mind that  just because it is covered, does not mean it is cheap. She has already called them in the past about several of these meds and came back she wanted insulin because she could afford it at $35 per month  Cash price for 30 tablets of jardiance 10 mg is over $500  Coverage can make it cheaper but not necessarily cheap..    In regards to this medication, she has to know it helps her to urinate out the extra sugar, the down side of this is increased risk of bladder infections, vaginal yeast infections, and in someone her age dehydration as she is peeing out too much fluid with the sugar.     Lisette Olson MD

## 2023-12-29 NOTE — TELEPHONE ENCOUNTER
"Patient called back to clinic in regards to the 12/20/23 Telephone Encounter in Chart Review.    Patient was recently started on insulin due to uncontrolled diabetes and oral medications not working for her or not being able to tolerate them.    Patient was advised today to increase her insulin dosing to help better control blood sugars.    Patient called to her insurance company today and spoke with a representative and was told that her insurance WILL cover Jardiance, if ordered as a 10 mg or 25 mg tablet.    Patient would prefer to go with the Jardiance as she is not happy with using insulin, \"it is too hard\" for her and \"I just can't do this\".  Patient would really like to try Jardiance or oral medication(s).      Routing to provider to review and advise.      Nyasia Sebastian RN  Clinical Triage/Primary Care  [facility]    "

## 2023-12-29 NOTE — TELEPHONE ENCOUNTER
Patient notified of provider's message as written below. The patient was warm transferred to central scheduling and they were asked to assist patient in making an appointment in March when she returns from [place]. They verified they understood. Patient verbalized good understanding, had no further questions and needed no further support.Asia Conklin R.N.

## 2024-01-15 ENCOUNTER — MEDICAL CORRESPONDENCE (OUTPATIENT)
Dept: HEALTH INFORMATION MANAGEMENT | Facility: CLINIC | Age: 84
End: 2024-01-15
Payer: COMMERCIAL

## 2024-02-14 ENCOUNTER — APPOINTMENT (RX ONLY)
Dept: URBAN - METROPOLITAN AREA CLINIC 168 | Facility: CLINIC | Age: 84
Setting detail: DERMATOLOGY
End: 2024-02-14

## 2024-02-14 DIAGNOSIS — L57.0 ACTINIC KERATOSIS: ICD-10-CM

## 2024-02-14 DIAGNOSIS — H61.03 CHONDRITIS OF EXTERNAL EAR: ICD-10-CM

## 2024-02-14 PROBLEM — C44.329 SQUAMOUS CELL CARCINOMA OF SKIN OF OTHER PARTS OF FACE: Status: ACTIVE | Noted: 2024-02-14

## 2024-02-14 PROBLEM — H61.032 CHONDRITIS OF LEFT EXTERNAL EAR: Status: ACTIVE | Noted: 2024-02-14

## 2024-02-14 PROCEDURE — ? PATIENT EDUCATION

## 2024-02-14 PROCEDURE — ? DIAGNOSIS COMMENT

## 2024-02-14 PROCEDURE — ? COUNSELING

## 2024-02-14 PROCEDURE — 17003 DESTRUCT PREMALG LES 2-14: CPT

## 2024-02-14 PROCEDURE — 99203 OFFICE O/P NEW LOW 30 MIN: CPT | Mod: 25

## 2024-02-14 PROCEDURE — ? CONSULTATION FOR MOHS SURGERY

## 2024-02-14 PROCEDURE — ? LIQUID NITROGEN

## 2024-02-14 PROCEDURE — ? PHOTO-DOCUMENTATION

## 2024-02-14 PROCEDURE — 17000 DESTRUCT PREMALG LESION: CPT

## 2024-02-14 ASSESSMENT — LOCATION DETAILED DESCRIPTION DERM
LOCATION DETAILED: LEFT LATERAL MALAR CHEEK
LOCATION DETAILED: RIGHT CENTRAL BUCCAL CHEEK
LOCATION DETAILED: LEFT LATERAL DISTAL PRETIBIAL REGION
LOCATION DETAILED: LEFT SUPERIOR HELIX
LOCATION DETAILED: LEFT CENTRAL ZYGOMA
LOCATION DETAILED: RIGHT LATERAL MALAR CHEEK
LOCATION DETAILED: LEFT CENTRAL BUCCAL CHEEK

## 2024-02-14 ASSESSMENT — LOCATION SIMPLE DESCRIPTION DERM
LOCATION SIMPLE: RIGHT CHEEK
LOCATION SIMPLE: LEFT ZYGOMA
LOCATION SIMPLE: LEFT PRETIBIAL REGION
LOCATION SIMPLE: LEFT EAR
LOCATION SIMPLE: LEFT CHEEK

## 2024-02-14 ASSESSMENT — LOCATION ZONE DERM
LOCATION ZONE: LEG
LOCATION ZONE: EAR
LOCATION ZONE: FACE

## 2024-02-14 NOTE — HPI: SKIN LESION (SQUAMOUS CELL CARCINOMA)
How Severe Is Your Skin Cancer?: moderate
Is This A New Presentation, Or A Follow-Up?: Squamous Cell Carcinoma
When Was Squamous Cell Carcinoma Biopsied? (Optional): 12/13/2023
Accession # (Optional): [id number]
Location From Outside Provider (Will Override Previously Chosen Location): Right medial forehead

## 2024-02-14 NOTE — PROCEDURE: LIQUID NITROGEN
Render Note In Bullet Format When Appropriate: No
Application Tool (Optional): Cry-AC
Number Of Freeze-Thaw Cycles: 1 freeze-thaw cycle
Render Post-Care Instructions In Note?: yes
Duration Of Freeze Thaw-Cycle (Seconds): 3
Post-Care Instructions: I reviewed with the patient in detail post-care instructions. Patient is to wear sunprotection, and avoid picking at any of the treated lesions. Pt may apply Vaseline to crusted or scabbing areas.
Consent: The patient's consent was obtained including but not limited to risks of crusting, scabbing, blistering, scarring, darker or lighter pigmentary change, recurrence, incomplete removal and infection.
Detail Level: Detailed
Aperture Size (Optional): C

## 2024-03-04 ENCOUNTER — TELEPHONE (OUTPATIENT)
Dept: FAMILY MEDICINE | Facility: CLINIC | Age: 84
End: 2024-03-04
Payer: COMMERCIAL

## 2024-03-04 NOTE — TELEPHONE ENCOUNTER
Have her cut the dose in half to 5 mg to see if she notes any improvement in her diarrhea  Have her monitor her BP as well  Report back in 1-2 weeks    Lisette Olson MD

## 2024-03-04 NOTE — TELEPHONE ENCOUNTER
Attempted to reach pt. There was no answer. Left message to return call to a nurse at [phone].    When pt calls back, please relay message below -  Have her cut the dose in half to 5 mg to see if she notes any improvement in her diarrhea  Have her monitor her BP as well  Report back in 1-2 weeks     Lisette Olson MD     Thank you - Bhavya Muhammad, [place], RN

## 2024-03-04 NOTE — TELEPHONE ENCOUNTER
Patient called in stating she was reading up on her medication, Amlodipine and noted that a side effect was Severe Diarrhea.  Patient stated she has been having severe diarrhea for a long time, couldn't give an amount of time this has been happening.  States she also has IBS but is concerned that this medication is what is causing her symptoms.        Patient currently in [place] and will be back on 3/14 and has appointment on 3/21 with provider.      Patient asking if she should wean off the amlodipine to see if the diarrhea stops.  Patient also notes he BP has been good otherwise and has been keeping a journal of her BP's.      Routing to provider to review and advise on weaning off medication prior to appointment on 3/21.      Kristina Kjellberg, MSN, RN  [facility] Primary Care Triage

## 2024-03-04 NOTE — LETTER
[place],    Our nurses have been unable to get ahold of you. Since you suspect you are getting diarrhea from the amlodipine, I would recommend cutting the dose in half and seeing if you have any improvement in your diarrhea. Record your BP and pulse. Please respond back in regard to your symptoms and BP readings.    Lisette Olson MD

## 2024-03-05 NOTE — TELEPHONE ENCOUNTER
"This writer attempted to contact \"Tonja\" on 03/05/24      Reason for call relay provider message and instructions below regarding Amlodipine and left message to call back to [facility] at [phone].      If patient calls back:   Registered Nurse called. Send to RN team at [place].          Nyasia Sebastian RN  Clinical Triage/Primary Care  [facility]      "

## 2024-03-06 NOTE — TELEPHONE ENCOUNTER
Attempted to reach pt. There was no answer. Left message to return call to a nurse at [phone]. Advised pt on VM this was our 3rd attempt to contact pt.    If pt returns call - please see message from provider below.    Thank you - Bhavya Muhammad, BSN, RN

## 2024-03-07 NOTE — TELEPHONE ENCOUNTER
Routing to provider - We reached out to pt 3 times and were unable to relay providers message to her.     Thank you - Bhavya Muhammad, BSN, RN

## 2024-03-08 ENCOUNTER — APPOINTMENT (RX ONLY)
Dept: URBAN - METROPOLITAN AREA CLINIC 168 | Facility: CLINIC | Age: 84
Setting detail: DERMATOLOGY
End: 2024-03-08

## 2024-03-08 PROBLEM — C44.329 SQUAMOUS CELL CARCINOMA OF SKIN OF OTHER PARTS OF FACE: Status: ACTIVE | Noted: 2024-03-08

## 2024-03-08 PROCEDURE — ? MOHS SURGERY

## 2024-03-08 PROCEDURE — 13132 CMPLX RPR F/C/C/M/N/AX/G/H/F: CPT

## 2024-03-08 PROCEDURE — 17311 MOHS 1 STAGE H/N/HF/G: CPT

## 2024-03-08 NOTE — PROCEDURE: MOHS SURGERY
Mohs Case Number: [id number]
Date Of Previous Biopsy (Optional): 12-13-23
Previous Accession (Optional): [id number]
Biopsy Photograph Reviewed: Yes
Referring Physician (Optional): Scott Perez
Consent Type: Consent 1 (Standard)
Eye Shield Used: No
Surgeon Performing Repair (Optional): Anam
Initial Size Of Lesion: 1
Primary Defect Length In Cm (Final Defect Size - Required For Flaps/Grafts): 0
Repair Type: Complex Repair
Which Eyelid Repair Cpt Are You Using?: 72305
Oculoplastic Surgeon Procedure Text (A): After obtaining clear surgical margins the patient was sent to oculoplastics for surgical repair.  The patient understands they will receive post-surgical care and follow-up from the referring physician's office.
Otolaryngologist Procedure Text (A): After obtaining clear surgical margins the patient was sent to otolaryngology for surgical repair.  The patient understands they will receive post-surgical care and follow-up from the referring physician's office.
Plastic Surgeon Procedure Text (A): After obtaining clear surgical margins the patient was sent to plastics for surgical repair.  The patient understands they will receive post-surgical care and follow-up from the referring physician's office.
Mid-Level Procedure Text (A): After obtaining clear surgical margins the patient was sent to a mid-level provider for surgical repair.  The patient understands they will receive post-surgical care and follow-up from the mid-level provider.
Provider Procedure Text (A): After obtaining clear surgical margins the defect was repaired by another provider.
Asc Procedure Text (A): After obtaining clear surgical margins the patient was sent to an ASC for surgical repair.  The patient understands they will receive post-surgical care and follow-up from the ASC physician.
Simple / Intermediate / Complex Repair - Final Wound Length In Cm: 3
Suturegard Retention Suture: 2-0 Nylon
Retention Suture Bite Size: 3 mm
Length To Time In Minutes Device Was In Place: 10
Width Of Defect Perpendicular To Closure In Cm (Required): 1.2
Undermining Type: Entire Wound
Debridement Text: The wound edges were debrided prior to proceeding with the closure to facilitate wound healing.
Helical Rim Text: The closure involved the helical rim.
Vermilion Border Text: The closure involved the vermilion border.
Nostril Rim Text: The closure involved the nostril rim.
Retention Suture Text: Retention sutures were placed to support the closure and prevent dehiscence.
Location Indication Override (Is Already Calculated Based On Selected Body Location): Area H
Area H Indication Text: Tumors in this location are included in Area H (eyelids, eyebrows, nose, lips, chin, ear, pre-auricular, post-auricular, temple, genitalia, hands, feet, ankles and areola).  Tissue conservation is critical in these anatomic locations.
Area M Indication Text: Tumors in this location are included in Area M (cheek, forehead, scalp, neck, jawline and pretibial skin).  Mohs surgery is indicated for tumors in these anatomic locations.
Area L Indication Text: Tumors in this location are included in Area L (trunk and extremities).  Mohs surgery is indicated for larger tumors, or tumors with aggressive histologic features, in these anatomic locations.
Tumor Debulked?: curette
Additional Debulk Details (Optional): 1.1 x 1.1
Depth Of Tumor Invasion (For Histology): adipose tissue
Special Stains Stage 1 - Results: Base On Clearance Noted Above
Stage 2: Additional Anesthesia Type: 1% lidocaine with epinephrine
Include Tumor Staging In Mohs Note?: Please Select the Appropriate Response
Staging Info: By selecting yes to the question above you will include information on AJCC 8 tumor staging in your Mohs note. Information on tumor staging will be automatically added for SCCs on the head and neck. AJCC 8 includes tumor size, tumor depth, perineural involvement and bone invasion.
Tumor Depth: Less than 6mm from granular layer and no invasion beyond the subcutaneous fat
Medical Necessity Statement: Based on my medical judgement, Mohs surgery is the most appropriate treatment for this cancer compared to other treatments.
Alternatives Discussed Intro (Do Not Add Period): I discussed alternative treatments to Mohs surgery and specifically discussed the risks and benefits of
Consent 1/Introductory Paragraph: The rationale for Mohs was explained to the patient and consent was obtained. The risks, benefits and alternatives to therapy were discussed in detail. Specifically, the risks of infection, scarring, bleeding, prolonged wound healing, incomplete removal, allergy to anesthesia, nerve injury and recurrence were addressed. Prior to the procedure, the treatment site was clearly identified and confirmed by the patient. All components of Universal Protocol/PAUSE Rule completed.
Consent 2/Introductory Paragraph: Mohs surgery was explained to the patient and consent was obtained. The risks, benefits and alternatives to therapy were discussed in detail. Specifically, the risks of infection, scarring, bleeding, prolonged wound healing, incomplete removal, allergy to anesthesia, nerve injury and recurrence were addressed. Prior to the procedure, the treatment site was clearly identified and confirmed by the patient. All components of Universal Protocol/PAUSE Rule completed.
Consent 3/Introductory Paragraph: I gave the patient a chance to ask questions they had about the procedure.  Following this I explained the Mohs procedure and consent was obtained. The risks, benefits and alternatives to therapy were discussed in detail. Specifically, the risks of infection, scarring, bleeding, prolonged wound healing, incomplete removal, allergy to anesthesia, nerve injury and recurrence were addressed. Prior to the procedure, the treatment site was clearly identified and confirmed by the patient. All components of Universal Protocol/PAUSE Rule completed.
Consent (Temporal Branch)/Introductory Paragraph: The rationale for Mohs was explained to the patient and consent was obtained. The risks, benefits and alternatives to therapy were discussed in detail. Specifically, the risks of damage to the temporal branch of the facial nerve, infection, scarring, bleeding, prolonged wound healing, incomplete removal, allergy to anesthesia, and recurrence were addressed. Prior to the procedure, the treatment site was clearly identified and confirmed by the patient. All components of Universal Protocol/PAUSE Rule completed.
Consent (Marginal Mandibular)/Introductory Paragraph: The rationale for Mohs was explained to the patient and consent was obtained. The risks, benefits and alternatives to therapy were discussed in detail. Specifically, the risks of damage to the marginal mandibular branch of the facial nerve, infection, scarring, bleeding, prolonged wound healing, incomplete removal, allergy to anesthesia, and recurrence were addressed. Prior to the procedure, the treatment site was clearly identified and confirmed by the patient. All components of Universal Protocol/PAUSE Rule completed.
Consent (Spinal Accessory)/Introductory Paragraph: The rationale for Mohs was explained to the patient and consent was obtained. The risks, benefits and alternatives to therapy were discussed in detail. Specifically, the risks of damage to the spinal accessory nerve, infection, scarring, bleeding, prolonged wound healing, incomplete removal, allergy to anesthesia, and recurrence were addressed. Prior to the procedure, the treatment site was clearly identified and confirmed by the patient. All components of Universal Protocol/PAUSE Rule completed.
Consent (Near Eyelid Margin)/Introductory Paragraph: The rationale for Mohs was explained to the patient and consent was obtained. The risks, benefits and alternatives to therapy were discussed in detail. Specifically, the risks of ectropion or eyelid deformity, infection, scarring, bleeding, prolonged wound healing, incomplete removal, allergy to anesthesia, nerve injury and recurrence were addressed. Prior to the procedure, the treatment site was clearly identified and confirmed by the patient. All components of Universal Protocol/PAUSE Rule completed.
Consent (Ear)/Introductory Paragraph: The rationale for Mohs was explained to the patient and consent was obtained. The risks, benefits and alternatives to therapy were discussed in detail. Specifically, the risks of ear deformity, infection, scarring, bleeding, prolonged wound healing, incomplete removal, allergy to anesthesia, nerve injury and recurrence were addressed. Prior to the procedure, the treatment site was clearly identified and confirmed by the patient. All components of Universal Protocol/PAUSE Rule completed.
Consent (Nose)/Introductory Paragraph: The rationale for Mohs was explained to the patient and consent was obtained. The risks, benefits and alternatives to therapy were discussed in detail. Specifically, the risks of nasal deformity, changes in the flow of air through the nose, infection, scarring, bleeding, prolonged wound healing, incomplete removal, allergy to anesthesia, nerve injury and recurrence were addressed. Prior to the procedure, the treatment site was clearly identified and confirmed by the patient. All components of Universal Protocol/PAUSE Rule completed.
Consent (Lip)/Introductory Paragraph: The rationale for Mohs was explained to the patient and consent was obtained. The risks, benefits and alternatives to therapy were discussed in detail. Specifically, the risks of lip deformity, changes in the oral aperture, infection, scarring, bleeding, prolonged wound healing, incomplete removal, allergy to anesthesia, nerve injury and recurrence were addressed. Prior to the procedure, the treatment site was clearly identified and confirmed by the patient. All components of Universal Protocol/PAUSE Rule completed.
Consent (Scalp)/Introductory Paragraph: The rationale for Mohs was explained to the patient and consent was obtained. The risks, benefits and alternatives to therapy were discussed in detail. Specifically, the risks of changes in hair growth pattern secondary to repair, infection, scarring, bleeding, prolonged wound healing, incomplete removal, allergy to anesthesia, nerve injury and recurrence were addressed. Prior to the procedure, the treatment site was clearly identified and confirmed by the patient. All components of Universal Protocol/PAUSE Rule completed.
Detail Level: Detailed
Postop Diagnosis: same
Additional Anesthesia Volume In Cc: 6
Hemostasis: Electrocautery
Estimated Blood Loss (Cc): minimal
Brow Lift Text: A midfrontal incision was made medially to the defect to allow access to the tissues just superior to the left eyebrow. Following careful dissection inferiorly in a supraperiosteal plane to the level of the left eyebrow, several 3-0 monocryl sutures were used to resuspend the eyebrow orbicularis oculi muscular unit to the superior frontal bone periosteum. This resulted in an appropriate reapproximation of static eyebrow symmetry and correction of the left brow ptosis.
Deep Sutures: 5-0 Vicryl
Epidermal Sutures: 6-0 Ethilon
Epidermal Closure: simple interrupted
Suturegard Intro: Intraoperative tissue expansion was performed, utilizing the SUTUREGARD device, in order to reduce wound tension.
Suturegard Body: The suture ends were repeatedly re-tightened and re-clamped to achieve the desired tissue expansion.
Hemigard Intro: Due to skin fragility and wound tension, it was decided to use HEMIGARD adhesive retention suture devices to permit a linear closure. The skin was cleaned and dried for a 6cm distance away from the wound. Excessive hair, if present, was removed to allow for adhesion.
Hemigard Postcare Instructions: The HEMIGARD strips are to remain completely dry for at least 5-7 days.
Donor Site Anesthesia Type: same as repair anesthesia
Graft Donor Site Bandage (Optional-Leave Blank If You Don't Want In Note): Steri-strips and a pressure bandage were applied to the donor site.
Closure 2 Information: This tab is for additional flaps and grafts, including complex repair and grafts and complex repair and flaps. You can also specify a different location for the additional defect, if the location is the same you do not need to select a new one. We will insert the automated text for the repair you select below just as we do for solitary flaps and grafts. Please note that at this time if you select a location with a different insurance zone you will need to override the ICD10 and CPT if appropriate.
Closure 3 Information: This tab is for additional flaps and grafts above and beyond our usual structured repairs.  Please note if you enter information here it will not currently bill and you will need to add the billing information manually.
Wound Care: Petrolatum
Dressing: dry sterile dressing
Unna Boot Text: An Unna boot was placed to help immobilize the limb and facilitate more rapid healing.
Home Suture Removal Text: Patient was provided instructions on removing sutures and will remove their sutures at home.  If they have any questions or difficulties they will call the office.
Post-Care Instructions: I reviewed with the patient in detail post-care instructions. Patient is not to engage in any heavy lifting, exercise, or swimming for the next 14 days. Should the patient develop any fevers, chills, bleeding, severe pain patient will contact the office immediately.
Pain Refusal Text: I offered to prescribe pain medication but the patient refused to take this medication.
Mauc Instructions: By selecting yes to the question below the MAUC number will be added into the note.  This will be calculated automatically based on the diagnosis chosen, the size entered, the body zone selected (H,M,L) and the specific indications you chose. You will also have the option to override the Mohs AUC if you disagree with the automatically calculated number and this option is found in the Case Summary tab.
Where Do You Want The Question To Include Opioid Counseling Located?: Case Summary Tab
Eye Protection Verbiage: Before proceeding with the stage, a plastic scleral shield was inserted. The globe was anesthetized with a few drops of 1% lidocaine with 1:100,000 epinephrine. Then, an appropriate sized scleral shield was chosen and coated with lacrilube ointment. The shield was gently inserted and left in place for the duration of each stage. After the stage was completed, the shield was gently removed.
Mohs Method Verbiage: An incision at a 45 degree angle following the standard Mohs approach was done and the specimen was harvested as a microscopic controlled layer.
Surgeon/Pathologist Verbiage (Will Incorporate Name Of Surgeon From Intro If Not Blank): operated in two distinct and integrated capacities as the surgeon and pathologist.
Mohs Histo Method Verbiage: Each section was then chromacoded and processed in the Mohs lab using the\\nMohs protocol and submitted for tissue processing.
Subsequent Stages Histo Method Verbiage: Using a similar technique to that described above, a thin layer of tissue was removed from all areas where tumor was visible on the previous stage.  The tissue was again oriented, mapped, dyed, and processed as above.
Mohs Rapid Report Verbiage: The area of clinically evident tumor was marked with skin marking ink and appropriately hatched.  The initial incision was made following the Mohs approach through the skin.  The specimen was taken to the lab, divided into the necessary number of pieces, chromacoded and processed according to the Mohs protocol.  This was repeated in successive stages until a tumor free defect was achieved.
Complex Repair Preamble Text (Leave Blank If You Do Not Want): Extensive wide undermining was performed.
Intermediate Repair Preamble Text (Leave Blank If You Do Not Want): Undermining was performed with blunt dissection.
Non-Graft Cartilage Fenestration Text: The cartilage was fenestrated with a 2mm punch biopsy to help facilitate healing.
Graft Cartilage Fenestration Text: The cartilage was fenestrated with a 2mm punch biopsy to help facilitate graft survival and healing.
Secondary Intention Text (Leave Blank If You Do Not Want): The defect will heal with secondary intention.
No Repair - Repaired With Adjacent Surgical Defect Text (Leave Blank If You Do Not Want): After obtaining clear surgical margins the defect was repaired concurrently with another surgical defect which was in close approximation.
Adjacent Tissue Transfer Text: The defect edges were debeveled with a #15 scalpel blade. Given the location of the defect and the proximity to free margins an adjacent tissue transfer was deemed most appropriate. Using a sterile surgical marker, an appropriate flap was drawn incorporating the defect and placing the expected incisions within the relaxed skin tension lines where possible. The area thus outlined was incised deep to adipose tissue with a #15 scalpel blade. The skin margins were undermined to an appropriate distance in all directions utilizing iris scissors and carried over to close the primary defect.
Advancement Flap (Single) Text: The defect edges were debeveled with a #15 scalpel blade.  Given the location of the defect and the proximity to free margins a single advancement flap was deemed most appropriate.  Using a sterile surgical marker, an appropriate advancement flap was drawn incorporating the defect and placing the expected incisions within the relaxed skin tension lines where possible.    The area thus outlined was incised deep to adipose tissue with a #15 scalpel blade.  The skin margins were undermined to an appropriate distance in all directions utilizing iris scissors and carried over to close the primary defect.
Advancement Flap (Double) Text: The defect edges were debeveled with a #15 scalpel blade.  Given the location of the defect and the proximity to free margins a double advancement flap was deemed most appropriate.  Using a sterile surgical marker, the appropriate advancement flaps were drawn incorporating the defect and placing the expected incisions within the relaxed skin tension lines where possible.    The area thus outlined was incised deep to adipose tissue with a #15 scalpel blade.  The skin margins were undermined to an appropriate distance in all directions utilizing iris scissors and carried over to close the primary defect.
Burow's Advancement Flap Text: The defect edges were debeveled with a #15 scalpel blade.  Given the location of the defect and the proximity to free margins a Burow's advancement flap was deemed most appropriate.  Using a sterile surgical marker, the appropriate advancement flap was drawn incorporating the defect and placing the expected incisions within the relaxed skin tension lines where possible.    The area thus outlined was incised deep to adipose tissue with a #15 scalpel blade.  The skin margins were undermined to an appropriate distance in all directions utilizing iris scissors and carried over to close the primary defect.
Chonodrocutaneous Helical Advancement Flap Text: The defect edges were debeveled with a #15 scalpel blade. Given the location of the defect and the proximity to free margins a chondrocutaneous helical advancement flap was deemed most appropriate. Using a sterile surgical marker, the appropriate advancement flap was drawn incorporating the defect and placing the expected incisions within the relaxed skin tension lines where possible. The area thus outlined was incised deep to adipose tissue with a #15 scalpel blade. The skin margins were undermined to an appropriate distance in all directions utilizing iris scissors. Following this, the designed flap was advanced and carried over into the primary defect and sutured into place.
Crescentic Advancement Flap Text: The defect edges were debeveled with a #15 scalpel blade.  Given the location of the defect and the proximity to free margins a crescentic advancement flap was deemed most appropriate.  Using a sterile surgical marker, the appropriate advancement flap was drawn incorporating the defect and placing the expected incisions within the relaxed skin tension lines where possible.    The area thus outlined was incised deep to adipose tissue with a #15 scalpel blade.  The skin margins were undermined to an appropriate distance in all directions utilizing iris scissors and carried over to close the primary defect.
A-T Advancement Flap Text: The defect edges were debeveled with a #15 scalpel blade.  Given the location of the defect, shape of the defect and the proximity to free margins an A-T advancement flap was deemed most appropriate.  Using a sterile surgical marker, an appropriate advancement flap was drawn incorporating the defect and placing the expected incisions within the relaxed skin tension lines where possible.    The area thus outlined was incised deep to adipose tissue with a #15 scalpel blade.  The skin margins were undermined to an appropriate distance in all directions utilizing iris scissors and carried over to close the primary defect.
O-T Advancement Flap Text: The defect edges were debeveled with a #15 scalpel blade.  Given the location of the defect, shape of the defect and the proximity to free margins an O-T advancement flap was deemed most appropriate.  Using a sterile surgical marker, an appropriate advancement flap was drawn incorporating the defect and placing the expected incisions within the relaxed skin tension lines where possible.    The area thus outlined was incised deep to adipose tissue with a #15 scalpel blade. The skin margins were undermined to an appropriate distance in all directions utilizing iris scissors and carried over to close the primary defect.
O-L Flap Text: The defect edges were debeveled with a #15 scalpel blade.  Given the location of the defect, shape of the defect and the proximity to free margins an O-L flap was deemed most appropriate.  Using a sterile surgical marker, an appropriate advancement flap was drawn incorporating the defect and placing the expected incisions within the relaxed skin tension lines where possible.    The area thus outlined was incised deep to adipose tissue with a #15 scalpel blade.  The skin margins were undermined to an appropriate distance in all directions utilizing iris scissors and carried over to close the primary defect.
O-Z Flap Text: The defect edges were debeveled with a #15 scalpel blade. Given the location of the defect, shape of the defect and the proximity to free margins an O-Z flap was deemed most appropriate. Using a sterile surgical marker, an appropriate transposition flap was drawn incorporating the defect and placing the expected incisions within the relaxed skin tension lines where possible. The area thus outlined was incised deep to adipose tissue with a #15 scalpel blade. The skin margins were undermined to an appropriate distance in all directions utilizing iris scissors. Following this, the designed flap was carried over into the primary defect and sutured into place.
Double O-Z Flap Text: The defect edges were debeveled with a #15 scalpel blade. Given the location of the defect, shape of the defect and the proximity to free margins a Double O-Z flap was deemed most appropriate. Using a sterile surgical marker, an appropriate transposition flap was drawn incorporating the defect and placing the expected incisions within the relaxed skin tension lines where possible. The area thus outlined was incised deep to adipose tissue with a #15 scalpel blade. The skin margins were undermined to an appropriate distance in all directions utilizing iris scissors. Following this, the designed flap was carried over into the primary defect and sutured into place.
V-Y Flap Text: The defect edges were debeveled with a #15 scalpel blade.  Given the location of the defect, shape of the defect and the proximity to free margins a V-Y flap was deemed most appropriate.  Using a sterile surgical marker, an appropriate advancement flap was drawn incorporating the defect and placing the expected incisions within the relaxed skin tension lines where possible.    The area thus outlined was incised deep to adipose tissue with a #15 scalpel blade. The skin margins were undermined to an appropriate distance in all directions utilizing iris scissors and carried over to close the primary defect.
Advancement-Rotation Flap Text: The defect edges were debeveled with a #15 scalpel blade.  Given the location of the defect, shape of the defect and the proximity to free margins an advancement-rotation flap was deemed most appropriate.  Using a sterile surgical marker, an appropriate flap was drawn incorporating the defect and placing the expected incisions within the relaxed skin tension lines where possible. The area thus outlined was incised deep to adipose tissue with a #15 scalpel blade.  The skin margins were undermined to an appropriate distance in all directions utilizing iris scissors and carried over to close the primary defect.
Mercedes Flap Text: The defect edges were debeveled with a #15 scalpel blade.  Given the location of the defect, shape of the defect and the proximity to free margins a Mercedes flap was deemed most appropriate.  Using a sterile surgical marker, an appropriate advancement flap was drawn incorporating the defect and placing the expected incisions within the relaxed skin tension lines where possible. The area thus outlined was incised deep to adipose tissue with a #15 scalpel blade.  The skin margins were undermined to an appropriate distance in all directions utilizing iris scissors and carried over to close the primary defect.
Modified Advancement Flap Text: The defect edges were debeveled with a #15 scalpel blade.  Given the location of the defect, shape of the defect and the proximity to free margins a modified advancement flap was deemed most appropriate.  Using a sterile surgical marker, an appropriate advancement flap was drawn incorporating the defect and placing the expected incisions within the relaxed skin tension lines where possible.    The area thus outlined was incised deep to adipose tissue with a #15 scalpel blade.  The skin margins were undermined to an appropriate distance in all directions utilizing iris scissors and carried over to close the primary defect.
Mucosal Advancement Flap Text: Given the location of the defect, shape of the defect and the proximity to free margins a mucosal advancement flap was deemed most appropriate. Incisions were made with a 15 blade scalpel in the appropriate fashion along the cutaneous vermilion border and the mucosal lip. The remaining actinically damaged mucosal tissue was excised.  The mucosal advancement flap was then elevated to the gingival sulcus with care taken to preserve the neurovascular structures and advanced into the primary defect. Care was taken to ensure that precise realignment of the vermilion border was achieved. The skin margins were undermined to an appropriate distance in all directions utilizing iris scissors and carried over to close the primary defect.
Peng Advancement Flap Text: The defect edges were debeveled with a #15 scalpel blade. Given the location of the defect, shape of the defect and the proximity to free margins a Peng advancement flap was deemed most appropriate. Using a sterile surgical marker, an appropriate advancement flap was drawn incorporating the defect and placing the expected incisions within the relaxed skin tension lines where possible. The area thus outlined was incised deep to adipose tissue with a #15 scalpel blade. The skin margins were undermined to an appropriate distance in all directions utilizing iris scissors. Following this, the designed flap was advanced and carried over into the primary defect and sutured into place.
Hatchet Flap Text: The defect edges were debeveled with a #15 scalpel blade.  Given the location of the defect, shape of the defect and the proximity to free margins a hatchet flap was deemed most appropriate.  Using a sterile surgical marker, an appropriate hatchet flap was drawn incorporating the defect and placing the expected incisions within the relaxed skin tension lines where possible.    The area thus outlined was incised deep to adipose tissue with a #15 scalpel blade.  The skin margins were undermined to an appropriate distance in all directions utilizing iris scissors and carried over to close the primary defect.
Rotation Flap Text: The defect edges were debeveled with a #15 scalpel blade.  Given the location of the defect, shape of the defect and the proximity to free margins a rotation flap was deemed most appropriate.  Using a sterile surgical marker, an appropriate rotation flap was drawn incorporating the defect and placing the expected incisions within the relaxed skin tension lines where possible.    The area thus outlined was incised deep to adipose tissue with a #15 scalpel blade.The skin margins were undermined to an appropriate distance in all directions utilizing iris scissors and carried over to close the primary defect.
Bilateral Rotation Flap Text: The defect edges were debeveled with a #15 scalpel blade. Given the location of the defect, shape of the defect and the proximity to free margins a bilateral rotation flap was deemed most appropriate. Using a sterile surgical marker, an appropriate rotation flap was drawn incorporating the defect and placing the expected incisions within the relaxed skin tension lines where possible. The area thus outlined was incised deep to adipose tissue with a #15 scalpel blade. The skin margins were undermined to an appropriate distance in all directions utilizing iris scissors. Following this, the designed flap was carried over into the primary defect and sutured into place.
Spiral Flap Text: The defect edges were debeveled with a #15 scalpel blade.  Given the location of the defect, shape of the defect and the proximity to free margins a spiral flap was deemed most appropriate.  Using a sterile surgical marker, an appropriate rotation flap was drawn incorporating the defect and placing the expected incisions within the relaxed skin tension lines where possible. The area thus outlined was incised deep to adipose tissue with a #15 scalpel blade.  The skin margins were undermined to an appropriate distance in all directions utilizing iris scissors and carried over to close the primary defect.
Staged Advancement Flap Text: The defect edges were debeveled with a #15 scalpel blade. Given the location of the defect, shape of the defect and the proximity to free margins a staged advancement flap was deemed most appropriate. Using a sterile surgical marker, an appropriate advancement flap was drawn incorporating the defect and placing the expected incisions within the relaxed skin tension lines where possible. The area thus outlined was incised deep to adipose tissue with a #15 scalpel blade. The skin margins were undermined to an appropriate distance in all directions utilizing iris scissors. Following this, the designed flap was carried over into the primary defect and sutured into place.
Star Wedge Flap Text: The defect edges were debeveled with a #15 scalpel blade.  Given the location of the defect, shape of the defect and the proximity to free margins a star wedge flap was deemed most appropriate.  Using a sterile surgical marker, an appropriate rotation flap was drawn incorporating the defect and placing the expected incisions within the relaxed skin tension lines where possible. The area thus outlined was incised deep to adipose tissue with a #15 scalpel blade.  The skin margins were undermined to an appropriate distance in all directions utilizing iris scissors and carried over to close the primary defect.
Transposition Flap Text: The defect edges were debeveled with a #15 scalpel blade.  Given the location of the defect and the proximity to free margins a transposition flap was deemed most appropriate.  Using a sterile surgical marker, an appropriate transposition flap was drawn incorporating the defect.    The area thus outlined was incised deep to adipose tissue with a #15 scalpel blade.  The skin margins were undermined to an appropriate distance in all directions utilizing iris scissors and carried over to close the primary defect.
Muscle Hinge Flap Text: The defect edges were debeveled with a #15 scalpel blade.  Given the size, depth and location of the defect and the proximity to free margins a muscle hinge flap was deemed most appropriate.  Using a sterile surgical marker, an appropriate hinge flap was drawn incorporating the defect. The area thus outlined was incised with a #15 scalpel blade.  The skin margins were undermined to an appropriate distance in all directions utilizing iris scissors and carried over to close the primary defect.
Mustarde Flap Text: The defect edges were debeveled with a #15 scalpel blade.  Given the size, depth and location of the defect and the proximity to free margins a Mustarde flap was deemed most appropriate. Using a sterile surgical marker, an appropriate flap was drawn incorporating the defect. The area thus outlined was incised with a #15 scalpel blade. The skin margins were undermined to an appropriate distance in all directions utilizing iris scissors. Following this, the designed flap was carried into the primary defect and sutured into place.
Nasal Turnover Hinge Flap Text: The defect edges were debeveled with a #15 scalpel blade.  Given the size, depth, location of the defect and the defect being full thickness a nasal turnover hinge flap was deemed most appropriate. Using a sterile surgical marker, an appropriate hinge flap was drawn incorporating the defect. The area thus outlined was incised with a #15 scalpel blade. The flap was designed to recreate the nasal mucosal lining and the alar rim. The skin margins were undermined to an appropriate distance in all directions utilizing iris scissors. Following this, the designed flap was carried over into the primary defect and sutured into place
Nasalis-Muscle-Based Myocutaneous Island Pedicle Flap Text: Using a #15 blade, an incision was made around the donor flap to the level of the nasalis muscle. Wide lateral undermining was then performed in both the subcutaneous plane above the nasalis muscle, and in a submuscular plane just above periosteum. This allowed the formation of a free nasalis muscle axial pedicle (based on the angular artery) which was still attached to the actual cutaneous flap, increasing its mobility and vascular viability. Hemostasis was obtained with pinpoint electrocoagulation. The flap was mobilized into position and the pivotal anchor points positioned and stabilized with buried interrupted sutures. Subcutaneous and dermal tissues were closed in a multilayered fashion with sutures. Tissue redundancies were excised, and the epidermal edges were apposed without significant tension and sutured with sutures. The skin margins were undermined to an appropriate distance in all directions utilizing iris scissors and carried over to close the primary defect.
Nasalis Myocutaneous Flap Text: Using a #15 blade, an incision was made around the donor flap to the level of the nasalis muscle. Wide lateral undermining was then performed in both the subcutaneous plane above the nasalis muscle, and in a submuscular plane just above periosteum. This allowed the formation of a free nasalis muscle axial pedicle which was still attached to the actual cutaneous flap, increasing its mobility and vascular viability. Hemostasis was obtained with pinpoint electrocoagulation. The flap was mobilized into position and the pivotal anchor points positioned and stabilized with buried interrupted sutures. Subcutaneous and dermal tissues were closed in a multilayered fashion with sutures. Tissue redundancies were excised, and the epidermal edges were apposed without significant tension and sutured with sutures.
Orbicularis Oris Muscle Flap Text: The defect edges were debeveled with a #15 scalpel blade.  Given that the defect affected the competency of the oral sphincter an orbicularis oris muscle flap was deemed most appropriate to restore this competency and normal muscle function.  Using a sterile surgical marker, an appropriate flap was drawn incorporating the defect. The area thus outlined was incised with a #15 scalpel blade. Following this, the designed flap was carried over into the primary defect and sutured into place.
Melolabial Transposition Flap Text: The defect edges were debeveled with a #15 scalpel blade.  Given the location of the defect and the proximity to free margins a melolabial flap was deemed most appropriate.  Using a sterile surgical marker, an appropriate melolabial transposition flap was drawn incorporating the defect.    The area thus outlined was incised deep to adipose tissue with a #15 scalpel blade. The skin margins were undermined to an appropriate distance in all directions utilizing iris scissors and carried over to close the primary defect.
Rectangular Flap Text: The defect edges were debeveled with a #15 scalpel blade. Given the location of the defect and the proximity to free margins a rectangular flap was deemed most appropriate. Using a sterile surgical marker, an appropriate rectangular flap was drawn incorporating the defect. The area thus outlined was incised deep to adipose tissue with a #15 scalpel blade. The skin margins were undermined to an appropriate distance in all directions utilizing iris scissors. Following this, the designed flap was carried over into the primary defect and sutured into place.
Rhombic Flap Text: The defect edges were debeveled with a #15 scalpel blade.  Given the location of the defect and the proximity to free margins a rhombic flap was deemed most appropriate.  Using a sterile surgical marker, an appropriate rhombic flap was drawn incorporating the defect.    The area thus outlined was incised deep to adipose tissue with a #15 scalpel blade.  The skin margins were undermined to an appropriate distance in all directions utilizing iris scissors and carried over to close the primary defect.
Rhomboid Transposition Flap Text: The defect edges were debeveled with a #15 scalpel blade. Given the location of the defect and the proximity to free margins a rhomboid transposition flap was deemed most appropriate. Using a sterile surgical marker, an appropriate rhomboid flap was drawn incorporating the defect. The area thus outlined was incised deep to adipose tissue with a #15 scalpel blade. The skin margins were undermined to an appropriate distance in all directions utilizing iris scissors. Following this, the designed flap was carried over into the primary defect and sutured into place.
Bi-Rhombic Flap Text: The defect edges were debeveled with a #15 scalpel blade.  Given the location of the defect and the proximity to free margins a bi-rhombic flap was deemed most appropriate.  Using a sterile surgical marker, an appropriate rhombic flap was drawn incorporating the defect. The area thus outlined was incised deep to adipose tissue with a #15 scalpel blade. The skin margins were undermined to an appropriate distance in all directions utilizing iris scissors and carried over to close the primary defect.
Helical Rim Advancement Flap Text: The defect edges were debeveled with a #15 blade scalpel.  Given the location of the defect and the proximity to free margins (helical rim) a double helical rim advancement flap was deemed most appropriate.  Using a sterile surgical marker, the appropriate advancement flaps were drawn incorporating the defect and placing the expected incisions between the helical rim and antihelix where possible.  The area thus outlined was incised through and through with a #15 scalpel blade.  With a skin hook and iris scissors, the flaps were gently and sharply undermined and freed up.The skin margins were undermined to an appropriate distance in all directions utilizing iris scissors and carried over to close the primary defect.
Bilateral Helical Rim Advancement Flap Text: The defect edges were debeveled with a #15 blade scalpel.  Given the location of the defect and the proximity to free margins (helical rim) a bilateral helical rim advancement flap was deemed most appropriate.  Using a sterile surgical marker, the appropriate advancement flaps were drawn incorporating the defect and placing the expected incisions between the helical rim and antihelix where possible.  The area thus outlined was incised through and through with a #15 scalpel blade.  With a skin hook and iris scissors, the flaps were gently and sharply undermined and freed up. The skin margins were undermined to an appropriate distance in all directions utilizing iris scissors and carried over to close the primary defect.
Ear Star Wedge Flap Text: The defect edges were debeveled with a #15 blade scalpel.  Given the location of the defect and the proximity to free margins (helical rim) an ear star wedge flap was deemed most appropriate.  Using a sterile surgical marker, the appropriate flap was drawn incorporating the defect and placing the expected incisions between the helical rim and antihelix where possible.  The area thus outlined was incised through and through with a #15 scalpel blade. The skin margins were undermined to an appropriate distance in all directions utilizing iris scissors and carried over to close the primary defect.
Banner Transposition Flap Text: The defect edges were debeveled with a #15 scalpel blade.  Given the location of the defect and the proximity to free margins a Banner transposition flap was deemed most appropriate.  Using a sterile surgical marker, an appropriate flap drawn around the defect. The area thus outlined was incised deep to adipose tissue with a #15 scalpel blade.  The skin margins were undermined to an appropriate distance in all directions utilizing iris scissors and carried over to close the primary defect.
Bilobed Flap Text: The defect edges were debeveled with a #15 scalpel blade.  Given the location of the defect and the proximity to free margins a bilobe flap was deemed most appropriate.  Using a sterile surgical marker, an appropriate bilobe flap drawn around the defect.    The area thus outlined was incised deep to adipose tissue with a #15 scalpel blade.  The skin margins were undermined to an appropriate distance in all directions utilizing iris scissors and carried over to close the primary defect.
Bilobed Transposition Flap Text: The defect edges were debeveled with a #15 scalpel blade.  Given the location of the defect and the proximity to free margins a bilobed transposition flap was deemed most appropriate.  Using a sterile surgical marker, an appropriate bilobe flap drawn around the defect.    The area thus outlined was incised deep to adipose tissue with a #15 scalpel blade.  The skin margins were undermined to an appropriate distance in all directions utilizing iris scissors and carried over to close the primary defect.
Trilobed Flap Text: The defect edges were debeveled with a #15 scalpel blade.  Given the location of the defect and the proximity to free margins a trilobed flap was deemed most appropriate.  Using a sterile surgical marker, an appropriate trilobed flap drawn around the defect.    The area thus outlined was incised deep to adipose tissue with a #15 scalpel blade.  The skin margins were undermined to an appropriate distance in all directions utilizing iris scissors and carried over to close the primary defect.
Dorsal Nasal Flap Text: The defect edges were debeveled with a #15 scalpel blade.  Given the location of the defect and the proximity to free margins a dorsal nasal flap was deemed most appropriate.  Using a sterile surgical marker, an appropriate dorsal nasal flap was drawn around the defect.    The area thus outlined was incised deep to adipose tissue with a #15 scalpel blade.  The skin margins were undermined to an appropriate distance in all directions utilizing iris scissors and carried over to close the primary defect.
Island Pedicle Flap Text: The defect edges were debeveled with a #15 scalpel blade.  Given the location of the defect, shape of the defect and the proximity to free margins an island pedicle advancement flap was deemed most appropriate.  Using a sterile surgical marker, an appropriate advancement flap was drawn incorporating the defect, outlining the appropriate donor tissue and placing the expected incisions within the relaxed skin tension lines where possible.    The area thus outlined was incised deep to adipose tissue with a #15 scalpel blade.  The skin margins were undermined to an appropriate distance in all directions around the primary defect and laterally outward around the island pedicle utilizing iris scissors.  There was minimal undermining beneath the pedicle flap. The skin margins were undermined to an appropriate distance in all directions utilizing iris scissors and carried over to close the primary defect.
Island Pedicle Flap With Canthal Suspension Text: The defect edges were debeveled with a #15 scalpel blade.  Given the location of the defect, shape of the defect and the proximity to free margins an island pedicle advancement flap was deemed most appropriate.  Using a sterile surgical marker, an appropriate advancement flap was drawn incorporating the defect, outlining the appropriate donor tissue and placing the expected incisions within the relaxed skin tension lines where possible. The area thus outlined was incised deep to adipose tissue with a #15 scalpel blade.  The skin margins were undermined to an appropriate distance in all directions around the primary defect and laterally outward around the island pedicle utilizing iris scissors.  There was minimal undermining beneath the pedicle flap. A suspension suture was placed in the canthal tendon to prevent tension and prevent ectropion. The skin margins were undermined to an appropriate distance in all directions utilizing iris scissors and carried over to close the primary defect.
Alar Island Pedicle Flap Text: The defect edges were debeveled with a #15 scalpel blade.  Given the location of the defect, shape of the defect and the proximity to the alar rim an island pedicle advancement flap was deemed most appropriate.  Using a sterile surgical marker, an appropriate advancement flap was drawn incorporating the defect, outlining the appropriate donor tissue and placing the expected incisions within the nasal ala running parallel to the alar rim. The area thus outlined was incised with a #15 scalpel blade.  The skin margins were undermined minimally to an appropriate distance in all directions around the primary defect and laterally outward around the island pedicle utilizing iris scissors.  There was minimal undermining beneath the pedicle flap. The skin margins were undermined to an appropriate distance in all directions utilizing iris scissors and carried over to close the primary defect.
Double Island Pedicle Flap Text: The defect edges were debeveled with a #15 scalpel blade.  Given the location of the defect, shape of the defect and the proximity to free margins a double island pedicle advancement flap was deemed most appropriate.  Using a sterile surgical marker, an appropriate advancement flap was drawn incorporating the defect, outlining the appropriate donor tissue and placing the expected incisions within the relaxed skin tension lines where possible.    The area thus outlined was incised deep to adipose tissue with a #15 scalpel blade.  The skin margins were undermined to an appropriate distance in all directions around the primary defect and laterally outward around the island pedicle utilizing iris scissors.  There was minimal undermining beneath the pedicleThe skin margins were undermined to an appropriate distance in all directions utilizing iris scissors and carried over to close the primary defect. flap.
Island Pedicle Flap-Requiring Vessel Identification Text: The defect edges were debeveled with a #15 scalpel blade.  Given the location of the defect, shape of the defect and the proximity to free margins an island pedicle advancement flap was deemed most appropriate.  Using a sterile surgical marker, an appropriate advancement flap was drawn, based on the axial vessel mentioned above, incorporating the defect, outlining the appropriate donor tissue and placing the expected incisions within the relaxed skin tension lines where possible.    The area thus outlined was incised deep to adipose tissue with a #15 scalpel blade.  The skin margins were undermined to an appropriate distance in all directions around the primary defect and laterally outward around the island pedicle utilizing iris scissors.  There was minimal undermining beneath the pedicle flap.
Keystone Flap Text: The defect edges were debeveled with a #15 scalpel blade.  Given the location of the defect, shape of the defect a keystone flap was deemed most appropriate.  Using a sterile surgical marker, an appropriate keystone flap was drawn incorporating the defect, outlining the appropriate donor tissue and placing the expected incisions within the relaxed skin tension lines where possible. The area thus outlined was incised deep to adipose tissue with a #15 scalpel blade.  The skin margins were undermined to an appropriate distance in all directions around the primary defect and laterally outward around the flap utilizing iris scissors.
O-T Plasty Text: The defect edges were debeveled with a #15 scalpel blade.  Given the location of the defect, shape of the defect and the proximity to free margins an O-T plasty was deemed most appropriate.  Using a sterile surgical marker, an appropriate O-T plasty was drawn incorporating the defect and placing the expected incisions within the relaxed skin tension lines where possible.    The area thus outlined was incised deep to adipose tissue with a #15 scalpel blade.  The skin margins were undermined to an appropriate distance in all directions utilizing iris scissors.
O-Z Plasty Text: The defect edges were debeveled with a #15 scalpel blade.  Given the location of the defect, shape of the defect and the proximity to free margins an O-Z plasty (double transposition flap) was deemed most appropriate.  Using a sterile surgical marker, the appropriate transposition flaps were drawn incorporating the defect and placing the expected incisions within the relaxed skin tension lines where possible.    The area thus outlined was incised deep to adipose tissue with a #15 scalpel blade.  The skin margins were undermined to an appropriate distance in all directions utilizing iris scissors.  Hemostasis was achieved with electrocautery.  The flaps were then transposed into place, one clockwise and the other counterclockwise, and anchored with interrupted buried subcutaneous sutures. The skin margins were undermined to an appropriate distance in all directions utilizing iris scissors and carried over to close the primary defect.
Double O-Z Plasty Text: The defect edges were debeveled with a #15 scalpel blade. Given the location of the defect, shape of the defect and the proximity to free margins a Double O-Z plasty (double transposition flap) was deemed most appropriate. Using a sterile surgical marker, the appropriate transposition flaps were drawn incorporating the defect and placing the expected incisions within the relaxed skin tension lines where possible. The area thus outlined was incised deep to adipose tissue with a #15 scalpel blade. The skin margins were undermined to an appropriate distance in all directions utilizing iris scissors. Hemostasis was achieved with electrocautery. The flaps were then transposed and carried over into place, one clockwise and the other counterclockwise, and anchored with interrupted buried subcutaneous sutures.\\nThe skin margins were undermined to an appropriate distance in all directions utilizing iris scissors and carried over to close the primary defect.
V-Y Plasty Text: The defect edges were debeveled with a #15 scalpel blade.  Given the location of the defect, shape of the defect and the proximity to free margins an V-Y advancement flap was deemed most appropriate.  Using a sterile surgical marker, an appropriate advancement flap was drawn incorporating the defect and placing the expected incisions within the relaxed skin tension lines where possible.    The area thus outlined was incised deep to adipose tissue with a #15 scalpel blade. The skin margins were undermined to an appropriate distance in all directions utilizing iris scissors and carried over to close the primary defect.
H Plasty Text: Given the location of the defect, shape of the defect and the proximity to free margins a H-plasty was deemed most appropriate for repair.  Using a sterile surgical marker, the appropriate advancement arms of the H-plasty were drawn incorporating the defect and placing the expected incisions within the relaxed skin tension lines where possible. The area thus outlined was incised deep to adipose tissue with a #15 scalpel blade. The skin margins were undermined to an appropriate distance in all directions utilizing iris scissors.  The opposing advancement arms were then advanced into place in opposite direction and anchored with interrupted buried subcutaneous sutures.  The skin margins were undermined to an appropriate distance in all directions utilizing iris scissors and carried over to close the primary defect.
W Plasty Text: The lesion was extirpated to the level of the fat with a #15 scalpel blade.  Given the location of the defect, shape of the defect and the proximity to free margins a W-plasty was deemed most appropriate for repair.  Using a sterile surgical marker, the appropriate transposition arms of the W-plasty were drawn incorporating the defect and placing the expected incisions within the relaxed skin tension lines where possible.    The area thus outlined was incised deep to adipose tissue with a #15 scalpel blade.  The skin margins were undermined to an appropriate distance in all directions utilizing iris scissors.  The opposing transposition arms were then transposed into place in opposite direction and anchored with interrupted buried subcutaneous sutures.
Z Plasty Text: The lesion was extirpated to the level of the fat with a #15 scalpel blade.  Given the location of the defect, shape of the defect and the proximity to free margins a Z-plasty was deemed most appropriate for repair.  Using a sterile surgical marker, the appropriate transposition arms of the Z-plasty were drawn incorporating the defect and placing the expected incisions within the relaxed skin tension lines where possible.    The area thus outlined was incised deep to adipose tissue with a #15 scalpel blade.  The skin margins were undermined to an appropriate distance in all directions utilizing iris scissors.  The opposing transposition arms were then transposed into place in opposite direction and anchored with interrupted buried subcutaneous sutures.  The skin margins were undermined to an appropriate distance in all directions utilizing iris scissors and carried over to close the primary defect.
Double Z Plasty Text: The lesion was extirpated to the level of the fat with a #15 scalpel blade. Given the location of the defect, shape of the defect and the proximity to free margins a double Z-plasty was deemed most appropriate for repair. Using a sterile surgical marker, the appropriate transposition arms of the double Z-plasty were drawn incorporating the defect and placing the expected incisions within the relaxed skin tension lines where possible. The area thus outlined was incised deep to adipose tissue with a #15 scalpel blade. The skin margins were undermined to an appropriate distance in all directions utilizing iris scissors. The opposing transposition arms were then transposed and carried over into place in opposite direction and anchored with interrupted buried subcutaneous sutures.
Zygomaticofacial Flap Text: Given the location of the defect, shape of the defect and the proximity to free margins a zygomaticofacial flap was deemed most appropriate for repair. Using a sterile surgical marker, the appropriate flap was drawn incorporating the defect and placing the expected incisions within the relaxed skin tension lines where possible. The area thus outlined was incised deep to adipose tissue with a #15 scalpel blade with preservation of a vascular pedicle.  The skin margins were undermined to an appropriate distance in all directions utilizing iris scissors. The flap was then carried over into the defect and anchored with interrupted buried subcutaneous sutures.
Cheek Interpolation Flap Text: A decision was made to reconstruct the defect utilizing an interpolation axial flap and a staged reconstruction.  A telfa template was made of the defect.  This telfa template was then used to outline the Cheek Interpolation flap.  The donor area for the pedicle flap was then injected with anesthesia.  The flap was excised through the skin and subcutaneous tissue down to the layer of the underlying musculature.  The interpolation flap was carefully excised within this deep plane to maintain its blood supply.  The edges of the donor site were undermined.   The donor site was closed in a primary fashion.  The pedicle was then rotated into position and sutured.  Once the tube was sutured into place, adequate blood supply was confirmed with blanching and refill.  The pedicle was then wrapped with xeroform gauze and dressed appropriately with a telfa and gauze bandage to ensure continued blood supply and protect the attached pedicle.  The skin margins were undermined to an appropriate distance in all directions utilizing iris scissors and carried over to close the primary defect.
Cheek-To-Nose Interpolation Flap Text: A decision was made to reconstruct the defect utilizing an interpolation axial flap and a staged reconstruction.  A telfa template was made of the defect.  This telfa template was then used to outline the Cheek-To-Nose Interpolation flap.  The donor area for the pedicle flap was then injected with anesthesia.  The flap was excised through the skin and subcutaneous tissue down to the layer of the underlying musculature.  The interpolation flap was carefully excised within this deep plane to maintain its blood supply.  The edges of the donor site were undermined.   The donor site was closed in a primary fashion.  The pedicle was then rotated into position and sutured.  Once the tube was sutured into place, adequate blood supply was confirmed with blanching and refill.  The pedicle was then wrapped with xeroform gauze and dressed appropriately with a telfa and gauze bandage to ensure continued blood supply and protect the attached pedicle. The skin margins were undermined to an appropriate distance in all directions utilizing iris scissors and carried over to close the primary defect.
Interpolation Flap Text: A decision was made to reconstruct the defect utilizing an interpolation axial flap and a staged reconstruction.  A telfa template was made of the defect.  This telfa template was then used to outline the interpolation flap.  The donor area for the pedicle flap was then injected with anesthesia.  The flap was excised through the skin and subcutaneous tissue down to the layer of the underlying musculature.  The interpolation flap was carefully excised within this deep plane to maintain its blood supply.  The edges of the donor site were undermined.   The donor site was closed in a primary fashion.  The pedicle was then rotated into position and sutured.  Once the tube was sutured into place, adequate blood supply was confirmed with blanching and refill.  The pedicle was then wrapped with xeroform gauze and dressed appropriately with a telfa and gauze bandage to ensure continued blood supply and protect the attached pedicle.The skin margins were undermined to an appropriate distance in all directions utilizing iris scissors and carried over to close the primary defect.
Melolabial Interpolation Flap Text: A decision was made to reconstruct the defect utilizing an interpolation axial flap and a staged reconstruction.  A telfa template was made of the defect.  This telfa template was then used to outline the melolabial interpolation flap.  The donor area for the pedicle flap was then injected with anesthesia.  The flap was excised through the skin and subcutaneous tissue down to the layer of the underlying musculature.  The pedicle flap was carefully excised within this deep plane to maintain its blood supply.  The edges of the donor site were undermined.   The donor site was closed in a primary fashion.  The pedicle was then rotated into position and sutured.  Once the tube was sutured into place, adequate blood supply was confirmed with blanching and refill.  The pedicle was then wrapped with xeroform gauze and dressed appropriately with a telfa and gauze bandage to ensure continued blood supply and protect the attached pedicle.The skin margins were undermined to an appropriate distance in all directions utilizing iris scissors and carried over to close the primary defect.
Mastoid Interpolation Flap Text: A decision was made to reconstruct the defect utilizing an interpolation axial flap and a staged reconstruction.  A telfa template was made of the defect.  This telfa template was then used to outline the mastoid interpolation flap.  The donor area for the pedicle flap was then injected with anesthesia.  The flap was excised through the skin and subcutaneous tissue down to the layer of the underlying musculature.  The pedicle flap was carefully excised within this deep plane to maintain its blood supply.  The edges of the donor site were undermined.   The donor site was closed in a primary fashion.  The pedicle was then rotated into position and sutured.  Once the tube was sutured into place, adequate blood supply was confirmed with blanching and refill.  The pedicle was then wrapped with xeroform gauze and dressed appropriately with a telfa and gauze bandage to ensure continued blood supply and protect the attached pedicle.
Posterior Auricular Interpolation Flap Text: A decision was made to reconstruct the defect utilizing an interpolation axial flap and a staged reconstruction.  A telfa template was made of the defect.  This telfa template was then used to outline the posterior auricular interpolation flap.  The donor area for the pedicle flap was then injected with anesthesia.  The flap was excised through the skin and subcutaneous tissue down to the layer of the underlying musculature.  The pedicle flap was carefully excised within this deep plane to maintain its blood supply.  The edges of the donor site were undermined.   The donor site was closed in a primary fashion.  The pedicle was then rotated into position and sutured.  Once the tube was sutured into place, adequate blood supply was confirmed with blanching and refill.  The pedicle was then wrapped with xeroform gauze and dressed appropriately with a telfa and gauze bandage to ensure continued blood supply and protect the attached pedicle.
Paramedian Forehead Flap Text: A decision was made to reconstruct the defect utilizing an interpolation axial flap and a staged reconstruction.  A telfa template was made of the defect.  This telfa template was then used to outline the paramedian forehead pedicle flap.  The donor area for the pedicle flap was then injected with anesthesia.  The flap was excised through the skin and subcutaneous tissue down to the layer of the underlying musculature.  The pedicle flap was carefully excised within this deep plane to maintain its blood supply.  The edges of the donor site were undermined.   The donor site was closed in a primary fashion.  The pedicle was then rotated into position and sutured.  Once the tube was sutured into place, adequate blood supply was confirmed with blanching and refill.  The pedicle was then wrapped with xeroform gauze and dressed appropriately with a telfa and gauze bandage to ensure continued blood supply and protect the attached pedicle.
Abbe Flap (Upper To Lower Lip) Text: The defect of the lower lip was assessed and measured.  Given the location and size of the defect, an Abbe flap was deemed most appropriate. Using a sterile surgical marker, an appropriate Abbe flap was measured and drawn on the upper lip. Local anesthesia was then infiltrated.  A scalpel was then used to incise the upper lip through and through the skin, vermilion, muscle and mucosa, leaving the flap pedicled on the opposite side.  The flap was then rotated and transferred to the lower lip defect.  The flap was then sutured into place with a three layer technique, closing the orbicularis oris muscle layer with subcutaneous buried sutures, followed by a mucosal layer and an epidermal layer.
Abbe Flap (Lower To Upper Lip) Text: The defect of the upper lip was assessed and measured.  Given the location and size of the defect, an Abbe flap was deemed most appropriate. Using a sterile surgical marker, an appropriate Abbe flap was measured and drawn on the lower lip. Local anesthesia was then infiltrated. A scalpel was then used to incise the upper lip through and through the skin, vermilion, muscle and mucosa, leaving the flap pedicled on the opposite side.  The flap was then rotated and transferred to the lower lip defect.  The flap was then sutured into place with a three layer technique, closing the orbicularis oris muscle layer with subcutaneous buried sutures, followed by a mucosal layer and an epidermal layer.
Estlander Flap (Upper To Lower Lip) Text: The defect of the lower lip was assessed and measured.  Given the location and size of the defect, an Estlander flap was deemed most appropriate. Using a sterile surgical marker, an appropriate Estlander flap was measured and drawn on the upper lip. Local anesthesia was then infiltrated. A scalpel was then used to incise the lateral aspect of the flap, through skin, muscle and mucosa, leaving the flap pedicled medially.  The flap was then rotated and positioned to fill the lower lip defect.  The flap was then sutured into place with a three layer technique, closing the orbicularis oris muscle layer with subcutaneous buried sutures, followed by a mucosal layer and an epidermal layer.
Cheiloplasty (Less Than 50%) Text: A decision was made to reconstruct the defect with a  cheiloplasty.  The defect was undermined extensively.  Additional obicularis oris muscle was excised with a 15 blade scalpel.  The defect was converted into a full thickness wedge, of less than 50% of the vertical height of the lip, to facilite a better cosmetic result.  Small vessels were then tied off with 5-0 monocyrl. The obicularis oris, superficial fascia, adipose and dermis were then reapproximated.  After the deeper layers were approximated the epidermis was reapproximated with particular care given to realign the vermilion border.
Cheiloplasty (Complex) Text: A decision was made to reconstruct the defect with a  cheiloplasty.  The defect was undermined extensively.  Additional obicularis oris muscle was excised with a 15 blade scalpel.  The defect was converted into a full thickness wedge to facilite a better cosmetic result.  Small vessels were then tied off with 5-0 monocyrl. The obicularis oris, superficial fascia, adipose and dermis were then reapproximated.  After the deeper layers were approximated the epidermis was reapproximated with particular care given to realign the vermilion border.
Ear Wedge Repair Text: A wedge excision was completed by carrying down an excision through the full thickness of the ear and cartilage with an inward facing Burow's triangle. The wound was then closed in a layered fashion.
Full Thickness Lip Wedge Repair (Flap) Text: Given the location of the defect and the proximity to free margins a full thickness wedge repair was deemed most appropriate.  Using a sterile surgical marker, the appropriate repair was drawn incorporating the defect and placing the expected incisions perpendicular to the vermilion border.  The vermilion border was also meticulously outlined to ensure appropriate reapproximation during the repair.  The area thus outlined was incised through and through with a #15 scalpel blade.  The muscularis and dermis were reaproximated with deep sutures following hemostasis. Care was taken to realign the vermilion border before proceeding with the superficial closure.  Once the vermilion was realigned the superfical and mucosal closure was finished.
Ftsg Text: The defect edges were debeveled with a #15 scalpel blade.  Given the location of the defect, shape of the defect and the proximity to free margins a full thickness skin graft was deemed most appropriate.  Using a sterile surgical marker, the primary defect shape was transferred to the donor site. The area thus outlined was incised deep to adipose tissue with a #15 scalpel blade.  The harvested graft was then trimmed of adipose tissue until only dermis and epidermis was left.  The skin margins of the secondary defect were undermined to an appropriate distance in all directions utilizing iris scissors.  The secondary defect was closed with interrupted buried subcutaneous sutures.  The skin edges were then re-apposed with running  sutures.  The skin graft was then placed in the primary defect and oriented appropriately.
Split-Thickness Skin Graft Text: The defect edges were debeveled with a #15 scalpel blade.  Given the location of the defect, shape of the defect and the proximity to free margins a split thickness skin graft was deemed most appropriate.  Using a sterile surgical marker, the primary defect shape was transferred to the donor site. The split thickness graft was then harvested.  The skin graft was then placed in the primary defect and oriented appropriately.
Pinch Graft Text: The defect edges were debeveled with a #15 scalpel blade. Given the location of the defect, shape of the defect and the proximity to free margins a pinch graft was deemed most appropriate. Using a sterile surgical marker, the primary defect shape was transferred to the donor site. The area thus outlined was incised deep to adipose tissue with a #15 scalpel blade.  The harvested graft was then trimmed of adipose tissue until only dermis and epidermis was left. The skin margins of the secondary defect were undermined to an appropriate distance in all directions utilizing iris scissors.  The secondary defect was closed with interrupted buried subcutaneous sutures.  The skin edges were then re-apposed with running  sutures.  The skin graft was then placed in the primary defect and oriented appropriately.
Burow's Graft Text: The defect edges were debeveled with a #15 scalpel blade. Given the location of the defect, shape of the defect, the proximity to free margins and the presence of a standing cone deformity a Burow's skin graft was deemed most appropriate. The standing cone was removed and this tissue was then trimmed to the shape of the primary defect. The adipose tissue was also removed until only dermis and epidermis were left.  The skin margins of the secondary defect were undermined to an appropriate distance in all directions utilizing iris scissors.  The secondary defect was closed with interrupted buried subcutaneous sutures.  The skin edges were then re-apposed with running  sutures.  The skin graft was then placed in the primary defect and oriented appropriately.
Cartilage Graft Text: The defect edges were debeveled with a #15 scalpel blade.  Given the location of the defect, shape of the defect, the fact the defect involved a full thickness cartilage defect a cartilage graft was deemed most appropriate.  An appropriate donor site was identified, cleansed, and anesthetized. The cartilage graft was then harvested and transferred to the recipient site, oriented appropriately and then sutured into place.  The secondary defect was then repaired using a primary closure.
Composite Graft Text: The defect edges were debeveled with a #15 scalpel blade.  Given the location of the defect, shape of the defect, the proximity to free margins and the fact the defect was full thickness a composite graft was deemed most appropriate.  The defect was outline and then transferred to the donor site.  A full thickness graft was then excised from the donor site. The graft was then placed in the primary defect, oriented appropriately and then sutured into place.  The secondary defect was then repaired using a primary closure.
Epidermal Autograft Text: The defect edges were debeveled with a #15 scalpel blade.  Given the location of the defect, shape of the defect and the proximity to free margins an epidermal autograft was deemed most appropriate.  Using a sterile surgical marker, the primary defect shape was transferred to the donor site. The epidermal graft was then harvested.  The skin graft was then placed in the primary defect and oriented appropriately.
Dermal Autograft Text: The defect edges were debeveled with a #15 scalpel blade.  Given the location of the defect, shape of the defect and the proximity to free margins a dermal autograft was deemed most appropriate.  Using a sterile surgical marker, the primary defect shape was transferred to the donor site. The area thus outlined was incised deep to adipose tissue with a #15 scalpel blade.  The harvested graft was then trimmed of adipose and epidermal tissue until only dermis was left.  The skin graft was then placed in the primary defect and oriented appropriately.
Skin Substitute Text: The defect edges were debeveled with a #15 scalpel blade.  Given the location of the defect, shape of the defect and the proximity to free margins a skin substitute graft was deemed most appropriate.  The graft material was trimmed to fit the size of the defect. The graft was then placed in the primary defect and oriented appropriately.
Tissue Cultured Epidermal Autograft Text: The defect edges were debeveled with a #15 scalpel blade.  Given the location of the defect, shape of the defect and the proximity to free margins a tissue cultured epidermal autograft was deemed most appropriate.  The graft was then trimmed to fit the size of the defect.  The graft was then placed in the primary defect and oriented appropriately.
Xenograft Text: The defect edges were debeveled with a #15 scalpel blade.  Given the location of the defect, shape of the defect and the proximity to free margins a xenograft was deemed most appropriate.  The graft was then trimmed to fit the size of the defect.  The graft was then placed in the primary defect and oriented appropriately.
Purse String (Simple) Text: Given the location of the defect and the characteristics of the surrounding skin a purse string closure was deemed most appropriate.  Undermining was performed circumfirentially around the surgical defect.  A purse string suture was then placed and tightened.
Purse String (Intermediate) Text: Given the location of the defect and the characteristics of the surrounding skin a purse string intermediate closure was deemed most appropriate.  Undermining was performed circumfirentially around the surgical defect.  A purse string suture was then placed and tightened.
Partial Purse String (Simple) Text: Given the location of the defect and the characteristics of the surrounding skin a simple purse string closure was deemed most appropriate.  Undermining was performed circumfirentially around the surgical defect.  A purse string suture was then placed and tightened. Wound tension only allowed a partial closure of the circular defect.
Partial Purse String (Intermediate) Text: Given the location of the defect and the characteristics of the surrounding skin an intermediate purse string closure was deemed most appropriate.  Undermining was performed circumfirentially around the surgical defect.  A purse string suture was then placed and tightened. Wound tension only allowed a partial closure of the circular defect.
Localized Dermabrasion With Wire Brush Text: The patient was draped in routine manner.  Localized dermabrasion using 3 x 17 mm wire brush was performed in routine manner to papillary dermis. This spot dermabrasion is being performed to complete skin cancer reconstruction. It also will eliminate the other sun damaged precancerous cells that are known to be part of the regional effect of a lifetime's worth of sun exposure. This localized dermabrasion is therapeutic and should not be considered cosmetic in any regard.
Tarsorrhaphy Text: A tarsorrhaphy was performed using Frost sutures.
Intermediate Repair And Flap Additional Text (Will Appearing After The Standard Complex Repair Text): The intermediate repair was not sufficient to completely close the primary defect. The remaining additional defect was repaired with the flap mentioned below.
Intermediate Repair And Graft Additional Text (Will Appearing After The Standard Complex Repair Text): The intermediate repair was not sufficient to completely close the primary defect. The remaining additional defect was repaired with the graft mentioned below.
Complex Repair And Flap Additional Text (Will Appearing After The Standard Complex Repair Text): The complex repair was not sufficient to completely close the primary defect. The remaining additional defect was repaired with the flap mentioned below.
Complex Repair And Graft Additional Text (Will Appearing After The Standard Complex Repair Text): The complex repair was not sufficient to completely close the primary defect. The remaining additional defect was repaired with the graft mentioned below.
Eyelid Full Thickness Repair - 75174: The eyelid defect was full thickness which required a wedge repair of the eyelid. Special care was taken to ensure that the eyelid margin was realligned when placing sutures.
Eyelid Partial Thickness Repair - 96904: The eyelid defect was partial thickness which required a wedge repair of the eyelid. Special care was taken to ensure that the eyelid margin was realligned when placing sutures.
Manual Repair Warning Statement: We plan on removing the manually selected variable below in favor of our much easier automatic structured text blocks found in the previous tab. We decided to do this to help make the flow better and give you the full power of structured data. Manual selection is never going to be ideal in our platform and I would encourage you to avoid using manual selection from this point on, especially since I will be sunsetting this feature. It is important that you do one of two things with the customized text below. First, you can save all of the text in a word file so you can have it for future reference. Second, transfer the text to the appropriate area in the Library tab. Lastly, if there is a flap or graft type which we do not have you need to let us know right away so I can add it in before the variable is hidden. No need to panic, we plan to give you roughly 6 months to make the change.
Same Histology In Subsequent Stages Text: The pattern and morphology of the tumor is as described in the first stage.
No Residual Tumor Seen Histology Text: There were no malignant cells seen in the sections examined.
Inflammation Suggestive Of Cancer Camouflage Histology Text: There was a dense lymphocytic infiltrate which prevented adequate histologic evaluation of adjacent structures.
Bcc Histology Text: There were numerous aggregates of basaloid cells.
Bcc Infiltrative Histology Text: There were numerous aggregates of basaloid cells demonstrating an infiltrative pattern.
Mart-1 - Positive Histology Text: MART-1 staining demonstrates areas of higher density and clustering of melanocytes with Pagetoid spread upwards within the epidermis. The surgical margins are positive for tumor cells.
Mart-1 - Negative Histology Text: MART-1 staining demonstrates a normal density and pattern of melanocytes along the dermal-epidermal junction. The surgical margins are negative for tumor cells.
Information: Selecting Yes will display possible errors in your note based on the variables you have selected. This validation is only offered as a suggestion for you. PLEASE NOTE THAT THE VALIDATION TEXT WILL BE REMOVED WHEN YOU FINALIZE YOUR NOTE. IF YOU WANT TO FAX A PRELIMINARY NOTE YOU WILL NEED TO TOGGLE THIS TO 'NO' IF YOU DO NOT WANT IT IN YOUR FAXED NOTE.

## 2024-03-26 DIAGNOSIS — E11.9 TYPE 2 DIABETES, HBA1C GOAL < 8% (H): ICD-10-CM

## 2024-03-26 RX ORDER — EMPAGLIFLOZIN 10 MG/1
10 TABLET, FILM COATED ORAL DAILY
Qty: 90 TABLET | Refills: 0 | Status: SHIPPED | OUTPATIENT
Start: 2024-03-26 | End: 2024-04-14

## 2024-04-15 ENCOUNTER — OFFICE VISIT (OUTPATIENT)
Dept: FAMILY MEDICINE | Facility: CLINIC | Age: 84
End: 2024-04-15
Payer: COMMERCIAL

## 2024-04-15 VITALS
BODY MASS INDEX: 24.11 KG/M2 | DIASTOLIC BLOOD PRESSURE: 62 MMHG | RESPIRATION RATE: 17 BRPM | TEMPERATURE: 98 F | WEIGHT: 131 LBS | SYSTOLIC BLOOD PRESSURE: 103 MMHG | OXYGEN SATURATION: 98 % | HEIGHT: 62 IN | HEART RATE: 52 BPM

## 2024-04-15 DIAGNOSIS — E78.5 HYPERLIPIDEMIA LDL GOAL <100: ICD-10-CM

## 2024-04-15 DIAGNOSIS — E11.42 TYPE 2 DIABETES MELLITUS WITH DIABETIC POLYNEUROPATHY, WITHOUT LONG-TERM CURRENT USE OF INSULIN (H): Primary | ICD-10-CM

## 2024-04-15 DIAGNOSIS — G63 POLYNEUROPATHY ASSOCIATED WITH UNDERLYING DISEASE (H): ICD-10-CM

## 2024-04-15 DIAGNOSIS — R19.7 DIARRHEA, UNSPECIFIED TYPE: ICD-10-CM

## 2024-04-15 DIAGNOSIS — K21.9 GASTROESOPHAGEAL REFLUX DISEASE WITHOUT ESOPHAGITIS: ICD-10-CM

## 2024-04-15 DIAGNOSIS — I10 HYPERTENSION GOAL BP (BLOOD PRESSURE) < 140/90: ICD-10-CM

## 2024-04-15 DIAGNOSIS — G25.81 RESTLESS LEG SYNDROME: ICD-10-CM

## 2024-04-15 PROBLEM — E11.9 TYPE 2 DIABETES MELLITUS WITHOUT COMPLICATION, WITHOUT LONG-TERM CURRENT USE OF INSULIN (H): Status: RESOLVED | Noted: 2017-05-03 | Resolved: 2024-04-15

## 2024-04-15 LAB — HBA1C MFR BLD: 8 % (ref 0–5.6)

## 2024-04-15 PROCEDURE — 80048 BASIC METABOLIC PNL TOTAL CA: CPT | Performed by: FAMILY MEDICINE

## 2024-04-15 PROCEDURE — G2211 COMPLEX E/M VISIT ADD ON: HCPCS | Performed by: FAMILY MEDICINE

## 2024-04-15 PROCEDURE — 99214 OFFICE O/P EST MOD 30 MIN: CPT | Performed by: FAMILY MEDICINE

## 2024-04-15 PROCEDURE — 36415 COLL VENOUS BLD VENIPUNCTURE: CPT | Performed by: FAMILY MEDICINE

## 2024-04-15 PROCEDURE — 83036 HEMOGLOBIN GLYCOSYLATED A1C: CPT | Performed by: FAMILY MEDICINE

## 2024-04-15 RX ORDER — GABAPENTIN 300 MG/1
CAPSULE ORAL
Qty: 450 CAPSULE | Refills: 1 | Status: SHIPPED | OUTPATIENT
Start: 2024-04-15 | End: 2024-09-12

## 2024-04-15 RX ORDER — AMLODIPINE BESYLATE 2.5 MG/1
2.5 TABLET ORAL DAILY
Qty: 30 TABLET | Refills: 1 | Status: SHIPPED | OUTPATIENT
Start: 2024-04-15 | End: 2024-05-17

## 2024-04-15 RX ORDER — HYDRALAZINE HYDROCHLORIDE 25 MG/1
25 TABLET, FILM COATED ORAL 2 TIMES DAILY
Qty: 180 TABLET | Refills: 1 | Status: SHIPPED | OUTPATIENT
Start: 2024-04-15 | End: 2024-06-12

## 2024-04-15 RX ORDER — ENALAPRIL MALEATE 20 MG/1
20 TABLET ORAL 2 TIMES DAILY
Qty: 180 TABLET | Refills: 1 | Status: SHIPPED | OUTPATIENT
Start: 2024-04-15 | End: 2024-06-12

## 2024-04-15 RX ORDER — HYDROCHLOROTHIAZIDE 25 MG/1
12.5 TABLET ORAL DAILY
Qty: 90 TABLET | Refills: 1 | Status: CANCELLED | OUTPATIENT
Start: 2024-04-15

## 2024-04-15 RX ORDER — METOPROLOL SUCCINATE 25 MG/1
25 TABLET, EXTENDED RELEASE ORAL DAILY
Qty: 90 TABLET | Refills: 3 | Status: SHIPPED | OUTPATIENT
Start: 2024-04-15 | End: 2024-06-12

## 2024-04-15 RX ORDER — HYDROCHLOROTHIAZIDE 12.5 MG/1
12.5 TABLET ORAL DAILY
Qty: 90 TABLET | Refills: 1 | Status: SHIPPED | OUTPATIENT
Start: 2024-04-15 | End: 2024-06-12

## 2024-04-15 ASSESSMENT — PAIN SCALES - GENERAL: PAINLEVEL: NO PAIN (0)

## 2024-04-15 ASSESSMENT — PATIENT HEALTH QUESTIONNAIRE - PHQ9
10. IF YOU CHECKED OFF ANY PROBLEMS, HOW DIFFICULT HAVE THESE PROBLEMS MADE IT FOR YOU TO DO YOUR WORK, TAKE CARE OF THINGS AT HOME, OR GET ALONG WITH OTHER PEOPLE: SOMEWHAT DIFFICULT
SUM OF ALL RESPONSES TO PHQ QUESTIONS 1-9: 7
SUM OF ALL RESPONSES TO PHQ QUESTIONS 1-9: 7

## 2024-04-15 NOTE — PROGRESS NOTES
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 Will recheckl on jardiance  - empagliflozin (JARDIANCE) 10 MG TABS tablet; Take 1 tablet (10 mg) by mouth daily  - **Hemoglobin A1c FUTURE 3mo; Future  - **Basic metabolic panel FUTURE 2mo; Future  - **Hemoglobin A1c FUTURE 3mo  - **Basic metabolic panel FUTURE 2mo      Hypertension goal BP (blood pressure) < 140/90  At goal on meds  - enalapril (VASOTEC) 20 MG tablet; Take 1 tablet (20 mg) by mouth 2 times daily  - hydrALAZINE (APRESOLINE) 25 MG tablet; Take 1 tablet (25 mg) by mouth 2 times daily  - metoprolol succinate ER (TOPROL XL) 25 MG 24 hr tablet; Take 1 tablet (25 mg) by mouth daily  - amLODIPine (NORVASC) 2.5 MG tablet; Take 1 tablet (2.5 mg) by mouth daily  - hydroCHLOROthiazide 12.5 MG tablet; Take 1 tablet (12.5 mg) by mouth daily    Restless leg syndrome  Refill as is working  - gabapentin (NEURONTIN) 300 MG capsule; Take 2 tablets ( 600 mg) QAM and 3 tablets ( 900 mg) QPM    Polyneuropathy associated with underlying disease (H24)  Meds are working   - gabapentin (NEURONTIN) 300 MG capsule; Take 2 tablets ( 600 mg) QAM and 3 tablets ( 900 mg) QPM    Gastroesophageal reflux disease without esophagitis  refill  - omeprazole (PRILOSEC) 20 MG DR capsule; TAKE 1 CAPSULE BY MOUTH ONCE DAILY    HYPERLIPIDEMIA LDL GOAL <100  LDL at goal    Diarrhea, unspecified type  Improved with lower dose of norvasc. As BP looks good, will continue to lower                  Subjective   Orquidea is a 83 year old, presenting for the following health issues:  Diabetes      4/15/2024     2:45 PM   Additional Questions   Roomed by jeanie     History of Present Illness       Diabetes:   She presents for follow up of diabetes.  She is checking home blood glucose one time daily.   She checks blood glucose before meals.  Blood glucose is never over 200 and never under 70.      "She has no concerns regarding her diabetes at this time.  She is having weight loss.            Hyperlipidemia:  She presents for follow up of hyperlipidemia.   She is taking medication to lower cholesterol. She is not having myalgia or other side effects to statin medications.    Hypertension: She presents for follow up of hypertension.  She does check blood pressure  regularly outside of the clinic. Outpatient blood pressures have not been over 140/90. She follows a low salt diet.     She eats 2-3 servings of fruits and vegetables daily.She consumes 0 sweetened beverage(s) daily.She exercises with enough effort to increase her heart rate 10 to 19 minutes per day.  She exercises with enough effort to increase her heart rate 7 days per week.   She is taking medications regularly.     Pt with diarrhea  Cut norvasc down to 5 and it did improve some  Since BP still looks good will cut it down to 2.5 mg to see if returns to normal    Pt with diabetes. Does not want to be on insulin. She stopped this on her own  Is only on jardiance at this time  Did not tolerate metformin  Was on glipizide but was not controlling her BSs    The longitudinal plan of care for the diagnosis(es)/condition(s) as documented were addressed during this visit. Due to the added complexity in care, I will continue to support Orquidea in the subsequent management and with ongoing continuity of care.        Review of Systems  Constitutional, HEENT, cardiovascular, pulmonary, gi and gu systems are negative, except as otherwise noted.      Objective    /62   Pulse 52   Temp 98  F (36.7  C) (Oral)   Resp 17   Ht 1.575 m (5' 2\")   Wt 59.4 kg (131 lb)   SpO2 98%   BMI 23.96 kg/m    Body mass index is 23.96 kg/m .  Physical Exam   GENERAL: alert and no distress  NECK: no adenopathy, no asymmetry, masses, or scars  RESP: lungs clear to auscultation - no rales, rhonchi or wheezes  CV: regular rate and rhythm, normal S1 S2, no S3 or S4, no murmur, " click or rub, no peripheral edema   MS: no gross musculoskeletal defects noted, no edema  Diabetic foot exam: normal DP and PT pulses, no trophic changes or ulcerative lesions, and normal sensory exam    Results for orders placed or performed in visit on 04/15/24 (from the past 24 hour(s))   **Hemoglobin A1c FUTURE 3mo   Result Value Ref Range    Hemoglobin A1C 8.0 (H) 0.0 - 5.6 %           Signed Electronically by: Lisette Olson MD

## 2024-04-16 LAB
ANION GAP SERPL CALCULATED.3IONS-SCNC: 12 MMOL/L (ref 7–15)
BUN SERPL-MCNC: 18.2 MG/DL (ref 8–23)
CALCIUM SERPL-MCNC: 10.2 MG/DL (ref 8.8–10.2)
CHLORIDE SERPL-SCNC: 100 MMOL/L (ref 98–107)
CREAT SERPL-MCNC: 0.76 MG/DL (ref 0.51–0.95)
DEPRECATED HCO3 PLAS-SCNC: 28 MMOL/L (ref 22–29)
EGFRCR SERPLBLD CKD-EPI 2021: 77 ML/MIN/1.73M2
GLUCOSE SERPL-MCNC: 260 MG/DL (ref 70–99)
POTASSIUM SERPL-SCNC: 4.5 MMOL/L (ref 3.4–5.3)
SODIUM SERPL-SCNC: 140 MMOL/L (ref 135–145)

## 2024-04-22 DIAGNOSIS — E11.42 TYPE 2 DIABETES MELLITUS WITH DIABETIC POLYNEUROPATHY, WITHOUT LONG-TERM CURRENT USE OF INSULIN (H): ICD-10-CM

## 2024-04-22 NOTE — TELEPHONE ENCOUNTER
FYI - Status Update    Who is Calling: pharmacist     Update: jardiance 10 mg and insurance allows 100 day refill     Does caller want a call/response back: No

## 2024-04-25 ENCOUNTER — TELEPHONE (OUTPATIENT)
Dept: FAMILY MEDICINE | Facility: CLINIC | Age: 84
End: 2024-04-25
Payer: COMMERCIAL

## 2024-04-25 NOTE — TELEPHONE ENCOUNTER
Disability parking certificate paperwork filled out and left at  for patient . Notified patient.     Sola ARIZMENDI,    [facility]

## 2024-05-15 DIAGNOSIS — I10 HYPERTENSION GOAL BP (BLOOD PRESSURE) < 140/90: ICD-10-CM

## 2024-05-15 RX ORDER — AMLODIPINE BESYLATE 2.5 MG/1
2.5 TABLET ORAL DAILY
Qty: 30 TABLET | Refills: 1 | OUTPATIENT
Start: 2024-05-15

## 2024-05-17 ENCOUNTER — TELEPHONE (OUTPATIENT)
Dept: FAMILY MEDICINE | Facility: CLINIC | Age: 84
End: 2024-05-17
Payer: COMMERCIAL

## 2024-05-17 DIAGNOSIS — I10 HYPERTENSION GOAL BP (BLOOD PRESSURE) < 140/90: ICD-10-CM

## 2024-05-17 RX ORDER — AMLODIPINE BESYLATE 2.5 MG/1
2.5 TABLET ORAL DAILY
Qty: 30 TABLET | Refills: 1 | Status: SHIPPED | OUTPATIENT
Start: 2024-05-17 | End: 2024-07-10

## 2024-05-17 NOTE — TELEPHONE ENCOUNTER
Medication Question or Refill    Contacts         Type Contact Phone/Fax    05/17/2024 10:30 AM CDT Phone (Incoming) Orquidea Stevens GUILLE (Self) [phone] (H)            What medication are you calling about (include dose and sig)?: amLODIPine (NORVASC) 2.5 MG tablet     Preferred Pharmacy:      [facility]      Controlled Substance Agreement on file:   CSA -- Patient Level:    CSA: None found at the patient level.       Who prescribed the medication?: Dr Amos    Do you need a refill? Yes    When did you use the medication last?     Patient offered an appointment? No    Do you have any questions or concerns?  Yes: Patient called [facility], they said that they have a refill on file, but needs a new RX sent over to refill.      Could we send this information to you in [employer] or would you prefer to receive a phone call?:   Patient would prefer a phone call   Okay to leave a detailed message?: Yes at Cell number on file:    Telephone Information:   Mobile [phone]     Jessica Liriano [facility]

## 2024-05-17 NOTE — TELEPHONE ENCOUNTER
Tayler's phone number: [phone]     Tayler is calling stating that the patient would like the Prescription(s) below sent to [facility].    Nursing action:  She was advised that the Prescription(s) below has already been sent to the requested pharmacy. Thank you. Asia Conklin R.N.       Disp Refills Start End SATURNINO   amLODIPine (NORVASC) 2.5 MG tablet 30 tablet 1 5/17/2024 -- No   Sig - Route: Take 1 tablet (2.5 mg) by mouth daily - Oral   Sent to pharmacy as: amLODIPine Besylate 2.5 MG Oral Tablet (NORVASC)   Class: E-Prescribe   Order: [id number]   E-Prescribing Status: Receipt confirmed by pharmacy (5/17/2024 10:45 AM CDT)     Printout Tracking    External Result Report     Pharmacy    [facility] - [facility] [address] [place] [address]

## 2024-06-08 DIAGNOSIS — E11.42 TYPE 2 DIABETES MELLITUS WITH DIABETIC POLYNEUROPATHY, WITHOUT LONG-TERM CURRENT USE OF INSULIN (H): ICD-10-CM

## 2024-06-09 RX ORDER — EMPAGLIFLOZIN 10 MG/1
10 TABLET, FILM COATED ORAL DAILY
Qty: 90 TABLET | Refills: 3 | Status: SHIPPED | OUTPATIENT
Start: 2024-06-09

## 2024-06-11 DIAGNOSIS — E78.1 HYPERTRIGLYCERIDEMIA: ICD-10-CM

## 2024-06-11 DIAGNOSIS — I10 HYPERTENSION GOAL BP (BLOOD PRESSURE) < 140/90: ICD-10-CM

## 2024-06-11 DIAGNOSIS — E78.5 HYPERLIPIDEMIA LDL GOAL <100: ICD-10-CM

## 2024-06-12 RX ORDER — ENALAPRIL MALEATE 20 MG/1
20 TABLET ORAL 2 TIMES DAILY
Qty: 180 TABLET | Refills: 1 | Status: SHIPPED | OUTPATIENT
Start: 2024-06-12

## 2024-06-12 RX ORDER — ATORVASTATIN CALCIUM 80 MG/1
80 TABLET, FILM COATED ORAL DAILY
Qty: 90 TABLET | Refills: 0 | Status: SHIPPED | OUTPATIENT
Start: 2024-06-12 | End: 2024-09-13

## 2024-06-12 RX ORDER — HYDROCHLOROTHIAZIDE 12.5 MG/1
12.5 TABLET ORAL DAILY
Qty: 90 TABLET | Refills: 0 | Status: SHIPPED | OUTPATIENT
Start: 2024-06-12 | End: 2024-08-18

## 2024-06-12 RX ORDER — AMLODIPINE BESYLATE 2.5 MG/1
2.5 TABLET ORAL DAILY
Qty: 60 TABLET | Refills: 5 | OUTPATIENT
Start: 2024-06-12

## 2024-06-12 RX ORDER — ATORVASTATIN CALCIUM 80 MG/1
80 TABLET, FILM COATED ORAL DAILY
Qty: 90 TABLET | Refills: 3 | Status: CANCELLED | OUTPATIENT
Start: 2024-06-12

## 2024-06-12 RX ORDER — HYDRALAZINE HYDROCHLORIDE 25 MG/1
25 TABLET, FILM COATED ORAL 2 TIMES DAILY
Qty: 180 TABLET | Refills: 1 | Status: SHIPPED | OUTPATIENT
Start: 2024-06-12

## 2024-06-12 RX ORDER — METOPROLOL SUCCINATE 25 MG/1
25 TABLET, EXTENDED RELEASE ORAL DAILY
Qty: 90 TABLET | Refills: 3 | Status: CANCELLED | OUTPATIENT
Start: 2024-06-12

## 2024-06-12 RX ORDER — METOPROLOL SUCCINATE 25 MG/1
25 TABLET, EXTENDED RELEASE ORAL DAILY
Qty: 90 TABLET | Refills: 2 | Status: SHIPPED | OUTPATIENT
Start: 2024-06-12

## 2024-06-12 RX ORDER — HYDROCHLOROTHIAZIDE 12.5 MG/1
12.5 TABLET ORAL DAILY
Qty: 90 TABLET | Refills: 1 | Status: CANCELLED | OUTPATIENT
Start: 2024-06-12

## 2024-06-13 ENCOUNTER — OFFICE VISIT (OUTPATIENT)
Dept: OPHTHALMOLOGY | Facility: CLINIC | Age: 84
End: 2024-06-13
Payer: COMMERCIAL

## 2024-06-13 DIAGNOSIS — E11.9 TYPE 2 DIABETES MELLITUS WITHOUT COMPLICATION, WITHOUT LONG-TERM CURRENT USE OF INSULIN (H): Primary | ICD-10-CM

## 2024-06-13 DIAGNOSIS — H04.123 DRY EYE SYNDROME, BILATERAL: ICD-10-CM

## 2024-06-13 DIAGNOSIS — D31.31 CHOROIDAL NEVUS OF BOTH EYES: ICD-10-CM

## 2024-06-13 DIAGNOSIS — D31.32 CHOROIDAL NEVUS OF BOTH EYES: ICD-10-CM

## 2024-06-13 DIAGNOSIS — Z01.00 EXAMINATION OF EYES AND VISION: ICD-10-CM

## 2024-06-13 DIAGNOSIS — H18.513 FUCHS' CORNEAL DYSTROPHY OF BOTH EYES: ICD-10-CM

## 2024-06-13 DIAGNOSIS — H52.03 HYPEROPIA OF BOTH EYES WITH ASTIGMATISM AND PRESBYOPIA: ICD-10-CM

## 2024-06-13 DIAGNOSIS — Z96.1 PSEUDOPHAKIA: ICD-10-CM

## 2024-06-13 DIAGNOSIS — H52.4 HYPEROPIA OF BOTH EYES WITH ASTIGMATISM AND PRESBYOPIA: ICD-10-CM

## 2024-06-13 DIAGNOSIS — H52.203 HYPEROPIA OF BOTH EYES WITH ASTIGMATISM AND PRESBYOPIA: ICD-10-CM

## 2024-06-13 PROCEDURE — 92015 DETERMINE REFRACTIVE STATE: CPT | Performed by: STUDENT IN AN ORGANIZED HEALTH CARE EDUCATION/TRAINING PROGRAM

## 2024-06-13 PROCEDURE — 92014 COMPRE OPH EXAM EST PT 1/>: CPT | Performed by: STUDENT IN AN ORGANIZED HEALTH CARE EDUCATION/TRAINING PROGRAM

## 2024-06-13 RX ORDER — SODIUM CHLORIDE 5 %
0.25 OINTMENT (GRAM) OPHTHALMIC (EYE) AT BEDTIME
Qty: 3.5 G | Refills: 11 | Status: SHIPPED | OUTPATIENT
Start: 2024-06-13

## 2024-06-13 ASSESSMENT — CUP TO DISC RATIO
OS_RATIO: 0.1
OD_RATIO: 0.1

## 2024-06-13 ASSESSMENT — CONF VISUAL FIELD
OS_SUPERIOR_TEMPORAL_RESTRICTION: 0
OD_NORMAL: 1
OD_SUPERIOR_TEMPORAL_RESTRICTION: 0
OD_INFERIOR_TEMPORAL_RESTRICTION: 0
METHOD: COUNTING FINGERS
OS_SUPERIOR_NASAL_RESTRICTION: 0
OD_INFERIOR_NASAL_RESTRICTION: 0
OS_INFERIOR_NASAL_RESTRICTION: 0
OS_INFERIOR_TEMPORAL_RESTRICTION: 0
OS_NORMAL: 1
OD_SUPERIOR_NASAL_RESTRICTION: 0

## 2024-06-13 ASSESSMENT — VISUAL ACUITY
OS_CC: J3
OD_CC: 20/30
OD_CC: J2
OS_CC+: -1
OS_PH_CC+: -2
CORRECTION_TYPE: GLASSES
METHOD: SNELLEN - LINEAR
OS_CC: 20/40
OD_CC+: -2
OS_PH_CC: 20/30

## 2024-06-13 ASSESSMENT — REFRACTION_MANIFEST
OD_AXIS: 075
OD_ADD: +3.00
OS_CYLINDER: +0.75
OS_ADD: +3.00
OD_CYLINDER: +1.00
OS_SPHERE: +0.50
OS_AXIS: 172
OD_SPHERE: +1.25

## 2024-06-13 ASSESSMENT — EXTERNAL EXAM - LEFT EYE: OS_EXAM: NORMAL

## 2024-06-13 ASSESSMENT — REFRACTION_WEARINGRX
OD_AXIS: 057
OS_AXIS: 170
OS_CYLINDER: +1.00
OS_SPHERE: PLANO
OD_ADD: +2.50
OS_ADD: +2.50
SPECS_TYPE: BIFOCAL
OD_CYLINDER: +1.00
OD_SPHERE: +1.00

## 2024-06-13 ASSESSMENT — TONOMETRY
IOP_METHOD: APPLANATION
OS_IOP_MMHG: 11
OD_IOP_MMHG: 12

## 2024-06-13 ASSESSMENT — EXTERNAL EXAM - RIGHT EYE: OD_EXAM: NORMAL

## 2024-06-13 NOTE — PATIENT INSTRUCTIONS
"Still recommend trying hard to use Nolan 128 (5%) ointment at bedtime in both eyes or Nolan 128 (5%) drops in the morning and at lunchtime     Use artificial tears more consistently (2-3 times per day) (Refresh Optive or Systane Balance. Avoid \"get the red out\" drops).     Try laying down when you use your eye drops to help ensure the drops get in your eyes    Glasses prescription given - optional to update    Use a warm compress on the eyes daily    Keep blood sugars and blood pressure under good control.     Lucie Gordillo MD  [phone]     Patient Education   Diabetes weakens the blood vessels all over the body, including the eyes. Damage to the blood vessels in the eyes can cause swelling or bleeding into part of the eye (called the retina). This is called diabetic retinopathy (JULIAN-tin--[facility]-thee). If not treated, this disease can cause vision loss or blindness.   Symptoms may include blurred or distorted vision, but many people have no symptoms. It's important to see your eye doctor regularly to check for problems.   Early treatment and good control can help protect your vision. Here are the things you can do to help prevent vision loss:      1. Keep your blood sugar levels under tight control.      2. Bring high blood pressure under control.      3. No smoking.      4. Have yearly dilated eye exams.      "

## 2024-06-13 NOTE — LETTER
"6/13/2024      Orquidea Stevens  [address] [address]      Dear Colleague,    Thank you for referring your patient, Orquidea Stevens, to the [facility]. Please see a copy of my visit note below.     Current Eye Medications: Thera Tears, both eyes rarely uses. (They are difficult to get in)     Subjective:  Patient returns today for her annual diabetic eye exam. Patient feels like the vision in her right eye is \"blurry\" compared to the left eye. for both near and distance. The vision seems to be worse upon waking and takes \"quite a while\" to clear. History or floaters, nothing new. No flashing lights. No ocular pain or discomfort. She has no other ocular concerns today.     Patient is asking if she could have laser surgery today?    Lab Results   Component Value Date    A1C 8.0 04/15/2024    A1C 10.0 10/16/2023    A1C 8.6 03/27/2023    A1C 7.3 08/11/2022    A1C 8.3 01/05/2022    A1C 7.6 10/20/2020    A1C 7.5 03/02/2020    A1C 7.9 08/21/2019    A1C 7.4 12/10/2018    A1C 6.8 05/03/2018         Objective:  See Ophthalmology Exam.    Assessment:  Orquidea Stevens is a 83 year old female who presents with:   Encounter Diagnoses   Name Primary?     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 Recommend trying harder with drops (discussed strategies to try instillation easier).     Plan:  Still recommend trying hard to use Nolan 128 (5%) ointment at bedtime in both eyes or Nolan 128 (5%) drops in the morning and at lunchtime     Use artificial tears more consistently (2-3 times per day) " "(Refresh Optive or Systane Balance. Avoid \"get the red out\" drops).     Try laying down when you use your eye drops to help ensure the drops get in your eyes    Glasses prescription given - optional to update    Use a warm compress on the eyes daily    Keep blood sugars and blood pressure under good control.     Lucie Gordillo MD  [phone]     Patient Education  Diabetes weakens the blood vessels all over the body, including the eyes. Damage to the blood vessels in the eyes can cause swelling or bleeding into part of the eye (called the retina). This is called diabetic retinopathy (JULIAN-tin--puh-thee). If not treated, this disease can cause vision loss or blindness.   Symptoms may include blurred or distorted vision, but many people have no symptoms. It's important to see your eye doctor regularly to check for problems.   Early treatment and good control can help protect your vision. Here are the things you can do to help prevent vision loss:      1. Keep your blood sugar levels under tight control.      2. Bring high blood pressure under control.      3. No smoking.      4. Have yearly dilated eye exams.        Again, thank you for allowing me to participate in the care of your patient.        Sincerely,        Lucie Gordillo MD  "

## 2024-06-13 NOTE — PROGRESS NOTES
" Current Eye Medications: Thera Tears, both eyes rarely uses. (They are difficult to get in)     Subjective:  Patient returns today for her annual diabetic eye exam. Patient feels like the vision in her right eye is \"blurry\" compared to the left eye. for both near and distance. The vision seems to be worse upon waking and takes \"quite a while\" to clear. History or floaters, nothing new. No flashing lights. No ocular pain or discomfort. She has no other ocular concerns today.     Patient is asking if she could have laser surgery today?    Lab Results   Component Value Date    A1C 8.0 04/15/2024    A1C 10.0 10/16/2023    A1C 8.6 03/27/2023    A1C 7.3 08/11/2022    A1C 8.3 01/05/2022    A1C 7.6 10/20/2020    A1C 7.5 03/02/2020    A1C 7.9 08/21/2019    A1C 7.4 12/10/2018    A1C 6.8 05/03/2018         Objective:  See Ophthalmology Exam.    Assessment:  Orquidea Stevens is a 83 year old female who presents with:   Encounter Diagnoses   Name Primary?    Type 2 diabetes mellitus without complication, without long-term current use of insulin (H) Negative diabetic retinopathy     Examination of eyes and vision     Fuchs' corneal dystrophy of both eyes     Pseudophakia - Both Eyes s/p YAG cap OU     Choroidal nevus of both eyes     Dry eye syndrome, bilateral     Hyperopia of both eyes with astigmatism and presbyopia      Discussed that she does not need any laser procedures at present. Recommend trying harder with drops (discussed strategies to try instillation easier).     Plan:  Still recommend trying hard to use Nolan 128 (5%) ointment at bedtime in both eyes or Nolan 128 (5%) drops in the morning and at lunchtime     Use artificial tears more consistently (2-3 times per day) (Refresh Optive or Systane Balance. Avoid \"get the red out\" drops).     Try laying down when you use your eye drops to help ensure the drops get in your eyes    Glasses prescription given - optional to update    Use a warm compress on the eyes " daily    Keep blood sugars and blood pressure under good control.     Lucie Gordillo MD  [phone]     Patient Education  Diabetes weakens the blood vessels all over the body, including the eyes. Damage to the blood vessels in the eyes can cause swelling or bleeding into part of the eye (called the retina). This is called diabetic retinopathy (JULIAN-tin-AH-puh-thee). If not treated, this disease can cause vision loss or blindness.   Symptoms may include blurred or distorted vision, but many people have no symptoms. It's important to see your eye doctor regularly to check for problems.   Early treatment and good control can help protect your vision. Here are the things you can do to help prevent vision loss:      1. Keep your blood sugar levels under tight control.      2. Bring high blood pressure under control.      3. No smoking.      4. Have yearly dilated eye exams.

## 2024-07-10 DIAGNOSIS — I10 HYPERTENSION GOAL BP (BLOOD PRESSURE) < 140/90: ICD-10-CM

## 2024-07-10 RX ORDER — AMLODIPINE BESYLATE 2.5 MG/1
2.5 TABLET ORAL DAILY
Qty: 60 TABLET | Refills: 2 | Status: SHIPPED | OUTPATIENT
Start: 2024-07-10 | End: 2024-09-26

## 2024-07-17 ENCOUNTER — TELEPHONE (OUTPATIENT)
Dept: FAMILY MEDICINE | Facility: CLINIC | Age: 84
End: 2024-07-17
Payer: COMMERCIAL

## 2024-07-17 NOTE — TELEPHONE ENCOUNTER
Per insurance patient is eligible for AWV once per calendar year.  Please reach out to patient to assist them with scheduling AWV.

## 2024-07-17 NOTE — TELEPHONE ENCOUNTER
Left voicemail for patient to call back and schedule AWV    Katheryn THOMASON [facility],

## 2024-07-18 ENCOUNTER — NURSE TRIAGE (OUTPATIENT)
Dept: FAMILY MEDICINE | Facility: CLINIC | Age: 84
End: 2024-07-18
Payer: COMMERCIAL

## 2024-07-18 NOTE — TELEPHONE ENCOUNTER
Patient calling in and reports had blood on the toilet paper while wiping after urinating. Episode happened just one time. Patient had wiped a few times, and bleeding stopped.  Patient is confident this blood could not be coming from the rectum, and is coming from the vagina. Patient has had a partial hysterectomy. Patient denies any abdominal pain, muscle cramping, fevers, blood clots, feeling weak, dizzy, or lightheaded.  Patient reports for the last few months has been noticing increase in urinary frequency. Offered patient an appointment to be seen tomorrow, but patient declines and will come to urgent care today.    Disposition: SEE IN OFFICE TODAY OR TOMORROW: Patient will come to urgent care today.    Reason for Disposition   All other patients with blood in urine  (Exception: Could be normal menstrual bleeding.)    Additional Information   Negative: Shock suspected (e.g., cold/pale/clammy skin, too weak to stand, low BP, rapid pulse)   Negative: Sounds like a life-threatening emergency to the triager   Negative: Urinary catheter, questions about   Negative: Recent back or abdominal injury   Negative: Recent genital injury   Negative: Unable to urinate (or only a few drops) > 4 hours and bladder feels very full (e.g., palpable bladder or strong urge to urinate)   Negative: Diffuse (all over) muscle pains in the shoulders, arms, legs, and back and dark (cola or tea-colored) or red-colored urine   Negative: Passing pure blood or large blood clots (i.e., larger than a dime or grape)   Negative: Fever > 100.4 F (38.0 C)   Negative: Patient sounds very sick or weak to the triager   Negative: Known sickle cell disease   Negative: Taking Coumadin (warfarin) or other strong blood thinner, or known bleeding disorder (e.g., thrombocytopenia)   Negative: Side (flank) or back pain present   Negative: Pain or burning with passing urine (urination)   Negative: Patient wants to be seen   Negative: Pink or red-colored urine  "and likely from food (beets, rhubarb, red food dye) and lasts > 24 hours after stopping food    Answer Assessment - Initial Assessment Questions  1. COLOR of URINE: \"Describe the color of the urine.\"  (e.g., tea-colored, pink, red, bloody) \"Do you have blood clots in your urine?\" (e.g., none, pea, grape, small coin)      Blood on the toilet paper. Patient is certain blood is coming from vagina  2. ONSET: \"When did the bleeding start?\"       Happened x1 before bed last at 0100.  3. EPISODES: \"How many times has there been blood in the urine?\" or \"How many times today?\"      One time  4. PAIN with URINATION: \"Is there any pain with passing your urine?\" If Yes, ask: \"How bad is the pain?\"  (Scale 1-10; or mild, moderate, severe)     - MILD: Complains slightly about urination hurting.     - MODERATE: Interferes with normal activities.       - SEVERE: Excruciating, unwilling or unable to urinate because of the pain.       Last couple of months have been having urinary frequency.  5. FEVER: \"Do you have a fever?\" If Yes, ask: \"What is your temperature, how was it measured, and when did it start?\"      No  6. ASSOCIATED SYMPTOMS: \"Are you passing urine more frequently than usual?\"      Yes  7. OTHER SYMPTOMS: \"Do you have any other symptoms?\" (e.g., back/flank pain, abdomen pain, vomiting)      No  8. PREGNANCY: \"Is there any chance you are pregnant?\" \"When was your last menstrual period?\"      No    Protocols used: Urine - Blood In-A-OH      Maliha Guardado RN    "

## 2024-08-18 DIAGNOSIS — I10 HYPERTENSION GOAL BP (BLOOD PRESSURE) < 140/90: ICD-10-CM

## 2024-08-18 RX ORDER — HYDROCHLOROTHIAZIDE 12.5 MG/1
12.5 TABLET ORAL DAILY
Qty: 90 TABLET | Refills: 3 | Status: SHIPPED | OUTPATIENT
Start: 2024-08-18

## 2024-09-11 DIAGNOSIS — E78.1 HYPERTRIGLYCERIDEMIA: ICD-10-CM

## 2024-09-11 DIAGNOSIS — E78.5 HYPERLIPIDEMIA LDL GOAL <100: ICD-10-CM

## 2024-09-11 DIAGNOSIS — G25.81 RESTLESS LEG SYNDROME: ICD-10-CM

## 2024-09-11 DIAGNOSIS — G63 POLYNEUROPATHY ASSOCIATED WITH UNDERLYING DISEASE (H): ICD-10-CM

## 2024-09-12 ENCOUNTER — TRANSFERRED RECORDS (OUTPATIENT)
Dept: HEALTH INFORMATION MANAGEMENT | Facility: CLINIC | Age: 84
End: 2024-09-12
Payer: COMMERCIAL

## 2024-09-12 NOTE — TELEPHONE ENCOUNTER
Pt calling on her medications. States that she needs a refill or atorvastatin and gabapentin.       ELIA Wolfe, RN  [facility]

## 2024-09-13 RX ORDER — ATORVASTATIN CALCIUM 80 MG/1
80 TABLET, FILM COATED ORAL DAILY
Qty: 90 TABLET | Refills: 0 | Status: SHIPPED | OUTPATIENT
Start: 2024-09-13

## 2024-09-13 RX ORDER — GABAPENTIN 300 MG/1
CAPSULE ORAL
Qty: 450 CAPSULE | Refills: 0 | Status: SHIPPED | OUTPATIENT
Start: 2024-09-13

## 2024-09-16 ENCOUNTER — PATIENT OUTREACH (OUTPATIENT)
Dept: CARE COORDINATION | Facility: CLINIC | Age: 84
End: 2024-09-16
Payer: COMMERCIAL

## 2024-09-26 DIAGNOSIS — E78.1 HYPERTRIGLYCERIDEMIA: ICD-10-CM

## 2024-09-26 DIAGNOSIS — E78.5 HYPERLIPIDEMIA LDL GOAL <100: ICD-10-CM

## 2024-09-26 DIAGNOSIS — I10 HYPERTENSION GOAL BP (BLOOD PRESSURE) < 140/90: ICD-10-CM

## 2024-09-26 RX ORDER — HYDRALAZINE HYDROCHLORIDE 25 MG/1
25 TABLET, FILM COATED ORAL 2 TIMES DAILY
Qty: 200 TABLET | OUTPATIENT
Start: 2024-09-26

## 2024-09-26 RX ORDER — ATORVASTATIN CALCIUM 80 MG/1
80 TABLET, FILM COATED ORAL DAILY
Qty: 90 TABLET | Refills: 3 | OUTPATIENT
Start: 2024-09-26

## 2024-09-26 RX ORDER — AMLODIPINE BESYLATE 2.5 MG/1
2.5 TABLET ORAL DAILY
Qty: 30 TABLET | Refills: 5 | Status: SHIPPED | OUTPATIENT
Start: 2024-09-26

## 2024-09-26 RX ORDER — ENALAPRIL MALEATE 20 MG/1
20 TABLET ORAL 2 TIMES DAILY
Qty: 200 TABLET | OUTPATIENT
Start: 2024-09-26

## 2024-09-30 ENCOUNTER — PATIENT OUTREACH (OUTPATIENT)
Dept: CARE COORDINATION | Facility: CLINIC | Age: 84
End: 2024-09-30
Payer: COMMERCIAL

## 2024-10-03 DIAGNOSIS — I10 HYPERTENSION GOAL BP (BLOOD PRESSURE) < 140/90: ICD-10-CM

## 2024-10-03 RX ORDER — HYDRALAZINE HYDROCHLORIDE 25 MG/1
25 TABLET, FILM COATED ORAL 2 TIMES DAILY
Qty: 200 TABLET | OUTPATIENT
Start: 2024-10-03

## 2024-10-03 RX ORDER — ENALAPRIL MALEATE 20 MG/1
20 TABLET ORAL 2 TIMES DAILY
Qty: 200 TABLET | OUTPATIENT
Start: 2024-10-03

## 2024-10-07 ENCOUNTER — TRANSFERRED RECORDS (OUTPATIENT)
Dept: HEALTH INFORMATION MANAGEMENT | Facility: CLINIC | Age: 84
End: 2024-10-07
Payer: COMMERCIAL

## 2024-10-15 DIAGNOSIS — I10 HYPERTENSION GOAL BP (BLOOD PRESSURE) < 140/90: ICD-10-CM

## 2024-10-15 DIAGNOSIS — E78.5 HYPERLIPIDEMIA LDL GOAL <100: ICD-10-CM

## 2024-10-15 DIAGNOSIS — E78.1 HYPERTRIGLYCERIDEMIA: ICD-10-CM

## 2024-10-15 RX ORDER — HYDRALAZINE HYDROCHLORIDE 25 MG/1
25 TABLET, FILM COATED ORAL 2 TIMES DAILY
Qty: 160 TABLET | Refills: 3 | OUTPATIENT
Start: 2024-10-15

## 2024-10-15 RX ORDER — ENALAPRIL MALEATE 20 MG/1
20 TABLET ORAL 2 TIMES DAILY
Qty: 160 TABLET | OUTPATIENT
Start: 2024-10-15

## 2024-10-15 RX ORDER — ATORVASTATIN CALCIUM 80 MG/1
80 TABLET, FILM COATED ORAL DAILY
Qty: 90 TABLET | Refills: 3 | OUTPATIENT
Start: 2024-10-15

## 2024-10-17 DIAGNOSIS — I10 HYPERTENSION GOAL BP (BLOOD PRESSURE) < 140/90: ICD-10-CM

## 2024-10-17 RX ORDER — METOPROLOL SUCCINATE 25 MG/1
25 TABLET, EXTENDED RELEASE ORAL DAILY
Qty: 100 TABLET | Refills: 2 | OUTPATIENT
Start: 2024-10-17

## 2024-10-24 ENCOUNTER — TELEPHONE (OUTPATIENT)
Dept: FAMILY MEDICINE | Facility: CLINIC | Age: 84
End: 2024-10-24

## 2024-10-24 ENCOUNTER — OFFICE VISIT (OUTPATIENT)
Dept: FAMILY MEDICINE | Facility: CLINIC | Age: 84
End: 2024-10-24
Payer: COMMERCIAL

## 2024-10-24 VITALS
HEART RATE: 63 BPM | BODY MASS INDEX: 22.89 KG/M2 | TEMPERATURE: 97.9 F | HEIGHT: 63 IN | OXYGEN SATURATION: 98 % | DIASTOLIC BLOOD PRESSURE: 60 MMHG | WEIGHT: 129.2 LBS | RESPIRATION RATE: 16 BRPM | SYSTOLIC BLOOD PRESSURE: 142 MMHG

## 2024-10-24 DIAGNOSIS — Z13.29 SCREENING FOR THYROID DISORDER: ICD-10-CM

## 2024-10-24 DIAGNOSIS — E78.5 HYPERLIPIDEMIA LDL GOAL <100: ICD-10-CM

## 2024-10-24 DIAGNOSIS — E11.42 TYPE 2 DIABETES MELLITUS WITH DIABETIC POLYNEUROPATHY, WITHOUT LONG-TERM CURRENT USE OF INSULIN (H): ICD-10-CM

## 2024-10-24 DIAGNOSIS — G63 POLYNEUROPATHY ASSOCIATED WITH UNDERLYING DISEASE (H): ICD-10-CM

## 2024-10-24 DIAGNOSIS — I10 HYPERTENSION GOAL BP (BLOOD PRESSURE) < 140/90: ICD-10-CM

## 2024-10-24 DIAGNOSIS — G25.81 RESTLESS LEG SYNDROME: ICD-10-CM

## 2024-10-24 DIAGNOSIS — I25.10 CORONARY ARTERY DISEASE INVOLVING NATIVE CORONARY ARTERY OF NATIVE HEART WITHOUT ANGINA PECTORIS: ICD-10-CM

## 2024-10-24 DIAGNOSIS — Z00.00 ENCOUNTER FOR MEDICARE ANNUAL WELLNESS EXAM: Primary | ICD-10-CM

## 2024-10-24 DIAGNOSIS — Z12.31 ENCOUNTER FOR SCREENING MAMMOGRAM FOR BREAST CANCER: ICD-10-CM

## 2024-10-24 DIAGNOSIS — E78.1 HYPERTRIGLYCERIDEMIA: ICD-10-CM

## 2024-10-24 DIAGNOSIS — R32 URINARY INCONTINENCE, UNSPECIFIED TYPE: ICD-10-CM

## 2024-10-24 LAB
BASOPHILS # BLD AUTO: 0 10E3/UL (ref 0–0.2)
BASOPHILS NFR BLD AUTO: 0 %
EOSINOPHIL # BLD AUTO: 0.2 10E3/UL (ref 0–0.7)
EOSINOPHIL NFR BLD AUTO: 2 %
ERYTHROCYTE [DISTWIDTH] IN BLOOD BY AUTOMATED COUNT: 12.5 % (ref 10–15)
EST. AVERAGE GLUCOSE BLD GHB EST-MCNC: 180 MG/DL
HBA1C MFR BLD: 7.9 % (ref 0–5.6)
HCT VFR BLD AUTO: 46.7 % (ref 35–47)
HGB BLD-MCNC: 15.3 G/DL (ref 11.7–15.7)
IMM GRANULOCYTES # BLD: 0 10E3/UL
IMM GRANULOCYTES NFR BLD: 0 %
LYMPHOCYTES # BLD AUTO: 4.2 10E3/UL (ref 0.8–5.3)
LYMPHOCYTES NFR BLD AUTO: 41 %
MCH RBC QN AUTO: 30.4 PG (ref 26.5–33)
MCHC RBC AUTO-ENTMCNC: 32.8 G/DL (ref 31.5–36.5)
MCV RBC AUTO: 93 FL (ref 78–100)
MONOCYTES # BLD AUTO: 0.7 10E3/UL (ref 0–1.3)
MONOCYTES NFR BLD AUTO: 7 %
NEUTROPHILS # BLD AUTO: 5.1 10E3/UL (ref 1.6–8.3)
NEUTROPHILS NFR BLD AUTO: 50 %
PLATELET # BLD AUTO: 208 10E3/UL (ref 150–450)
RBC # BLD AUTO: 5.03 10E6/UL (ref 3.8–5.2)
WBC # BLD AUTO: 10.2 10E3/UL (ref 4–11)

## 2024-10-24 PROCEDURE — G0439 PPPS, SUBSEQ VISIT: HCPCS | Performed by: PHYSICIAN ASSISTANT

## 2024-10-24 PROCEDURE — 82570 ASSAY OF URINE CREATININE: CPT | Performed by: PHYSICIAN ASSISTANT

## 2024-10-24 PROCEDURE — 99214 OFFICE O/P EST MOD 30 MIN: CPT | Mod: 25 | Performed by: PHYSICIAN ASSISTANT

## 2024-10-24 PROCEDURE — 36415 COLL VENOUS BLD VENIPUNCTURE: CPT | Performed by: PHYSICIAN ASSISTANT

## 2024-10-24 PROCEDURE — 80053 COMPREHEN METABOLIC PANEL: CPT | Performed by: PHYSICIAN ASSISTANT

## 2024-10-24 PROCEDURE — 83735 ASSAY OF MAGNESIUM: CPT | Performed by: PHYSICIAN ASSISTANT

## 2024-10-24 PROCEDURE — 83036 HEMOGLOBIN GLYCOSYLATED A1C: CPT | Performed by: PHYSICIAN ASSISTANT

## 2024-10-24 PROCEDURE — 80061 LIPID PANEL: CPT | Performed by: PHYSICIAN ASSISTANT

## 2024-10-24 PROCEDURE — 82043 UR ALBUMIN QUANTITATIVE: CPT | Performed by: PHYSICIAN ASSISTANT

## 2024-10-24 PROCEDURE — 85025 COMPLETE CBC W/AUTO DIFF WBC: CPT | Performed by: PHYSICIAN ASSISTANT

## 2024-10-24 PROCEDURE — 84443 ASSAY THYROID STIM HORMONE: CPT | Mod: GY | Performed by: PHYSICIAN ASSISTANT

## 2024-10-24 RX ORDER — OXYBUTYNIN CHLORIDE 5 MG/1
5 TABLET ORAL 2 TIMES DAILY
Qty: 180 TABLET | Refills: 3 | Status: SHIPPED | OUTPATIENT
Start: 2024-10-24

## 2024-10-24 RX ORDER — AMLODIPINE BESYLATE 2.5 MG/1
2.5 TABLET ORAL DAILY
Qty: 100 TABLET | Refills: 3 | Status: SHIPPED | OUTPATIENT
Start: 2024-10-24

## 2024-10-24 RX ORDER — ENALAPRIL MALEATE 20 MG/1
20 TABLET ORAL 2 TIMES DAILY
Qty: 200 TABLET | Refills: 1 | Status: SHIPPED | OUTPATIENT
Start: 2024-10-24

## 2024-10-24 RX ORDER — GABAPENTIN 300 MG/1
CAPSULE ORAL
Qty: 450 CAPSULE | Refills: 1 | Status: SHIPPED | OUTPATIENT
Start: 2024-10-24

## 2024-10-24 RX ORDER — METOPROLOL SUCCINATE 25 MG/1
25 TABLET, EXTENDED RELEASE ORAL DAILY
Qty: 90 TABLET | Refills: 1 | Status: SHIPPED | OUTPATIENT
Start: 2024-10-24

## 2024-10-24 RX ORDER — ATORVASTATIN CALCIUM 80 MG/1
80 TABLET, FILM COATED ORAL DAILY
Qty: 100 TABLET | Refills: 3 | Status: SHIPPED | OUTPATIENT
Start: 2024-10-24

## 2024-10-24 RX ORDER — HYDROCHLOROTHIAZIDE 12.5 MG/1
12.5 TABLET ORAL DAILY
Qty: 90 TABLET | Refills: 3 | Status: SHIPPED | OUTPATIENT
Start: 2024-10-24

## 2024-10-24 RX ORDER — HYDRALAZINE HYDROCHLORIDE 25 MG/1
25 TABLET, FILM COATED ORAL 2 TIMES DAILY
Qty: 180 TABLET | Refills: 1 | Status: SHIPPED | OUTPATIENT
Start: 2024-10-24

## 2024-10-24 RX ORDER — OXYBUTYNIN CHLORIDE 5 MG/1
TABLET ORAL
Qty: 180 TABLET | Refills: 3 | Status: SHIPPED | OUTPATIENT
Start: 2024-10-24 | End: 2024-10-24

## 2024-10-24 SDOH — HEALTH STABILITY: PHYSICAL HEALTH: ON AVERAGE, HOW MANY MINUTES DO YOU ENGAGE IN EXERCISE AT THIS LEVEL?: 20 MIN

## 2024-10-24 SDOH — HEALTH STABILITY: PHYSICAL HEALTH: ON AVERAGE, HOW MANY DAYS PER WEEK DO YOU ENGAGE IN MODERATE TO STRENUOUS EXERCISE (LIKE A BRISK WALK)?: 7 DAYS

## 2024-10-24 ASSESSMENT — PAIN SCALES - GENERAL: PAINLEVEL_OUTOF10: NO PAIN (0)

## 2024-10-24 ASSESSMENT — SOCIAL DETERMINANTS OF HEALTH (SDOH): HOW OFTEN DO YOU GET TOGETHER WITH FRIENDS OR RELATIVES?: ONCE A WEEK

## 2024-10-24 NOTE — TELEPHONE ENCOUNTER
Pharmacy is looking for clarification on oxybutynin 5 mg tab. Typically dosed 2-3 times per day.    Jessica Liriano [facility]     Hematuria Hematuria Hematuria Hematuria Hematuria

## 2024-10-24 NOTE — PROGRESS NOTES
Preventive Care Visit  [facility]  Spenser Teixeira PA-C, Physician Assistant - Medical  Oct 24, 2024      Assessment & Plan     (Z00.00) Encounter for Medicare annual wellness exam  (primary encounter diagnosis)  Comment:     (I10) Hypertension goal BP (blood pressure) < 140/90  Comment: Blood pressure not at goal.  Discussed with patient follow-up for recheck of blood pressure in 2 weeks.  Discussed healthy lifestyle modifications.  Plan: Comprehensive metabolic panel (BMP + Alb, Alk         Phos, ALT, AST, Total. Bili, TP), amLODIPine         (NORVASC) 2.5 MG tablet, enalapril (VASOTEC) 20        MG tablet, hydrALAZINE (APRESOLINE) 25 MG         tablet, hydroCHLOROthiazide 12.5 MG tablet,         metoprolol succinate ER (TOPROL XL) 25 MG 24 hr        tablet           (E78.5) Hyperlipidemia LDL goal <100  Comment: Hyperlipidemia.  Continue with statin medication.  Plan: Lipid panel reflex to direct LDL Fasting,         atorvastatin (LIPITOR) 80 MG tablet            (E78.1) Hypertriglyceridemia  Comment:   Plan: Lipid panel reflex to direct LDL Fasting,         atorvastatin (LIPITOR) 80 MG tablet            (G25.81) Restless leg syndrome  Comment: Continue with gabapentin  Plan: Magnesium, gabapentin (NEURONTIN) 300 MG         capsule, CBC with platelets and differential            (G63) Polyneuropathy associated with underlying disease (H)  Comment: Continue with gabapentin  Plan: Magnesium, gabapentin (NEURONTIN) 300 MG         capsule, CBC with platelets and differential           (E11.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  Poorly controlled with glipizide.  Did not tolerate metformin.  Ozempic was too expensive.  Lack of control with glargine 10 units.  Dapagliflozin has been expensive.  Discussed with patient recheck of hemoglobin A1c today.  Hemoglobin A1c remains elevated at 7.9.   Will increase to 25 mg Jardiance.  Plan: HEMOGLOBIN A1C, Albumin Random Urine         Quantitative with Creat Ratio, CBC with         platelets and differential, empagliflozin         (JARDIANCE) 10 MG TABS tablet, DISCONTINUED:         empagliflozin (JARDIANCE) 10 MG TABS tablet          (R32) Urinary incontinence, unspecified type  Comment: Continue with oxybutynin  Plan: oxyBUTYnin (DITROPAN) 5 MG tablet            (I25.10) Coronary artery disease involving native coronary artery of native heart without angina pectoris  Comment: No chest pain today.   Discussed screening mammogram.  Patient would like to continue with.    (Z12.31) Encounter for screening mammogram for breast cancer  Comment:   Plan: MA Screen Bilateral w/Sabas        (Z13.29) Screening for thyroid disorder  Comment: Discussed screening thyroid.  Plan: TSH with free T4 reflex                   Counseling  Appropriate preventive services were addressed with this patient via screening, questionnaire, or discussion as appropriate for fall prevention, nutrition, physical activity, Tobacco-use cessation, social engagement, weight loss and cognition.  Checklist reviewing preventive services available has been given to the patient.  Reviewed patient's diet, addressing concerns and/or questions.   She is at risk for psychosocial distress and has been provided with information to reduce risk.   Discussed possible causes of fatigue. Information on urinary incontinence and treatment options given to patient.         Manasa Evangelista is a 84 year old, presenting for the following:  Medicare Visit        10/24/2024     1:15 PM   Additional Questions   Roomed by CHERYLE ARAGON   Accompanied by SELF           HPI  GLUCOSE 128 135   YESTERDAY 155    METFORMIN diarrhea  Not at goal with glipizide.  Ozempic too expensive  Not well-controlled with glargine 10 units  Was started on Jardiance 10 mg.    Eye exam June 13th No diabetic retinopathy     Sciatica improved  with steroid injection    No family history of breast cancer.     Daughter with colon cancer.     Tolerating statin medication without side effects.      Health Care Directive  Patient does not have a Health Care Directive: Discussed advance care planning with patient; information given to patient to review.      10/24/2024   General Health   How would you rate your overall physical health? (!) FAIR   Feel stress (tense, anxious, or unable to sleep) Only a little      (!) STRESS CONCERN      10/24/2024   Nutrition   Diet: Regular (no restrictions)    Carbohydrate counting       Multiple values from one day are sorted in reverse-chronological order         10/24/2024   Exercise   Days per week of moderate/strenous exercise 7 days   Average minutes spent exercising at this level 20 min            10/24/2024   Social Factors   Frequency of gathering with friends or relatives Once a week   Worry food won't last until get money to buy more No   Food not last or not have enough money for food? No   Do you have housing? (Housing is defined as stable permanent housing and does not include staying ouside in a car, in a tent, in an abandoned building, in an overnight shelter, or couch-surfing.) Yes   Are you worried about losing your housing? No   Lack of transportation? No   Unable to get utilities (heat,electricity)? No          10/24/2024   Fall Risk   Fallen 2 or more times in the past year? No     No    Trouble with walking or balance? No     Yes        Patient-reported    Multiple values from one day are sorted in reverse-chronological order      No falls. Not unsteady but feels not tracking straight ahead while walking.         10/24/2024   Activities of Daily Living- Home Safety   Needs help with the following daily activites None of the above   Safety concerns in the home None of the above         10/24/2024   Dental   Dentist two times every year? Yes          10/24/2024   Hearing Screening   Hearing concerns? None  of the above          10/24/2024   Driving Risk Screening   Patient/family members have concerns about driving No          10/24/2024   General Alertness/Fatigue Screening   Have you been more tired than usual lately? (!) YES          10/24/2024   Urinary Incontinence Screening   Bothered by leaking urine in past 6 months Yes          10/24/2024   TB Screening   Were you born outside of the [place]? No      Today's PHQ-2 Score:       10/24/2024     1:14 PM   PHQ-2 ( 1999 Pf[employer])   Q1: Little interest or pleasure in doing things 0    Q2: Feeling down, depressed or hopeless 0    PHQ-2 Score 0    Q1: Little interest or pleasure in doing things Not at all   Q2: Feeling down, depressed or hopeless Not at all   PHQ-2 Score 0       Patient-reported           10/24/2024   Substance Use   Alcohol more than 3/day or more than 7/wk No   Do you have a current opioid prescription? No   How severe/bad is pain from 1 to 10? 0/10 (No Pain)   Do you use any other substances recreationally? No      Social History     Tobacco Use    Smoking status: Never    Smokeless tobacco: Never    Tobacco comments:     Lives in smoke free household   Vaping Use    Vaping status: Never Used   Substance Use Topics    Alcohol use: No    Drug use: No      Mammogram Screening - After age 74- determine frequency with patient based on health status, life expectancy and patient goals       Reviewed and updated as needed this visit by Provider                    Past Medical History:   Diagnosis Date    Actinic keratosis     Actinic keratosis     Allergies     Arthritis     CAD (coronary artery disease)     Cataract     Diabetes mellitus type II     Fatty liver     GERD (gastroesophageal reflux disease)     HH (hiatus hernia)     Hyperlipidaemia LDL goal <100     Hypertension goal BP (blood pressure) < 140/90     IBS (irritable bowel syndrome)     Rosacea     Spastic neurogenic bladder     Squamous cell carcinoma 10/27/2008    R. Nasal Sidewall    Type 2  diabetes, HbA1C goal < 8% (H) 10/31/2010    Urinary incontinence     Vitamin D deficiency      Past Surgical History:   Procedure Laterality Date    ANGIOGRAM  1999    50% Blockage    ARTHROSCOPY KNEE RT/LT  09/2004    BIOPSY BREAST  2001    RT    BLADDER SURGERY  1988    CATARACT IOL, RT/LT      HC REDUCTION OF LARGE BREAST  1988    HYSTERECTOMY, PAP NO LONGER INDICATED  2003    MOHS MICROGRAPHIC PROCEDURE      RT nose SCC    PHACOEMULSIFICATION CLEAR CORNEA WITH STANDARD INTRAOCULAR LENS IMPLANT Left 08/10/2017    Procedure: PHACOEMULSIFICATION CLEAR CORNEA WITH STANDARD INTRAOCULAR LENS IMPLANT;  LEFT EYE PHACOEMULSIFICATION CLEAR CORNEA WITH STANDARD INTRAOCULAR LENS IMPLANT ;  Surgeon: Lucie Gordillo MD;  Location: [facility]    PHACOEMULSIFICATION CLEAR CORNEA WITH STANDARD INTRAOCULAR LENS IMPLANT Right 08/28/2017    Procedure: PHACOEMULSIFICATION CLEAR CORNEA WITH STANDARD INTRAOCULAR LENS IMPLANT;  RIGHT EYE PHACOEMULSIFICATION CLEAR CORNEA WITH STANDARD INTRAOCULAR LENS IMPLANT   ;  Surgeon: Lucie Gordillo MD;  Location: [facility]     Current providers sharing in care for this patient include:  Patient Care Team:  Lisette Olson MD as PCP - General (Family Practice)  Lisette Olson MD as Assigned PCP  Lucie Gordillo MD as Assigned Surgical Provider  Marilu Bar RN as Diabetes Educator (Diabetes Education)  Lucie Gordillo MD as MD (Ophthalmology)    The following health maintenance items are reviewed in Epic and correct as of today:  Health Maintenance   Topic Date Due    ANNUAL REVIEW OF HM ORDERS  Never done    DEXA  08/03/2019    A1C  10/15/2024    LIPID  10/16/2024    MICROALBUMIN  10/16/2024    BMP  04/15/2025    DIABETIC FOOT EXAM  04/15/2025    EYE EXAM  06/13/2025    MEDICARE ANNUAL WELLNESS VISIT  10/24/2025    FALL RISK ASSESSMENT  10/24/2025    ADVANCE CARE PLANNING  10/24/2029    DTAP/TDAP/TD IMMUNIZATION (3 - Td or Tdap) 08/11/2032    PHQ-2 (once per calendar  "year)  Completed    INFLUENZA VACCINE  Completed    Pneumococcal Vaccine: 65+ Years  Completed    ZOSTER IMMUNIZATION  Completed    RSV VACCINE  Completed    COVID-19 Vaccine  Completed    HPV IMMUNIZATION  Aged Out    MENINGITIS IMMUNIZATION  Aged Out    RSV MONOCLONAL ANTIBODY  Aged Out    MAMMO SCREENING  Discontinued     Review of Systems  Constitutional, neuro, ENT, endocrine, pulmonary, cardiac, gastrointestinal, genitourinary, musculoskeletal, integument and psychiatric systems are negative, except as otherwise noted.     Objective    Exam  BP (!) 142/60   Pulse 63   Temp 97.9  F (36.6  C) (Tympanic)   Resp 16   Ht 1.6 m (5' 3\")   Wt 58.6 kg (129 lb 3.2 oz)   SpO2 98%   BMI 22.89 kg/m     Estimated body mass index is 22.89 kg/m  as calculated from the following:    Height as of this encounter: 1.6 m (5' 3\").    Weight as of this encounter: 58.6 kg (129 lb 3.2 oz).    Physical Exam  GENERAL: alert and no distress  EYES: Eyes grossly normal to inspection, PERRL and conjunctivae and sclerae normal  HENT: ear canals and TM's normal, nose and mouth without ulcers or lesions  NECK: no adenopathy, no asymmetry, masses, or scars  RESP: lungs clear to auscultation - no rales, rhonchi or wheezes  CV: regular rate and rhythm, normal S1 S2, no S3 or S4, no murmur, click or rub, no peripheral edema  ABDOMEN: soft, nontender, no hepatosplenomegaly, no masses and bowel sounds normal  MS: no gross musculoskeletal defects noted, no edema  SKIN: no suspicious lesions or rashes  NEURO: Normal strength and tone, mentation intact and speech normal  PSYCH: mentation appears normal, affect normal/bright         10/24/2024   Mini Cog   Clock Draw Score 2 Normal   3 Item Recall 2 objects recalled   Mini Cog Total Score 4      Signed Electronically by: Spenser Teixeira PA-C    "

## 2024-10-24 NOTE — PATIENT INSTRUCTIONS
Patient Education   Preventive Care Advice   This is general advice given by our system to help you stay healthy. However, your care team may have specific advice just for you. Please talk to your care team about your preventive care needs.  Nutrition  Eat 5 or more servings of fruits and vegetables each day.  Try wheat bread, brown rice and whole grain pasta (instead of white bread, rice, and pasta).  Get enough calcium and vitamin D. Check the label on foods and aim for 100% of the RDA (recommended daily allowance).  Lifestyle  Exercise at least 150 minutes each week  (30 minutes a day, 5 days a week).  Do muscle strengthening activities 2 days a week. These help control your weight and prevent disease.  No smoking.  Wear sunscreen to prevent skin cancer.  Have a dental exam and cleaning every 6 months.  Yearly exams  See your health care team every year to talk about:  Any changes in your health.  Any medicines your care team has prescribed.  Preventive care, family planning, and ways to prevent chronic diseases.  Shots (vaccines)   HPV shots (up to age 26), if you've never had them before.  Hepatitis B shots (up to age 59), if you've never had them before.  COVID-19 shot: Get this shot when it's due.  Flu shot: Get a flu shot every year.  Tetanus shot: Get a tetanus shot every 10 years.  Pneumococcal, hepatitis A, and RSV shots: Ask your care team if you need these based on your risk.  Shingles shot (for age 50 and up)  General health tests  Diabetes screening:  Starting at age 35, Get screened for diabetes at least every 3 years.  If you are younger than age 35, ask your care team if you should be screened for diabetes.  Cholesterol test: At age 39, start having a cholesterol test every 5 years, or more often if advised.  Bone density scan (DEXA): At age 50, ask your care team if you should have this scan for osteoporosis (brittle bones).  Hepatitis C: Get tested at least once in your life.  STIs (sexually  transmitted infections)  Before age 24: Ask your care team if you should be screened for STIs.  After age 24: Get screened for STIs if you're at risk. You are at risk for STIs (including HIV) if:  You are sexually active with more than one person.  You don't use condoms every time.  You or a partner was diagnosed with a sexually transmitted infection.  If you are at risk for HIV, ask about PrEP medicine to prevent HIV.  Get tested for HIV at least once in your life, whether you are at risk for HIV or not.  Cancer screening tests  Cervical cancer screening: If you have a cervix, begin getting regular cervical cancer screening tests starting at age 21.  Breast cancer scan (mammogram): If you've ever had breasts, begin having regular mammograms starting at age 40. This is a scan to check for breast cancer.  Colon cancer screening: It is important to start screening for colon cancer at age 45.  Have a colonoscopy test every 10 years (or more often if you're at risk) Or, ask your provider about stool tests like a FIT test every year or Cologuard test every 3 years.  To learn more about your testing options, visit:   .  For help making a decision, visit:   https://bit.ly/bd88368.  Prostate cancer screening test: If you have a prostate, ask your care team if a prostate cancer screening test (PSA) at age 55 is right for you.  Lung cancer screening: If you are a current or former smoker ages 50 to 80, ask your care team if ongoing lung cancer screenings are right for you.  For informational purposes only. Not to replace the advice of your health care provider. Copyright   2023 [facility]. All rights reserved. Clinically reviewed by the [facility]. [employer] 233304 - REV 01/24.  Learning About Sleeping Well  What does sleeping well mean?     Sleeping well means getting enough sleep to feel good and stay healthy. How much sleep is enough varies among people.  The number of hours you  sleep and how you feel when you wake up are both important. If you do not feel refreshed, you probably need more sleep. Another sign of not getting enough sleep is feeling tired during the day.  Experts recommend that adults get at least 7 or more hours of sleep per day. Children and older adults need more sleep.  Why is getting enough sleep important?  Getting enough quality sleep is a basic part of good health. When your sleep suffers, your physical health, mood, and your thoughts can suffer too. You may find yourself feeling more grumpy or stressed. Not getting enough sleep also can lead to serious problems, including injury, accidents, anxiety, and depression.  What might cause poor sleeping?  Many things can cause sleep problems, including:  Changes to your sleep schedule.  Stress. Stress can be caused by fear about a single event, such as giving a speech. Or you may have ongoing stress, such as worry about work or school.  Depression, anxiety, and other mental or emotional conditions.  Changes in your sleep habits or surroundings. This includes changes that happen where you sleep, such as noise, light, or sleeping in a different bed. It also includes changes in your sleep pattern, such as having jet lag or working a late shift.  Health problems, such as pain, breathing problems, and restless legs syndrome.  Lack of regular exercise.  Using alcohol, nicotine, or caffeine before bed.  How can you help yourself?  Here are some tips that may help you sleep more soundly and wake up feeling more refreshed.  Your sleeping area   Use your bedroom only for sleeping and sex. A bit of light reading may help you fall asleep. But if it doesn't, do your reading elsewhere in the house. Try not to use your TV, computer, smartphone, or tablet while you are in bed.  Be sure your bed is big enough to stretch out comfortably, especially if you have a sleep partner.  Keep your bedroom quiet, dark, and cool. Use curtains, blinds,  "or a sleep mask to block out light. To block out noise, use earplugs, soothing music, or a \"white noise\" machine.  Your evening and bedtime routine   Create a relaxing bedtime routine. You might want to take a warm shower or bath, or listen to soothing music.  Go to bed at the same time every night. And get up at the same time every morning, even if you feel tired.  What to avoid   Limit caffeine (coffee, tea, caffeinated sodas) during the day, and don't have any for at least 6 hours before bedtime.  Avoid drinking alcohol before bedtime. Alcohol can cause you to wake up more often during the night.  Try not to smoke or use tobacco, especially in the evening. Nicotine can keep you awake.  Limit naps during the day, especially close to bedtime.  Avoid lying in bed awake for too long. If you can't fall asleep or if you wake up in the middle of the night and can't get back to sleep within about 20 minutes, get out of bed and go to another room until you feel sleepy.  Avoid taking medicine right before bed that may keep you awake or make you feel hyper or energized. Your doctor can tell you if your medicine may do this and if you can take it earlier in the day.  If you can't sleep   Imagine yourself in a peaceful, pleasant scene. Focus on the details and feelings of being in a place that is relaxing.  Get up and do a quiet or boring activity until you feel sleepy.  Avoid drinking any liquids before going to bed to help prevent waking up often to use the bathroom.  Where can you learn more?  Go to https://www.[employer].net/patiented  Enter J942 in the search box to learn more about \"Learning About Sleeping Well.\"  Current as of: July 10, 2023  Content Version: 14.2 2024 [employer].   Care instructions adapted under license by your healthcare professional. If you have questions about a medical condition or this instruction, always ask your healthcare professional. [employer] Inc[employer] disclaims any " warranty or liability for your use of this information.    Bladder Training: Care Instructions  Your Care Instructions     Bladder training is used to treat urge incontinence and stress incontinence. Urge incontinence means that the need to urinate comes on so fast that you can't get to a toilet in time. Stress incontinence means that you leak urine because of pressure on your bladder. For example, it may happen when you laugh, cough, or lift something heavy.  Bladder training can increase how long you can wait before you have to urinate. It can also help your bladder hold more urine. And it can give you better control over the urge to urinate.  It is important to remember that bladder training takes a few weeks to a few months to make a difference. You may not see results right away, but don't give up.  Follow-up care is a key part of your treatment and safety. Be sure to make and go to all appointments, and call your doctor if you are having problems. It's also a good idea to know your test results and keep a list of the medicines you take.  How can you care for yourself at home?  Work with your doctor to come up with a bladder training program that is right for you. You may use one or more of the following methods.  Delayed urination  In the beginning, try to keep from urinating for 5 minutes after you first feel the need to go.  While you wait, take deep, slow breaths to relax. Kegel exercises can also help you delay the need to go to the bathroom.  After some practice, when you can easily wait 5 minutes to urinate, try to wait 10 minutes before you urinate.  Slowly increase the waiting period until you are able to control when you have to urinate.  Scheduled urination  Empty your bladder when you first wake up in the morning.  Schedule times throughout the day when you will urinate.  Start by going to the bathroom every hour, even if you don't need to go.  Slowly increase the time between trips to the  "bathroom.  When you have found a schedule that works well for you, keep doing it.  If you wake up during the night and have to urinate, do it. Apply your schedule to waking hours only.  Kegel exercises  These tighten and strengthen pelvic muscles, which can help you control the flow of urine. (If doing these exercises causes pain, stop doing them and talk with your doctor.) To do Kegel exercises:  Squeeze your muscles as if you were trying not to pass gas. Or squeeze your muscles as if you were stopping the flow of urine. Your belly, legs, and buttocks shouldn't move.  Hold the squeeze for 3 seconds, then relax for 5 to 10 seconds.  Start with 3 seconds, then add 1 second each week until you are able to squeeze for 10 seconds.  Repeat the exercise 10 times a session. Do 3 to 8 sessions a day.  When should you call for help?  Watch closely for changes in your health, and be sure to contact your doctor if:    Your incontinence is getting worse.     You do not get better as expected.   Where can you learn more?  Go to https://www.[employer].net/patiented  Enter V684 in the search box to learn more about \"Bladder Training: Care Instructions.\"  Current as of: November 15, 2023  Content Version: 14.2 2024 [facility] [employer].   Care instructions adapted under license by your healthcare professional. If you have questions about a medical condition or this instruction, always ask your healthcare professional. Healthwise, Incorporated disclaims any warranty or liability for your use of this information.       "

## 2024-10-25 LAB
ALBUMIN SERPL BCG-MCNC: 4.7 G/DL (ref 3.5–5.2)
ALP SERPL-CCNC: 73 U/L (ref 40–150)
ALT SERPL W P-5'-P-CCNC: 29 U/L (ref 0–50)
ANION GAP SERPL CALCULATED.3IONS-SCNC: 16 MMOL/L (ref 7–15)
AST SERPL W P-5'-P-CCNC: 23 U/L (ref 0–45)
BILIRUB SERPL-MCNC: 0.8 MG/DL
BUN SERPL-MCNC: 13.2 MG/DL (ref 8–23)
CALCIUM SERPL-MCNC: 10.2 MG/DL (ref 8.8–10.4)
CHLORIDE SERPL-SCNC: 100 MMOL/L (ref 98–107)
CHOLEST SERPL-MCNC: 127 MG/DL
CREAT SERPL-MCNC: 0.73 MG/DL (ref 0.51–0.95)
CREAT UR-MCNC: 28 MG/DL
EGFRCR SERPLBLD CKD-EPI 2021: 80 ML/MIN/1.73M2
FASTING STATUS PATIENT QL REPORTED: ABNORMAL
FASTING STATUS PATIENT QL REPORTED: NORMAL
GLUCOSE SERPL-MCNC: 134 MG/DL (ref 70–99)
HCO3 SERPL-SCNC: 25 MMOL/L (ref 22–29)
HDLC SERPL-MCNC: 62 MG/DL
LDLC SERPL CALC-MCNC: 43 MG/DL
MAGNESIUM SERPL-MCNC: 2.2 MG/DL (ref 1.7–2.3)
MICROALBUMIN UR-MCNC: 35.3 MG/L
MICROALBUMIN/CREAT UR: 126.07 MG/G CR (ref 0–25)
NONHDLC SERPL-MCNC: 65 MG/DL
POTASSIUM SERPL-SCNC: 4.1 MMOL/L (ref 3.4–5.3)
PROT SERPL-MCNC: 8.2 G/DL (ref 6.4–8.3)
SODIUM SERPL-SCNC: 141 MMOL/L (ref 135–145)
TRIGL SERPL-MCNC: 111 MG/DL
TSH SERPL DL<=0.005 MIU/L-ACNC: 1.77 UIU/ML (ref 0.3–4.2)

## 2024-11-14 ENCOUNTER — MYC MEDICAL ADVICE (OUTPATIENT)
Dept: FAMILY MEDICINE | Facility: CLINIC | Age: 84
End: 2024-11-14
Payer: COMMERCIAL

## 2024-11-14 DIAGNOSIS — I25.10 CORONARY ARTERY DISEASE INVOLVING NATIVE CORONARY ARTERY OF NATIVE HEART WITHOUT ANGINA PECTORIS: Primary | ICD-10-CM

## 2024-12-02 DIAGNOSIS — E11.42 TYPE 2 DIABETES MELLITUS WITH DIABETIC POLYNEUROPATHY, WITHOUT LONG-TERM CURRENT USE OF INSULIN (H): ICD-10-CM

## 2025-01-03 DIAGNOSIS — I10 HYPERTENSION GOAL BP (BLOOD PRESSURE) < 140/90: ICD-10-CM

## 2025-01-06 RX ORDER — METOPROLOL SUCCINATE 25 MG/1
25 TABLET, EXTENDED RELEASE ORAL DAILY
Qty: 100 TABLET | Refills: 2 | OUTPATIENT
Start: 2025-01-06

## 2025-01-30 DIAGNOSIS — I10 HYPERTENSION GOAL BP (BLOOD PRESSURE) < 140/90: ICD-10-CM

## 2025-01-30 RX ORDER — HYDRALAZINE HYDROCHLORIDE 25 MG/1
25 TABLET, FILM COATED ORAL 2 TIMES DAILY
Qty: 200 TABLET | Refills: 2 | OUTPATIENT
Start: 2025-01-30

## 2025-03-09 DIAGNOSIS — E11.42 TYPE 2 DIABETES MELLITUS WITH DIABETIC POLYNEUROPATHY, WITHOUT LONG-TERM CURRENT USE OF INSULIN (H): ICD-10-CM

## 2025-03-10 RX ORDER — EMPAGLIFLOZIN 25 MG/1
25 TABLET, FILM COATED ORAL DAILY
Qty: 90 TABLET | Refills: 0 | Status: SHIPPED | OUTPATIENT
Start: 2025-03-10

## 2025-04-08 NOTE — TELEPHONE ENCOUNTER
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   She was admitted with hypotension with concern for sepsis.     Recommendations:     Discharge Recommendations: Low Intensity Therapy      Assessment:     Seema Gann is a 77 y.o. 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  Performance deficits affecting function: weakness, impaired endurance, impaired self care skills, impaired functional mobility, gait instability, impaired balance.  Pt tolerated session fair. Pt remained cooperative and asymptomatic, but MAP did drop from 79 to 55 with EOB sitting with LEVO .1 on board     Rehab Prognosis: Good; patient would benefit from acute skilled OT services to address these deficits and reach maximum level of function.       Plan:     Patient to be seen 3 x/week to address the above listed problems via self-care/home management, therapeutic activities, therapeutic exercises  Plan of Care Expires:    Plan of Care Reviewed with: patient    Subjective     Pt agreeable to therapy     Occupational Profile:  Pt lives with spouse and spouse's adult son with CP.   Home is one story with 5 SHARON to B HR with rails too far apart to reach at same time.   Pt has no DME/AE and was independent prior to arrival.   Spouse is home to assist as needed.     Pain/Comfort:  Pain Rating 1: 0/10    Patients cultural, spiritual, Rastafari conflicts given the current situation: no    Objective:     Communicated with: nsandressa prior to session.  Patient found in bed with tele, pulse ox, BP cuff, peripheral IV  Coeval completed this date to optimize functional performance and safety given impaired tolerance for activity in setting of ICU   General Precautions: Standard, fall    Occupational Performance:    Bed Mobility:    Supine>sit with  Pt calling regarding her BS numbers. Pt is concerned that the new medication is not working the way it was suppose to as her numbers have not come down. Please advise.   Okay to call pt back at [phone] and VM is fine.    Kayla Sandra,       SBA  SIt>supine with MIN A     Functional Mobility/Transfers:  Standing NT due to low MAP    Activities of Daily Living:  Pt has strength needed to completed self feeding and basic oral care skills bed level. Further ADL's not addressed due to low MAP with EOB sitting.   Cognitive/Visual Perceptual:  Pt awake, alert and following commands.     Physical Exam:  Pt is left hand dominant and demo 3/5 UE strength and Fair      AMPAC 6 Click ADL:  AMPAC Total Score: 10    Treatment & Education:  Pt tolerated EOB sitting approx 5 min with SBA. MAP decreased trom 79 to 55 EOB. Pt without c/o and remained asymptomatic on .1 LEVO. MAP slowly increased while EOB, but did not return to >80 which was parameter.   Education provided to pt and nsg re: importance of UE AROM throughout the day as well as increasing tolerance for HOB elevation throughout the day. Pt/nsg verb understanding.     Education provided re: Role of OT and safety with functional mobility/ADL skills.       Patient left HOB elevated with all lines intact, call button in reach, and nsg notified    GOALS:   Multidisciplinary Problems       Occupational Therapy Goals          Problem: Occupational Therapy    Goal Priority Disciplines Outcome Interventions   Occupational Therapy Goal     OT, PT/OT Progressing    Description: Goals to be met by: 7 days 4/15/25     Patient will increase functional independence with ADLs by performing:      Pt to complete toilieting with SBA  Pt to complete standing g/h skills with SBA  Pt to complete t/f bed, chair and commode with SBA  Pt to complete UE/LE dressing with SBA                          History:     Past Medical History:   Diagnosis Date    Cirrhosis     HCC (hepatocellular carcinoma)     Hepatitis     Hypertension          Past Surgical History:   Procedure Laterality Date    APPENDECTOMY      HYSTERECTOMY         Time Tracking:     OT Date of Treatment: 04/08/25  OT Start Time: 0835  OT Stop Time: 0851  OT Total  Time (min): 16 min    Billable Minutes:Evaluation 8  Therapeutic Activity 8    4/8/2025

## 2025-04-30 ENCOUNTER — OFFICE VISIT (OUTPATIENT)
Dept: FAMILY MEDICINE | Facility: CLINIC | Age: 85
End: 2025-04-30
Payer: COMMERCIAL

## 2025-04-30 VITALS
BODY MASS INDEX: 22.5 KG/M2 | SYSTOLIC BLOOD PRESSURE: 134 MMHG | DIASTOLIC BLOOD PRESSURE: 68 MMHG | HEIGHT: 63 IN | HEART RATE: 62 BPM | TEMPERATURE: 98.4 F | RESPIRATION RATE: 17 BRPM | WEIGHT: 127 LBS | OXYGEN SATURATION: 97 %

## 2025-04-30 DIAGNOSIS — I10 HYPERTENSION GOAL BP (BLOOD PRESSURE) < 140/90: ICD-10-CM

## 2025-04-30 DIAGNOSIS — G47.00 INSOMNIA, UNSPECIFIED TYPE: ICD-10-CM

## 2025-04-30 DIAGNOSIS — R30.0 DYSURIA: ICD-10-CM

## 2025-04-30 DIAGNOSIS — E78.5 HYPERLIPIDEMIA LDL GOAL <100: ICD-10-CM

## 2025-04-30 DIAGNOSIS — G63 POLYNEUROPATHY ASSOCIATED WITH UNDERLYING DISEASE: ICD-10-CM

## 2025-04-30 DIAGNOSIS — Z83.49 FAMILY HISTORY OF THYROID DISEASE: ICD-10-CM

## 2025-04-30 DIAGNOSIS — E11.42 TYPE 2 DIABETES MELLITUS WITH DIABETIC POLYNEUROPATHY, WITHOUT LONG-TERM CURRENT USE OF INSULIN (H): Primary | ICD-10-CM

## 2025-04-30 DIAGNOSIS — I25.10 CORONARY ARTERY DISEASE INVOLVING NATIVE CORONARY ARTERY OF NATIVE HEART WITHOUT ANGINA PECTORIS: ICD-10-CM

## 2025-04-30 LAB
ALBUMIN UR-MCNC: NEGATIVE MG/DL
APPEARANCE UR: ABNORMAL
BACTERIA #/AREA URNS HPF: ABNORMAL /HPF
BASOPHILS # BLD AUTO: 0 10E3/UL (ref 0–0.2)
BASOPHILS NFR BLD AUTO: 0 %
BILIRUB UR QL STRIP: NEGATIVE
COLOR UR AUTO: YELLOW
EOSINOPHIL # BLD AUTO: 0.1 10E3/UL (ref 0–0.7)
EOSINOPHIL NFR BLD AUTO: 2 %
ERYTHROCYTE [DISTWIDTH] IN BLOOD BY AUTOMATED COUNT: 12.7 % (ref 10–15)
EST. AVERAGE GLUCOSE BLD GHB EST-MCNC: 169 MG/DL
GLUCOSE UR STRIP-MCNC: >=1000 MG/DL
HBA1C MFR BLD: 7.5 % (ref 0–5.6)
HCT VFR BLD AUTO: 44.8 % (ref 35–47)
HGB BLD-MCNC: 14.9 G/DL (ref 11.7–15.7)
HGB UR QL STRIP: NEGATIVE
IMM GRANULOCYTES # BLD: 0 10E3/UL
IMM GRANULOCYTES NFR BLD: 0 %
KETONES UR STRIP-MCNC: NEGATIVE MG/DL
LEUKOCYTE ESTERASE UR QL STRIP: ABNORMAL
LYMPHOCYTES # BLD AUTO: 3.4 10E3/UL (ref 0.8–5.3)
LYMPHOCYTES NFR BLD AUTO: 39 %
MCH RBC QN AUTO: 30.9 PG (ref 26.5–33)
MCHC RBC AUTO-ENTMCNC: 33.3 G/DL (ref 31.5–36.5)
MCV RBC AUTO: 93 FL (ref 78–100)
MONOCYTES # BLD AUTO: 0.6 10E3/UL (ref 0–1.3)
MONOCYTES NFR BLD AUTO: 7 %
NEUTROPHILS # BLD AUTO: 4.6 10E3/UL (ref 1.6–8.3)
NEUTROPHILS NFR BLD AUTO: 52 %
NITRATE UR QL: NEGATIVE
PH UR STRIP: 6 [PH] (ref 5–7)
PLATELET # BLD AUTO: 200 10E3/UL (ref 150–450)
RBC # BLD AUTO: 4.82 10E6/UL (ref 3.8–5.2)
RBC #/AREA URNS AUTO: ABNORMAL /HPF
SP GR UR STRIP: 1.01 (ref 1–1.03)
SQUAMOUS #/AREA URNS AUTO: ABNORMAL /LPF
UROBILINOGEN UR STRIP-ACNC: 0.2 E.U./DL
WBC # BLD AUTO: 8.8 10E3/UL (ref 4–11)
WBC #/AREA URNS AUTO: ABNORMAL /HPF

## 2025-04-30 PROCEDURE — 36415 COLL VENOUS BLD VENIPUNCTURE: CPT | Performed by: FAMILY MEDICINE

## 2025-04-30 PROCEDURE — 3075F SYST BP GE 130 - 139MM HG: CPT | Performed by: FAMILY MEDICINE

## 2025-04-30 PROCEDURE — 99214 OFFICE O/P EST MOD 30 MIN: CPT | Performed by: FAMILY MEDICINE

## 2025-04-30 PROCEDURE — 81001 URINALYSIS AUTO W/SCOPE: CPT | Performed by: FAMILY MEDICINE

## 2025-04-30 PROCEDURE — 87186 SC STD MICRODIL/AGAR DIL: CPT | Performed by: FAMILY MEDICINE

## 2025-04-30 PROCEDURE — 84443 ASSAY THYROID STIM HORMONE: CPT | Mod: GZ | Performed by: FAMILY MEDICINE

## 2025-04-30 PROCEDURE — 99000 SPECIMEN HANDLING OFFICE-LAB: CPT | Performed by: FAMILY MEDICINE

## 2025-04-30 PROCEDURE — G2211 COMPLEX E/M VISIT ADD ON: HCPCS | Performed by: FAMILY MEDICINE

## 2025-04-30 PROCEDURE — 84244 ASSAY OF RENIN: CPT | Mod: 90 | Performed by: FAMILY MEDICINE

## 2025-04-30 PROCEDURE — 83036 HEMOGLOBIN GLYCOSYLATED A1C: CPT | Performed by: FAMILY MEDICINE

## 2025-04-30 PROCEDURE — 84132 ASSAY OF SERUM POTASSIUM: CPT | Performed by: FAMILY MEDICINE

## 2025-04-30 PROCEDURE — 85025 COMPLETE CBC W/AUTO DIFF WBC: CPT | Performed by: FAMILY MEDICINE

## 2025-04-30 PROCEDURE — 87086 URINE CULTURE/COLONY COUNT: CPT | Performed by: FAMILY MEDICINE

## 2025-04-30 PROCEDURE — 3078F DIAST BP <80 MM HG: CPT | Performed by: FAMILY MEDICINE

## 2025-04-30 RX ORDER — METOPROLOL SUCCINATE 25 MG/1
25 TABLET, EXTENDED RELEASE ORAL DAILY
Qty: 90 TABLET | Refills: 1 | Status: SHIPPED | OUTPATIENT
Start: 2025-04-30

## 2025-04-30 RX ORDER — TRAZODONE HYDROCHLORIDE 50 MG/1
50 TABLET ORAL AT BEDTIME
Qty: 30 TABLET | Refills: 0 | Status: SHIPPED | OUTPATIENT
Start: 2025-04-30

## 2025-04-30 RX ORDER — ENALAPRIL MALEATE 20 MG/1
20 TABLET ORAL 2 TIMES DAILY
Qty: 200 TABLET | Refills: 1 | Status: SHIPPED | OUTPATIENT
Start: 2025-04-30

## 2025-04-30 RX ORDER — HYDRALAZINE HYDROCHLORIDE 25 MG/1
25 TABLET, FILM COATED ORAL 2 TIMES DAILY
Qty: 180 TABLET | Refills: 1 | Status: SHIPPED | OUTPATIENT
Start: 2025-04-30

## 2025-04-30 NOTE — PROGRESS NOTES
Assessment & Plan     Type 2 diabetes mellitus with diabetic polyneuropathy, without long-term current use of insulin (H)  Doing well on jardiance  On statin, ace, will restart aspirin  - **Hemoglobin A1c FUTURE 3mo; Future  - **CBC with platelets differential FUTURE 2mo; Future    Coronary artery disease involving native coronary artery of native heart without angina pectoris  On aspirin and statin    Polyneuropathy associated with underlying disease  Stopped gabapentin. Getting relief from otc spray    Hypertension goal BP (blood pressure) < 140/90  At goal on meds  - enalapril (VASOTEC) 20 MG tablet; Take 1 tablet (20 mg) by mouth 2 times daily.  - hydrALAZINE (APRESOLINE) 25 MG tablet; Take 1 tablet (25 mg) by mouth 2 times daily.  - metoprolol succinate ER (TOPROL XL) 25 MG 24 hr tablet; Take 1 tablet (25 mg) by mouth daily.  - **CBC with platelets differential FUTURE 2mo; Future  - Aldosterone; Future  - Renin activity; Future  - Aldosterone Renin Ratio; Future  - Potassium; Future    HYPERLIPIDEMIA LDL GOAL <100  At goal on meds    Insomnia, unspecified type  Tried marijuana gummies, made her too sedated in am  Used her brothers xanax which worked  Will do trial of trazodone  - traZODone (DESYREL) 50 MG tablet; Take 1 tablet (50 mg) by mouth at bedtime.    Family history of thyroid disease  She wants this checked  - **TSH with free T4 reflex FUTURE 2mo; Future    Dysuria  Would like urine checked for UTI  - UA Macroscopic with reflex to Microscopic and Culture - Lab Collect; Future      The longitudinal plan of care for the diagnosis(es)/condition(s) as documented were addressed during this visit. Due to the added complexity in care, I will continue to support Tonja in the subsequent management and with ongoing continuity of care.            Manasa Evangelista is a 84 year old, presenting for the following health issues:  Diabetes        4/30/2025     2:41 PM   Additional Questions   Roomed by jeanie  "  Accompanied by self     History of Present Illness       Diabetes:   She presents for follow up of diabetes.  She is checking home blood glucose a few times a month.   She checks blood glucose before meals.  Blood glucose is never over 200 and never under 70.  When her blood glucose is low, the patient is asymptomatic for confusion, blurred vision, lethargy and reports not feeling dizzy, shaky, or weak.  She is concerned about other.   She is having burning in feet.            She eats 2-3 servings of fruits and vegetables daily.She consumes 0 sweetened beverage(s) daily.She exercises with enough effort to increase her heart rate 60 or more minutes per day.  She exercises with enough effort to increase her heart rate 7 days per week.   She is taking medications regularly.        Pt doing well  Wants to get off some meds  She stopped gabapentin and neuropathy is manageable with otc spray  Will have her stop the low dose hydrochlorothiazide and monitor her BP to see if she really needs it.       Review of Systems  Constitutional, HEENT, cardiovascular, pulmonary, gi and gu systems are negative, except as otherwise noted.      Objective    /68   Pulse 62   Temp 98.4  F (36.9  C) (Oral)   Resp 17   Ht 1.588 m (5' 2.5\")   Wt 57.6 kg (127 lb)   SpO2 97%   BMI 22.86 kg/m    Body mass index is 22.86 kg/m .  Physical Exam   GENERAL: alert and no distress  NECK: no adenopathy, no asymmetry, masses, or scars  RESP: lungs clear to auscultation - no rales, rhonchi or wheezes  CV: regular rate and rhythm, normal S1 S2, no S3 or S4, no murmur, click or rub, no peripheral edema   ABDOMEN: soft, nontender, no hepatosplenomegaly, no masses and bowel sounds normal  MS: no gross musculoskeletal defects noted, no edema  Diabetic foot exam: normal DP and PT pulses, no trophic changes or ulcerative lesions, and normal sensory exam    Results for orders placed or performed in visit on 04/30/25 (from the past 24 hours) "   **CBC with platelets differential FUTURE 2mo    Narrative    The following orders were created for panel order **CBC with platelets differential FUTURE 2mo.  Procedure                               Abnormality         Status                     ---------                               -----------         ------                     CBC with platelets and ...[[id number]]                      In process                   Please view results for these tests on the individual orders.   Aldosterone Renin Ratio    Narrative    The following orders were created for panel order Aldosterone Renin Ratio.  Procedure                               Abnormality         Status                     ---------                               -----------         ------                     Aldosterone[[id number]]                                     In process                 Renin activity[[id number]]                                  In process                 Aldosterone Renin Ratio[[id number]]                         In process                   Please view results for these tests on the individual orders.           Signed Electronically by: Lisette Olson MD

## 2025-05-01 DIAGNOSIS — N30.00 ACUTE CYSTITIS WITHOUT HEMATURIA: ICD-10-CM

## 2025-05-01 LAB
BACTERIA UR CULT: ABNORMAL
POTASSIUM SERPL-SCNC: 4.1 MMOL/L (ref 3.4–5.3)
TSH SERPL DL<=0.005 MIU/L-ACNC: 3.33 UIU/ML (ref 0.3–4.2)

## 2025-05-01 RX ORDER — CEPHALEXIN 500 MG/1
500 CAPSULE ORAL 2 TIMES DAILY
Qty: 14 CAPSULE | Refills: 0 | Status: SHIPPED | OUTPATIENT
Start: 2025-05-01

## 2025-05-03 LAB — RENIN PLAS-CCNC: 26.4 NG/ML/HR

## 2025-05-04 LAB — ALDOST/RENIN PLAS-RTO: 0.3 {RATIO} (ref 0–25)

## 2025-05-20 DIAGNOSIS — E11.42 TYPE 2 DIABETES MELLITUS WITH DIABETIC POLYNEUROPATHY, WITHOUT LONG-TERM CURRENT USE OF INSULIN (H): ICD-10-CM

## 2025-05-20 RX ORDER — EMPAGLIFLOZIN 25 MG/1
25 TABLET, FILM COATED ORAL DAILY
Qty: 90 TABLET | Refills: 0 | Status: SHIPPED | OUTPATIENT
Start: 2025-05-20

## 2025-07-02 ENCOUNTER — MYC MEDICAL ADVICE (OUTPATIENT)
Dept: FAMILY MEDICINE | Facility: CLINIC | Age: 85
End: 2025-07-02
Payer: COMMERCIAL

## 2025-07-02 NOTE — TELEPHONE ENCOUNTER
Provider:  Please see [employer] message from the patient. Last addressed at her 4/30/2025 O.V. Would you like an E-visit for this request?  Thank you. Asia Conklin R.N.

## 2025-07-07 DIAGNOSIS — G47.00 INSOMNIA, UNSPECIFIED TYPE: ICD-10-CM

## 2025-07-07 RX ORDER — TRAZODONE HYDROCHLORIDE 50 MG/1
50 TABLET ORAL AT BEDTIME
Qty: 90 TABLET | Refills: 2 | Status: SHIPPED | OUTPATIENT
Start: 2025-07-07

## 2025-07-23 ENCOUNTER — OFFICE VISIT (OUTPATIENT)
Dept: OPHTHALMOLOGY | Facility: CLINIC | Age: 85
End: 2025-07-23
Payer: COMMERCIAL

## 2025-07-23 DIAGNOSIS — E11.9 TYPE 2 DIABETES MELLITUS WITHOUT COMPLICATION, WITHOUT LONG-TERM CURRENT USE OF INSULIN (H): ICD-10-CM

## 2025-07-23 DIAGNOSIS — D31.31 CHOROIDAL NEVUS OF BOTH EYES: ICD-10-CM

## 2025-07-23 DIAGNOSIS — H52.03 HYPEROPIA OF BOTH EYES WITH ASTIGMATISM AND PRESBYOPIA: ICD-10-CM

## 2025-07-23 DIAGNOSIS — H18.513 FUCHS' CORNEAL DYSTROPHY OF BOTH EYES: ICD-10-CM

## 2025-07-23 DIAGNOSIS — Z96.1 PSEUDOPHAKIA: ICD-10-CM

## 2025-07-23 DIAGNOSIS — H52.203 HYPEROPIA OF BOTH EYES WITH ASTIGMATISM AND PRESBYOPIA: ICD-10-CM

## 2025-07-23 DIAGNOSIS — H04.123 DRY EYE SYNDROME, BILATERAL: ICD-10-CM

## 2025-07-23 DIAGNOSIS — D31.32 CHOROIDAL NEVUS OF BOTH EYES: ICD-10-CM

## 2025-07-23 DIAGNOSIS — Z01.00 EXAMINATION OF EYES AND VISION: Primary | ICD-10-CM

## 2025-07-23 DIAGNOSIS — H52.4 HYPEROPIA OF BOTH EYES WITH ASTIGMATISM AND PRESBYOPIA: ICD-10-CM

## 2025-07-23 ASSESSMENT — REFRACTION_MANIFEST
OS_SPHERE: +1.00
OS_CYLINDER: SPHERE
OD_CYLINDER: +0.50
OS_ADD: +3.00
OD_SPHERE: +1.25
OD_AXIS: 075
OD_ADD: +3.00

## 2025-07-23 ASSESSMENT — CONF VISUAL FIELD
OS_NORMAL: 1
OS_INFERIOR_NASAL_RESTRICTION: 0
OD_SUPERIOR_NASAL_RESTRICTION: 0
OS_SUPERIOR_TEMPORAL_RESTRICTION: 0
OD_INFERIOR_NASAL_RESTRICTION: 0
OS_INFERIOR_TEMPORAL_RESTRICTION: 0
OD_NORMAL: 1
OD_INFERIOR_TEMPORAL_RESTRICTION: 0
OD_SUPERIOR_TEMPORAL_RESTRICTION: 0
OS_SUPERIOR_NASAL_RESTRICTION: 0

## 2025-07-23 ASSESSMENT — EXTERNAL EXAM - RIGHT EYE: OD_EXAM: NORMAL

## 2025-07-23 ASSESSMENT — VISUAL ACUITY
OS_CC+: -1
OD_CC: 20/40
CORRECTION_TYPE: GLASSES
OS_CC: 20/40
OD_CC+: +2
METHOD: SNELLEN - LINEAR

## 2025-07-23 ASSESSMENT — REFRACTION_WEARINGRX
OS_ADD: +3.00
OS_AXIS: 172
OS_CYLINDER: +0.75
OS_SPHERE: +0.50
OD_ADD: +3.00
OD_SPHERE: +1.25
OD_AXIS: 075
OD_CYLINDER: +1.00

## 2025-07-23 ASSESSMENT — TONOMETRY
OS_IOP_MMHG: 16
IOP_METHOD: APPLANATION
OD_IOP_MMHG: 16

## 2025-07-23 ASSESSMENT — CUP TO DISC RATIO
OS_RATIO: 0.1
OD_RATIO: 0.1

## 2025-07-23 ASSESSMENT — EXTERNAL EXAM - LEFT EYE: OS_EXAM: NORMAL

## 2025-07-23 NOTE — LETTER
"7/23/2025      Orquidea Stevens  [address] [address] [address]      Dear Colleague,    Thank you for referring your patient, Orquidea Stevens, to the [facility]. Please see a copy of my visit note below.     Current Eye Medications: Artificial tears as needed      Subjective: Here for Diabetic Eye Exam. Patient complains of vision decrease in the distance since last visit. Distance vision is blurry. Right eye is more blurry. Eyes feel dry often.     Lab Results   Component Value Date    A1C 7.5 04/30/2025    A1C 7.9 10/24/2024    A1C 8.0 04/15/2024    A1C 10.0 10/16/2023    A1C 8.6 03/27/2023    A1C 7.6 10/20/2020    A1C 7.5 03/02/2020    A1C 7.9 08/21/2019    A1C 7.4 12/10/2018    A1C 6.8 05/03/2018        Objective:  See Ophthalmology Exam.       Assessment:  Orquidea Stevens is a 84 year old female who presents with:   Encounter Diagnoses   Name Primary?     Examination of eyes and vision      Type 2 diabetes mellitus without complication, without long-term current use of insulin (H) No diabetic retinopathy      Fuchs' corneal dystrophy of both eyes      Pseudophakia - Both Eyes s/p YAG cap OU      Choroidal nevus of both eyes      Dry eye syndrome, bilateral      Hyperopia of both eyes with astigmatism and presbyopia      Stable eye exam.      Plan:  Use artificial tears more consistently (2-3 times per day) (Refresh Optive or Systane Balance. Avoid \"get the red out\" drops).      Glasses prescription given      Use a warm compress on the eyes daily     Keep blood sugars and blood pressure under good control.      Lucie Gordillo MD  [phone]     Patient Education  Diabetes weakens the blood vessels all over the body, including the eyes. Damage to the blood vessels in the eyes can cause swelling or bleeding into part of the eye (called the retina). This is called diabetic retinopathy (JULIAN-tin-AH-puh-thee). If not treated, this disease can cause vision loss or blindness. "   Symptoms may include blurred or distorted vision, but many people have no symptoms. It's important to see your eye doctor regularly to check for problems.   Early treatment and good control can help protect your vision. Here are the things you can do to help prevent vision loss:      1. Keep your blood sugar levels under tight control.      2. Bring high blood pressure under control.      3. No smoking.      4. Have yearly dilated eye exams.        Again, thank you for allowing me to participate in the care of your patient.        Sincerely,        Lucie Gordillo MD    Electronically signed

## 2025-07-23 NOTE — PROGRESS NOTES
" Current Eye Medications: Artificial tears as needed      Subjective: Here for Diabetic Eye Exam. Patient complains of vision decrease in the distance since last visit. Distance vision is blurry. Right eye is more blurry. Eyes feel dry often.     Lab Results   Component Value Date    A1C 7.5 04/30/2025    A1C 7.9 10/24/2024    A1C 8.0 04/15/2024    A1C 10.0 10/16/2023    A1C 8.6 03/27/2023    A1C 7.6 10/20/2020    A1C 7.5 03/02/2020    A1C 7.9 08/21/2019    A1C 7.4 12/10/2018    A1C 6.8 05/03/2018        Objective:  See Ophthalmology Exam.       Assessment:  Orquidea Stevens is a 84 year old female who presents with:   Encounter Diagnoses   Name Primary?    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      Plan:  Use artificial tears more consistently (2-3 times per day) (Refresh Optive or Systane Balance. Avoid \"get the red out\" drops).      Glasses prescription given      Use a warm compress on the eyes daily     Keep blood sugars and blood pressure under good control.      Lucie Gordillo MD  [phone]     Patient Education  Diabetes weakens the blood vessels all over the body, including the eyes. Damage to the blood vessels in the eyes can cause swelling or bleeding into part of the eye (called the retina). This is called diabetic retinopathy (JULIAN-tin--puh-thee). If not treated, this disease can cause vision loss or blindness.   Symptoms may include blurred or distorted vision, but many people have no symptoms. It's important to see your eye doctor regularly to check for problems.   Early treatment and good control can help protect your vision. Here are the things you can " do to help prevent vision loss:      1. Keep your blood sugar levels under tight control.      2. Bring high blood pressure under control.      3. No smoking.      4. Have yearly dilated eye exams.

## 2025-07-23 NOTE — PATIENT INSTRUCTIONS
"Use artificial tears more consistently (2-3 times per day) (Refresh Optive or Systane Balance. Avoid \"get the red out\" drops).      Glasses prescription given      Use a warm compress on the eyes daily     Keep blood sugars and blood pressure under good control.      Lucie Gordillo MD  [phone]     Patient Education   Diabetes weakens the blood vessels all over the body, including the eyes. Damage to the blood vessels in the eyes can cause swelling or bleeding into part of the eye (called the retina). This is called diabetic retinopathy (JULIAN-tin--pu-thee). If not treated, this disease can cause vision loss or blindness.   Symptoms may include blurred or distorted vision, but many people have no symptoms. It's important to see your eye doctor regularly to check for problems.   Early treatment and good control can help protect your vision. Here are the things you can do to help prevent vision loss:      1. Keep your blood sugar levels under tight control.      2. Bring high blood pressure under control.      3. No smoking.      4. Have yearly dilated eye exams.      "

## (undated) DEVICE — LINEN TOWEL PACK X5 5464

## (undated) DEVICE — PACK CATARACT CUSTOM SO DALE SEY32CTFCX

## (undated) DEVICE — GLOVE PROTEXIS MICRO 6.5  2D73PM65

## (undated) DEVICE — GLOVE PROTEXIS MICRO 7.0  2D73PM70

## (undated) DEVICE — GLOVE PROTEXIS W/NEU-THERA 7.0  2D73TE70

## (undated) DEVICE — EYE SHIELD PLASTIC

## (undated) DEVICE — EYE SOL BSS 500ML

## (undated) DEVICE — EYE PACK BVI READYPAK KIT #1

## (undated) DEVICE — EYE TIP IRRIGATION & ASPIRATION POLYMER 35D BENT 8065751511

## (undated) DEVICE — EYE PACK CUSTOM ANTERIOR 30DEG TIP CENTURION PPK6682-04

## (undated) DEVICE — EYE KNIFE SLIT XSTAR VISITEC 2.4MM 45DEG BEVEL UP 373724

## (undated) RX ORDER — FLURBIPROFEN SODIUM 0.3 MG/ML
SOLUTION/ DROPS OPHTHALMIC
Status: DISPENSED
Start: 2017-08-28

## (undated) RX ORDER — ONDANSETRON 2 MG/ML
INJECTION INTRAMUSCULAR; INTRAVENOUS
Status: DISPENSED
Start: 2017-08-28